# Patient Record
Sex: FEMALE | Race: BLACK OR AFRICAN AMERICAN | NOT HISPANIC OR LATINO | Employment: UNEMPLOYED | URBAN - METROPOLITAN AREA
[De-identification: names, ages, dates, MRNs, and addresses within clinical notes are randomized per-mention and may not be internally consistent; named-entity substitution may affect disease eponyms.]

---

## 2017-08-04 DIAGNOSIS — E87.6 HYPOKALEMIA: ICD-10-CM

## 2017-08-04 RX ORDER — POTASSIUM CHLORIDE 750 MG/1
CAPSULE, EXTENDED RELEASE ORAL
Qty: 90 CAPSULE | Refills: 0 | Status: SHIPPED | OUTPATIENT
Start: 2017-08-04 | End: 2017-09-01 | Stop reason: SDUPTHER

## 2017-08-07 ENCOUNTER — TELEPHONE (OUTPATIENT)
Dept: FAMILY MEDICINE | Facility: CLINIC | Age: 52
End: 2017-08-07

## 2017-08-07 NOTE — TELEPHONE ENCOUNTER
----- Message from Sheree Parson sent at 8/7/2017  2:18 PM CDT -----  Contact: Lynn  Patient is calling again as called earlier regarding an appointment to be seen for ear pain, sore throat, and nose irritation as about to go out of town. Please call [phone] or [phone]. Thanks!

## 2017-08-07 NOTE — TELEPHONE ENCOUNTER
----- Message from Giuliana Arita sent at 8/7/2017 12:37 PM CDT -----  Contact: patient  Patient calling in regards to scheduling a same day appt today. Her nose/ears and throat irritated- sore throat. No avail appt. Please advise.  Call back   Thanks!

## 2017-08-07 NOTE — TELEPHONE ENCOUNTER
Patient has been called and a appointment has been scheduled for her , patient has verbalized full understanding.

## 2017-08-08 ENCOUNTER — DOCUMENTATION ONLY (OUTPATIENT)
Dept: FAMILY MEDICINE | Facility: CLINIC | Age: 52
End: 2017-08-08

## 2017-08-08 DIAGNOSIS — Z51.81 THERAPEUTIC DRUG MONITORING: Primary | ICD-10-CM

## 2017-08-08 DIAGNOSIS — M54.2 CERVICALGIA: ICD-10-CM

## 2017-08-08 RX ORDER — IBUPROFEN 800 MG/1
800 TABLET ORAL 3 TIMES DAILY PRN
Qty: 270 TABLET | Refills: 0 | Status: SHIPPED | OUTPATIENT
Start: 2017-08-08 | End: 2017-09-01 | Stop reason: SDUPTHER

## 2017-08-08 NOTE — PROGRESS NOTES
Pre-Visit Chart Review  For Appointment Scheduled on 8/9/17    Health Maintenance Due   Topic Date Due    Colonoscopy  02/21/2014    Pap Smear with HPV Cotest  08/01/2016    Influenza Vaccine  08/01/2017

## 2017-08-08 NOTE — TELEPHONE ENCOUNTER
Last refill was for 90 days and was a year ago?????  Due for cbc and creatinine to check for problems with nsaid.  Orders in

## 2017-08-09 ENCOUNTER — HOSPITAL ENCOUNTER (OUTPATIENT)
Dept: RADIOLOGY | Facility: CLINIC | Age: 52
Discharge: HOME | End: 2017-08-09
Attending: INTERNAL MEDICINE
Payer: COMMERCIAL

## 2017-08-09 DIAGNOSIS — Z12.31 ENCOUNTER FOR SCREENING MAMMOGRAM FOR MALIGNANT NEOPLASM OF BREAST: ICD-10-CM

## 2017-08-09 PROCEDURE — 77067 SCR MAMMO BI INCL CAD: CPT | Mod: TC

## 2017-08-09 PROCEDURE — 77063 BREAST TOMOSYNTHESIS BI: CPT | Mod: 26,,, | Performed by: RADIOLOGY

## 2017-08-09 PROCEDURE — 77067 SCR MAMMO BI INCL CAD: CPT | Mod: 26,,, | Performed by: RADIOLOGY

## 2017-08-24 DIAGNOSIS — Z12.11 COLON CANCER SCREENING: ICD-10-CM

## 2017-08-31 ENCOUNTER — DOCUMENTATION ONLY (OUTPATIENT)
Dept: FAMILY MEDICINE | Facility: CLINIC | Age: 52
End: 2017-08-31

## 2017-08-31 NOTE — PROGRESS NOTES
Pre-Visit Chart Review  For Appointment Scheduled on 09/01/2017      Health Maintenance Due   Topic Date Due    Colonoscopy  02/21/2014    Pap Smear with HPV Cotest  08/01/2016    Influenza Vaccine  08/01/2017

## 2017-09-01 ENCOUNTER — OFFICE VISIT (OUTPATIENT)
Dept: FAMILY MEDICINE | Facility: CLINIC | Age: 52
End: 2017-09-01
Payer: COMMERCIAL

## 2017-09-01 ENCOUNTER — LAB VISIT (OUTPATIENT)
Dept: LAB | Facility: HOSPITAL | Age: 52
End: 2017-09-01
Attending: NURSE PRACTITIONER
Payer: COMMERCIAL

## 2017-09-01 VITALS
HEIGHT: 64 IN | SYSTOLIC BLOOD PRESSURE: 137 MMHG | DIASTOLIC BLOOD PRESSURE: 84 MMHG | BODY MASS INDEX: 34.21 KG/M2 | TEMPERATURE: 99 F | WEIGHT: 200.38 LBS | HEART RATE: 81 BPM

## 2017-09-01 DIAGNOSIS — Z12.11 SCREENING FOR COLON CANCER: ICD-10-CM

## 2017-09-01 DIAGNOSIS — E56.9 UNSPECIFIED VITAMIN DEFICIENCY: ICD-10-CM

## 2017-09-01 DIAGNOSIS — M50.30 DDD (DEGENERATIVE DISC DISEASE), CERVICAL: ICD-10-CM

## 2017-09-01 DIAGNOSIS — E87.6 HYPOKALEMIA: ICD-10-CM

## 2017-09-01 DIAGNOSIS — Z13.21 SCREENING FOR MALNUTRITION: Primary | ICD-10-CM

## 2017-09-01 DIAGNOSIS — Z13.6 SCREENING FOR ISCHEMIC HEART DISEASE: ICD-10-CM

## 2017-09-01 DIAGNOSIS — R53.83 FATIGUE: ICD-10-CM

## 2017-09-01 DIAGNOSIS — K59.00 CONSTIPATION, UNSPECIFIED CONSTIPATION TYPE: ICD-10-CM

## 2017-09-01 DIAGNOSIS — Z00.00 ANNUAL PHYSICAL EXAM: Primary | ICD-10-CM

## 2017-09-01 DIAGNOSIS — Z12.4 CERVICAL CANCER SCREENING: ICD-10-CM

## 2017-09-01 DIAGNOSIS — R60.9 EDEMA, UNSPECIFIED TYPE: ICD-10-CM

## 2017-09-01 DIAGNOSIS — L29.9 ITCHING: ICD-10-CM

## 2017-09-01 DIAGNOSIS — G47.00 INSOMNIA, UNSPECIFIED TYPE: ICD-10-CM

## 2017-09-01 DIAGNOSIS — Z51.81 THERAPEUTIC DRUG MONITORING: ICD-10-CM

## 2017-09-01 DIAGNOSIS — M54.2 CERVICALGIA: ICD-10-CM

## 2017-09-01 LAB
BASOPHILS # BLD AUTO: 0.02 K/UL
BASOPHILS NFR BLD: 0.3 %
CREAT SERPL-MCNC: 1 MG/DL
DIFFERENTIAL METHOD: NORMAL
EOSINOPHIL # BLD AUTO: 0.2 K/UL
EOSINOPHIL NFR BLD: 3.6 %
ERYTHROCYTE [DISTWIDTH] IN BLOOD BY AUTOMATED COUNT: 13.3 %
EST. GFR  (AFRICAN AMERICAN): >60 ML/MIN/1.73 M^2
EST. GFR  (NON AFRICAN AMERICAN): >60 ML/MIN/1.73 M^2
HCT VFR BLD AUTO: 43.6 %
HGB BLD-MCNC: 14.3 G/DL
LYMPHOCYTES # BLD AUTO: 3 K/UL
LYMPHOCYTES NFR BLD: 45.6 %
MCH RBC QN AUTO: 29.1 PG
MCHC RBC AUTO-ENTMCNC: 32.8 G/DL
MCV RBC AUTO: 89 FL
MONOCYTES # BLD AUTO: 0.4 K/UL
MONOCYTES NFR BLD: 5.7 %
NEUTROPHILS # BLD AUTO: 3 K/UL
NEUTROPHILS NFR BLD: 44.6 %
PLATELET # BLD AUTO: 326 K/UL
PMV BLD AUTO: 9.8 FL
RBC # BLD AUTO: 4.92 M/UL
WBC # BLD AUTO: 6.66 K/UL

## 2017-09-01 PROCEDURE — 85025 COMPLETE CBC W/AUTO DIFF WBC: CPT | Mod: 91

## 2017-09-01 PROCEDURE — 99999 PR PBB SHADOW E&M-EST. PATIENT-LVL IV: CPT | Mod: PBBFAC,,, | Performed by: PHYSICIAN ASSISTANT

## 2017-09-01 PROCEDURE — 99214 OFFICE O/P EST MOD 30 MIN: CPT | Mod: S$GLB,,, | Performed by: PHYSICIAN ASSISTANT

## 2017-09-01 PROCEDURE — 82565 ASSAY OF CREATININE: CPT

## 2017-09-01 PROCEDURE — 3008F BODY MASS INDEX DOCD: CPT | Mod: S$GLB,,, | Performed by: PHYSICIAN ASSISTANT

## 2017-09-01 RX ORDER — CYCLOBENZAPRINE HCL 10 MG
10 TABLET ORAL 3 TIMES DAILY PRN
Qty: 100 TABLET | Refills: 5 | Status: SHIPPED | OUTPATIENT
Start: 2017-09-01 | End: 2018-10-10 | Stop reason: SDUPTHER

## 2017-09-01 RX ORDER — HYDROCODONE BITARTRATE AND ACETAMINOPHEN 5; 325 MG/1; MG/1
1 TABLET ORAL EVERY 6 HOURS PRN
Qty: 100 TABLET | Refills: 0 | Status: CANCELLED | OUTPATIENT
Start: 2017-09-01

## 2017-09-01 RX ORDER — HYDROXYZINE HYDROCHLORIDE 25 MG/1
25 TABLET, FILM COATED ORAL 3 TIMES DAILY PRN
Qty: 90 TABLET | Refills: 1 | Status: SHIPPED | OUTPATIENT
Start: 2017-09-01 | End: 2017-11-06 | Stop reason: SDUPTHER

## 2017-09-01 RX ORDER — TRAZODONE HYDROCHLORIDE 100 MG/1
100 TABLET ORAL NIGHTLY
Qty: 90 TABLET | Refills: 3 | Status: SHIPPED | OUTPATIENT
Start: 2017-09-01 | End: 2018-10-10 | Stop reason: SDUPTHER

## 2017-09-01 RX ORDER — POTASSIUM CHLORIDE 750 MG/1
10 CAPSULE, EXTENDED RELEASE ORAL DAILY
Qty: 90 CAPSULE | Refills: 3 | Status: SHIPPED | OUTPATIENT
Start: 2017-09-01 | End: 2018-10-10 | Stop reason: SDUPTHER

## 2017-09-01 RX ORDER — FUROSEMIDE 40 MG/1
40 TABLET ORAL DAILY
Qty: 90 TABLET | Refills: 3 | Status: SHIPPED | OUTPATIENT
Start: 2017-09-01 | End: 2018-10-10 | Stop reason: SDUPTHER

## 2017-09-01 RX ORDER — IBUPROFEN 800 MG/1
800 TABLET ORAL 3 TIMES DAILY PRN
Qty: 270 TABLET | Refills: 1 | Status: SHIPPED | OUTPATIENT
Start: 2017-09-01 | End: 2018-10-10 | Stop reason: SDUPTHER

## 2017-09-01 NOTE — TELEPHONE ENCOUNTER
Needs ov with me.  Schedule 2 opiate.  Still has clonazepam on her med list.  Not allowed to have opiates if taking benzodiazepines or sleep meds.  (does not include trazodone-its okay)

## 2017-09-01 NOTE — PATIENT INSTRUCTIONS
[email or web address]  [email or web address]  Magnesium 500mg a nigh    Weight Management: Getting Started  Healthy bodies come in all shapes and sizes. Not all bodies are made to be thin. For some people, a healthy weight is higher than the average weight listed on weight charts. Your healthcare provider can help you decide on a healthy weight for you.    Reasons to lose weight  Losing weight can help with some health problems, such as high blood pressure, heart disease, diabetes, sleep apnea, and arthritis. You may also feel more energy.  Set your long-term goal  Your goal doesn't even have to be a specific weight. You may decide on a fitness goal (such as being able to walk 10 miles a week), or a health goal (such as lowering your blood pressure). Choose a goal that is measurable and reasonable, so you know when you've reached it. A goal of reaching a BMI of less than 25 is not always reasonable (or possible).   Make an action plan  Habits dont change overnight. Setting your goals too high can leave you feeling discouraged if you cant reach them. Be realistic. Choose one or two small changes you can make now. Set an action plan for how you are going to make these changes. When you can stick to this plan, keep making a few more small changes. Taking small steps will help you stay on the path to success.  Track your progress  Write down your goals. Then, keep a daily record of your progress. Write down what you eat and how active you are. This record lets you look back on how much youve done. It may also help when youre feeling frustrated. Reward yourself for success. Even if you dont reach every goal, give yourself credit for what you do get done.  Get support  Encouragement from others can help make losing weight easier. Ask your family members and friends for support. They may even want to join you. Also look to your healthcare provider, registered dietitian, and  for help. Your  local hospital can give you more information about nutrition, exercise, and weight loss.  Date Last Reviewed: 1/31/2016 © 2000-2016 [employer]. [address] [place] [address], [place] [address]. All rights reserved. This information is not intended as a substitute for professional medical care. Always follow your healthcare professional's instructions.        Walking for Fitness  Fitness walking has something for everyone, even people who are already fit. Walking is one of the safest ways to condition your body aerobically. It can boost energy, help you lose weight, and reduce stress.    Physical benefits  · Walking strengthens your heart and lungs, and tones your muscles.  · When walking, your feet land with less impact than in other sports. This reduces chances of muscle, bone, and joint injury.  · Regular walking improves your cholesterol levels and lowers your risk of heart disease. And it helps you control your blood sugar if you have diabetes.  · Walking is a weight-bearing activity, which helps maintain bone density. This can help prevent osteoporosis.  Personal rewards  · Taking walks can help you relax and manage stress. And fitness walking may make you feel better about yourself.  · Walking can help you sleep better at night and make you less likely to be depressed.  · Regular walking may help maintain your memory as you get older.  · Walking is a great way to spend extra time with friends and family members. Be sure to invite your dog along!  Q&A about fitness walking  Q: Will walking keep me fit?  A: Yes. Regular walking at the right pace gives you all the benefits of other aerobic activities, such as jogging and swimming.  Q: Will walking help me lose weight and keep it off?  A: Yes. Per mile, walking can burn as many calories as jogging. Your health care provider can help work walking into your weight-loss plan.  Q: Is walking safe for my health?  A: Yes. Walking is safe if you have high blood  pressure, diabetes, heart disease, or other conditions. Talk to your health care provider before you start.  Date Last Reviewed: 5/9/2015  © 5826-8179 [employer]. [address] [place] [address], [place] [address]. All rights reserved. This information is not intended as a substitute for professional medical care. Always follow your healthcare professional's instructions.

## 2017-09-01 NOTE — PROGRESS NOTES
Subjective:       Patient ID: Lynn Naidu is a 52 y.o. female.    Chief Complaint: Annual Exam    Patient is 51 yo F who is here for yearly physical. She is due for blood work and medication refills. She would like to discuss options for bariatric surgery. She is still complaining of constipation despite taking Linzess 290 daily.       Review of Systems   Constitutional: Negative for chills, fatigue and fever.   HENT: Negative for ear pain, rhinorrhea and sore throat.    Respiratory: Negative for cough and shortness of breath.    Cardiovascular: Negative for chest pain and palpitations.   Gastrointestinal: Positive for constipation. Negative for abdominal pain, nausea and vomiting.   Genitourinary: Negative for difficulty urinating and dysuria.   Skin: Negative for rash and wound.   Neurological: Negative for light-headedness and headaches.   Psychiatric/Behavioral: Negative.        Objective:      Physical Exam   Constitutional: She is oriented to person, place, and time. She appears well-developed and well-nourished.   HENT:   Head: Normocephalic and atraumatic.   Eyes: Conjunctivae are normal. Pupils are equal, round, and reactive to light.   Neck: Normal range of motion. Neck supple. No JVD present.   Cardiovascular: Normal rate and regular rhythm.  Exam reveals no gallop and no friction rub.    No murmur heard.  Pulmonary/Chest: Effort normal and breath sounds normal. No respiratory distress. She has no wheezes. She has no rales.   Neurological: She is alert and oriented to person, place, and time.   Skin: Skin is warm and dry.   Psychiatric: She has a normal mood and affect. Her behavior is normal. Judgment and thought content normal.       Assessment:       1. Annual physical exam    2. Cervical cancer screening    3. Screening for colon cancer    4. Hypokalemia    5. Edema, unspecified type    6. Cervicalgia    7. Insomnia, unspecified type    8. Constipation, unspecified constipation type    9. Itching         Plan:       Lynn was seen today for annual exam.    Diagnoses and all orders for this visit:    Annual physical exam  -     CBC auto differential; Future  -     Comprehensive metabolic panel; Future  -     Lipid panel; Future    Cervical cancer screening  -     Ambulatory Referral to Obstetrics / Gynecology    Screening for colon cancer  -     Ambulatory referral to Gastroenterology    Hypokalemia  -     potassium chloride (MICRO-K) 10 MEQ CpSR; Take 1 capsule (10 mEq total) by mouth once daily.    Edema, unspecified type  -     furosemide (LASIX) 40 MG tablet; Take 1 tablet (40 mg total) by mouth once daily.    Cervicalgia  -     cyclobenzaprine (FLEXERIL) 10 MG tablet; Take 1 tablet (10 mg total) by mouth 3 (three) times daily as needed.  -     ibuprofen (ADVIL,MOTRIN) 800 MG tablet; Take 1 tablet (800 mg total) by mouth 3 (three) times daily as needed.    Insomnia, unspecified type  -     trazodone (DESYREL) 100 MG tablet; Take 1 tablet (100 mg total) by mouth every evening.    Constipation, unspecified constipation type  -     linaclotide 290 mcg Cap; Take 1 capsule (290 mcg total) by mouth once daily.    Itching  -     hydrOXYzine HCl (ATARAX) 25 MG tablet; Take 1 tablet (25 mg total) by mouth 3 (three) times daily as needed for Itching.    Given resources for bariatric surgery  Encouraged to take OTC Mg 500 mg to aid in constipation relief

## 2017-09-19 ENCOUNTER — DOCUMENTATION ONLY (OUTPATIENT)
Dept: FAMILY MEDICINE | Facility: CLINIC | Age: 52
End: 2017-09-19

## 2017-09-19 NOTE — PROGRESS NOTES
Pre-Visit Chart Review  For Appointment Scheduled on 9-21-17    Health Maintenance Due   Topic Date Due    Colonoscopy  02/21/2014    Pap Smear with HPV Cotest  08/01/2016    Influenza Vaccine  08/01/2017

## 2017-09-21 ENCOUNTER — OFFICE VISIT (OUTPATIENT)
Dept: FAMILY MEDICINE | Facility: CLINIC | Age: 52
End: 2017-09-21
Payer: COMMERCIAL

## 2017-09-21 VITALS
TEMPERATURE: 98 F | HEIGHT: 64 IN | RESPIRATION RATE: 20 BRPM | BODY MASS INDEX: 34.33 KG/M2 | DIASTOLIC BLOOD PRESSURE: 92 MMHG | HEART RATE: 84 BPM | OXYGEN SATURATION: 97 % | WEIGHT: 201.06 LBS | SYSTOLIC BLOOD PRESSURE: 156 MMHG

## 2017-09-21 DIAGNOSIS — L50.9 URTICARIA: ICD-10-CM

## 2017-09-21 DIAGNOSIS — E55.9 VITAMIN D DEFICIENCY: ICD-10-CM

## 2017-09-21 DIAGNOSIS — M50.30 DDD (DEGENERATIVE DISC DISEASE), CERVICAL: Primary | ICD-10-CM

## 2017-09-21 DIAGNOSIS — M54.12 CERVICAL RADICULITIS: ICD-10-CM

## 2017-09-21 DIAGNOSIS — M50.20 CERVICAL DISC DISPLACEMENT: ICD-10-CM

## 2017-09-21 PROCEDURE — 99999 PR PBB SHADOW E&M-EST. PATIENT-LVL IV: CPT | Mod: PBBFAC,,, | Performed by: FAMILY MEDICINE

## 2017-09-21 PROCEDURE — 3008F BODY MASS INDEX DOCD: CPT | Mod: S$GLB,,, | Performed by: FAMILY MEDICINE

## 2017-09-21 PROCEDURE — 99214 OFFICE O/P EST MOD 30 MIN: CPT | Mod: S$GLB,,, | Performed by: FAMILY MEDICINE

## 2017-09-21 RX ORDER — MAGNESIUM 250 MG
2 TABLET ORAL DAILY PRN
COMMUNITY
End: 2019-10-15

## 2017-09-21 RX ORDER — HYDROCODONE BITARTRATE AND ACETAMINOPHEN 5; 325 MG/1; MG/1
1 TABLET ORAL EVERY 6 HOURS PRN
Qty: 100 TABLET | Refills: 0 | Status: SHIPPED | OUTPATIENT
Start: 2017-09-21 | End: 2019-01-02

## 2017-09-21 RX ORDER — ERGOCALCIFEROL 1.25 MG/1
50000 CAPSULE ORAL
Qty: 12 CAPSULE | Refills: 2 | Status: SHIPPED | OUTPATIENT
Start: 2017-09-21 | End: 2018-07-09 | Stop reason: SDUPTHER

## 2017-09-21 RX ORDER — ERGOCALCIFEROL 1.25 MG/1
50000 CAPSULE ORAL
COMMUNITY
End: 2017-09-21 | Stop reason: SDUPTHER

## 2017-09-21 NOTE — PATIENT INSTRUCTIONS
Walking for Fitness  Fitness walking has something for everyone, even people who are already fit. Walking is one of the safest ways to condition your body aerobically. It can boost energy, help you lose weight, and reduce stress.    Physical benefits  · Walking strengthens your heart and lungs, and tones your muscles.  · When walking, your feet land with less impact than in other sports. This reduces chances of muscle, bone, and joint injury.  · Regular walking improves your cholesterol levels and lowers your risk of heart disease. And it helps you control your blood sugar if you have diabetes.  · Walking is a weight-bearing activity, which helps maintain bone density. This can help prevent osteoporosis.  Personal rewards  · Taking walks can help you relax and manage stress. And fitness walking may make you feel better about yourself.  · Walking can help you sleep better at night and make you less likely to be depressed.  · Regular walking may help maintain your memory as you get older.  · Walking is a great way to spend extra time with friends and family members. Be sure to invite your dog along!  Q&A about fitness walking  Q: Will walking keep me fit?  A: Yes. Regular walking at the right pace gives you all the benefits of other aerobic activities, such as jogging and swimming.  Q: Will walking help me lose weight and keep it off?  A: Yes. Per mile, walking can burn as many calories as jogging. Your health care provider can help work walking into your weight-loss plan.  Q: Is walking safe for my health?  A: Yes. Walking is safe if you have high blood pressure, diabetes, heart disease, or other conditions. Talk to your healthcare provider before you start.  Date Last Reviewed: 4/1/2017 © 2000-2017 [employer]. [address] [place] [address], [place] [address]. All rights reserved. This information is not intended as a substitute for professional medical care. Always follow your healthcare professional's  instructions.        Weight Management: Getting Started  Healthy bodies come in all shapes and sizes. Not all bodies are made to be thin. For some people, a healthy weight is higher than the average weight listed on weight charts. Your healthcare provider can help you decide on a healthy weight for you.    Reasons to lose weight  Losing weight can help with some health problems, such as high blood pressure, heart disease, diabetes, sleep apnea, and arthritis. You may also feel more energy.  Set your long-term goal  Your goal doesn't even have to be a specific weight. You may decide on a fitness goal (such as being able to walk 10 miles a week), or a health goal (such as lowering your blood pressure). Choose a goal that is measurable and reasonable, so you know when you've reached it. A goal of reaching a BMI of less than 25 is not always reasonable (or possible).   Make an action plan  Habits dont change overnight. Setting your goals too high can leave you feeling discouraged if you cant reach them. Be realistic. Choose one or two small changes you can make now. Set an action plan for how you are going to make these changes. When you can stick to this plan, keep making a few more small changes. Taking small steps will help you stay on the path to success.  Track your progress  Write down your goals. Then, keep a daily record of your progress. Write down what you eat and how active you are. This record lets you look back on how much youve done. It may also help when youre feeling frustrated. Reward yourself for success. Even if you dont reach every goal, give yourself credit for what you do get done.  Get support  Encouragement from others can help make losing weight easier. Ask your family members and friends for support. They may even want to join you. Also look to your healthcare provider, registered dietitian, and  for help. Your local hospital can give you more information about  nutrition, exercise, and weight loss.  Date Last Reviewed: 1/31/2016  © 9449-1484 [employer]. [address] [place] [address], [place] [address]. All rights reserved. This information is not intended as a substitute for professional medical care. Always follow your healthcare professional's instructions.        Controlling High Blood Pressure  High blood pressure (hypertension) is often called the silent killer. This is because many people who have it dont know it. High blood pressure is defined as 140/90 mm Hg or higher. Know your blood pressure and remember to check it regularly. Doing so can save your life. Here are some things you can do to help control your blood pressure.    Choose heart-healthy foods  · Select low-salt, low-fat foods. Limit sodium intake to 2,400 mg per day or the amount suggested by your healthcare provider.  · Limit canned, dried, cured, packaged, and fast foods. These can contain a lot of salt.  · Eat 8 to 10 servings of fruits and vegetables every day.  · Choose lean meats, fish, or chicken.  · Eat whole-grain pasta, brown rice, and beans.  · Eat 2 to 3 servings of low-fat or fat-free dairy products.  · Ask your doctor about the DASH eating plan. This plan helps reduce blood pressure.  · When you go to a restaurant, ask that your meal be prepared with no added salt.  Maintain a healthy weight  · Ask your healthcare provider how many calories to eat a day. Then stick to that number.  · Ask your healthcare provider what weight range is healthiest for you. If you are overweight, a weight loss of only 3% to 5% of your body weight can help lower blood pressure. Generally, a good weight loss goal is to lose 10% of your body weight in a year.  · Limit snacks and sweets.  · Get regular exercise.  Get up and get active  · Choose activities you enjoy. Find ones you can do with friends or family. This includes bicycling, dancing, walking, and jogging.  · Park farther away from building  entrances.  · Use stairs instead of the elevator.  · When you can, walk or bike instead of driving.  · Triplett leaves, garden, or do household repairs.  · Be active at a moderate to vigorous level of physical activity for at least 40 minutes for a minimum of 3 to 4 days a week.   Manage stress  · Make time to relax and enjoy life. Find time to laugh.  · Communicate your concerns with your loved ones and your healthcare provider.  · Visit with family and friends, and keep up with hobbies.  Limit alcohol and quit smoking  · Men should have no more than 2 drinks per day.  · Women should have no more than 1 drink per day.  · Talk with your healthcare provider about quitting smoking. Smoking significantly increases your risk for heart disease and stroke. Ask your healthcare provider about community smoking cessation programs and other options.  Medicines  If lifestyle changes arent enough, your healthcare provider may prescribe high blood pressure medicine. Take all medicines as prescribed. If you have any questions about your medicines, ask your healthcare provider before stopping or changing them.   Date Last Reviewed: 4/27/2016 © 2000-2017 [employer]. [address] [place] [address], [place] [address]. All rights reserved. This information is not intended as a substitute for professional medical care. Always follow your healthcare professional's instructions.

## 2017-09-21 NOTE — PROGRESS NOTES
Subjective:       Patient ID: Lynn Naidu is a 52 y.o. female.    Chief Complaint: Medication Refill    52-year-old female presents for refill of [place].  She has degenerative disc disease with cervical radiculitis to the left shoulder and has a very sparing use of Norco.  She's been undergoing workup for possible gastric sleeve procedure with Dr. Frank.  They found on a screening ultrasound some mild abnormalities of the liver suggestive of intrinsic liver disease.  She has numerous liver panels going back 6 years or more all of which are completely normal and did a hepatitis panel that was negative as well.  The patient has no symptoms whatsoever.  They did not describe fatty liver disease but I suspect that what they are seeing is actually the early stages of it.  The patient complains of some urticaria generally involving the head and neck that occurs randomly several times a month.  She suspects it may be due to a food ALLERGY.  She went to a dermatologist he was not able to help her and she has no symptoms at this time.  She was found to have a very low vitamin D level during her workup of 8.  She was placed on high-dose vitamin D supplementation weekly but only given 3 month supply.    Past Medical History:  No date: Allergy  7-17-15: Bronchitis  No date: Cervical disc displacement  No date: Edema  No date: Insomnia  No date: Plantar fasciitis    History reviewed. No pertinent surgical history.      Current Outpatient Prescriptions:     BIONECT 0.2 % Crea, Apply topically daily as needed. , Disp: , Rfl:     clonazePAM (KLONOPIN) 0.5 MG tablet, Take 1 tablet (0.5 mg total) by mouth daily as needed., Disp: 30 tablet, Rfl: 0    cyclobenzaprine (FLEXERIL) 10 MG tablet, Take 1 tablet (10 mg total) by mouth 3 (three) times daily as needed., Disp: 100 tablet, Rfl: 5    desonide (DESOWEN) 0.05 % cream, Apply topically daily as needed. , Disp: , Rfl:     ergocalciferol (VITAMIN D2) 50,000 unit Cap, Take 1  capsule (50,000 Units total) by mouth every 7 days., Disp: 12 capsule, Rfl: 2    furosemide (LASIX) 40 MG tablet, Take 1 tablet (40 mg total) by mouth once daily., Disp: 90 tablet, Rfl: 3    hydrocodone-acetaminophen 5-325mg (NORCO) 5-325 mg per tablet, Take 1 tablet by mouth every 6 (six) hours as needed for Pain., Disp: 100 tablet, Rfl: 0    hydrOXYzine HCl (ATARAX) 25 MG tablet, Take 1 tablet (25 mg total) by mouth 3 (three) times daily as needed for Itching., Disp: 90 tablet, Rfl: 1    ibuprofen (ADVIL,MOTRIN) 800 MG tablet, Take 1 tablet (800 mg total) by mouth 3 (three) times daily as needed., Disp: 270 tablet, Rfl: 1    JUBLIA 10 % Tr, Apply topically nightly as needed. , Disp: , Rfl:     linaclotide 290 mcg Cap, Take 1 capsule (290 mcg total) by mouth once daily., Disp: 90 capsule, Rfl: 3    magnesium 250 mg Tab, Take 2 tablets by mouth once., Disp: , Rfl:     mometasone (NASONEX) 50 mcg/actuation nasal spray, 2 sprays by Nasal route daily as needed. , Disp: , Rfl:     montelukast (SINGULAIR) 10 mg tablet, Take 1 tablet (10 mg total) by mouth every evening. (Patient taking differently: Take 10 mg by mouth nightly as needed. ), Disp: 90 tablet, Rfl: 3    PANDEL 0.1 % Crea, Apply topically daily as needed. , Disp: , Rfl:     potassium chloride (MICRO-K) 10 MEQ CpSR, Take 1 capsule (10 mEq total) by mouth once daily., Disp: 90 capsule, Rfl: 3    scopolamine (TRANSDERM-SCOP) 1.5 mg (1 mg over 3 days), Place 1 patch (1.5 mg total) onto the skin every 72 hours. (Patient taking differently: Place 1 patch onto the skin every 72 hours as needed. ), Disp: 30 patch, Rfl: 2    SUDOGEST 30 mg tablet, Take 30 mg by mouth every 6 (six) hours as needed. , Disp: , Rfl:     SULFACLEANSE 8-4 8-4 % Susp, Apply topically nightly as needed. , Disp: , Rfl:     trazodone (DESYREL) 100 MG tablet, Take 1 tablet (100 mg total) by mouth every evening. (Patient taking differently: Take 100 mg by mouth nightly as needed.  ), Disp: 90 tablet, Rfl: 3    triamcinolone acetonide 0.1% (KENALOG) 0.1 % cream, Apply topically daily as needed. , Disp: , Rfl:         Review of Systems   Musculoskeletal: Positive for neck pain.   Skin: Positive for rash. Negative for color change and pallor.       Objective:      Physical Exam   Constitutional: She is oriented to person, place, and time. She appears well-developed. No distress.   Elevated blood pressure.  Patient reports she ate Chinese food which was a very salty last night.  Her physical 3 weeks ago had a normal blood pressure  Obese with BMI 35.1.  She is up 17.7 pounds since her last visit with me August 31, 2016   Abdominal: Soft. Normal appearance and bowel sounds are normal. She exhibits no shifting dullness, no distension, no pulsatile liver, no fluid wave, no abdominal bruit, no ascites, no pulsatile midline mass and no mass. There is no hepatosplenomegaly. There is no tenderness. There is no rigidity, no rebound, no guarding, no CVA tenderness, no tenderness at McBurney's point and negative Johnson's sign. No hernia.   Neurological: She is alert and oriented to person, place, and time.   Skin: She is not diaphoretic.   Psychiatric: She has a normal mood and affect. Her behavior is normal. Thought content normal.   Nursing note and vitals reviewed.      Assessment:       1. DDD (degenerative disc disease), cervical    2. Vitamin D deficiency    3. Urticaria    4. Cervical radiculitis    5. Cervical disc displacement        Plan:       1. DDD (degenerative disc disease), cervical   checked with no inappropriate activit- hydrocodone-acetaminophen 5-325mg (NORCO) 5-325 mg per tablet; Take 1 tablet by mouth every 6 (six) hours as needed for Pain.  Dispense: 100 tablet; Refill: 0    2. Vitamin D deficiency  - ergocalciferol (VITAMIN D2) 50,000 unit Cap; Take 1 capsule (50,000 Units total) by mouth every 7 days.  Dispense: 12 capsule; Refill: 2    3. Urticaria  Suggested she keep a food  diary for several weeks to months and then see ALLERGY  - Ambulatory referral to Allergy    4. Cervical radiculitis  Stable/chronic    5. Cervical disc displacement

## 2017-10-05 ENCOUNTER — TELEPHONE (OUTPATIENT)
Dept: GASTROENTEROLOGY | Facility: CLINIC | Age: 52
End: 2017-10-05

## 2017-10-05 NOTE — TELEPHONE ENCOUNTER
----- Message from Asha Gilliam sent at 10/5/2017  1:27 PM CDT -----  Contact: self [phone]  Please call her to reschedule the nurse appt.  Thank you!

## 2017-10-11 DIAGNOSIS — Z12.11 SCREENING FOR COLON CANCER: Primary | ICD-10-CM

## 2017-11-01 ENCOUNTER — HOSPITAL ENCOUNTER (OUTPATIENT)
Facility: HOSPITAL | Age: 52
Discharge: HOME | End: 2017-11-01
Attending: INTERNAL MEDICINE | Admitting: INTERNAL MEDICINE
Payer: COMMERCIAL

## 2017-11-01 ENCOUNTER — SURGERY (OUTPATIENT)
Age: 52
End: 2017-11-01

## 2017-11-01 ENCOUNTER — ANESTHESIA EVENT (OUTPATIENT)
Dept: ENDOSCOPY | Facility: HOSPITAL | Age: 52
End: 2017-11-01
Payer: COMMERCIAL

## 2017-11-01 ENCOUNTER — ANESTHESIA (OUTPATIENT)
Dept: ENDOSCOPY | Facility: HOSPITAL | Age: 52
End: 2017-11-01
Payer: COMMERCIAL

## 2017-11-01 VITALS — RESPIRATION RATE: 30 BRPM

## 2017-11-01 DIAGNOSIS — K57.30 DIVERTICULOSIS OF LARGE INTESTINE WITHOUT HEMORRHAGE: ICD-10-CM

## 2017-11-01 DIAGNOSIS — Z12.11 SCREEN FOR COLON CANCER: ICD-10-CM

## 2017-11-01 DIAGNOSIS — K64.8 INTERNAL HEMORRHOIDS: Primary | ICD-10-CM

## 2017-11-01 PROCEDURE — 37000009 HC ANESTHESIA EA ADD 15 MINS: Performed by: INTERNAL MEDICINE

## 2017-11-01 PROCEDURE — D9220A PRA ANESTHESIA: Mod: 33,ANES,, | Performed by: ANESTHESIOLOGY

## 2017-11-01 PROCEDURE — 37000008 HC ANESTHESIA 1ST 15 MINUTES: Performed by: INTERNAL MEDICINE

## 2017-11-01 PROCEDURE — G0121 COLON CA SCRN NOT HI RSK IND: HCPCS | Performed by: INTERNAL MEDICINE

## 2017-11-01 PROCEDURE — D9220A PRA ANESTHESIA: Mod: 33,CRNA,, | Performed by: NURSE ANESTHETIST, CERTIFIED REGISTERED

## 2017-11-01 PROCEDURE — 63600175 PHARM REV CODE 636 W HCPCS: Performed by: NURSE ANESTHETIST, CERTIFIED REGISTERED

## 2017-11-01 PROCEDURE — G0121 COLON CA SCRN NOT HI RSK IND: HCPCS | Mod: ,,, | Performed by: INTERNAL MEDICINE

## 2017-11-01 PROCEDURE — 25000003 PHARM REV CODE 250: Performed by: INTERNAL MEDICINE

## 2017-11-01 RX ORDER — SODIUM CHLORIDE 9 MG/ML
INJECTION, SOLUTION INTRAVENOUS CONTINUOUS
Status: DISCONTINUED | OUTPATIENT
Start: 2017-11-01 | End: 2017-11-01 | Stop reason: HOSPADM

## 2017-11-01 RX ORDER — LIDOCAINE HCL/PF 100 MG/5ML
SYRINGE (ML) INTRAVENOUS
Status: DISCONTINUED | OUTPATIENT
Start: 2017-11-01 | End: 2017-11-01

## 2017-11-01 RX ORDER — PROPOFOL 10 MG/ML
INJECTION, EMULSION INTRAVENOUS
Status: COMPLETED
Start: 2017-11-01 | End: 2017-11-01

## 2017-11-01 RX ORDER — LIDOCAINE HYDROCHLORIDE 20 MG/ML
INJECTION, SOLUTION EPIDURAL; INFILTRATION; INTRACAUDAL; PERINEURAL
Status: DISCONTINUED
Start: 2017-11-01 | End: 2017-11-01 | Stop reason: HOSPADM

## 2017-11-01 RX ORDER — PROPOFOL 10 MG/ML
VIAL (ML) INTRAVENOUS
Status: DISCONTINUED | OUTPATIENT
Start: 2017-11-01 | End: 2017-11-01

## 2017-11-01 RX ADMIN — PROPOFOL 50 MG: 10 INJECTION, EMULSION INTRAVENOUS at 08:11

## 2017-11-01 RX ADMIN — LIDOCAINE HYDROCHLORIDE 50 MG: 20 INJECTION, SOLUTION INTRAVENOUS at 08:11

## 2017-11-01 RX ADMIN — PROPOFOL 100 MG: 10 INJECTION, EMULSION INTRAVENOUS at 08:11

## 2017-11-01 RX ADMIN — SODIUM CHLORIDE: 0.9 INJECTION, SOLUTION INTRAVENOUS at 07:11

## 2017-11-01 NOTE — TRANSFER OF CARE
"Anesthesia Transfer of Care Note    Patient: Lynn Naidu    Procedure(s) Performed: Procedure(s) (LRB):  COLONOSCOPY (N/A)    Patient location: PACU    Anesthesia Type: general    Transport from OR: Transported from OR on room air with adequate spontaneous ventilation    Post pain: adequate analgesia    Post assessment: no apparent anesthetic complications and tolerated procedure well    Post vital signs: stable    Level of consciousness: awake, alert and oriented    Nausea/Vomiting: no nausea/vomiting    Complications: none    Transfer of care protocol was followed      Last vitals:   Visit Vitals  BP (!) 167/96 (BP Location: Left arm, Patient Position: Sitting)   Pulse 91   Temp 36.7 °C (98.1 °F) (Skin)   Resp 16   Ht 5' 3" (1.6 m)   Wt 91.2 kg (201 lb)   LMP 02/11/2009   SpO2 98%   Breastfeeding? No   BMI 35.61 kg/m²     "

## 2017-11-01 NOTE — DISCHARGE INSTRUCTIONS
Diverticulosis    Diverticulosis means that small pouches have formed in the wall of your large intestine (colon). Most often, this problem causes no symptoms and is common as people age. But the pouches in the colon are at risk of becoming infected. When this happens, the condition is called diverticulitis. Although most people with diverticulosis never develop diverticulitis, it is still not uncommon. Rectal bleeding can also occur and in less common situations, a type of colon inflammation called colitis.  While most people do not have symptoms, some people with diverticulosis may have:  · Abdominal cramps and pain  · Bloating  · Constipation  · Change in bowel habits  Causes  The exact cause of diverticulosis (and diverticulitis) has not been proved, but a few things are associated with the condition:  · Low-fiber diet  · Constipation  · Lack of exercise  Your healthcare provider will talk with you about how to manage your condition. Diet changes may be all that are needed to help control diverticulosis and prevent progression to diverticulitis. If you develop diverticulitis, you will likely need other treatments.  Home care  You may be told to take fiber supplements daily. Fiber adds bulk to the stool so that it passes through the colon more easily. Stool softeners may be recommended. You may also be given medications for pain relief. Be sure to take all medications as directed.  In the past, people were told to avoid corn, nuts, and seeds. This is no longer necessary.  Follow these guidelines when caring for yourself at home:  · Eat unprocessed foods that are high in fiber. Whole grains, fruits, and vegetables are good choices.  · Drink 6 to 8 glasses of water every day unless your healthcare provider has you limit how much fluid you should have.  · Watch for changes in your bowel movements. Tell your provider if you notice any changes.  · Begin an exercise program. Ask your provider how to get started.  Generally, walking is the best.  · Get plenty of rest and sleep.  Follow-up care  Follow up with your healthcare provider, or as advised. Regular visits may be needed to check on your health. Sometimes special procedures such as colonoscopy, are needed after an episode of diverticulitis or blooding. Be sure to keep all your appointments.  If a stool sample was taken, or cultures were done, you should be told if they are positive, or if your treatment needs to be changed. You can call as directed for the results.  If X-rays were done, a radiologist will look at them. You will be told if there is a change in your treatment.  If antibiotics were prescribed, be sure to finish them all.  When to seek medical advice  Call your healthcare provider right away if any of these occur:  · Fever of 100.4°F (38°C) or higher, or as directed by your healthcare provider  · Severe cramps in the lower left side of the abdomen or pain that is getting worse  · Tenderness in the lower left side of the abdomen or worsening pain throughout the abdomen  · Diarrhea or constipation that doesn't get better within 24 hours  · Nausea and vomiting  · Bleeding from the rectum  Call 911  Call emergency services if any of the following occur:  · Trouble breathing  · Confusion  · Very drowsy or trouble awakening  · Fainting or loss of consciousness  · Rapid heart rate  · Chest pain  Date Last Reviewed: 12/30/2015 © 2000-2017 [employer]. [address] [place] [address]. All rights reserved. This information is not intended as a substitute for professional medical care. Always follow your healthcare professional's instructions.          Discharge Instructions: After Your Surgery/Procedure  Youve just had surgery. During surgery you were given medicine called anesthesia to keep you relaxed and free of pain. After surgery you may have some pain or nausea. This is common. Here are some tips for feeling better and getting well  "after surgery.     Stay on schedule with your medication.   Going home  Your doctor or nurse will show you how to take care of yourself when you go home. He or she will also answer your questions. Have an adult family member or friend drive you home.      For your safety we recommend these precaution for the first 24 hours after your procedure:  · Do not drive or use heavy equipment.  · Do not make important decisions or sign legal papers.  · Do not drink alcohol.  · Have someone stay with you, if needed. He or she can watch for problems and help keep you safe.  · Your concentration, balance, coordination, and judgement may be impaired for many hours after anesthesia.  Use caution when ambulating or standing up.     · You may feel weak and "washed out" after anesthesia and surgery.      Subtle residual effects of general anesthesia or sedation with regional / local anesthesia can last more than 24 hours.  Rest for the remainder of the day or longer if your Doctor/Surgeon has advised you to do so.  Although you may feel normal within the first 24 hours, your reflexes and mental ability may be impaired without you realizing it.  You may feel dizzy, lightheaded or sleepy for 24 hours or longer.      Be sure to go to all follow-up visits with your doctor. And rest after your surgery for as long as your doctor tells you to.  Coping with pain  If you have pain after surgery, pain medicine will help you feel better. Take it as told, before pain becomes severe. Also, ask your doctor or pharmacist about other ways to control pain. This might be with heat, ice, or relaxation. And follow any other instructions your surgeon or nurse gives you.  Tips for taking pain medicine  To get the best relief possible, remember these points:  · Pain medicines can upset your stomach. Taking them with a little food may help.  · Most pain relievers taken by mouth need at least 20 to 30 minutes to start to work.  · Taking medicine on a " schedule can help you remember to take it. Try to time your medicine so that you can take it before starting an activity. This might be before you get dressed, go for a walk, or sit down for dinner.  · Constipation is a common side effect of pain medicines. Call your doctor before taking any medicines such as laxatives or stool softeners to help ease constipation. Also ask if you should skip any foods. Drinking lots of fluids and eating foods such as fruits and vegetables that are high in fiber can also help. Remember, do not take laxatives unless your surgeon has prescribed them.  · Drinking alcohol and taking pain medicine can cause dizziness and slow your breathing. It can even be deadly. Do not drink alcohol while taking pain medicine.  · Pain medicine can make you react more slowly to things. Do not drive or run machinery while taking pain medicine.  Your health care provider may tell you to take acetaminophen to help ease your pain. Ask him or her how much you are supposed to take each day. Acetaminophen or other pain relievers may interact with your prescription medicines or other over-the-counter (OTC) drugs. Some prescription medicines have acetaminophen and other ingredients. Using both prescription and OTC acetaminophen for pain can cause you to overdose. Read the labels on your OTC medicines with care. This will help you to clearly know the list of ingredients, how much to take, and any warnings. It may also help you not take too much acetaminophen. If you have questions or do not understand the information, ask your pharmacist or health care provider to explain it to you before you take the OTC medicine.  Managing nausea  Some people have an upset stomach after surgery. This is often because of anesthesia, pain, or pain medicine, or the stress of surgery. These tips will help you handle nausea and eat healthy foods as you get better. If you were on a special food plan before surgery, ask your doctor if  you should follow it while you get better. These tips may help:  · Do not push yourself to eat. Your body will tell you when to eat and how much.  · Start off with clear liquids and soup. They are easier to digest.  · Next try semi-solid foods, such as mashed potatoes, applesauce, and gelatin, as you feel ready.  · Slowly move to solid foods. Dont eat fatty, rich, or spicy foods at first.  · Do not force yourself to have 3 large meals a day. Instead eat smaller amounts more often.  · Take pain medicines with a small amount of solid food, such as crackers or toast, to avoid nausea.     Call your surgeon if  · You still have pain an hour after taking medicine. The medicine may not be strong enough.  · You feel too sleepy, dizzy, or groggy. The medicine may be too strong.  · You have side effects like nausea, vomiting, or skin changes, such as rash, itching, or hives.       If you have obstructive sleep apnea  You were given anesthesia medicine during surgery to keep you comfortable and free of pain. After surgery, you may have more apnea spells because of this medicine and other medicines you were given. The spells may last longer than usual.   At home:  · Keep using the continuous positive airway pressure (CPAP) device when you sleep. Unless your health care provider tells you not to, use it when you sleep, day or night. CPAP is a common device used to treat obstructive sleep apnea.  · Talk with your provider before taking any pain medicine, muscle relaxants, or sedatives. Your provider will tell you about the possible dangers of taking these medicines.  © 5944-6622 [employer]. [address] [place] [address], [place] [address]. All rights reserved. This information is not intended as a substitute for professional medical care. Always follow your healthcare professional's instructions.

## 2017-11-01 NOTE — ANESTHESIA PREPROCEDURE EVALUATION
11/01/2017  Lynn Naidu is a 52 y.o., female.    Anesthesia Evaluation      I have reviewed the Medications.     Review of Systems  Anesthesia Hx:  No problems with previous Anesthesia   Social:  Non-Smoker, No Alcohol Use    Cardiovascular:  Cardiovascular Normal     Pulmonary:  Pulmonary Normal    Renal/:  Renal/ Normal     Hepatic/GI:  Hepatic/GI Normal    Neurological:  Neurology Normal    Endocrine:  Endocrine Normal        Physical Exam  General:  Obesity    Airway/Jaw/Neck:  Airway Findings: Mouth Opening: Normal Tongue: Normal  General Airway Assessment: Adult, Average  Mallampati: II  Jaw/Neck Findings:  Neck ROM: Normal ROM       Chest/Lungs:  Chest/Lungs Findings: Clear to auscultation, Normal Respiratory Rate     Heart/Vascular:  Heart Findings: Rate: Normal  Rhythm: Regular Rhythm  Sounds: Normal  Heart murmur: negative       Mental Status:  Mental Status Findings:  Cooperative, Alert and Oriented         Anesthesia Plan  Type of Anesthesia, risks & benefits discussed:  Anesthesia Type:  general  Patient's Preference:   Intra-op Monitoring Plan:   Intra-op Monitoring Plan Comments:   Post Op Pain Control Plan:   Post Op Pain Control Plan Comments:   Induction:   IV  Beta Blocker:  Patient is not currently on a Beta-Blocker (No further documentation required).       Informed Consent: Patient understands risks and agrees with Anesthesia plan.  Questions answered. Anesthesia consent signed with patient.  ASA Score: 2     Day of Surgery Review of History & Physical:        Anesthesia Plan Notes: Propofol general.        Ready For Surgery From Anesthesia Perspective.

## 2017-11-01 NOTE — PLAN OF CARE
Have you had a colonoscopy LESS THAN 3 years ago? no  * If YES, answer these questions*:    1. Did patient have a prior colonic polyp in a previous surveillance/diagnostic colonoscopy and is 18 years or older on date of encounter?    2. Documentation of < 3 year interval since the patients last colonoscopy due to medical reasons (eg., last colonoscopy incomplete, last colonoscopy had inadequate prep, piecemeal removal of adenomas, or last colonoscopy found > 10 adenomas) ?

## 2017-11-01 NOTE — PLAN OF CARE
Vss, bruno po fluids, denies pain, ambulates easily. IV removed, catheter intact. Discharge instructions provided and states understanding. States ready to go home.  Discharged from facility with family.

## 2017-11-01 NOTE — H&P
[address] [address]    Phone:  [phone]                                       After Visit Summary   9/9/2017    Charlene Douglas    MRN: [id number]           After Visit Summary Signature Page     I have received my discharge instructions, and my questions have been answered. I have discussed any challenges I see with this plan with the nurse or doctor.    ..........................................................................................................................................  Patient/Patient Representative Signature      ..........................................................................................................................................  Patient Representative Print Name and Relationship to Patient    ..................................................               ................................................  Date                                            Time    ..........................................................................................................................................  Reviewed by Signature/Title    ...................................................              ..............................................  Date                                                            Time           CC: Screening for colorectal cancer - first occurrence    52 year old female with above. States that symptoms are absent, no alleviating/exacerbating factors. No family history of CA. No personal history of polyps. No bleeding or weight loss.     ROS:  No headache, no fever/chills, no chest pain/SOB, no nausea/vomiting/diarrhea/constipation/GI bleeding/abdominal pain, no dysuria/hematuria.    VSSAF   Exam:   Alert and oriented x 3; no apparent distress   PERRLA, sclera anicteric  CV: Regular rate/rhythm, normal PMI   Lungs: Clear bilaterally with no wheeze/rales   Abdomen: Soft, NT/ND, normal bowel sounds   Ext: No cyanosis, clubbing     Impression:   As above    Plan:   Proceed with endoscopy. Further recs to follow.

## 2017-11-01 NOTE — ANESTHESIA POSTPROCEDURE EVALUATION
"Anesthesia Post Evaluation    Patient: Lynn Naidu    Procedure(s) Performed: Procedure(s) (LRB):  COLONOSCOPY (N/A)    Final Anesthesia Type: general  Patient location during evaluation: PACU  Patient participation: Yes- Able to Participate  Level of consciousness: awake and alert  Post-procedure vital signs: reviewed and stable  Pain management: adequate  Airway patency: patent  PONV status at discharge: No PONV  Anesthetic complications: no      Cardiovascular status: hemodynamically stable and blood pressure returned to baseline  Respiratory status: unassisted, spontaneous ventilation and room air  Hydration status: euvolemic  Follow-up not needed.        Visit Vitals  BP (!) 149/90 (BP Location: Left arm, Patient Position: Sitting)   Pulse 92   Temp 36.7 °C (98.1 °F) (Skin)   Resp 16   Ht 5' 3" (1.6 m)   Wt 91.2 kg (201 lb)   LMP 02/11/2009   SpO2 99%   Breastfeeding? No   BMI 35.61 kg/m²       Pain/Dana Score: Pain Assessment Performed: Yes (11/1/2017  9:07 AM)  Presence of Pain: denies (11/1/2017  9:07 AM)  Dana Score: 10 (11/1/2017  9:07 AM)      "

## 2017-11-02 VITALS
OXYGEN SATURATION: 99 % | SYSTOLIC BLOOD PRESSURE: 149 MMHG | HEART RATE: 92 BPM | HEIGHT: 63 IN | DIASTOLIC BLOOD PRESSURE: 90 MMHG | TEMPERATURE: 98 F | BODY MASS INDEX: 35.61 KG/M2 | RESPIRATION RATE: 16 BRPM | WEIGHT: 201 LBS

## 2017-11-03 ENCOUNTER — TELEPHONE (OUTPATIENT)
Dept: ALLERGY | Facility: CLINIC | Age: 52
End: 2017-11-03

## 2017-11-03 NOTE — TELEPHONE ENCOUNTER
Pt has appt for 11/14/17 (next available).       ----- Message from Adilene Andino sent at 11/3/2017 10:15 AM CDT -----  Contact: SELF [phone]  Patient came to  to cancel appointment with Dr Mcintosh.  Then she came back to desk after going down the [place]illo and wanted to keep the appointment.  It will not let me book it.  She wants to know what is her next available if she cannot get that appointment back.  Please call patient at [phone] to advise.

## 2017-11-06 DIAGNOSIS — L29.9 ITCHING: ICD-10-CM

## 2017-11-06 RX ORDER — HYDROXYZINE HYDROCHLORIDE 25 MG/1
TABLET, FILM COATED ORAL
Qty: 90 TABLET | Refills: 1 | Status: SHIPPED | OUTPATIENT
Start: 2017-11-06 | End: 2017-11-14 | Stop reason: SDUPTHER

## 2017-11-14 ENCOUNTER — OFFICE VISIT (OUTPATIENT)
Dept: ALLERGY | Facility: CLINIC | Age: 52
End: 2017-11-14
Payer: COMMERCIAL

## 2017-11-14 VITALS — BODY MASS INDEX: 35.43 KG/M2 | HEIGHT: 63 IN | OXYGEN SATURATION: 97 % | HEART RATE: 112 BPM | WEIGHT: 199.94 LBS

## 2017-11-14 DIAGNOSIS — L29.9 PRURITUS: ICD-10-CM

## 2017-11-14 DIAGNOSIS — L29.9 ITCHING: ICD-10-CM

## 2017-11-14 DIAGNOSIS — J31.0 OTHER CHRONIC RHINITIS: ICD-10-CM

## 2017-11-14 DIAGNOSIS — L30.9 DERMATITIS: Primary | ICD-10-CM

## 2017-11-14 PROCEDURE — 99244 OFF/OP CNSLTJ NEW/EST MOD 40: CPT | Mod: S$GLB,,, | Performed by: ALLERGY & IMMUNOLOGY

## 2017-11-14 PROCEDURE — 99999 PR PBB SHADOW E&M-EST. PATIENT-LVL II: CPT | Mod: PBBFAC,,, | Performed by: ALLERGY & IMMUNOLOGY

## 2017-11-14 RX ORDER — HYDROXYZINE HYDROCHLORIDE 25 MG/1
25 TABLET, FILM COATED ORAL 3 TIMES DAILY PRN
Qty: 90 TABLET | Refills: 1 | Status: SHIPPED | OUTPATIENT
Start: 2017-11-14

## 2017-11-14 NOTE — PROGRESS NOTES
Subjective:       Patient ID: Lynn Naidu is a 52 y.o. female.    Chief Complaint:  Rash (rash /bumps on face, gets hot. pcp referred, has seen Derm several times)      51 yo woman presents for consult from Dr Remy Parson for rash. She states for past year she breaks out on her face mostly, once in awhile gets on neck and upper back. It is small bumps, slightly itchy and face gets red and hot. It will last a week or more then resolve but ome back eery 2 weeks or so. She has had medrol and helped. Saw derm and told seb derm then told rosacea but not sure. Had some blood work from derm and told negative for allergy but she was not sure what was done. Derm advised patch test but insurance wont cover and is expensive so not done yet. No rash anywhere else, no hives. she does get dry patches with the little bumps. Nos welling. She has occ congestion, rhinitis but not often. No asthma or eczema. No known food, insect or latex allergy. She thought maybe nuts or wheat are trigger and thinks that is what derm tested for.   after delay received records for derm, had negative immunocaps to several inhalants - cat,dog, dust mites, mold, trees, weeds and grass and common food panel        Environmental History: see history section for home environment  Review of Systems   Constitutional: Negative for appetite change, chills, fatigue and fever.   HENT: Positive for congestion, postnasal drip and sinus pressure. Negative for ear discharge, ear pain, facial swelling, nosebleeds, rhinorrhea, sneezing, sore throat, trouble swallowing and voice change.    Eyes: Negative for discharge, redness, itching and visual disturbance.   Respiratory: Negative for cough, choking, chest tightness, shortness of breath and wheezing.    Cardiovascular: Negative for chest pain, palpitations and leg swelling.   Gastrointestinal: Negative for abdominal distention, abdominal pain, constipation, diarrhea, nausea and vomiting.   Genitourinary:  Negative for difficulty urinating.   Musculoskeletal: Negative for arthralgias, gait problem, joint swelling and myalgias.   Skin: Positive for rash. Negative for color change.   Neurological: Negative for dizziness, syncope, weakness, light-headedness and headaches.   Hematological: Negative for adenopathy. Does not bruise/bleed easily.   Psychiatric/Behavioral: Negative for agitation, behavioral problems, confusion and sleep disturbance. The patient is not nervous/anxious.         Objective:    Physical Exam   Constitutional: She is oriented to person, place, and time. She appears well-developed and well-nourished. No distress.   HENT:   Head: Normocephalic and atraumatic.   Right Ear: Hearing, tympanic membrane, external ear and ear canal normal.   Left Ear: Hearing, tympanic membrane, external ear and ear canal normal.   Nose: No mucosal edema, rhinorrhea, sinus tenderness or septal deviation. No epistaxis. Right sinus exhibits no maxillary sinus tenderness and no frontal sinus tenderness. Left sinus exhibits no maxillary sinus tenderness and no frontal sinus tenderness.   Mouth/Throat: Uvula is midline, oropharynx is clear and moist and mucous membranes are normal. No uvula swelling.   Eyes: Conjunctivae are normal. Right eye exhibits no discharge. Left eye exhibits no discharge.   Neck: Normal range of motion. No thyromegaly present.   Cardiovascular: Normal rate, regular rhythm and normal heart sounds.    No murmur heard.  Pulmonary/Chest: Effort normal and breath sounds normal. No respiratory distress. She has no wheezes.   Abdominal: Soft. She exhibits no distension. There is no tenderness.   Musculoskeletal: Normal range of motion. She exhibits no edema or tenderness.   Lymphadenopathy:     She has no cervical adenopathy.   Neurological: She is alert and oriented to person, place, and time.   Skin: Skin is warm and dry. No rash noted. No erythema.   Psychiatric: She has a normal mood and affect. Her  behavior is normal. Judgment and thought content normal.   Nursing note and vitals reviewed.      Laboratory:   none performed   Assessment:       1. Dermatitis    2. Pruritus    3. Other chronic rhinitis         Plan:       1. Advised pt rash is not hives or eczema and had complete negative allergy test. Advised is more dermatology process and she needs to f/u with derm, she would like second opinion of derm so referred to Dr Garvey

## 2017-11-14 NOTE — LETTER
November 14, 2017      Remy Parson MD  [address]  [place] [address]           [place] [facility]  [address]  [place] [address]  Phone: [phone]          Patient: Lynn Naidu   MR Number: [id number]   YOB: 1965   Date of Visit: 11/14/2017       Dear Dr. Remy Parson:    Thank you for referring Lynn Naidu to me for evaluation. Attached you will find relevant portions of my assessment and plan of care.    If you have questions, please do not hesitate to call me. I look forward to following Lynn Naidu along with you.    Sincerely,    Albertina Mcintosh MD    Enclosure  CC:  No Recipients    If you would like to receive this communication electronically, please contact [email or web address] or [phone] to request more information on [employer] Link access.    For providers and/or their staff who would like to refer a patient to [facility], please contact us through our one-stop-shop provider referral line, Shawna Kirby, at [phone].    If you feel you have received this communication in error or would no longer like to receive these types of communications, please e-mail [email or web address]

## 2018-03-19 ENCOUNTER — OFFICE VISIT (OUTPATIENT)
Dept: FAMILY MEDICINE | Facility: CLINIC | Age: 53
End: 2018-03-19
Payer: COMMERCIAL

## 2018-03-19 VITALS
WEIGHT: 203 LBS | HEIGHT: 63 IN | TEMPERATURE: 98 F | HEART RATE: 81 BPM | SYSTOLIC BLOOD PRESSURE: 133 MMHG | DIASTOLIC BLOOD PRESSURE: 89 MMHG | BODY MASS INDEX: 35.97 KG/M2

## 2018-03-19 DIAGNOSIS — E66.01 SEVERE OBESITY (BMI 35.0-35.9 WITH COMORBIDITY): ICD-10-CM

## 2018-03-19 DIAGNOSIS — K21.00 GASTROESOPHAGEAL REFLUX DISEASE WITH ESOPHAGITIS: Primary | ICD-10-CM

## 2018-03-19 DIAGNOSIS — R14.0 BLOATED ABDOMEN: ICD-10-CM

## 2018-03-19 DIAGNOSIS — R14.2 BELCHING: ICD-10-CM

## 2018-03-19 DIAGNOSIS — K59.09 CHRONIC CONSTIPATION: ICD-10-CM

## 2018-03-19 DIAGNOSIS — K29.00 ACUTE GASTRITIS WITHOUT HEMORRHAGE, UNSPECIFIED GASTRITIS TYPE: ICD-10-CM

## 2018-03-19 PROCEDURE — 99999 PR PBB SHADOW E&M-EST. PATIENT-LVL V: CPT | Mod: PBBFAC,,, | Performed by: NURSE PRACTITIONER

## 2018-03-19 PROCEDURE — 99214 OFFICE O/P EST MOD 30 MIN: CPT | Mod: SA,S$GLB,, | Performed by: NURSE PRACTITIONER

## 2018-03-19 RX ORDER — PANTOPRAZOLE SODIUM 40 MG/1
40 TABLET, DELAYED RELEASE ORAL DAILY
Qty: 30 TABLET | Refills: 1 | Status: SHIPPED | OUTPATIENT
Start: 2018-03-19 | End: 2018-05-01 | Stop reason: SDUPTHER

## 2018-03-19 NOTE — PROGRESS NOTES
Subjective:       Patient ID: Lynn Naidu is a 53 y.o. female.    Chief Complaint: GI Problem    HPI   Chief Complaint  Chief Complaint   Patient presents with    GI Problem       HPI  Lynn Naidu is a 53 y.o. female with medical diagnoses as listed in the medical history and problem list that presents with complaints of a GI problem for 4 weeks.  Started with irritated stomach with stomach cramps, acid reflux and belching and bloating.  About 3 weeks ago had Sedrick's chicken and got ill with nausea and vomiting.  Since that time has had persistent abdominal cramping, acid reflux, belching.  Has tried some antacid and zantac over the counter with minimal relief but persistent symptoms. Patient stopped all of her medications on Friday, most of which are as needed with exception of linzess for chronic constipation and has not had a bowel movement since Friday.  BMI today is 35.96 and patient has gained 4 pounds since last appointment. Established patient with last clinic appointment 9/21/2017.        PAST MEDICAL HISTORY:  Past Medical History:   Diagnosis Date    Allergy     Bronchitis 7-17-15    Cervical disc displacement     Edema     Insomnia     Plantar fasciitis        PAST SURGICAL HISTORY:  Past Surgical History:   Procedure Laterality Date    COLONOSCOPY  11/01/2017    Dr. Oviedo, normal, ten year recheck    COLONOSCOPY N/A 11/1/2017    Procedure: COLONOSCOPY;  Surgeon: Cameron Oviedo MD;  Location: [facility];  Service: Endoscopy;  Laterality: N/A;    WISDOM TOOTH EXTRACTION         SOCIAL HISTORY:  Social History     Social History    Marital status:      Spouse name: N/A    Number of children: N/A    Years of education: N/A     Occupational History    Not on file.     Social History Main Topics    Smoking status: Never Smoker    Smokeless tobacco: Never Used    Alcohol use No    Drug use: No    Sexual activity: Yes     Partners: Male     Other Topics Concern    Not on  file     Social History Narrative    No narrative on file       FAMILY HISTORY:  Family History   Problem Relation Age of Onset    Heart disease Mother     Stroke Mother     Cancer Father      bone ca, prostate ca     Hypertension Sister     Diabetes Sister     Allergies Sister     Asthma Sister     Heart disease Brother     Kidney failure Brother     Hypertension Sister     Allergies Sister     Asthma Sister     Angioedema Neg Hx     Eczema Neg Hx     Immunodeficiency Neg Hx     Urticaria Neg Hx        ALLERGIES AND MEDICATIONS: updated and reviewed.  Review of patient's allergies indicates:   Allergen Reactions    No known drug allergies      Current Outpatient Prescriptions   Medication Sig Dispense Refill    BIONECT 0.2 % Crea Apply topically daily as needed.       clonazePAM (KLONOPIN) 0.5 MG tablet Take 1 tablet (0.5 mg total) by mouth daily as needed. 30 tablet 0    cyclobenzaprine (FLEXERIL) 10 MG tablet Take 1 tablet (10 mg total) by mouth 3 (three) times daily as needed. 100 tablet 5    desonide (DESOWEN) 0.05 % cream Apply topically daily as needed.       ergocalciferol (VITAMIN D2) 50,000 unit Cap Take 1 capsule (50,000 Units total) by mouth every 7 days. 12 capsule 2    furosemide (LASIX) 40 MG tablet Take 1 tablet (40 mg total) by mouth once daily. 90 tablet 3    hydrocodone-acetaminophen 5-325mg (NORCO) 5-325 mg per tablet Take 1 tablet by mouth every 6 (six) hours as needed for Pain. 100 tablet 0    hydrOXYzine HCl (ATARAX) 25 MG tablet Take 1 tablet (25 mg total) by mouth 3 (three) times daily as needed. 90 tablet 1    ibuprofen (ADVIL,MOTRIN) 800 MG tablet Take 1 tablet (800 mg total) by mouth 3 (three) times daily as needed. 270 tablet 1    JUBLIA 10 % Tr Apply topically nightly as needed.       linaclotide 290 mcg Cap Take 1 capsule (290 mcg total) by mouth once daily. 90 capsule 3    magnesium 250 mg Tab Take 2 tablets by mouth once.      mometasone (NASONEX) 50  mcg/actuation nasal spray 2 sprays by Nasal route daily as needed.       montelukast (SINGULAIR) 10 mg tablet Take 1 tablet (10 mg total) by mouth every evening. (Patient taking differently: Take 10 mg by mouth nightly as needed. ) 90 tablet 3    PANDEL 0.1 % Crea Apply topically daily as needed.       pantoprazole (PROTONIX) 40 MG tablet Take 1 tablet (40 mg total) by mouth once daily. In the morning 30 tablet 1    potassium chloride (MICRO-K) 10 MEQ CpSR Take 1 capsule (10 mEq total) by mouth once daily. 90 capsule 3    ranitidine (ZANTAC) 300 MG tablet Take 1 tablet (300 mg total) by mouth every evening. 30 tablet 1    SUDOGEST 30 mg tablet Take 30 mg by mouth every 6 (six) hours as needed.       SULFACLEANSE 8-4 8-4 % Susp Apply topically nightly as needed.       trazodone (DESYREL) 100 MG tablet Take 1 tablet (100 mg total) by mouth every evening. (Patient taking differently: Take 100 mg by mouth nightly as needed. ) 90 tablet 3    triamcinolone acetonide 0.1% (KENALOG) 0.1 % cream Apply topically daily as needed.        No current facility-administered medications for this visit.      Review of Systems   Constitutional: Negative for appetite change, chills and fever.        Hungry but has been afraid to eat due to indigestion.   Respiratory: Positive for cough. Negative for shortness of breath.         Had a cough but believes was related to sinus problem, seems to be resolving   Cardiovascular: Negative for chest pain.   Gastrointestinal: Positive for abdominal pain, constipation and nausea. Negative for blood in stool, diarrhea and vomiting.        Abdominal cramping, indigestion, bloating, belching, acid reflux, intermittent nausea   Genitourinary: Negative for difficulty urinating.   Skin: Negative for rash and wound.   Neurological: Negative for dizziness, numbness and headaches.   Psychiatric/Behavioral: Positive for sleep disturbance.        Does not sleep well normally, however lately has been  "sitting up at night due to acid reflux.       Objective:       Vitals:    03/19/18 1128   BP: 133/89   BP Location: Right arm   Patient Position: Sitting   BP Method: Large (Automatic)   Pulse: 81   Temp: 98.4 °F (36.9 °C)   TempSrc: Oral   Weight: 92.1 kg (203 lb)   Height: 5' 3" (1.6 m)     Physical Exam   Constitutional: She is oriented to person, place, and time. Vital signs are normal. She appears well-developed and well-nourished. No distress.   HENT:   Head: Normocephalic and atraumatic.   Right Ear: Tympanic membrane, external ear and ear canal normal.   Left Ear: Tympanic membrane, external ear and ear canal normal.   Nose: Nose normal. No mucosal edema. Right sinus exhibits no maxillary sinus tenderness and no frontal sinus tenderness. Left sinus exhibits no maxillary sinus tenderness and no frontal sinus tenderness.   Mouth/Throat: Oropharynx is clear and moist and mucous membranes are normal.   Neck: Normal carotid pulses present. Carotid bruit is not present.   Cardiovascular: Normal rate, regular rhythm, S1 normal, S2 normal, normal heart sounds, intact distal pulses and normal pulses.    No murmur heard.  Pulses:       Carotid pulses are 2+ on the right side, and 2+ on the left side.       Radial pulses are 2+ on the right side, and 2+ on the left side.        Posterior tibial pulses are 2+ on the right side, and 2+ on the left side.   No edema   Pulmonary/Chest: Effort normal and breath sounds normal. No respiratory distress. She has no decreased breath sounds. She has no wheezes. She has no rhonchi. She has no rales.   Abdominal: Soft. Bowel sounds are normal. There is no hepatosplenomegaly. There is tenderness in the epigastric area and left upper quadrant. No hernia.   Tenderness over lower esophageal sphincter with palpation   Musculoskeletal: Normal range of motion.   Lymphadenopathy:        Head (right side): No submental, no submandibular, no tonsillar, no preauricular, no posterior auricular " and no occipital adenopathy present.        Head (left side): No submental, no submandibular, no tonsillar, no preauricular, no posterior auricular and no occipital adenopathy present.     She has no cervical adenopathy.   Neurological: She is alert and oriented to person, place, and time. She has normal strength.   Skin: Skin is warm, dry and intact. Capillary refill takes less than 2 seconds. She is not diaphoretic. No pallor.   Psychiatric: She has a normal mood and affect. Her speech is normal and behavior is normal. Judgment and thought content normal. Cognition and memory are normal.   Nursing note and vitals reviewed.      Assessment:       1. Gastroesophageal reflux disease with esophagitis    2. Acute gastritis without hemorrhage, unspecified gastritis type    3. Bloated abdomen    4. Belching    5. Chronic constipation    6. Severe obesity (BMI 35.0-35.9 with comorbidity)        Plan:   Lynn was seen today for gi problem.  Patient advised to resume all scheduled medications: Linzess, Vitamin D2; All other medication is prescribed as needed and patient does not need to take unless necessary, prn meds left on patient med list.  Diagnoses and all orders for this visit:    Gastroesophageal reflux disease with esophagitis  -     ranitidine (ZANTAC) 300 MG tablet; Take 1 tablet (300 mg total) by mouth every evening.  -     pantoprazole (PROTONIX) 40 MG tablet; Take 1 tablet (40 mg total) by mouth once daily. In the morning  - Educational handouts provided. GERD, Lifestyle changes for controlling GERD, How acid reflux affects the throa    Acute gastritis without hemorrhage, unspecified gastritis type  -     ranitidine (ZANTAC) 300 MG tablet; Take 1 tablet (300 mg total) by mouth every evening.  -     pantoprazole (PROTONIX) 40 MG tablet; Take 1 tablet (40 mg total) by mouth once daily. In the morning  - Educational handouts provided. Gastritis, medications to treat    Bloated abdomen  -     ranitidine (ZANTAC)  300 MG tablet; Take 1 tablet (300 mg total) by mouth every evening.  -     pantoprazole (PROTONIX) 40 MG tablet; Take 1 tablet (40 mg total) by mouth once daily. In the morning    Belching  -     ranitidine (ZANTAC) 300 MG tablet; Take 1 tablet (300 mg total) by mouth every evening.  -     pantoprazole (PROTONIX) 40 MG tablet; Take 1 tablet (40 mg total) by mouth once daily. In the morning    Chronic constipation   Resume linzess 290 mcg daily as prescribed    Severe obesity (BMI 35.0-35.9 with comorbidity)   Obese with BMI 35.96 and weight gain 4 pounds since last visit   Weight loss recommended with well balanced diet and portion controlled meals and increased activity.     Medications for GERD/Gastritis to be taken for 4-6 weeks and then stopped if symptoms have resolved.    Follow-up if symptoms worsen or fail to improve, for in 6-8 weeks.     Patient readiness: acceptance and barriers:readiness    During the course of the visit the patient was educated and counseled about the following:     Obesity:   General weight loss/lifestyle modification strategies discussed (elicit support from others; identify saboteurs; non-food rewards, etc).  Informal exercise measures discussed, e.g. taking stairs instead of elevator.  Regular aerobic exercise program discussed.    Goals: Obesity: Reduce calorie intake and BMI    Did patient meet goals/outcomes: No    The following self management tools provided: declined    Patient Instructions (the written plan) was given to the patient/family.     Time spent with patient: 45 minutes    Barriers to medications present (no )    Adverse reactions to current medications (no)    Over the counter medications reviewed (Yes)

## 2018-03-19 NOTE — PATIENT INSTRUCTIONS
Tips to Control Acid Reflux    To control acid reflux, youll need to make some basic diet and lifestyle changes. The simple steps outlined below may be all youll need to ease discomfort.  Watch what you eat  · Avoid fatty foods and spicy foods.  · Eat fewer acidic foods, such as citrus and tomato-based foods. These can increase symptoms.  · Limit drinking alcohol, caffeine, and fizzy beverages. All increase acid reflux.  · Try limiting chocolate, peppermint, and spearmint. These can worsen acid reflux in some people.  Watch when you eat  · Avoid lying down for 3 hours after eating.  · Do not snack before going to bed.  Raise your head  Raising your head and upper body by 4 to 6 inches helps limit reflux when youre lying down. Put blocks under the head of your bed frame to raise it.  Other changes  · Lose weight, if you need to  · Dont exercise near bedtime  · Avoid tight-fitting clothes  · Limit aspirin and ibuprofen  · Stop smoking   Date Last Reviewed: 7/1/2016 © 2000-2017 [employer]. [address] [place] [address]. All rights reserved. This information is not intended as a substitute for professional medical care. Always follow your healthcare professional's instructions.        Gastritis (Adult)    Gastritis is inflammation and irritation of the stomach lining. It can be present for a short time (acute) or be long lasting (chronic). Gastritis is often caused by infection with bacteria called H pylori. More than a third of people in the [place] have this bacteria in their bodies. In many cases, H pylori causes no problems or symptoms. In some people, though, the infection irritates the stomach lining and causes gastritis. Other causes of stomach irritation include drinking alcohol or taking pain-relieving medicines called NSAIDs (such as aspirin or ibuprofen).   Symptoms of gastritis can include:  · Abdominal pain or bloating  · Loss of appetite  · Nausea or vomiting  · Vomiting blood  or having black stools  · Feeling more tired than usual  An inflamed and irritated stomach lining is more likely to develop a sore called an ulcer. To help prevent this, gastritis should be treated.  Home care  If needed, medicines may be prescribed. If you have H pylori infection, treating it will likely relieve your symptoms. Other changes can help reduce stomach irritation and help it heal.  · If you have been prescribed medicines for H pylori infection, take them as directed. Take all of the medicine until it is finished or your healthcare provider tells you to stop, even if you feel better.  · Your healthcare provider may recommend avoiding NSAIDs. If you take daily aspirin for your heart or other medical reasons, do not stop without talking to your healthcare provider first.  · Avoid drinking alcohol.  · Stop smoking. Smoking can irritate the stomach and delay healing. As much as possible, stay away from second hand smoke.  Follow-up care  Follow up with your healthcare provider, or as advised by our staff. Testing may be needed to check for inflammation or an ulcer.  When to seek medical advice  Call your healthcare provider for any of the following:  · Stomach pain that gets worse or moves to the lower right abdomen (appendix area)  · Chest pain that appears or gets worse, or spreads to the back, neck, shoulder, or arm  · Frequent vomiting (cant keep down liquids)  · Blood in the stool or vomit (red or black in color)  · Feeling weak or dizzy  · Fever of 100.4ºF (38ºC) or higher, or as directed by your healthcare provider  Date Last Reviewed: 6/22/2015  © 4712-0993 [employer]. [address] [place] [address], [place] [address]. All rights reserved. This information is not intended as a substitute for professional medical care. Always follow your healthcare professional's instructions.        Treating Gastritis     Take your medicines as directed, even if your stomach pain goes away.    A medical  evaluation will be done to find out the cause of your symptoms. The evaluation may include your health history, a physical exam, and some tests. Once your evaluation is done, treatment can begin. It may include taking certain medicines and making some lifestyle changes. Follow your healthcare providers advice.  Taking medicines  Your healthcare providers may prescribe some medicines to neutralize or reduce excess stomach acids. If tests show that H. pylori are in your stomach lining, antibiotics may be prescribed. H.pylori are a type of bacteria that can cause gastritis.  Avoiding certain things  Be sure to avoid:  · Aspirin. Avoid taking aspirin and other anti-inflammatory medicines, such as ibuprofen. They can irritate your stomach lining. Also, check with your healthcare provider before taking or stopping any medicines.  · Spicy foods and caffeine. Stay away from foods prepared with spices, especially black pepper. Caffeine can also make your symptoms worse. So, avoid coffee, tea, cola drinks, and chocolate. Be sure to tell your healthcare provider about any other foods or liquids that bother your stomach.  · Tobacco and alcohol. Dont use tobacco or drink alcohol. Tobacco and alcohol can increase stomach acids and worsen your gastritis symptoms.  Reducing your stress  Stress may make your gastritis symptoms worse. Whenever you can, reduce the stress in your life. One way to do this is to start an exercise program--talk to your healthcare provider first. Also, try to get enough sleep, at least 8 hours a night.  Date Last Reviewed: 7/1/2016  © 2667-3139 [employer]. [address] [place] [address], [place] [address]. All rights reserved. This information is not intended as a substitute for professional medical care. Always follow your healthcare professional's instructions.        Understanding Gastritis    Gastritis is a painful inflammation of the stomach lining. It has a number of causes. Gastritis and  its symptoms can be relieved with treatment. Work with your healthcare provider to find ways to treat your symptoms.  The Stomach  To digest the food you eat, your stomach makes strong acids and enzymes. A healthy stomach has built-in defenses that protect its lining from damage by these acids and enzymes.  When you have gastritis  Acids may damage the stomach lining when the built-in defenses of the stomach dont function as they should. The stomach lining can then become inflamed. When this happens, it is called gastritis.  Causes of gastritis  Gastritis has many causes. They may include:  · Aspirin and anti-inflammatory medicines  · Tobacco use  · Alcohol use  · Helicobacter pylori (H. pylori) bacteria  · Trauma from injuries, burns, or major surgery  · Critical illness or autoimmune disorders  Common symptoms  With gastritis, you may notice one or more of the following:  · A burning feeling in your upper belly  · Pain that happens after eating certain foods  · Gas or a bloated feeling in your stomach  · Frequent belching  · Nausea with or without vomiting  · Loss of appetite  · Feeling full quickly  · Fatigue  Date Last Reviewed: 7/1/2016 © 2000-2017 [employer]. [address] [place] [address]. All rights reserved. This information is not intended as a substitute for professional medical care. Always follow your healthcare professional's instructions.        GERD (Adult)    The esophagus is a tube that carries food from the mouth to the stomach. A valve at the lower end of the esophagus prevents stomach acid from flowing upward. When this valve doesn't work properly, stomach contents may repeatedly flow back up (reflux) into the esophagus. This is called gastroesophageal reflux disease (GERD). GERD can irritate the esophagus. It can cause problems with swallowing or breathing. In severe cases, GERD can cause recurrent pneumonia or other serious problems.  Symptoms of reflux include  "burning, pressure or sharp pain in the upper abdomen or mid to lower chest. The pain can spread to the neck, back, or shoulder. There may be belching, an acid taste in the back of the throat, chronic cough, or sore throat or hoarseness. GERD symptoms often occur during the day after a big meal. They can also occur at night when lying down.   Home care  Lifestyle changes can help reduce symptoms. If needed, medicines may be prescribed. Symptoms often improve with treatment, but if treatment is stopped, the symptoms often return after a few months. So most persons with GERD will need to continue treatment.  Lifestyle changes  · Limit or avoid fatty, fried, and spicy foods, as well as coffee, chocolate, mint, and foods with high acid content such as tomatoes and citrus fruit and juices (orange, grapefruit, lemon).  · Dont eat large meals, especially at night. Frequent, smaller meals are best. Do not lie down right after eating. And dont eat anything 3 hours before going to bed.  · Avoid drinking alcohol and smoking. As much as possible, stay away from second hand smoke.  · If you are overweight, losing weight will reduce symptoms.   · Avoid wearing tight clothing around your stomach area.  · If your symptoms occur during sleep, use a foam wedge to elevate your upper body (not just your head.) Or, place 4" blocks under the head of your bed.  Medicines  If needed, medicines can help relieve the symptoms of GERD and prevent damage to the esophagus. Discuss a medicine plan with your healthcare provider. This may include one or more of the following medicines:  · Antacids to help neutralize the normal acids in your stomach.  · Acid blockers (H2 blockers) to decrease acid production.  · Acid inhibitors (PPIs) to decrease acid production in a different way than the blockers. They may work better, but can take a little longer to take effect.  Take an antacid 30-60 minutes after eating and at bedtime, but not at the same time " as an acid blocker.  Try not to take medicines such as ibuprofen and aspirin. If you are taking aspirin for your heart or other medical reasons, talk to your healthcare provider about stopping it.  Follow-up care  Follow up with your healthcare provider or as advised by our staff.  When to seek medical advice  Call your healthcare provider if any of the following occur:  · Stomach pain gets worse or moves to the lower right abdomen (appendix area)  · Chest pain appears or gets worse, or spreads to the back, neck, shoulder, or arm  · Frequent vomiting (cant keep down liquids)  · Blood in the stool or vomit (red or black in color)  · Feeling weak or dizzy  · Fever of 100.4ºF (38ºC) or higher, or as directed by your healthcare provider  Date Last Reviewed: 6/23/2015 © 2000-2017 [employer]. [address] [place] [address]. All rights reserved. This information is not intended as a substitute for professional medical care. Always follow your healthcare professional's instructions.        Medicines for GERD  Gastroesophageal reflux disease (GERD) can be treated with medicine. This may be done with a medicine you can buy over the counter. Or it may be done with a medicine that your healthcare provider has to prescribe. In some cases, both types may be used. Your provider will tell you what is best for your symptoms.  Antacids  Antacids work to weaken the acid in your stomach and can give you quick relief. You can buy many of them with no prescription. Antacids can be high in sodium. This may be a problem if you have high blood pressure. Some antacids also have aluminum. This should be avoided if you have long-term (chronic) kidney disease. So check with your provider first. Take antacids only when you need to, as advised by your provider.  Side effects: Constipation, diarrhea. If you take too much medicine, it can cause calcium to build up.   H-2 blockers  H-2 blockers cause the stomach to make  less acid. They are often used both on demand as symptoms occur, and daily to keep symptoms away. Your provider may prescribe them if antacids dont work for you. You can buy some of them over the counter. These come in a lower dosage.  Side effects: Confusion in older adults  Proton-pump inhibitors  These also cause the stomach to make less acid. They reduce stomach acid more than H-2 blockers. They may be used for a short time, or longer to treat certain conditions. You can buy some of them over the counter. Or your provider may prescribe them. They help control GERD symptoms.  Side effects: Belly (abdominal) pain, diarrhea, upset stomach (nausea). Possible other side effects linked to long-term use and high doses.  Prokinetics  These medicines affect the movement of the digestive tract. They may be recommended if your stomach is emptying too slowly. But in most cases they are not recommended for treating GERD.   Side effects: Tiredness, depression, anxiety, problems with physical movement, belly cramps, constipation, diarrhea, a jittery feeling  Medicines to avoid  Dont take aspirin without your providers approval. And dont take a nonsteroidal anti-inflammatory drug (NSAID), such as ibuprofen. These reduce the protective lining of your stomach. This can lead to more GERD symptoms. Check with your provider or pharmacist before taking a new medicine.   Date Last Reviewed: 7/1/2016 © 2000-2017 [employer]. [address] [place] [address]. All rights reserved. This information is not intended as a substitute for professional medical care. Always follow your healthcare professional's instructions.        What Is GERD?     With GERD, the weak LES allows food and fluids to travel back, or reflux, into the esophagus.      If you often have a painful burning feeling in your chest after you eat, you may have gastroesophageal reflux disease (GERD). Heartburn that keeps coming back is a classic  symptom of GERD. But you may have other symptoms as well. A GERD diagnosis is made only after a complete evaluation by your healthcare provider.  Note: Chest pain may also be caused by heart problems. Be sure to have all chest pain evaluated by a healthcare provider.   When you have a reflux problem  After you eat, food travels from your mouth down the esophagus to your stomach. Along the way, food passes through a one-way valve called the lower esophageal sphincter (LES). The LES sits at the opening to your stomach. Normally the LES opens when you swallow. It lets food enter the stomach, then closes quickly. With GERD, the LES doesnt work normally. It lets food and stomach acid flow back (reflux) into the esophagus.  Some common symptoms  · Frequent heartburn or burping  · Sour-tasting fluid backing up into your mouth  · Symptoms that get worse after you eat, bend over, or lie down  · Trouble swallowing or pain when swallowing  · A dry, long-term (chronic) cough  · Upset stomach (nausea) or vomiting  Relieving your discomfort  You and your healthcare provider can work together to find the treatment options that best ease your symptoms. These may include lifestyle changes, medicine, and possibly surgery.  Many people find their GERD symptoms decrease when they eat small frequent meals instead of 3 large ones. Reducing the amount of fatty foods in your diet will also help.   The following foods tend to cause problems for people diagnosed with GERD:  · Tomatoes and tomato products  · Alcohol  · Coffee  · Peppermint  · Greasy or spicy foods  Talk with your provider if you dont understand how to make the dietary changes needed to control your GERD symptoms. Your provider can refer you to a nutritionist.  Date Last Reviewed: 7/1/2016 © 2000-2017 [employer] [address] [place] [address], [place] [address]. All rights reserved. This information is not intended as a substitute for professional medical care.  Always follow your healthcare professional's instructions.        How Acid Reflux Affects Your Throat    Do you have to clear your throat or cough often? Are you hoarse? Do you have trouble swallowing? If you have these or other throat symptoms, you may have acid reflux. This occurs when stomach acid flows back up and irritates your throat.  Why you have throat symptoms  There are muscles (esophageal sphincters) at both ends of the tube that carries food to your stomach (the esophagus). These muscles relax to let food pass. Then they tighten to keep stomach acid down. When the lower esophageal sphincter (LES) doesnt tighten enough, acid can flow back (reflux) from your stomach into your esophagus. This may cause heartburn. In some cases the upper esophageal sphincter (UES) also doesnt work well. Then acid can travel higher and enter your throat (pharynx). In many cases, this causes throat symptoms.  Common throat symptoms  · Need to clear your throat often  · Feeling like youre choking  · Long-term (chronic) cough  · Hoarseness  · Trouble swallowing  · Feel like you have a lump in your throat  · Sour or acid taste  · Sore throat that keeps coming back   Date Last Reviewed: 7/1/2016  © 4598-4565 [employer]. [address] [place] [address], [place] [address]. All rights reserved. This information is not intended as a substitute for professional medical care. Always follow your healthcare professional's instructions.

## 2018-05-01 DIAGNOSIS — R14.0 BLOATED ABDOMEN: ICD-10-CM

## 2018-05-01 DIAGNOSIS — K29.00 ACUTE GASTRITIS WITHOUT HEMORRHAGE, UNSPECIFIED GASTRITIS TYPE: ICD-10-CM

## 2018-05-01 DIAGNOSIS — R14.2 BELCHING: ICD-10-CM

## 2018-05-01 DIAGNOSIS — K21.00 GASTROESOPHAGEAL REFLUX DISEASE WITH ESOPHAGITIS: ICD-10-CM

## 2018-05-01 RX ORDER — PANTOPRAZOLE SODIUM 40 MG/1
TABLET, DELAYED RELEASE ORAL
Qty: 30 TABLET | Refills: 1 | Status: SHIPPED | OUTPATIENT
Start: 2018-05-01 | End: 2018-05-09 | Stop reason: SDUPTHER

## 2018-05-09 DIAGNOSIS — R14.0 BLOATED ABDOMEN: ICD-10-CM

## 2018-05-09 DIAGNOSIS — K29.00 ACUTE GASTRITIS WITHOUT HEMORRHAGE, UNSPECIFIED GASTRITIS TYPE: ICD-10-CM

## 2018-05-09 DIAGNOSIS — R14.2 BELCHING: ICD-10-CM

## 2018-05-09 DIAGNOSIS — K21.00 GASTROESOPHAGEAL REFLUX DISEASE WITH ESOPHAGITIS: ICD-10-CM

## 2018-05-09 RX ORDER — PANTOPRAZOLE SODIUM 40 MG/1
40 TABLET, DELAYED RELEASE ORAL EVERY MORNING
Qty: 90 TABLET | Refills: 0 | Status: SHIPPED | OUTPATIENT
Start: 2018-05-09 | End: 2018-10-10 | Stop reason: SDUPTHER

## 2018-05-09 NOTE — TELEPHONE ENCOUNTER
----- Message from Marely Yen sent at 5/9/2018  4:28 PM CDT -----  Contact: pt  Pt states needs a refill on her pantoprazole (PROTONIX) 40 MG tablet andranitidine (ZANTAC) 300 MG tablet...[phone](please notify when sent)        .  [facility] [address] [place] [address] [facility]  [address] [facility] [address]  Phone: [phone] Fax: [phone]

## 2018-07-09 DIAGNOSIS — E55.9 VITAMIN D DEFICIENCY: ICD-10-CM

## 2018-07-09 RX ORDER — ERGOCALCIFEROL 1.25 MG/1
CAPSULE ORAL
Qty: 12 CAPSULE | Refills: 2 | Status: SHIPPED | OUTPATIENT
Start: 2018-07-09 | End: 2018-10-12 | Stop reason: SDUPTHER

## 2018-08-10 ENCOUNTER — HOSPITAL ENCOUNTER (OUTPATIENT)
Dept: RADIOLOGY | Facility: CLINIC | Age: 53
Discharge: HOME | End: 2018-08-10
Attending: FAMILY MEDICINE
Payer: COMMERCIAL

## 2018-08-10 DIAGNOSIS — Z12.39 ENCOUNTER FOR SPECIAL SCREENING EXAMINATION FOR NEOPLASM OF BREAST: ICD-10-CM

## 2018-08-10 PROCEDURE — 77067 SCR MAMMO BI INCL CAD: CPT | Mod: 26,,, | Performed by: RADIOLOGY

## 2018-08-10 PROCEDURE — 77063 BREAST TOMOSYNTHESIS BI: CPT | Mod: TC,PO

## 2018-08-10 PROCEDURE — 77063 BREAST TOMOSYNTHESIS BI: CPT | Mod: 26,,, | Performed by: RADIOLOGY

## 2018-09-26 ENCOUNTER — TELEPHONE (OUTPATIENT)
Dept: ADMINISTRATIVE | Facility: HOSPITAL | Age: 53
End: 2018-09-26

## 2018-09-26 NOTE — LETTER
"  Dear Lynn:    [facility] wants to help patients achieve their health goals. Our records show that you may have been told you have diabetes in the past. Diabetes is a medical condition that needs to be managed all the time to reduce your risk of things like heart, kidney and eye disease. Your [facility] primary care team wants to be sure you get important tests and screenings done regularly to assure that your health needs are met.    We have put a new system in place, called CareTouch that will help us improve how we monitor and reach out to you about tests and screenings that you may need to help manage your diabetes. Our records indicate that you may be due for the following:    There are no preventive care reminders to display for this patient.    We have ordered these tests for you and would like you to come in to one of the following labs in your area any weekday between 10:30 am and 4:00 pm to have your tests done. Tell the  you received a  CareTouch letter, or just look for the CareTouch sign.    For [facility] Lab Locations, click <a href="[email or web address]" target="_blank">here.</a>     If you've recently had these tests done outside the [facility], or if the diagnosis of diabetes is in error, please let your physician know so that he/she can update your health record.    If you have questions or want to schedule your lab at another more convenient site, simply call [phone],  Monday through Friday 9am - 4pm, to speak to a CareTouch Coordinator.    Sincerely,      Remy Parson MD and your [facility] Team     "

## 2018-09-26 NOTE — LETTER
September 26, 2018    Liz Starr MD             [facility]  [address]  [facility] [address]  Phone: [phone] September 26, 2018     Patient: Lynn Naidu    YOB: 1965   Date of Visit: 9/26/2018       To Whom It May Concern:  [facility]    We are seeing Lynn Naidu in the clinic today at [facility].  Remy Parson MD is their PCP.  She/He has an outstanding lab/procedure at this time when reviewing their chart.  To help with our Health Maintenance records will you please supply the following:      []  Mammogram                                                []  Colonoscopy   [x]  Pap Smear                                                    []  Outside Lab Results   []  Dexa scans                                                   []  Eye Exam   []  Foot Exam                                                     [] Other___________   []  Outside Immunizations                                               Please Fax to [facility] at [phone]    If you have any questions or concerns, please don't hesitate to call.    Sincerely,    Remy Parson MD

## 2018-10-04 ENCOUNTER — TELEPHONE (OUTPATIENT)
Dept: FAMILY MEDICINE | Facility: CLINIC | Age: 53
End: 2018-10-04

## 2018-10-04 NOTE — TELEPHONE ENCOUNTER
Patient notified/left message that Dr. Parson prefers to see patients for annual exam then will order lab.

## 2018-10-04 NOTE — TELEPHONE ENCOUNTER
----- Message from Adilene Andino sent at 10/4/2018 10:08 AM CDT -----  Contact: SELF [phone]  Patient came to .  Has appt for annual on 10-10-18.  Would like to have labs done earlier if you want fasting labs.  Please call patient to advise.

## 2018-10-05 ENCOUNTER — DOCUMENTATION ONLY (OUTPATIENT)
Dept: FAMILY MEDICINE | Facility: CLINIC | Age: 53
End: 2018-10-05

## 2018-10-05 NOTE — PROGRESS NOTES
Pre-Visit Chart Review  For Appointment Scheduled on 10-10-18    Health Maintenance Due   Topic Date Due    Influenza Vaccine  08/01/2018

## 2018-10-10 ENCOUNTER — OFFICE VISIT (OUTPATIENT)
Dept: FAMILY MEDICINE | Facility: CLINIC | Age: 53
End: 2018-10-10
Attending: FAMILY MEDICINE
Payer: COMMERCIAL

## 2018-10-10 VITALS
TEMPERATURE: 98 F | DIASTOLIC BLOOD PRESSURE: 82 MMHG | HEIGHT: 63 IN | WEIGHT: 197.56 LBS | SYSTOLIC BLOOD PRESSURE: 134 MMHG | OXYGEN SATURATION: 97 % | HEART RATE: 112 BPM | RESPIRATION RATE: 20 BRPM | BODY MASS INDEX: 35 KG/M2

## 2018-10-10 DIAGNOSIS — E55.9 VITAMIN D DEFICIENCY: ICD-10-CM

## 2018-10-10 DIAGNOSIS — R14.2 BELCHING: ICD-10-CM

## 2018-10-10 DIAGNOSIS — K29.00 ACUTE GASTRITIS WITHOUT HEMORRHAGE, UNSPECIFIED GASTRITIS TYPE: ICD-10-CM

## 2018-10-10 DIAGNOSIS — M54.2 CERVICALGIA: ICD-10-CM

## 2018-10-10 DIAGNOSIS — R14.0 BLOATED ABDOMEN: ICD-10-CM

## 2018-10-10 DIAGNOSIS — K59.00 CONSTIPATION, UNSPECIFIED CONSTIPATION TYPE: ICD-10-CM

## 2018-10-10 DIAGNOSIS — Z00.00 ENCOUNTER FOR PREVENTIVE HEALTH EXAMINATION: Primary | ICD-10-CM

## 2018-10-10 DIAGNOSIS — G47.00 INSOMNIA, UNSPECIFIED TYPE: ICD-10-CM

## 2018-10-10 DIAGNOSIS — L71.9 ROSACEA: ICD-10-CM

## 2018-10-10 DIAGNOSIS — M50.30 DDD (DEGENERATIVE DISC DISEASE), CERVICAL: ICD-10-CM

## 2018-10-10 DIAGNOSIS — E78.5 HYPERLIPIDEMIA, UNSPECIFIED HYPERLIPIDEMIA TYPE: ICD-10-CM

## 2018-10-10 DIAGNOSIS — E87.6 HYPOKALEMIA: ICD-10-CM

## 2018-10-10 DIAGNOSIS — K21.00 GASTROESOPHAGEAL REFLUX DISEASE WITH ESOPHAGITIS: ICD-10-CM

## 2018-10-10 DIAGNOSIS — R60.9 EDEMA, UNSPECIFIED TYPE: ICD-10-CM

## 2018-10-10 PROCEDURE — 99396 PREV VISIT EST AGE 40-64: CPT | Mod: S$GLB,,, | Performed by: FAMILY MEDICINE

## 2018-10-10 PROCEDURE — 99999 PR PBB SHADOW E&M-EST. PATIENT-LVL IV: CPT | Mod: PBBFAC,,, | Performed by: FAMILY MEDICINE

## 2018-10-10 RX ORDER — MINOCYCLINE HYDROCHLORIDE 100 MG/1
100 CAPSULE ORAL EVERY 12 HOURS PRN
COMMUNITY
Start: 2018-10-04 | End: 2019-10-15 | Stop reason: ALTCHOICE

## 2018-10-10 RX ORDER — DOXYCYCLINE HYCLATE 20 MG
20 TABLET ORAL 2 TIMES DAILY
COMMUNITY
Start: 2018-09-26 | End: 2019-01-02 | Stop reason: ALTCHOICE

## 2018-10-10 RX ORDER — FUROSEMIDE 40 MG/1
40 TABLET ORAL DAILY
Qty: 90 TABLET | Refills: 3 | Status: SHIPPED | OUTPATIENT
Start: 2018-10-10 | End: 2019-10-15 | Stop reason: SDUPTHER

## 2018-10-10 RX ORDER — TRAZODONE HYDROCHLORIDE 100 MG/1
100 TABLET ORAL NIGHTLY PRN
Qty: 90 TABLET | Refills: 3 | Status: SHIPPED | OUTPATIENT
Start: 2018-10-10 | End: 2019-10-15

## 2018-10-10 RX ORDER — PANTOPRAZOLE SODIUM 40 MG/1
40 TABLET, DELAYED RELEASE ORAL EVERY MORNING
Qty: 90 TABLET | Refills: 3 | Status: SHIPPED | OUTPATIENT
Start: 2018-10-10 | End: 2020-07-06

## 2018-10-10 RX ORDER — IBUPROFEN 800 MG/1
800 TABLET ORAL 3 TIMES DAILY PRN
Qty: 270 TABLET | Refills: 1 | Status: SHIPPED | OUTPATIENT
Start: 2018-10-10 | End: 2019-10-15 | Stop reason: SDUPTHER

## 2018-10-10 RX ORDER — POTASSIUM CHLORIDE 750 MG/1
10 CAPSULE, EXTENDED RELEASE ORAL DAILY
Qty: 90 CAPSULE | Refills: 3 | Status: SHIPPED | OUTPATIENT
Start: 2018-10-10 | End: 2019-10-15 | Stop reason: SDUPTHER

## 2018-10-10 RX ORDER — CYCLOBENZAPRINE HCL 10 MG
10 TABLET ORAL 3 TIMES DAILY PRN
Qty: 100 TABLET | Refills: 5 | Status: SHIPPED | OUTPATIENT
Start: 2018-10-10 | End: 2019-10-15

## 2018-10-10 NOTE — PROGRESS NOTES
Subjective:       Patient ID: Lynn Naidu is a 53 y.o. female.    Chief Complaint: Annual Exam    53-year-old female coming in for physical exam fasting.  She has a history of degenerative disc disease of the cervical spine with occasional radiculopathy and insomnia.  She is status post colonoscopy November 1, 2017 and was recently placed on vitamin-D supplementation because of a very low vitamin-D level at 8.  She needs a follow-up on that and will need a refill on her vitamin-D supplement if it continues to be low.  She is going on a cruise in early November that will be 11 days.  She does tend to be C6 but she found she is worse with the patches and she is does not want a patch.    She has a history of pain in the neck and uses ibuprofen and Flexeril for the most part.  She does occasionally use Norco and is asking for refill now but she is not having symptoms now so we deferred until she actually needs them.    Past Medical History:  No date: Allergy  7-17-15: Bronchitis  No date: Cervical disc displacement  No date: Edema  No date: Insomnia  No date: Plantar fasciitis    Past Surgical History:  11/01/2017: COLONOSCOPY      Comment:  Dr. Oviedo, normal, ten year recheck  11/1/2017: COLONOSCOPY; N/A      Comment:  Procedure: COLONOSCOPY;  Surgeon: Cameron Oviedo MD;                Location: [facility];  Service: Endoscopy;  Laterality:                N/A;  11/1/2017: COLONOSCOPY; N/A      Comment:  Performed by Cameron Oviedo MD at [facility]  3/14/2014: INJECTION, STEROID, SPINE, CERVICAL, EPIDURAL; N/A      Comment:  [facility]  No date: WISDOM TOOTH EXTRACTION    Review of patient's family history indicates:  Problem: Heart disease      Relation: Mother          Age of Onset: (Not Specified)  Problem: Stroke      Relation: Mother          Age of Onset: (Not Specified)  Problem: Cancer      Relation: Father          Age of Onset: (Not Specified)          Comment: bone ca,  prostate ca   Problem: Hypertension      Relation: Sister          Age of Onset: (Not Specified)  Problem: Diabetes      Relation: Sister          Age of Onset: (Not Specified)  Problem: Allergies      Relation: Sister          Age of Onset: (Not Specified)  Problem: Asthma      Relation: Sister          Age of Onset: (Not Specified)  Problem: Heart disease      Relation: Brother          Age of Onset: (Not Specified)  Problem: Kidney failure      Relation: Brother          Age of Onset: (Not Specified)  Problem: Hypertension      Relation: Sister          Age of Onset: (Not Specified)  Problem: Allergies      Relation: Sister          Age of Onset: (Not Specified)  Problem: Asthma      Relation: Sister          Age of Onset: (Not Specified)  Problem: Angioedema      Relation: Neg Hx          Age of Onset: (Not Specified)  Problem: Eczema      Relation: Neg Hx          Age of Onset: (Not Specified)  Problem: Immunodeficiency      Relation: Neg Hx          Age of Onset: (Not Specified)  Problem: Urticaria      Relation: Neg Hx          Age of Onset: (Not Specified)    Social History    Tobacco Use      Smoking status: Never Smoker      Smokeless tobacco: Never Used    Alcohol use: No    Drug use: No    Current Outpatient Medications on File Prior to Visit:  BIONECT 0.2 % Crea, Apply topically daily as needed. , Disp: , Rfl:   clonazePAM (KLONOPIN) 0.5 MG tablet, Take 1 tablet (0.5 mg total) by mouth daily as needed., Disp: 30 tablet, Rfl: 0  desonide (DESOWEN) 0.05 % cream, Apply topically daily as needed. , Disp: , Rfl:   doxycycline (PERIOSTAT) 20 MG tablet, Take 20 mg by mouth 2 (two) times daily. , Disp: , Rfl:   hydrocodone-acetaminophen 5-325mg (NORCO) 5-325 mg per tablet, Take 1 tablet by mouth every 6 (six) hours as needed for Pain., Disp: 100 tablet, Rfl: 0  hydrOXYzine HCl (ATARAX) 25 MG tablet, Take 1 tablet (25 mg total) by mouth 3 (three) times daily as needed., Disp: 90 tablet, Rfl: 1  JUBLIA 10 % Tr,  Apply topically nightly as needed. , Disp: , Rfl:   magnesium 250 mg Tab, Take 2 tablets by mouth once., Disp: , Rfl:   minocycline (MINOCIN,DYNACIN) 100 MG capsule, Take 100 mg by mouth every 12 (twelve) hours. , Disp: , Rfl:   mometasone (NASONEX) 50 mcg/actuation nasal spray, 2 sprays by Nasal route daily as needed. , Disp: , Rfl:   montelukast (SINGULAIR) 10 mg tablet, Take 1 tablet (10 mg total) by mouth every evening. (Patient taking differently: Take 10 mg by mouth nightly as needed. ), Disp: 90 tablet, Rfl: 3  PANDEL 0.1 % Crea, Apply topically daily as needed. , Disp: , Rfl:   SULFACLEANSE 8-4 8-4 % Susp, Apply topically nightly as needed. , Disp: , Rfl:   triamcinolone acetonide 0.1% (KENALOG) 0.1 % cream, Apply topically daily as needed. , Disp: , Rfl:   VITAMIN D2 50,000 unit capsule, TAKE ONE CAPSULE BY MOUTH ONCE A WEEK, Disp: 12 capsule, Rfl: 2  (DISCONTINUED) cyclobenzaprine (FLEXERIL) 10 MG tablet, Take 1 tablet (10 mg total) by mouth 3 (three) times daily as needed., Disp: 100 tablet, Rfl: 5  (DISCONTINUED) furosemide (LASIX) 40 MG tablet, Take 1 tablet (40 mg total) by mouth once daily., Disp: 90 tablet, Rfl: 3  (DISCONTINUED) ibuprofen (ADVIL,MOTRIN) 800 MG tablet, Take 1 tablet (800 mg total) by mouth 3 (three) times daily as needed., Disp: 270 tablet, Rfl: 1  (DISCONTINUED) linaclotide 290 mcg Cap, Take 1 capsule (290 mcg total) by mouth once daily., Disp: 90 capsule, Rfl: 3  (DISCONTINUED) pantoprazole (PROTONIX) 40 MG tablet, Take 1 tablet (40 mg total) by mouth every morning., Disp: 90 tablet, Rfl: 0  (DISCONTINUED) potassium chloride (MICRO-K) 10 MEQ CpSR, Take 1 capsule (10 mEq total) by mouth once daily., Disp: 90 capsule, Rfl: 3  (DISCONTINUED) ranitidine (ZANTAC) 300 MG tablet, Take 1 tablet (300 mg total) by mouth every evening., Disp: 90 tablet, Rfl: 0  (DISCONTINUED) trazodone (DESYREL) 100 MG tablet, Take 1 tablet (100 mg total) by mouth every evening. (Patient taking differently:  Take 100 mg by mouth nightly as needed. ), Disp: 90 tablet, Rfl: 3  (DISCONTINUED) SUDOGEST 30 mg tablet, Take 30 mg by mouth every 6 (six) hours as needed. , Disp: , Rfl:     No current facility-administered medications on file prior to visit.       Influenza Vaccine due on 08/01/2018        Review of Systems   Constitutional: Negative for chills, diaphoresis, fatigue, fever and unexpected weight change.   HENT: Positive for tinnitus (Constant noise with varying intensity). Negative for congestion, ear pain, hearing loss, postnasal drip and sinus pressure.    Eyes: Negative for itching and visual disturbance.   Respiratory: Negative for cough, chest tightness, shortness of breath and wheezing.    Cardiovascular: Negative for chest pain, palpitations and leg swelling.   Gastrointestinal: Positive for constipation (Takes Linzess). Negative for abdominal pain, blood in stool, diarrhea, nausea and vomiting.   Genitourinary: Negative for dysuria, frequency, hematuria, menstrual problem, pelvic pain, vaginal bleeding and vaginal discharge.   Musculoskeletal: Positive for neck pain and neck stiffness. Negative for arthralgias, back pain, joint swelling and myalgias.   Neurological: Negative for dizziness and headaches.   Hematological: Negative for adenopathy.   Psychiatric/Behavioral: Positive for sleep disturbance. The patient is not nervous/anxious.        Objective:      Physical Exam   Constitutional: She is oriented to person, place, and time. She appears well-developed. No distress.   Fair blood pressure control  Obese with a BMI of 34.9 she is down 3.6 lb from her last checkup September 21, 2017   HENT:   Head: Normocephalic and atraumatic.   Right Ear: Hearing, tympanic membrane, external ear and ear canal normal.   Left Ear: Hearing, tympanic membrane, external ear and ear canal normal.   Nose: Nose normal.   Mouth/Throat: Oropharynx is clear and moist. No oropharyngeal exudate.   Tympanic membranes are clear  with good motility, no effusion erythema or injection.  No cerumen obstructing the tympanic membrane or contacting.   Eyes: Conjunctivae and EOM are normal. Pupils are equal, round, and reactive to light. Right eye exhibits no discharge. Left eye exhibits no discharge. No scleral icterus.   Neck: Neck supple. No JVD present. No tracheal deviation present. No thyromegaly present.   Cardiovascular: Normal rate, regular rhythm and normal heart sounds. Exam reveals no gallop and no friction rub.   No murmur heard.  Carotid pulses 2+ without bruit audible.  No intracranial bruit audible   Pulmonary/Chest: Effort normal and breath sounds normal. She has no wheezes. She has no rales. She exhibits no tenderness.   Abdominal: Soft. Bowel sounds are normal. She exhibits no mass. There is no tenderness. There is no rebound and no guarding.   Musculoskeletal: Normal range of motion. She exhibits no edema or tenderness.   Lymphadenopathy:     She has no cervical adenopathy.   Neurological: She is alert and oriented to person, place, and time. She has normal reflexes. She displays normal reflexes. No cranial nerve deficit or sensory deficit. She exhibits normal muscle tone.   Skin: Skin is warm and dry. No rash noted. She is not diaphoretic. No erythema.   Psychiatric: She has a normal mood and affect.   Nursing note and vitals reviewed.      Assessment:       1. Encounter for preventive health examination    2. Vitamin D deficiency    3. DDD (degenerative disc disease), cervical    4. Insomnia, unspecified type    5. Rosacea    6. Hyperlipidemia, unspecified hyperlipidemia type    7. BMI 34.0-34.9,adult    8. Cervicalgia    9. Edema, unspecified type    10. Constipation, unspecified constipation type    11. Gastroesophageal reflux disease with esophagitis    12. Acute gastritis without hemorrhage, unspecified gastritis type    13. Bloated abdomen    14. Belching    15. Hypokalemia        Plan:       1. Encounter for preventive  health examination  - Comprehensive metabolic panel; Future  - Lipid panel; Future  - CBC auto differential; Future  - TSH; Future    2. Vitamin D deficiency  Await level, renew vitamin-D if needed  - Vitamin D; Future    3. DDD (degenerative disc disease), cervical  Asymptomatic at present with intermittent flare ups, occasional use of Norco    4. Insomnia, unspecified type  - traZODone (DESYREL) 100 MG tablet; Take 1 tablet (100 mg total) by mouth nightly as needed.  Dispense: 90 tablet; Refill: 3    5. Rosacea  Under care Dermatology, she has been placed on doxycycline but is reluctant to use it because of her sensitive GI system.  She is going to consider trying to tetri oil    6. Hyperlipidemia, unspecified hyperlipidemia type  Await labs  - Comprehensive metabolic panel; Future  - Lipid panel; Future    7. BMI 34.0-34.9,adult  - Comprehensive metabolic panel; Future  - Lipid panel; Future  - TSH; Future    8. Cervicalgia  - cyclobenzaprine (FLEXERIL) 10 MG tablet; Take 1 tablet (10 mg total) by mouth 3 (three) times daily as needed.  Dispense: 100 tablet; Refill: 5  - ibuprofen (ADVIL,MOTRIN) 800 MG tablet; Take 1 tablet (800 mg total) by mouth 3 (three) times daily as needed.  Dispense: 270 tablet; Refill: 1    9. Edema, unspecified type  - furosemide (LASIX) 40 MG tablet; Take 1 tablet (40 mg total) by mouth once daily.  Dispense: 90 tablet; Refill: 3    10. Constipation, unspecified constipation type  - linaclotide (LINZESS) 290 mcg Cap; Take 1 capsule (290 mcg total) by mouth once daily.  Dispense: 90 capsule; Refill: 3    11. Gastroesophageal reflux disease with esophagitis  - pantoprazole (PROTONIX) 40 MG tablet; Take 1 tablet (40 mg total) by mouth every morning.  Dispense: 90 tablet; Refill: 3  - ranitidine (ZANTAC) 300 MG tablet; Take 1 tablet (300 mg total) by mouth every evening.  Dispense: 90 tablet; Refill: 3    12. Acute gastritis without hemorrhage, unspecified gastritis type  - pantoprazole  (PROTONIX) 40 MG tablet; Take 1 tablet (40 mg total) by mouth every morning.  Dispense: 90 tablet; Refill: 3  - ranitidine (ZANTAC) 300 MG tablet; Take 1 tablet (300 mg total) by mouth every evening.  Dispense: 90 tablet; Refill: 3    13. Bloated abdomen  - pantoprazole (PROTONIX) 40 MG tablet; Take 1 tablet (40 mg total) by mouth every morning.  Dispense: 90 tablet; Refill: 3  - ranitidine (ZANTAC) 300 MG tablet; Take 1 tablet (300 mg total) by mouth every evening.  Dispense: 90 tablet; Refill: 3    14. Belching  - pantoprazole (PROTONIX) 40 MG tablet; Take 1 tablet (40 mg total) by mouth every morning.  Dispense: 90 tablet; Refill: 3  - ranitidine (ZANTAC) 300 MG tablet; Take 1 tablet (300 mg total) by mouth every evening.  Dispense: 90 tablet; Refill: 3    15. Hypokalemia  - potassium chloride (MICRO-K) 10 MEQ CpSR; Take 1 capsule (10 mEq total) by mouth once daily.  Dispense: 90 capsule; Refill: 3

## 2018-10-10 NOTE — PATIENT INSTRUCTIONS
Walking for Fitness  Fitness walking has something for everyone, even people who are already fit. Walking is one of the safest ways to condition your body aerobically. It can boost energy, help you lose weight, and reduce stress.    Physical benefits  · Walking strengthens your heart and lungs, and tones your muscles.  · When walking, your feet land with less impact than in other sports. This reduces chances of muscle, bone, and joint injury.  · Regular walking improves your cholesterol levels and lowers your risk of heart disease. And it helps you control your blood sugar if you have diabetes.  · Walking is a weight-bearing activity, which helps maintain bone density. This can help prevent osteoporosis.  Personal rewards  · Taking walks can help you relax and manage stress. And fitness walking may make you feel better about yourself.  · Walking can help you sleep better at night and make you less likely to be depressed.  · Regular walking may help maintain your memory as you get older.  · Walking is a great way to spend extra time with friends and family members. Be sure to invite your dog along!  Q&A about fitness walking  Q: Will walking keep me fit?  A: Yes. Regular walking at the right pace gives you all the benefits of other aerobic activities, such as jogging and swimming.  Q: Will walking help me lose weight and keep it off?  A: Yes. Per mile, walking can burn as many calories as jogging. Your health care provider can help work walking into your weight-loss plan.  Q: Is walking safe for my health?  A: Yes. Walking is safe if you have high blood pressure, diabetes, heart disease, or other conditions. Talk to your healthcare provider before you start.  Date Last Reviewed: 4/1/2017 © 2000-2017 [employer]. [address] [place] [address], [place] [address]. All rights reserved. This information is not intended as a substitute for professional medical care. Always follow your healthcare professional's  instructions.        Weight Management: Getting Started  Healthy bodies come in all shapes and sizes. Not all bodies are made to be thin. For some people, a healthy weight is higher than the average weight listed on weight charts. Your healthcare provider can help you decide on a healthy weight for you.    Reasons to lose weight  Losing weight can help with some health problems, such as high blood pressure, heart disease, diabetes, sleep apnea, and arthritis. You may also feel more energy.  Set your long-term goal  Your goal doesn't even have to be a specific weight. You may decide on a fitness goal (such as being able to walk 10 miles a week), or a health goal (such as lowering your blood pressure). Choose a goal that is measurable and reasonable, so you know when you've reached it. A goal of reaching a BMI of less than 25 is not always reasonable (or possible).   Make an action plan  Habits dont change overnight. Setting your goals too high can leave you feeling discouraged if you cant reach them. Be realistic. Choose one or two small changes you can make now. Set an action plan for how you are going to make these changes. When you can stick to this plan, keep making a few more small changes. Taking small steps will help you stay on the path to success.  Track your progress  Write down your goals. Then, keep a daily record of your progress. Write down what you eat and how active you are. This record lets you look back on how much youve done. It may also help when youre feeling frustrated. Reward yourself for success. Even if you dont reach every goal, give yourself credit for what you do get done.  Get support  Encouragement from others can help make losing weight easier. Ask your family members and friends for support. They may even want to join you. Also look to your healthcare provider, registered dietitian, and  for help. Your local hospital can give you more information about  nutrition, exercise, and weight loss.  Date Last Reviewed: 1/31/2016  © 0000-8076 [employer] [address] [place] [address], [place] [address]. All rights reserved. This information is not intended as a substitute for professional medical care. Always follow your healthcare professional's instructions.        Controlling High Blood Pressure  High blood pressure (hypertension) is often called the silent killer. This is because many people who have it dont know it. High blood pressure is defined as 140/90 mm Hg or higher. Know your blood pressure and remember to check it regularly. Doing so can save your life. Here are some things you can do to help control your blood pressure.    Choose heart-healthy foods  · Select low-salt, low-fat foods. Limit sodium intake to 2,400 mg per day or the amount suggested by your healthcare provider.  · Limit canned, dried, cured, packaged, and fast foods. These can contain a lot of salt.  · Eat 8 to 10 servings of fruits and vegetables every day.  · Choose lean meats, fish, or chicken.  · Eat whole-grain pasta, brown rice, and beans.  · Eat 2 to 3 servings of low-fat or fat-free dairy products.  · Ask your doctor about the DASH eating plan. This plan helps reduce blood pressure.  · When you go to a restaurant, ask that your meal be prepared with no added salt.  Maintain a healthy weight  · Ask your healthcare provider how many calories to eat a day. Then stick to that number.  · Ask your healthcare provider what weight range is healthiest for you. If you are overweight, a weight loss of only 3% to 5% of your body weight can help lower blood pressure. Generally, a good weight loss goal is to lose 10% of your body weight in a year.  · Limit snacks and sweets.  · Get regular exercise.  Get up and get active  · Choose activities you enjoy. Find ones you can do with friends or family. This includes bicycling, dancing, walking, and jogging.  · Park farther away from building  entrances.  · Use stairs instead of the elevator.  · When you can, walk or bike instead of driving.  · Lucas leaves, garden, or do household repairs.  · Be active at a moderate to vigorous level of physical activity for at least 40 minutes for a minimum of 3 to 4 days a week.   Manage stress  · Make time to relax and enjoy life. Find time to laugh.  · Communicate your concerns with your loved ones and your healthcare provider.  · Visit with family and friends, and keep up with hobbies.  Limit alcohol and quit smoking  · Men should have no more than 2 drinks per day.  · Women should have no more than 1 drink per day.  · Talk with your healthcare provider about quitting smoking. Smoking significantly increases your risk for heart disease and stroke. Ask your healthcare provider about community smoking cessation programs and other options.  Medicines  If lifestyle changes arent enough, your healthcare provider may prescribe high blood pressure medicine. Take all medicines as prescribed. If you have any questions about your medicines, ask your healthcare provider before stopping or changing them.   Date Last Reviewed: 4/27/2016 © 2000-2017 [employer]. [address] [place] [address], [place] [address]. All rights reserved. This information is not intended as a substitute for professional medical care. Always follow your healthcare professional's instructions.

## 2018-10-11 ENCOUNTER — LAB VISIT (OUTPATIENT)
Dept: LAB | Facility: HOSPITAL | Age: 53
End: 2018-10-11
Attending: FAMILY MEDICINE
Payer: COMMERCIAL

## 2018-10-11 DIAGNOSIS — Z00.00 ENCOUNTER FOR PREVENTIVE HEALTH EXAMINATION: ICD-10-CM

## 2018-10-11 DIAGNOSIS — E55.9 VITAMIN D DEFICIENCY: ICD-10-CM

## 2018-10-11 DIAGNOSIS — E78.5 HYPERLIPIDEMIA, UNSPECIFIED HYPERLIPIDEMIA TYPE: ICD-10-CM

## 2018-10-11 LAB
25(OH)D3+25(OH)D2 SERPL-MCNC: 19 NG/ML
ALBUMIN SERPL BCP-MCNC: 3.6 G/DL
ALP SERPL-CCNC: 125 U/L
ALT SERPL W/O P-5'-P-CCNC: 17 U/L
ANION GAP SERPL CALC-SCNC: 10 MMOL/L
AST SERPL-CCNC: 15 U/L
BASOPHILS # BLD AUTO: 0.05 K/UL
BASOPHILS NFR BLD: 0.7 %
BILIRUB SERPL-MCNC: 0.6 MG/DL
BUN SERPL-MCNC: 16 MG/DL
CALCIUM SERPL-MCNC: 9.2 MG/DL
CHLORIDE SERPL-SCNC: 108 MMOL/L
CHOLEST SERPL-MCNC: 192 MG/DL
CHOLEST/HDLC SERPL: 3.9 {RATIO}
CO2 SERPL-SCNC: 23 MMOL/L
CREAT SERPL-MCNC: 1 MG/DL
DIFFERENTIAL METHOD: ABNORMAL
EOSINOPHIL # BLD AUTO: 0.1 K/UL
EOSINOPHIL NFR BLD: 1.7 %
ERYTHROCYTE [DISTWIDTH] IN BLOOD BY AUTOMATED COUNT: 13.3 %
EST. GFR  (AFRICAN AMERICAN): >60 ML/MIN/1.73 M^2
EST. GFR  (NON AFRICAN AMERICAN): >60 ML/MIN/1.73 M^2
GLUCOSE SERPL-MCNC: 111 MG/DL
HCT VFR BLD AUTO: 41.9 %
HDLC SERPL-MCNC: 49 MG/DL
HDLC SERPL: 25.5 %
HGB BLD-MCNC: 13.8 G/DL
IMM GRANULOCYTES # BLD AUTO: 0 K/UL
IMM GRANULOCYTES NFR BLD AUTO: 0 %
LDLC SERPL CALC-MCNC: 122.8 MG/DL
LYMPHOCYTES # BLD AUTO: 4.2 K/UL
LYMPHOCYTES NFR BLD: 60.1 %
MCH RBC QN AUTO: 28.9 PG
MCHC RBC AUTO-ENTMCNC: 32.9 G/DL
MCV RBC AUTO: 88 FL
MONOCYTES # BLD AUTO: 0.4 K/UL
MONOCYTES NFR BLD: 5.2 %
NEUTROPHILS # BLD AUTO: 2.2 K/UL
NEUTROPHILS NFR BLD: 32.3 %
NONHDLC SERPL-MCNC: 143 MG/DL
NRBC BLD-RTO: 0 /100 WBC
PLATELET # BLD AUTO: 311 K/UL
PMV BLD AUTO: 9.9 FL
POTASSIUM SERPL-SCNC: 3.4 MMOL/L
PROT SERPL-MCNC: 7.1 G/DL
RBC # BLD AUTO: 4.77 M/UL
SODIUM SERPL-SCNC: 141 MMOL/L
TRIGL SERPL-MCNC: 101 MG/DL
TSH SERPL DL<=0.005 MIU/L-ACNC: 2.88 UIU/ML
WBC # BLD AUTO: 6.91 K/UL

## 2018-10-11 PROCEDURE — 85025 COMPLETE CBC W/AUTO DIFF WBC: CPT

## 2018-10-11 PROCEDURE — 80061 LIPID PANEL: CPT

## 2018-10-11 PROCEDURE — 80053 COMPREHEN METABOLIC PANEL: CPT

## 2018-10-11 PROCEDURE — 84443 ASSAY THYROID STIM HORMONE: CPT

## 2018-10-11 PROCEDURE — 82306 VITAMIN D 25 HYDROXY: CPT

## 2018-10-11 PROCEDURE — 36415 COLL VENOUS BLD VENIPUNCTURE: CPT | Mod: PO

## 2018-10-12 ENCOUNTER — TELEPHONE (OUTPATIENT)
Dept: FAMILY MEDICINE | Facility: CLINIC | Age: 53
End: 2018-10-12

## 2018-10-12 DIAGNOSIS — R73.9 HYPERGLYCEMIA: Primary | ICD-10-CM

## 2018-10-12 DIAGNOSIS — E55.9 VITAMIN D DEFICIENCY: ICD-10-CM

## 2018-10-12 RX ORDER — ERGOCALCIFEROL 1.25 MG/1
50000 CAPSULE ORAL
Qty: 12 CAPSULE | Refills: 3 | Status: SHIPPED | OUTPATIENT
Start: 2018-10-12 | End: 2019-10-15 | Stop reason: SDUPTHER

## 2018-10-12 NOTE — TELEPHONE ENCOUNTER
----- Message from Remy Parson MD sent at 10/11/2018  9:19 PM CDT -----  The vitamin-D level is better but still very low and she should remain on the high dose supplement.    The thyroid level is normal.    The chemistry panel as an elevated blood sugar.  She said she was fasting for this test, was she?  The potassium level is low, probably from the furosemide.  I would suggest we add a potassium supplement.    The cholesterol levels are good and much improved from last year.    The blood count is normal with no anemia, normal white blood cells, and normal platelets.

## 2018-10-12 NOTE — TELEPHONE ENCOUNTER
Patient was given report. She will stay on vit D = please send in new rx to her [facility]    She stated she was fasting for these labs.    She had stopped her potassium supplement. She will restart it.     Patient requested results be mailed to her. Done.

## 2018-10-12 NOTE — TELEPHONE ENCOUNTER
Vitamin D sent to [facility].  Recommend weight loss program with high protein low simple carb diet, regular exercise 30-60 minutes 5-6 days a week and recheck blood sugar with an a1c as well in 6-12 months

## 2018-12-28 ENCOUNTER — TELEPHONE (OUTPATIENT)
Dept: FAMILY MEDICINE | Facility: CLINIC | Age: 53
End: 2018-12-28

## 2018-12-28 NOTE — TELEPHONE ENCOUNTER
----- Message from Adilene Sina sent at 12/28/2018 12:30 PM CST -----  Contact: self [phone]  Patient came to  to make appt.  Earliest she could get is 1-7-19.  Patient stated she went to a doctor right before Thanksgiving and her bp was 181/117.  She is having bp issues as well as anxiety and depression.  Would like to be seen sooner than 1-7-19 if possible.  Please call patient at [phone] to advise if she can.

## 2018-12-28 NOTE — TELEPHONE ENCOUNTER
Called patient- she saw an ent for ear infection- no htn meds prescribed- she is under a lot of stress/anxiety-denies depression- appt rescheduled to 1/2/19.

## 2019-01-02 ENCOUNTER — DOCUMENTATION ONLY (OUTPATIENT)
Dept: FAMILY MEDICINE | Facility: CLINIC | Age: 54
End: 2019-01-02

## 2019-01-02 ENCOUNTER — OFFICE VISIT (OUTPATIENT)
Dept: FAMILY MEDICINE | Facility: CLINIC | Age: 54
End: 2019-01-02
Attending: FAMILY MEDICINE
Payer: COMMERCIAL

## 2019-01-02 VITALS
RESPIRATION RATE: 12 BRPM | DIASTOLIC BLOOD PRESSURE: 86 MMHG | OXYGEN SATURATION: 98 % | SYSTOLIC BLOOD PRESSURE: 154 MMHG | HEIGHT: 63 IN | BODY MASS INDEX: 34.73 KG/M2 | WEIGHT: 196 LBS | HEART RATE: 91 BPM | TEMPERATURE: 99 F

## 2019-01-02 DIAGNOSIS — F43.20 ADJUSTMENT DISORDER, UNSPECIFIED TYPE: ICD-10-CM

## 2019-01-02 DIAGNOSIS — I10 HYPERTENSION, POOR CONTROL: Primary | ICD-10-CM

## 2019-01-02 PROCEDURE — 99214 OFFICE O/P EST MOD 30 MIN: CPT | Mod: S$GLB,,, | Performed by: FAMILY MEDICINE

## 2019-01-02 PROCEDURE — 3008F PR BODY MASS INDEX (BMI) DOCUMENTED: ICD-10-PCS | Mod: CPTII,S$GLB,, | Performed by: FAMILY MEDICINE

## 2019-01-02 PROCEDURE — 3008F BODY MASS INDEX DOCD: CPT | Mod: CPTII,S$GLB,, | Performed by: FAMILY MEDICINE

## 2019-01-02 PROCEDURE — 99999 PR PBB SHADOW E&M-EST. PATIENT-LVL IV: ICD-10-PCS | Mod: PBBFAC,,, | Performed by: FAMILY MEDICINE

## 2019-01-02 PROCEDURE — 99999 PR PBB SHADOW E&M-EST. PATIENT-LVL IV: CPT | Mod: PBBFAC,,, | Performed by: FAMILY MEDICINE

## 2019-01-02 PROCEDURE — 99214 PR OFFICE/OUTPT VISIT, EST, LEVL IV, 30-39 MIN: ICD-10-PCS | Mod: S$GLB,,, | Performed by: FAMILY MEDICINE

## 2019-01-02 RX ORDER — METOPROLOL SUCCINATE 50 MG/1
50 TABLET, EXTENDED RELEASE ORAL DAILY
Qty: 30 TABLET | Refills: 5 | Status: SHIPPED | OUTPATIENT
Start: 2019-01-02 | End: 2019-05-29 | Stop reason: SDUPTHER

## 2019-01-02 NOTE — PATIENT INSTRUCTIONS
Controlling High Blood Pressure  High blood pressure (hypertension) is often called the silent killer. This is because many people who have it dont know it. High blood pressure is defined as 140/90 mm Hg or higher. Know your blood pressure and remember to check it regularly. Doing so can save your life. Here are some things you can do to help control your blood pressure.    Choose heart-healthy foods  · Select low-salt, low-fat foods. Limit sodium intake to 2,400 mg per day or the amount suggested by your healthcare provider.  · Limit canned, dried, cured, packaged, and fast foods. These can contain a lot of salt.  · Eat 8 to 10 servings of fruits and vegetables every day.  · Choose lean meats, fish, or chicken.  · Eat whole-grain pasta, brown rice, and beans.  · Eat 2 to 3 servings of low-fat or fat-free dairy products.  · Ask your doctor about the DASH eating plan. This plan helps reduce blood pressure.  · When you go to a restaurant, ask that your meal be prepared with no added salt.  Maintain a healthy weight  · Ask your healthcare provider how many calories to eat a day. Then stick to that number.  · Ask your healthcare provider what weight range is healthiest for you. If you are overweight, a weight loss of only 3% to 5% of your body weight can help lower blood pressure. Generally, a good weight loss goal is to lose 10% of your body weight in a year.  · Limit snacks and sweets.  · Get regular exercise.  Get up and get active  · Choose activities you enjoy. Find ones you can do with friends or family. This includes bicycling, dancing, walking, and jogging.  · Park farther away from building entrances.  · Use stairs instead of the elevator.  · When you can, walk or bike instead of driving.  · [place] leaves, garden, or do household repairs.  · Be active at a moderate to vigorous level of physical activity for at least 40 minutes for a minimum of 3 to 4 days a week.   Manage stress  · Make time to relax and enjoy  life. Find time to laugh.  · Communicate your concerns with your loved ones and your healthcare provider.  · Visit with family and friends, and keep up with hobbies.  Limit alcohol and quit smoking  · Men should have no more than 2 drinks per day.  · Women should have no more than 1 drink per day.  · Talk with your healthcare provider about quitting smoking. Smoking significantly increases your risk for heart disease and stroke. Ask your healthcare provider about community smoking cessation programs and other options.  Medicines  If lifestyle changes arent enough, your healthcare provider may prescribe high blood pressure medicine. Take all medicines as prescribed. If you have any questions about your medicines, ask your healthcare provider before stopping or changing them.   Date Last Reviewed: 4/27/2016  © 7221-4613 [employer]. [address] [place] [address], [place] [address]. All rights reserved. This information is not intended as a substitute for professional medical care. Always follow your healthcare professional's instructions.        Controlling High Blood Pressure  High blood pressure (hypertension) is often called the silent killer. This is because many people who have it dont know it. High blood pressure is defined as 140/90 mm Hg or higher. Know your blood pressure and remember to check it regularly. Doing so can save your life. Here are some things you can do to help control your blood pressure.    Choose heart-healthy foods  · Select low-salt, low-fat foods. Limit sodium intake to 2,400 mg per day or the amount suggested by your healthcare provider.  · Limit canned, dried, cured, packaged, and fast foods. These can contain a lot of salt.  · Eat 8 to 10 servings of fruits and vegetables every day.  · Choose lean meats, fish, or chicken.  · Eat whole-grain pasta, brown rice, and beans.  · Eat 2 to 3 servings of low-fat or fat-free dairy products.  · Ask your doctor about the DASH eating plan.  This plan helps reduce blood pressure.  · When you go to a restaurant, ask that your meal be prepared with no added salt.  Maintain a healthy weight  · Ask your healthcare provider how many calories to eat a day. Then stick to that number.  · Ask your healthcare provider what weight range is healthiest for you. If you are overweight, a weight loss of only 3% to 5% of your body weight can help lower blood pressure. Generally, a good weight loss goal is to lose 10% of your body weight in a year.  · Limit snacks and sweets.  · Get regular exercise.  Get up and get active  · Choose activities you enjoy. Find ones you can do with friends or family. This includes bicycling, dancing, walking, and jogging.  · Park farther away from building entrances.  · Use stairs instead of the elevator.  · When you can, walk or bike instead of driving.  · [place] leaves, garden, or do household repairs.  · Be active at a moderate to vigorous level of physical activity for at least 40 minutes for a minimum of 3 to 4 days a week.   Manage stress  · Make time to relax and enjoy life. Find time to laugh.  · Communicate your concerns with your loved ones and your healthcare provider.  · Visit with family and friends, and keep up with hobbies.  Limit alcohol and quit smoking  · Men should have no more than 2 drinks per day.  · Women should have no more than 1 drink per day.  · Talk with your healthcare provider about quitting smoking. Smoking significantly increases your risk for heart disease and stroke. Ask your healthcare provider about community smoking cessation programs and other options.  Medicines  If lifestyle changes arent enough, your healthcare provider may prescribe high blood pressure medicine. Take all medicines as prescribed. If you have any questions about your medicines, ask your healthcare provider before stopping or changing them.   Date Last Reviewed: 4/27/2016  © 1023-4435 [employer]. [address] [facility],  [place] [address]. All rights reserved. This information is not intended as a substitute for professional medical care. Always follow your healthcare professional's instructions.        Walking for Fitness  Fitness walking has something for everyone, even people who are already fit. Walking is one of the safest ways to condition your body aerobically. It can boost energy, help you lose weight, and reduce stress.    Physical benefits  · Walking strengthens your heart and lungs, and tones your muscles.  · When walking, your feet land with less impact than in other sports. This reduces chances of muscle, bone, and joint injury.  · Regular walking improves your cholesterol levels and lowers your risk of heart disease. And it helps you control your blood sugar if you have diabetes.  · Walking is a weight-bearing activity, which helps maintain bone density. This can help prevent osteoporosis.  Personal rewards  · Taking walks can help you relax and manage stress. And fitness walking may make you feel better about yourself.  · Walking can help you sleep better at night and make you less likely to be depressed.  · Regular walking may help maintain your memory as you get older.  · Walking is a great way to spend extra time with friends and family members. Be sure to invite your dog along!  Q&A about fitness walking  Q: Will walking keep me fit?  A: Yes. Regular walking at the right pace gives you all the benefits of other aerobic activities, such as jogging and swimming.  Q: Will walking help me lose weight and keep it off?  A: Yes. Per mile, walking can burn as many calories as jogging. Your health care provider can help work walking into your weight-loss plan.  Q: Is walking safe for my health?  A: Yes. Walking is safe if you have high blood pressure, diabetes, heart disease, or other conditions. Talk to your healthcare provider before you start.  Date Last Reviewed: 4/1/2017  © 9596-4712 [employer] [address]  [place] [address]. All rights reserved. This information is not intended as a substitute for professional medical care. Always follow your healthcare professional's instructions.        Weight Management: Getting Started  Healthy bodies come in all shapes and sizes. Not all bodies are made to be thin. For some people, a healthy weight is higher than the average weight listed on weight charts. Your healthcare provider can help you decide on a healthy weight for you.    Reasons to lose weight  Losing weight can help with some health problems, such as high blood pressure, heart disease, diabetes, sleep apnea, and arthritis. You may also feel more energy.  Set your long-term goal  Your goal doesn't even have to be a specific weight. You may decide on a fitness goal (such as being able to walk 10 miles a week), or a health goal (such as lowering your blood pressure). Choose a goal that is measurable and reasonable, so you know when you've reached it. A goal of reaching a BMI of less than 25 is not always reasonable (or possible).   Make an action plan  Habits dont change overnight. Setting your goals too high can leave you feeling discouraged if you cant reach them. Be realistic. Choose one or two small changes you can make now. Set an action plan for how you are going to make these changes. When you can stick to this plan, keep making a few more small changes. Taking small steps will help you stay on the path to success.  Track your progress  Write down your goals. Then, keep a daily record of your progress. Write down what you eat and how active you are. This record lets you look back on how much youve done. It may also help when youre feeling frustrated. Reward yourself for success. Even if you dont reach every goal, give yourself credit for what you do get done.  Get support  Encouragement from others can help make losing weight easier. Ask your family members and friends for support. They  may even want to join you. Also look to your healthcare provider, registered dietitian, and  for help. Your local hospital can give you more information about nutrition, exercise, and weight loss.  Date Last Reviewed: 1/31/2016  © 0800-0169 [employer]. [address] [place] [address], [place] [address]. All rights reserved. This information is not intended as a substitute for professional medical care. Always follow your healthcare professional's instructions.
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
## 2019-01-02 NOTE — PROGRESS NOTES
Pre-Visit Chart Review  For Appointment Scheduled on 1-2-19    Health Maintenance Due   Topic Date Due    Influenza Vaccine  08/01/2018

## 2019-01-04 ENCOUNTER — DOCUMENTATION ONLY (OUTPATIENT)
Dept: FAMILY MEDICINE | Facility: CLINIC | Age: 54
End: 2019-01-04

## 2019-01-04 NOTE — PROGRESS NOTES
Pre-Visit Chart Review  For Appointment Scheduled on 1-30-19    Health Maintenance Due   Topic Date Due    Influenza Vaccine  08/01/2018

## 2019-01-16 ENCOUNTER — PATIENT OUTREACH (OUTPATIENT)
Dept: ADMINISTRATIVE | Facility: HOSPITAL | Age: 54
End: 2019-01-16

## 2019-01-16 NOTE — LETTER
January 16, 2019    Lynn Naidu  [address] [place] [address] [address] [place] [id number]             [facility]  [address]  [facility] [address]  Phone: [phone] DEARJanet R Johnson, [facility] is committed to your overall health.  To help you get the most out of each of your visits, we will review your information to make sure you are up to date on all of your recommended tests and/or procedures.      Dr. Remy Parson MD has found that your chart shows you may be due for PAP SMEAR.     If you have had any of the above done at another facility, please bring the records or information with you so that your record at [facility] will be complete.  If you would like to schedule any of these, please contact me.    If you are currently taking medication, please bring it with you to your appointment for review.    If you see a provider from Dr Vigil office the results will need to be picked up and brought to us or, you as the patient will have to request the record for your PCP. We have a difficult time getting records from that office.      Thank you for letting [facility] take care of you r healthcare needs.    Your [facility],  MD Odalys Shah LPN Clinical Care Coordinator  [facility] [address] [place] [address]  Phone [phone]  Fax [phone]

## 2019-01-30 ENCOUNTER — OFFICE VISIT (OUTPATIENT)
Dept: FAMILY MEDICINE | Facility: CLINIC | Age: 54
End: 2019-01-30
Attending: FAMILY MEDICINE
Payer: COMMERCIAL

## 2019-01-30 VITALS
WEIGHT: 198.19 LBS | OXYGEN SATURATION: 98 % | TEMPERATURE: 99 F | RESPIRATION RATE: 20 BRPM | DIASTOLIC BLOOD PRESSURE: 70 MMHG | HEART RATE: 75 BPM | HEIGHT: 63 IN | SYSTOLIC BLOOD PRESSURE: 122 MMHG | BODY MASS INDEX: 35.12 KG/M2

## 2019-01-30 DIAGNOSIS — F43.23 ADJUSTMENT DISORDER WITH MIXED ANXIETY AND DEPRESSED MOOD: Primary | ICD-10-CM

## 2019-01-30 DIAGNOSIS — I10 GOOD HYPERTENSION CONTROL: ICD-10-CM

## 2019-01-30 PROCEDURE — 99213 OFFICE O/P EST LOW 20 MIN: CPT | Mod: S$GLB,,, | Performed by: FAMILY MEDICINE

## 2019-01-30 PROCEDURE — 3008F PR BODY MASS INDEX (BMI) DOCUMENTED: ICD-10-PCS | Mod: CPTII,S$GLB,, | Performed by: FAMILY MEDICINE

## 2019-01-30 PROCEDURE — 99213 PR OFFICE/OUTPT VISIT, EST, LEVL III, 20-29 MIN: ICD-10-PCS | Mod: S$GLB,,, | Performed by: FAMILY MEDICINE

## 2019-01-30 PROCEDURE — 99999 PR PBB SHADOW E&M-EST. PATIENT-LVL IV: CPT | Mod: PBBFAC,,, | Performed by: FAMILY MEDICINE

## 2019-01-30 PROCEDURE — 3008F BODY MASS INDEX DOCD: CPT | Mod: CPTII,S$GLB,, | Performed by: FAMILY MEDICINE

## 2019-01-30 PROCEDURE — 99999 PR PBB SHADOW E&M-EST. PATIENT-LVL IV: ICD-10-PCS | Mod: PBBFAC,,, | Performed by: FAMILY MEDICINE

## 2019-01-30 NOTE — PATIENT INSTRUCTIONS
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
## 2019-02-14 ENCOUNTER — TELEPHONE (OUTPATIENT)
Dept: FAMILY MEDICINE | Facility: CLINIC | Age: 54
End: 2019-02-14

## 2019-02-14 NOTE — TELEPHONE ENCOUNTER
----- Message from Anni Zhang sent at 2/14/2019  1:33 PM CST -----  Type: Needs Medical Advice    Who Called:  Patient  Best Call Back Number: [phone] or [phone]  Additional Information: Her social work therapist Jaja Ori (Ph#[phone])recommended patient go to her PCP for Xanax for her sleep and panic attacks. They are asking you to fill the prescription. Please remit to:    [address] - [address] [address]  [address] [facility] [address]  Phone: [phone] Fax: [phone]    Please call to advise.

## 2019-02-15 NOTE — TELEPHONE ENCOUNTER
Patient notified  will not prescribe Xanax- she will contact her therapist to see if she can help her find a psychiatrist.

## 2019-02-15 NOTE — TELEPHONE ENCOUNTER
I usually do not use xanax or benzodiazepines for other than short term needs (flying, mri scan, etc) due to highly addictive nature and sedation problems.  Xanax is too addictive to use as a sleep aide.  In addition it looks like she has been getting Valium in [place] already.

## 2019-05-16 ENCOUNTER — LAB VISIT (OUTPATIENT)
Dept: LAB | Facility: HOSPITAL | Age: 54
End: 2019-05-16
Attending: FAMILY MEDICINE
Payer: COMMERCIAL

## 2019-05-16 DIAGNOSIS — R73.9 HYPERGLYCEMIA: ICD-10-CM

## 2019-05-16 LAB
ESTIMATED AVG GLUCOSE: 114 MG/DL (ref 68–131)
GLUCOSE SERPL-MCNC: 95 MG/DL (ref 70–110)
HBA1C MFR BLD HPLC: 5.6 % (ref 4–5.6)

## 2019-05-16 PROCEDURE — 83036 HEMOGLOBIN GLYCOSYLATED A1C: CPT

## 2019-05-16 PROCEDURE — 82947 ASSAY GLUCOSE BLOOD QUANT: CPT

## 2019-05-16 PROCEDURE — 36415 COLL VENOUS BLD VENIPUNCTURE: CPT | Mod: PO

## 2019-05-29 ENCOUNTER — TELEPHONE (OUTPATIENT)
Dept: FAMILY MEDICINE | Facility: CLINIC | Age: 54
End: 2019-05-29

## 2019-05-29 DIAGNOSIS — F43.20 ADJUSTMENT DISORDER, UNSPECIFIED TYPE: ICD-10-CM

## 2019-05-29 DIAGNOSIS — I10 HYPERTENSION, POOR CONTROL: ICD-10-CM

## 2019-05-29 RX ORDER — METOPROLOL SUCCINATE 50 MG/1
50 TABLET, EXTENDED RELEASE ORAL DAILY
Qty: 30 TABLET | Refills: 8 | Status: SHIPPED | OUTPATIENT
Start: 2019-05-29 | End: 2019-06-04 | Stop reason: SDUPTHER

## 2019-05-29 NOTE — TELEPHONE ENCOUNTER
----- Message from Brianda Dudley sent at 5/29/2019  4:11 PM CDT -----  .Type:  RX Refill Request    Who Called: patient  Refill or New Rx:refill  RX Name and Strength:metoprolol 50mg  How is the patient currently taking it? (ex. 1XDay):1/day  Is this a 30 day or 90 day RX:90  Preferred Pharmacy with phone number:.  [facility]  [place], [place] - [address] [address]  [address] [address] [facility] [address]  Phone: [phone] Fax: [phone]    Local or Mail Order:local  Ordering Provider:lester  Would the patient rather a call back or a response via [facility]? call  Best Call Back Number:.[phone] or [phone]  Additional Information: needs by Friday as she's going out of town for 3 weeks

## 2019-06-04 DIAGNOSIS — I10 HYPERTENSION, POOR CONTROL: ICD-10-CM

## 2019-06-04 DIAGNOSIS — F43.20 ADJUSTMENT DISORDER, UNSPECIFIED TYPE: ICD-10-CM

## 2019-06-04 RX ORDER — METOPROLOL SUCCINATE 50 MG/1
50 TABLET, EXTENDED RELEASE ORAL DAILY
Qty: 90 TABLET | Refills: 1 | Status: SHIPPED | OUTPATIENT
Start: 2019-06-04 | End: 2019-10-15 | Stop reason: SDUPTHER

## 2019-06-04 NOTE — TELEPHONE ENCOUNTER
----- Message from Nataliia Matta sent at 6/4/2019 10:24 AM CDT -----    Type:  RX Refill Request    Who Called:  pt  Refill   RX Name and Strength: metoprolol succinate 50  mg  How is the patient currently taking it? (ex. 1XDay):  1   A day  Is this a 30 day or 90 day RX:  90  days  Preferred Pharmacy with phone number:    [facility] [address] [place] [address] [facility]  [address] [facility] [address]  Phone: [phone]  Best Call Back Number:  [phone]  Additional Information:   Pt is  Out  Of  med

## 2019-07-03 ENCOUNTER — OFFICE VISIT (OUTPATIENT)
Dept: PAIN MEDICINE | Facility: CLINIC | Age: 54
End: 2019-07-03
Payer: COMMERCIAL

## 2019-07-03 ENCOUNTER — TELEPHONE (OUTPATIENT)
Dept: PAIN MEDICINE | Facility: CLINIC | Age: 54
End: 2019-07-03

## 2019-07-03 VITALS
SYSTOLIC BLOOD PRESSURE: 129 MMHG | HEIGHT: 63 IN | HEART RATE: 67 BPM | DIASTOLIC BLOOD PRESSURE: 81 MMHG | BODY MASS INDEX: 35.08 KG/M2 | WEIGHT: 198 LBS

## 2019-07-03 DIAGNOSIS — M54.12 CERVICAL RADICULITIS: Primary | ICD-10-CM

## 2019-07-03 DIAGNOSIS — M50.30 DDD (DEGENERATIVE DISC DISEASE), CERVICAL: ICD-10-CM

## 2019-07-03 PROCEDURE — 99999 PR PBB SHADOW E&M-EST. PATIENT-LVL IV: CPT | Mod: PBBFAC,,, | Performed by: ANESTHESIOLOGY

## 2019-07-03 PROCEDURE — 99204 PR OFFICE/OUTPT VISIT, NEW, LEVL IV, 45-59 MIN: ICD-10-PCS | Mod: S$GLB,,, | Performed by: ANESTHESIOLOGY

## 2019-07-03 PROCEDURE — 99999 PR PBB SHADOW E&M-EST. PATIENT-LVL IV: ICD-10-PCS | Mod: PBBFAC,,, | Performed by: ANESTHESIOLOGY

## 2019-07-03 PROCEDURE — 99204 OFFICE O/P NEW MOD 45 MIN: CPT | Mod: S$GLB,,, | Performed by: ANESTHESIOLOGY

## 2019-07-03 PROCEDURE — 3008F BODY MASS INDEX DOCD: CPT | Mod: CPTII,S$GLB,, | Performed by: ANESTHESIOLOGY

## 2019-07-03 PROCEDURE — 3008F PR BODY MASS INDEX (BMI) DOCUMENTED: ICD-10-PCS | Mod: CPTII,S$GLB,, | Performed by: ANESTHESIOLOGY

## 2019-07-03 RX ORDER — FLUOXETINE HYDROCHLORIDE 20 MG/1
20 CAPSULE ORAL DAILY
Refills: 2 | COMMUNITY
Start: 2019-06-29 | End: 2024-01-02

## 2019-07-03 RX ORDER — DOXEPIN HYDROCHLORIDE 10 MG/1
10 CAPSULE ORAL NIGHTLY
Refills: 1 | COMMUNITY
Start: 2019-06-29 | End: 2019-10-15

## 2019-07-03 RX ORDER — BACLOFEN 10 MG/1
10 TABLET ORAL 3 TIMES DAILY
Qty: 90 TABLET | Refills: 1 | Status: SHIPPED | OUTPATIENT
Start: 2019-07-03 | End: 2019-10-15 | Stop reason: ALTCHOICE

## 2019-07-03 RX ORDER — GABAPENTIN 300 MG/1
300 CAPSULE ORAL 3 TIMES DAILY
Qty: 90 CAPSULE | Refills: 1 | Status: SHIPPED | OUTPATIENT
Start: 2019-07-03 | End: 2019-08-19 | Stop reason: SDUPTHER
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
## 2019-07-03 NOTE — TELEPHONE ENCOUNTER
----- Message from Anni Zhang sent at 7/3/2019 11:31 AM CDT -----  Type: Needs Medical Advice    Who Called:  Patient    Pharmacy name and phone #:    [facility] [place] - [address]  [address]  [place] [address]  Phone: [phone] Fax: [phone]    Best Call Back Number: [phone] (home)     Additional Information: The doctor told patient that he was sending in a muscle relaxer for her. She would like to know if he did or not. Please call to advise.
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
## 2019-07-08 DIAGNOSIS — M54.12 CERVICAL RADICULOPATHY: Primary | ICD-10-CM

## 2019-07-12 ENCOUNTER — TELEPHONE (OUTPATIENT)
Dept: PAIN MEDICINE | Facility: CLINIC | Age: 54
End: 2019-07-12

## 2019-07-12 RX ORDER — HYDROCODONE BITARTRATE AND ACETAMINOPHEN 5; 325 MG/1; MG/1
1 TABLET ORAL EVERY 6 HOURS PRN
Qty: 30 TABLET | Refills: 0 | Status: SHIPPED | OUTPATIENT
Start: 2019-07-12 | End: 2019-08-11

## 2019-07-12 NOTE — TELEPHONE ENCOUNTER
----- Message from Ezekiel Yadav sent at 7/12/2019 12:08 PM CDT -----  Contact: Dianne Marshall  Type:  Pharmacy Calling to Clarify an RX    Name of Caller:  Dianne  Pharmacy Name:    [facility] [address] [place] [address] [address]  [address] [facility] [address]  Phone: [phone] Fax: [phone]  Prescription Name:  HYDROcodone-acetaminophen (NORCO) 5-325 mg per tablet  What do they need to clarify?:  Diagnosis code  Best Call Back Number:  [phone]  Additional Information:  NA

## 2019-07-12 NOTE — TELEPHONE ENCOUNTER
----- Message from Mau Benites sent at 7/12/2019  9:28 AM CDT -----  Type: Needs Medical Advice    Who Called: self Symptoms (please be specific):  NA  How long has patient had these symptoms:  NA  [facility] [address]  Best Call Back Number: [phone]  Additional Information: Patient complain of neck pain, requesting a rx for the pain.

## 2019-07-22 ENCOUNTER — NURSE TRIAGE (OUTPATIENT)
Dept: ADMINISTRATIVE | Facility: CLINIC | Age: 54
End: 2019-07-22

## 2019-07-22 ENCOUNTER — HOSPITAL ENCOUNTER (OUTPATIENT)
Facility: AMBULARY SURGERY CENTER | Age: 54
Discharge: HOME | End: 2019-07-22
Attending: ANESTHESIOLOGY | Admitting: ANESTHESIOLOGY
Payer: COMMERCIAL

## 2019-07-22 DIAGNOSIS — M54.12 CERVICAL RADICULITIS: Primary | ICD-10-CM

## 2019-07-22 PROCEDURE — 99152 PR MOD CONSCIOUS SEDATION, SAME PHYS, 5+ YRS, FIRST 15 MIN: ICD-10-PCS | Mod: ,,, | Performed by: ANESTHESIOLOGY

## 2019-07-22 PROCEDURE — 62321 NJX INTERLAMINAR CRV/THRC: CPT | Mod: ,,, | Performed by: ANESTHESIOLOGY

## 2019-07-22 PROCEDURE — 62321 PR INJ CERV/THORAC, W/GUIDANCE: ICD-10-PCS | Mod: ,,, | Performed by: ANESTHESIOLOGY

## 2019-07-22 PROCEDURE — 62321 NJX INTERLAMINAR CRV/THRC: CPT | Performed by: ANESTHESIOLOGY

## 2019-07-22 PROCEDURE — 99152 MOD SED SAME PHYS/QHP 5/>YRS: CPT | Mod: ,,, | Performed by: ANESTHESIOLOGY

## 2019-07-22 RX ORDER — SODIUM CHLORIDE, SODIUM LACTATE, POTASSIUM CHLORIDE, CALCIUM CHLORIDE 600; 310; 30; 20 MG/100ML; MG/100ML; MG/100ML; MG/100ML
INJECTION, SOLUTION INTRAVENOUS CONTINUOUS
Status: DISCONTINUED | OUTPATIENT
Start: 2019-07-22 | End: 2019-07-22 | Stop reason: HOSPADM

## 2019-07-22 RX ORDER — SODIUM CHLORIDE 9 MG/ML
INJECTION, SOLUTION INTRAMUSCULAR; INTRAVENOUS; SUBCUTANEOUS
Status: DISCONTINUED | OUTPATIENT
Start: 2019-07-22 | End: 2019-07-22 | Stop reason: HOSPADM

## 2019-07-22 RX ORDER — DEXAMETHASONE SODIUM PHOSPHATE 10 MG/ML
INJECTION INTRAMUSCULAR; INTRAVENOUS
Status: DISCONTINUED | OUTPATIENT
Start: 2019-07-22 | End: 2019-07-22 | Stop reason: HOSPADM

## 2019-07-22 NOTE — DISCHARGE SUMMARY
[facility]  Discharge Note  Short Stay    Admit Date: 7/22/2019    Discharge Date and Time: 7/22/2019    Attending Physician: Thomas Ivory MD     Discharge Provider: Thomas Ivory    Diagnoses:  Active Hospital Problems    Diagnosis  POA    *Cervical radiculitis [M54.12]  Yes      Resolved Hospital Problems   No resolved problems to display.       Hospital Course: cervical EIS  Discharged Condition: Good    Final Diagnoses:   Active Hospital Problems    Diagnosis  POA    *Cervical radiculitis [M54.12]  Yes      Resolved Hospital Problems   No resolved problems to display.       Disposition: Home or Self Care    Follow up/Patient Instructions:    Medications:  Reconciled Home Medications:      Medication List      CHANGE how you take these medications    pantoprazole 40 MG tablet  Commonly known as:  PROTONIX  Take 1 tablet (40 mg total) by mouth every morning.  What changed:    · when to take this  · reasons to take this     ranitidine 300 MG tablet  Commonly known as:  ZANTAC  Take 1 tablet (300 mg total) by mouth every evening.  What changed:    · when to take this  · reasons to take this        CONTINUE taking these medications    baclofen 10 MG tablet  Commonly known as:  LIORESAL  Take 1 tablet (10 mg total) by mouth 3 (three) times daily.     BIONECT 0.2 % Crea  Generic drug:  sodium hyaluronate  Apply topically daily as needed.     clonazePAM 0.5 MG tablet  Commonly known as:  KLONOPIN  Take 1 tablet (0.5 mg total) by mouth daily as needed.     cyclobenzaprine 10 MG tablet  Commonly known as:  FLEXERIL  Take 1 tablet (10 mg total) by mouth 3 (three) times daily as needed.     desonide 0.05 % cream  Commonly known as:  DESOWEN  Apply topically daily as needed.     doxepin 10 MG capsule  Commonly known as:  SINEQUAN  Take 10 mg by mouth every evening.     ergocalciferol 50,000 unit Cap  Commonly known as:  VITAMIN D2  Take 1 capsule (50,000 Units total) by mouth every 7 days.     FLUoxetine 20 MG  capsule  Take 20 mg by mouth once daily.     furosemide 40 MG tablet  Commonly known as:  LASIX  Take 1 tablet (40 mg total) by mouth once daily.     gabapentin 300 MG capsule  Commonly known as:  NEURONTIN  Take 1 capsule (300 mg total) by mouth 3 (three) times daily.     HYDROcodone-acetaminophen 5-325 mg per tablet  Commonly known as:  NORCO  Take 1 tablet by mouth every 6 (six) hours as needed for Pain.     hydrOXYzine HCl 25 MG tablet  Commonly known as:  ATARAX  Take 1 tablet (25 mg total) by mouth 3 (three) times daily as needed.     ibuprofen 800 MG tablet  Commonly known as:  ADVIL,MOTRIN  Take 1 tablet (800 mg total) by mouth 3 (three) times daily as needed.     JUBLIA 10 % Ava  Generic drug:  efinaconazole  Apply topically nightly as needed.     linaCLOtide 290 mcg Cap capsule  Commonly known as:  LINZESS  Take 1 capsule (290 mcg total) by mouth once daily.     magnesium 250 mg Tab  Take 2 tablets by mouth daily as needed.     metoprolol succinate 50 MG 24 hr tablet  Commonly known as:  TOPROL-XL  Take 1 tablet (50 mg total) by mouth once daily.     minocycline 100 MG capsule  Commonly known as:  MINOCIN,DYNACIN  Take 100 mg by mouth every 12 (twelve) hours as needed.     mometasone 50 mcg/actuation nasal spray  Commonly known as:  NASONEX  2 sprays by Nasal route daily as needed.     potassium chloride 10 MEQ Cpsr  Commonly known as:  MICRO-K  Take 1 capsule (10 mEq total) by mouth once daily.     PROBIOTIC BLEND ORAL  Take by mouth.     SULFACLEANSE 8-4 8-4 % Susp  Generic drug:  sulfacetamide sodium-sulfur  Apply topically nightly as needed.     traZODone 100 MG tablet  Commonly known as:  DESYREL  Take 1 tablet (100 mg total) by mouth nightly as needed.     triamcinolone acetonide 0.1% 0.1 % cream  Commonly known as:  KENALOG  Apply topically daily as needed.          Discharge Procedure Orders   Call MD for:  temperature >100.4     Call MD for:  persistent nausea and vomiting or diarrhea     Call MD  for:  severe uncontrolled pain     Call MD for:  redness, tenderness, or signs of infection (pain, swelling, redness, odor or green/yellow discharge around incision site)     Call MD for:  difficulty breathing or increased cough     Call MD for:  severe persistent headache        Follow up with MD in 2-3 weeks    Discharge Procedure Orders (must include Diet, Follow-up, Activity):   Discharge Procedure Orders (must include Diet, Follow-up, Activity)   Call MD for:  temperature >100.4     Call MD for:  persistent nausea and vomiting or diarrhea     Call MD for:  severe uncontrolled pain     Call MD for:  redness, tenderness, or signs of infection (pain, swelling, redness, odor or green/yellow discharge around incision site)     Call MD for:  difficulty breathing or increased cough     Call MD for:  severe persistent headache

## 2019-07-22 NOTE — OP NOTE
PROCEDURE DATE: 7/22/2019    Procedure: C7-T1 cervical interlaminar epidural steroid injection under utilizing fluoroscopy.    Diagnosis: Cervical Degenerative Disc Diease; Cervical Radiculitis  POSTOP DIAGNOSIS: SAME    Physician: Thomas Ivory MD    Medications injected:  Dexamethasone 10mg followed by a slow injection of 4 mL sterile, preservative-free normal saline.    Local anesthetic used: Lidocaine 1%, 2 ml.    Sedation Medications: RN IV sedation    Complications:  None    Estimated blood loss: None    Technique:  A time-out was taken to identify patient and procedure prior to starting the procedure.  With the patient laying in a prone position with the neck in a mid-flexed forward position, the area was prepped and draped in the usual sterile fashion using ChloraPrep and a fenestrated drape.  The area was determined under AP fluoroscopic guidance.  Local anesthetic was given using a 25-gauge 1.5 inch needle by raising a wheal and then infiltrating ventrally.  A 3.5 inch 20-gauge Touhy needle was introduced under fluoroscopic guidance to meet the lamina of C7.  The needle was then hinged under the lamina then advanced using loss of resistance technique.  Once the tip of the needle was in the desired position, the 1ml contrast dye Omnipaque was injected to determine placement and no uptake.  The steroid was then injected slowly followed by a slow injection of 4 mL of the sterile preservative-free normal saline.  The patient tolerated the procedure well.    The patient was monitored after the procedure and was given post-procedure and discharge instructions to follow at home. The patient was discharged in a stable condition.

## 2019-07-22 NOTE — PLAN OF CARE
Patient sitting in wheelchair and states she is ready to go home;she is awake and alert and denies pain,nausea ,dizziness or weakness. She states that she is ready to go home. Her spouse is present and states he is ready to take patient home; he states that he is driving the patient home. All belongings listed on pre op check list have been returned to patient.

## 2019-07-23 ENCOUNTER — TELEPHONE (OUTPATIENT)
Dept: PAIN MEDICINE | Facility: CLINIC | Age: 54
End: 2019-07-23

## 2019-07-23 VITALS
OXYGEN SATURATION: 97 % | RESPIRATION RATE: 18 BRPM | WEIGHT: 198 LBS | DIASTOLIC BLOOD PRESSURE: 101 MMHG | TEMPERATURE: 98 F | HEIGHT: 63 IN | BODY MASS INDEX: 35.08 KG/M2 | SYSTOLIC BLOOD PRESSURE: 139 MMHG | HEART RATE: 67 BPM

## 2019-07-23 NOTE — TELEPHONE ENCOUNTER
----- Message from Ezekiel Yadav sent at 7/23/2019  4:12 PM CDT -----  Contact: Patient  Type: Needs Medical Advice    Who Called:  Patient  Best Call Back Number: [phone]; [phone]  Additional Information: Patient states they had an injection and had to call the on-call nurse due to swelling and pain with a headache. Please call to advise. Thanks!

## 2019-07-23 NOTE — TELEPHONE ENCOUNTER
Patient states she barely got any sleep last night due to neck swelling and a headache.  She also states she has been putting ice on her neck and it has gotten better.  She states she just has a slight headache.  I advised her to go to the ED if it gets any worse.  She verbalized understanding.  She also would like to know if she can get something called in for pain?  Please advise

## 2019-07-23 NOTE — TELEPHONE ENCOUNTER
Reason for Disposition   Health Information question, no triage required and triager able to answer question    Protocols used: ST INFORMATION ONLY CALL-A-AH    Pt had some questions regarding post op care. Read through discharge instructions with patient. Verbalized understanding. Advised to call back with any questions

## 2019-07-24 RX ORDER — NABUMETONE 500 MG/1
500 TABLET, FILM COATED ORAL 2 TIMES DAILY
Qty: 30 TABLET | Refills: 0 | Status: SHIPPED | OUTPATIENT
Start: 2019-07-24 | End: 2019-10-15 | Stop reason: ALTCHOICE

## 2019-07-24 NOTE — TELEPHONE ENCOUNTER
Spoke to pt states she received a muscle relaxer Baclofen with no relief. Her headaches have subsided. Pt also states she has been holding ice packs on neck witch has leaked and wet the area. Pt requesting a different medication be called in if possible. Pharmacy verified [facility] correct in chart.

## 2019-08-02 ENCOUNTER — TELEPHONE (OUTPATIENT)
Dept: FAMILY MEDICINE | Facility: CLINIC | Age: 54
End: 2019-08-02

## 2019-08-02 DIAGNOSIS — Z12.31 SCREENING MAMMOGRAM, ENCOUNTER FOR: Primary | ICD-10-CM

## 2019-08-02 NOTE — TELEPHONE ENCOUNTER
----- Message from Gogo Foley sent at 8/2/2019  2:55 PM CDT -----  Contact: patient  Type: Needs Medical Advice    Who Called:  patient  Symptoms (please be specific):  Need to get orders put in for mammogram  Best Call Back Number: [phone]  Additional Information: please call when ready for scheduling

## 2019-08-12 ENCOUNTER — HOSPITAL ENCOUNTER (OUTPATIENT)
Dept: RADIOLOGY | Facility: CLINIC | Age: 54
Discharge: HOME | End: 2019-08-12
Attending: FAMILY MEDICINE
Payer: COMMERCIAL

## 2019-08-12 DIAGNOSIS — Z12.31 SCREENING MAMMOGRAM, ENCOUNTER FOR: ICD-10-CM

## 2019-08-12 PROCEDURE — 77063 MAMMO DIGITAL SCREENING BILAT WITH TOMOSYNTHESIS_CAD: ICD-10-PCS | Mod: 26,,, | Performed by: RADIOLOGY

## 2019-08-12 PROCEDURE — 77063 BREAST TOMOSYNTHESIS BI: CPT | Mod: 26,,, | Performed by: RADIOLOGY

## 2019-08-12 PROCEDURE — 77067 SCR MAMMO BI INCL CAD: CPT | Mod: 26,,, | Performed by: RADIOLOGY

## 2019-08-12 PROCEDURE — 77067 SCR MAMMO BI INCL CAD: CPT | Mod: TC,PO

## 2019-08-12 PROCEDURE — 77067 MAMMO DIGITAL SCREENING BILAT WITH TOMOSYNTHESIS_CAD: ICD-10-PCS | Mod: 26,,, | Performed by: RADIOLOGY

## 2019-08-19 ENCOUNTER — OFFICE VISIT (OUTPATIENT)
Dept: PAIN MEDICINE | Facility: CLINIC | Age: 54
End: 2019-08-19
Payer: COMMERCIAL

## 2019-08-19 VITALS
DIASTOLIC BLOOD PRESSURE: 86 MMHG | BODY MASS INDEX: 34.91 KG/M2 | HEART RATE: 59 BPM | WEIGHT: 197 LBS | HEIGHT: 63 IN | SYSTOLIC BLOOD PRESSURE: 130 MMHG

## 2019-08-19 DIAGNOSIS — M54.12 CERVICAL RADICULITIS: ICD-10-CM

## 2019-08-19 DIAGNOSIS — M50.30 DDD (DEGENERATIVE DISC DISEASE), CERVICAL: Primary | ICD-10-CM

## 2019-08-19 DIAGNOSIS — M79.18 MYOFASCIAL PAIN: ICD-10-CM

## 2019-08-19 PROCEDURE — 99214 PR OFFICE/OUTPT VISIT, EST, LEVL IV, 30-39 MIN: ICD-10-PCS | Mod: S$GLB,,, | Performed by: PHYSICIAN ASSISTANT

## 2019-08-19 PROCEDURE — 3008F PR BODY MASS INDEX (BMI) DOCUMENTED: ICD-10-PCS | Mod: CPTII,S$GLB,, | Performed by: PHYSICIAN ASSISTANT

## 2019-08-19 PROCEDURE — 99214 OFFICE O/P EST MOD 30 MIN: CPT | Mod: S$GLB,,, | Performed by: PHYSICIAN ASSISTANT

## 2019-08-19 PROCEDURE — 99999 PR PBB SHADOW E&M-EST. PATIENT-LVL V: CPT | Mod: PBBFAC,,, | Performed by: PHYSICIAN ASSISTANT

## 2019-08-19 PROCEDURE — 3008F BODY MASS INDEX DOCD: CPT | Mod: CPTII,S$GLB,, | Performed by: PHYSICIAN ASSISTANT

## 2019-08-19 PROCEDURE — 99999 PR PBB SHADOW E&M-EST. PATIENT-LVL V: ICD-10-PCS | Mod: PBBFAC,,, | Performed by: PHYSICIAN ASSISTANT

## 2019-08-19 RX ORDER — GABAPENTIN 300 MG/1
300 CAPSULE ORAL 3 TIMES DAILY
Qty: 90 CAPSULE | Refills: 1 | Status: SHIPPED | OUTPATIENT
Start: 2019-08-19 | End: 2019-10-22 | Stop reason: SDUPTHER
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
## 2019-09-30 ENCOUNTER — DOCUMENTATION ONLY (OUTPATIENT)
Dept: FAMILY MEDICINE | Facility: CLINIC | Age: 54
End: 2019-09-30

## 2019-09-30 NOTE — PROGRESS NOTES
Pre-Visit Chart Review  For Appointment Scheduled on 10-15-19    There are no preventive care reminders to display for this patient.

## 2019-10-15 ENCOUNTER — OFFICE VISIT (OUTPATIENT)
Dept: FAMILY MEDICINE | Facility: CLINIC | Age: 54
End: 2019-10-15
Attending: FAMILY MEDICINE
Payer: COMMERCIAL

## 2019-10-15 ENCOUNTER — LAB VISIT (OUTPATIENT)
Dept: LAB | Facility: HOSPITAL | Age: 54
End: 2019-10-15
Attending: FAMILY MEDICINE
Payer: COMMERCIAL

## 2019-10-15 VITALS
SYSTOLIC BLOOD PRESSURE: 124 MMHG | RESPIRATION RATE: 16 BRPM | WEIGHT: 189.13 LBS | BODY MASS INDEX: 33.51 KG/M2 | HEIGHT: 63 IN | TEMPERATURE: 98 F | HEART RATE: 67 BPM | DIASTOLIC BLOOD PRESSURE: 74 MMHG | OXYGEN SATURATION: 98 %

## 2019-10-15 DIAGNOSIS — Z00.00 ENCOUNTER FOR PREVENTIVE HEALTH EXAMINATION: ICD-10-CM

## 2019-10-15 DIAGNOSIS — K21.00 GASTROESOPHAGEAL REFLUX DISEASE WITH ESOPHAGITIS: ICD-10-CM

## 2019-10-15 DIAGNOSIS — E55.9 VITAMIN D DEFICIENCY: ICD-10-CM

## 2019-10-15 DIAGNOSIS — E87.6 HYPOKALEMIA: ICD-10-CM

## 2019-10-15 DIAGNOSIS — I10 GOOD HYPERTENSION CONTROL: ICD-10-CM

## 2019-10-15 DIAGNOSIS — M54.2 CERVICALGIA: ICD-10-CM

## 2019-10-15 DIAGNOSIS — R60.9 EDEMA, UNSPECIFIED TYPE: ICD-10-CM

## 2019-10-15 DIAGNOSIS — J30.2 SEASONAL ALLERGIC RHINITIS, UNSPECIFIED TRIGGER: ICD-10-CM

## 2019-10-15 DIAGNOSIS — R73.9 HYPERGLYCEMIA: ICD-10-CM

## 2019-10-15 DIAGNOSIS — K59.00 CONSTIPATION, UNSPECIFIED CONSTIPATION TYPE: ICD-10-CM

## 2019-10-15 DIAGNOSIS — Z00.00 ENCOUNTER FOR PREVENTIVE HEALTH EXAMINATION: Primary | ICD-10-CM

## 2019-10-15 LAB
25(OH)D3+25(OH)D2 SERPL-MCNC: 21 NG/ML (ref 30–96)
ALBUMIN SERPL BCP-MCNC: 3.8 G/DL (ref 3.5–5.2)
ALP SERPL-CCNC: 103 U/L (ref 55–135)
ALT SERPL W/O P-5'-P-CCNC: 11 U/L (ref 10–44)
ANION GAP SERPL CALC-SCNC: 8 MMOL/L (ref 8–16)
AST SERPL-CCNC: 15 U/L (ref 10–40)
BASOPHILS # BLD AUTO: 0.06 K/UL (ref 0–0.2)
BASOPHILS NFR BLD: 0.9 % (ref 0–1.9)
BILIRUB SERPL-MCNC: 0.5 MG/DL (ref 0.1–1)
BUN SERPL-MCNC: 14 MG/DL (ref 6–20)
CALCIUM SERPL-MCNC: 9.2 MG/DL (ref 8.7–10.5)
CHLORIDE SERPL-SCNC: 105 MMOL/L (ref 95–110)
CHOLEST SERPL-MCNC: 193 MG/DL (ref 120–199)
CHOLEST/HDLC SERPL: 4.4 {RATIO} (ref 2–5)
CO2 SERPL-SCNC: 26 MMOL/L (ref 23–29)
CREAT SERPL-MCNC: 1 MG/DL (ref 0.5–1.4)
DIFFERENTIAL METHOD: ABNORMAL
EOSINOPHIL # BLD AUTO: 0.1 K/UL (ref 0–0.5)
EOSINOPHIL NFR BLD: 1.9 % (ref 0–8)
ERYTHROCYTE [DISTWIDTH] IN BLOOD BY AUTOMATED COUNT: 12.8 % (ref 11.5–14.5)
EST. GFR  (AFRICAN AMERICAN): >60 ML/MIN/1.73 M^2
EST. GFR  (NON AFRICAN AMERICAN): >60 ML/MIN/1.73 M^2
ESTIMATED AVG GLUCOSE: 108 MG/DL (ref 68–131)
GLUCOSE SERPL-MCNC: 88 MG/DL (ref 70–110)
HBA1C MFR BLD HPLC: 5.4 % (ref 4–5.6)
HCT VFR BLD AUTO: 42.5 % (ref 37–48.5)
HDLC SERPL-MCNC: 44 MG/DL (ref 40–75)
HDLC SERPL: 22.8 % (ref 20–50)
HGB BLD-MCNC: 14.1 G/DL (ref 12–16)
IMM GRANULOCYTES # BLD AUTO: 0.01 K/UL (ref 0–0.04)
IMM GRANULOCYTES NFR BLD AUTO: 0.2 % (ref 0–0.5)
LDLC SERPL CALC-MCNC: 129.8 MG/DL (ref 63–159)
LYMPHOCYTES # BLD AUTO: 3.5 K/UL (ref 1–4.8)
LYMPHOCYTES NFR BLD: 54.3 % (ref 18–48)
MCH RBC QN AUTO: 29.7 PG (ref 27–31)
MCHC RBC AUTO-ENTMCNC: 33.2 G/DL (ref 32–36)
MCV RBC AUTO: 90 FL (ref 82–98)
MONOCYTES # BLD AUTO: 0.3 K/UL (ref 0.3–1)
MONOCYTES NFR BLD: 4.9 % (ref 4–15)
NEUTROPHILS # BLD AUTO: 2.5 K/UL (ref 1.8–7.7)
NEUTROPHILS NFR BLD: 37.8 % (ref 38–73)
NONHDLC SERPL-MCNC: 149 MG/DL
NRBC BLD-RTO: 0 /100 WBC
PLATELET # BLD AUTO: 337 K/UL (ref 150–350)
PMV BLD AUTO: 10.2 FL (ref 9.2–12.9)
POTASSIUM SERPL-SCNC: 3.4 MMOL/L (ref 3.5–5.1)
PROT SERPL-MCNC: 7.6 G/DL (ref 6–8.4)
RBC # BLD AUTO: 4.75 M/UL (ref 4–5.4)
SODIUM SERPL-SCNC: 139 MMOL/L (ref 136–145)
TRIGL SERPL-MCNC: 96 MG/DL (ref 30–150)
WBC # BLD AUTO: 6.48 K/UL (ref 3.9–12.7)

## 2019-10-15 PROCEDURE — 80053 COMPREHEN METABOLIC PANEL: CPT

## 2019-10-15 PROCEDURE — 99396 PR PREVENTIVE VISIT,EST,40-64: ICD-10-PCS | Mod: S$GLB,,, | Performed by: FAMILY MEDICINE

## 2019-10-15 PROCEDURE — 99396 PREV VISIT EST AGE 40-64: CPT | Mod: S$GLB,,, | Performed by: FAMILY MEDICINE

## 2019-10-15 PROCEDURE — 82306 VITAMIN D 25 HYDROXY: CPT

## 2019-10-15 PROCEDURE — 36415 COLL VENOUS BLD VENIPUNCTURE: CPT | Mod: PO

## 2019-10-15 PROCEDURE — 99999 PR PBB SHADOW E&M-EST. PATIENT-LVL IV: CPT | Mod: PBBFAC,,, | Performed by: FAMILY MEDICINE

## 2019-10-15 PROCEDURE — 80061 LIPID PANEL: CPT

## 2019-10-15 PROCEDURE — 99999 PR PBB SHADOW E&M-EST. PATIENT-LVL IV: ICD-10-PCS | Mod: PBBFAC,,, | Performed by: FAMILY MEDICINE

## 2019-10-15 PROCEDURE — 83036 HEMOGLOBIN GLYCOSYLATED A1C: CPT

## 2019-10-15 PROCEDURE — 85025 COMPLETE CBC W/AUTO DIFF WBC: CPT

## 2019-10-15 RX ORDER — POTASSIUM CHLORIDE 750 MG/1
10 CAPSULE, EXTENDED RELEASE ORAL DAILY
Qty: 90 CAPSULE | Refills: 1 | Status: SHIPPED | OUTPATIENT
Start: 2019-10-15 | End: 2019-10-16 | Stop reason: DRUGHIGH

## 2019-10-15 RX ORDER — DIAZEPAM 10 MG/1
10 TABLET ORAL NIGHTLY
Refills: 1 | COMMUNITY
Start: 2019-10-01

## 2019-10-15 RX ORDER — ERGOCALCIFEROL 1.25 MG/1
50000 CAPSULE ORAL
Qty: 12 CAPSULE | Refills: 3 | Status: SHIPPED | OUTPATIENT
Start: 2019-10-15 | End: 2020-10-01

## 2019-10-15 RX ORDER — IBUPROFEN 800 MG/1
800 TABLET ORAL 3 TIMES DAILY PRN
Qty: 270 TABLET | Refills: 1 | Status: SHIPPED | OUTPATIENT
Start: 2019-10-15 | End: 2021-10-19

## 2019-10-15 RX ORDER — METOPROLOL SUCCINATE 50 MG/1
50 TABLET, EXTENDED RELEASE ORAL DAILY
Qty: 90 TABLET | Refills: 3 | Status: SHIPPED | OUTPATIENT
Start: 2019-10-15 | End: 2020-10-16 | Stop reason: SDUPTHER

## 2019-10-15 RX ORDER — FUROSEMIDE 40 MG/1
40 TABLET ORAL DAILY
Qty: 90 TABLET | Refills: 3 | Status: SHIPPED | OUTPATIENT
Start: 2019-10-15 | End: 2020-10-16 | Stop reason: SDUPTHER

## 2019-10-15 RX ORDER — HYDROXYZINE HYDROCHLORIDE 25 MG/1
25 TABLET, FILM COATED ORAL DAILY
COMMUNITY
End: 2020-10-16

## 2019-10-15 NOTE — PROGRESS NOTES
Subjective:       Patient ID: Lynn Naidu is a 54 y.o. female.    Chief Complaint: Annual Exam    54-year-old female coming in for a physical exam.  She is fasting for lab.  She has a history of degenerative disc disease in cervical spine with radiculopathy and had an epidural injection with Dr. Ivory back in July which is working fairly well.  She still has some arthritic pain in the neck but the radicular symptoms have improved.  She has a history of allergic rhinitis with seasonal allergies, plantar fasciitis, vitamin-D deficiency, chronic edema, and hyperglycemia.  Her colonoscopy was in November of 2017 by Dr. Oviedo and she is due for a recheck in 10 years or 2027.  She declines to have a flu vaccine and will consider a shingles vaccine but is leaning against that.    Past Medical History:  No date: Allergy  7-17-15: Bronchitis  No date: Cervical disc displacement  No date: Edema  No date: Insomnia  No date: Plantar fasciitis    Past Surgical History:  11/01/2017: COLONOSCOPY      Comment:  Dr. Oviedo, normal, ten year recheck  11/1/2017: COLONOSCOPY; N/A      Comment:  Procedure: COLONOSCOPY;  Surgeon: Cameron Oviedo MD;                Location: [facility];  Service: Endoscopy;  Laterality:                N/A;  7/22/2019: EPIDURAL STEROID INJECTION INTO CERVICAL SPINE; N/A      Comment:  Procedure: Injection-steroid-epidural-cervical;                 Surgeon: Thomas Ivory MD;  Location: [facility];  Service:                Pain Management;  Laterality: N/A;  C7-T1  No date: WISDOM TOOTH EXTRACTION    Review of patient's family history indicates:  Problem: Heart disease      Relation: Mother          Age of Onset: (Not Specified)  Problem: Stroke      Relation: Mother          Age of Onset: (Not Specified)  Problem: Cancer      Relation: Father          Age of Onset: (Not Specified)          Comment: bone ca, prostate ca   Problem: Hypertension      Relation: Sister          Age of Onset: (Not  Specified)  Problem: Diabetes      Relation: Sister          Age of Onset: (Not Specified)  Problem: Allergies      Relation: Sister          Age of Onset: (Not Specified)  Problem: Asthma      Relation: Sister          Age of Onset: (Not Specified)  Problem: Heart disease      Relation: Brother          Age of Onset: (Not Specified)  Problem: Kidney failure      Relation: Brother          Age of Onset: (Not Specified)  Problem: Hypertension      Relation: Sister          Age of Onset: (Not Specified)  Problem: Allergies      Relation: Sister          Age of Onset: (Not Specified)  Problem: Asthma      Relation: Sister          Age of Onset: (Not Specified)  Problem: Angioedema      Relation: Neg Hx          Age of Onset: (Not Specified)  Problem: Eczema      Relation: Neg Hx          Age of Onset: (Not Specified)  Problem: Immunodeficiency      Relation: Neg Hx          Age of Onset: (Not Specified)  Problem: Urticaria      Relation: Neg Hx          Age of Onset: (Not Specified)    Social History    Tobacco Use      Smoking status: Never Smoker      Smokeless tobacco: Never Used    Alcohol use: No    Drug use: No    Current Outpatient Medications on File Prior to Visit:  BIONECT 0.2 % Crea, Apply topically daily as needed. , Disp: , Rfl:   desonide (DESOWEN) 0.05 % cream, Apply topically daily as needed. , Disp: , Rfl:   diazePAM (VALIUM) 10 MG Tab, Take 10 mg by mouth every evening., Disp: , Rfl: 1  FLUoxetine 20 MG capsule, Take 20 mg by mouth once daily., Disp: , Rfl: 2  gabapentin (NEURONTIN) 300 MG capsule, Take 1 capsule (300 mg total) by mouth 3 (three) times daily., Disp: 90 capsule, Rfl: 1  hydrOXYzine HCl (ATARAX) 25 MG tablet, Take 1 tablet (25 mg total) by mouth 3 (three) times daily as needed. (Patient taking differently: Take 25 mg by mouth once daily. ), Disp: 90 tablet, Rfl: 1  hydrOXYzine HCl (ATARAX) 25 MG tablet, Take 25 mg by mouth once daily., Disp: , Rfl:   L.acid/L.casei/B.bif/B.lisa/FOS  (PROBIOTIC BLEND ORAL), Take 1 capsule by mouth once daily. florify = 10 Billion, Disp: , Rfl:   mometasone (NASONEX) 50 mcg/actuation nasal spray, 2 sprays by Nasal route daily as needed. , Disp: , Rfl:   pantoprazole (PROTONIX) 40 MG tablet, Take 1 tablet (40 mg total) by mouth every morning. (Patient taking differently: Take 40 mg by mouth daily as needed. ), Disp: 90 tablet, Rfl: 3  SULFACLEANSE 8-4 8-4 % Susp, Apply topically nightly as needed. , Disp: , Rfl:   triamcinolone acetonide 0.1% (KENALOG) 0.1 % cream, Apply topically daily as needed. , Disp: , Rfl:   (DISCONTINUED) ergocalciferol (VITAMIN D2) 50,000 unit Cap, Take 1 capsule (50,000 Units total) by mouth every 7 days., Disp: 12 capsule, Rfl: 3  (DISCONTINUED) furosemide (LASIX) 40 MG tablet, Take 1 tablet (40 mg total) by mouth once daily., Disp: 90 tablet, Rfl: 3  (DISCONTINUED) ibuprofen (ADVIL,MOTRIN) 800 MG tablet, Take 1 tablet (800 mg total) by mouth 3 (three) times daily as needed., Disp: 270 tablet, Rfl: 1  (DISCONTINUED) linaclotide (LINZESS) 290 mcg Cap, Take 1 capsule (290 mcg total) by mouth once daily., Disp: 90 capsule, Rfl: 3  (DISCONTINUED) metoprolol succinate (TOPROL-XL) 50 MG 24 hr tablet, Take 1 tablet (50 mg total) by mouth once daily., Disp: 90 tablet, Rfl: 1  (DISCONTINUED) potassium chloride (MICRO-K) 10 MEQ CpSR, Take 1 capsule (10 mEq total) by mouth once daily., Disp: 90 capsule, Rfl: 3  (DISCONTINUED) ranitidine (ZANTAC) 300 MG tablet, Take 1 tablet (300 mg total) by mouth every evening. (Patient taking differently: Take 300 mg by mouth nightly as needed. ), Disp: 90 tablet, Rfl: 3  (DISCONTINUED) baclofen (LIORESAL) 10 MG tablet, Take 1 tablet (10 mg total) by mouth 3 (three) times daily. (Patient not taking: Reported on 10/15/2019), Disp: 90 tablet, Rfl: 1  (DISCONTINUED) clonazePAM (KLONOPIN) 0.5 MG tablet, Take 1 tablet (0.5 mg total) by mouth daily as needed. (Patient not taking: Reported on 10/15/2019), Disp: 30  tablet, Rfl: 0  (DISCONTINUED) cyclobenzaprine (FLEXERIL) 10 MG tablet, Take 1 tablet (10 mg total) by mouth 3 (three) times daily as needed. (Patient not taking: Reported on 10/15/2019), Disp: 100 tablet, Rfl: 5  (DISCONTINUED) doxepin (SINEQUAN) 10 MG capsule, Take 10 mg by mouth every evening., Disp: , Rfl: 1  (DISCONTINUED) JUBLIA 10 % Tr, Apply topically nightly as needed. , Disp: , Rfl:   (DISCONTINUED) magnesium 250 mg Tab, Take 2 tablets by mouth daily as needed. , Disp: , Rfl:   (DISCONTINUED) minocycline (MINOCIN,DYNACIN) 100 MG capsule, Take 100 mg by mouth every 12 (twelve) hours as needed. , Disp: , Rfl:   (DISCONTINUED) nabumetone (RELAFEN) 500 MG tablet, Take 1 tablet (500 mg total) by mouth 2 (two) times daily. (Patient not taking: Reported on 10/15/2019), Disp: 30 tablet, Rfl: 0  (DISCONTINUED) traZODone (DESYREL) 100 MG tablet, Take 1 tablet (100 mg total) by mouth nightly as needed. (Patient not taking: Reported on 10/15/2019), Disp: 90 tablet, Rfl: 3    No current facility-administered medications on file prior to visit.     Shingles Vaccine(1 of 2) due on 02/21/2014  Influenza Vaccine(1) due on 09/01/2019      Review of Systems   Constitutional: Negative for chills, diaphoresis, fatigue, fever and unexpected weight change.   HENT: Positive for congestion, postnasal drip and sneezing. Negative for ear pain, hearing loss, sinus pressure and sore throat.    Eyes: Positive for photophobia, pain, redness and itching. Negative for discharge and visual disturbance.   Respiratory: Negative for cough, chest tightness, shortness of breath and wheezing.    Cardiovascular: Negative for chest pain, palpitations and leg swelling.   Gastrointestinal: Negative for abdominal pain, blood in stool, constipation, diarrhea, nausea and vomiting.   Genitourinary: Negative for dysuria, frequency, hematuria, menstrual problem, pelvic pain, vaginal bleeding and vaginal discharge.   Musculoskeletal: Negative for  arthralgias, back pain, joint swelling and myalgias.   Skin: Negative for color change and rash.   Neurological: Negative for dizziness and headaches.   Hematological: Negative for adenopathy.   Psychiatric/Behavioral: Negative for sleep disturbance. The patient is not nervous/anxious.        Objective:      Physical Exam   Constitutional: She is oriented to person, place, and time. She appears well-developed. No distress.   Good blood pressure control  Obese with a BMI of 33.8 she is down 9.1 lb from her January 30, 2019 visit   HENT:   Head: Normocephalic and atraumatic.   Right Ear: External ear normal.   Left Ear: External ear normal.   Mouth/Throat: Oropharynx is clear and moist. No oropharyngeal exudate.   Nasal turbinate swollen but pale with no erythema or exudate have present.  Mild postnasal drainage.  No facial tenderness to percussion   Eyes: Pupils are equal, round, and reactive to light. Conjunctivae and EOM are normal. Right eye exhibits no discharge. Left eye exhibits no discharge. No scleral icterus.   Neck: Normal range of motion. Neck supple. No JVD present. No tracheal deviation present. No thyromegaly present.   Cardiovascular: Normal rate, regular rhythm and normal heart sounds. Exam reveals no gallop and no friction rub.   No murmur heard.  Pulmonary/Chest: Effort normal and breath sounds normal. No stridor. No respiratory distress. She has no wheezes. She has no rales. She exhibits no tenderness.   Abdominal: Soft. Bowel sounds are normal. She exhibits no distension and no mass. There is no tenderness. There is no rebound and no guarding. No hernia.   Musculoskeletal: Normal range of motion. She exhibits no edema or tenderness.   Lymphadenopathy:     She has no cervical adenopathy.   Neurological: She is alert and oriented to person, place, and time. She has normal reflexes. She displays normal reflexes. No cranial nerve deficit or sensory deficit. She exhibits normal muscle tone.   Skin:  Skin is warm and dry. No rash noted. She is not diaphoretic. No erythema.   Psychiatric: She has a normal mood and affect. Her behavior is normal. Judgment and thought content normal.   Nursing note and vitals reviewed.      Assessment:       1. Encounter for preventive health examination    2. Hyperglycemia    3. Vitamin D deficiency    4. Seasonal allergic rhinitis, unspecified trigger    5. Edema, unspecified type    6. Cervicalgia    7. Constipation, unspecified constipation type    8. Good hypertension control    9. Hypokalemia    10. Gastroesophageal reflux disease with esophagitis    11. BMI 33.0-33.9,adult        Plan:       1. Encounter for preventive health examination  - Comprehensive metabolic panel; Future  - Lipid panel; Future  - CBC auto differential; Future    2. Hyperglycemia  Await lab results  - Comprehensive metabolic panel; Future  - Hemoglobin A1c; Future    3. Vitamin D deficiency  Patient to hold printed prescription into lab results available to see if we are going to continue or change therapy  - Vitamin D; Future  - ergocalciferol (VITAMIN D2) 50,000 unit Cap; Take 1 capsule (50,000 Units total) by mouth every 7 days.  Dispense: 12 capsule; Refill: 3    4. Seasonal allergic rhinitis, unspecified trigger  Not using antihistamines due to dry eyes, may use saline, Flonase    5. Edema, unspecified type  - furosemide (LASIX) 40 MG tablet; Take 1 tablet (40 mg total) by mouth once daily.  Dispense: 90 tablet; Refill: 3    6. Cervicalgia  Radicular symptoms much improved still having local neck pain for which ibuprofen helps  - ibuprofen (ADVIL,MOTRIN) 800 MG tablet; Take 1 tablet (800 mg total) by mouth 3 (three) times daily as needed.  Dispense: 270 tablet; Refill: 1    7. Constipation, unspecified constipation type  Linzess generally control symptoms with occasional worsening possibly due to foods such as apples and she is  - linaCLOtide (LINZESS) 290 mcg Cap capsule; Take 1 capsule (290 mcg  total) by mouth once daily.  Dispense: 90 capsule; Refill: 3    8. Good hypertension control  Good control with no changes needed  - metoprolol succinate (TOPROL-XL) 50 MG 24 hr tablet; Take 1 tablet (50 mg total) by mouth once daily.  Dispense: 90 tablet; Refill: 3    9. Hypokalemia  Await lab results  - potassium chloride (MICRO-K) 10 MEQ CpSR; Take 1 capsule (10 mEq total) by mouth once daily.  Dispense: 90 capsule; Refill: 1    10. Gastroesophageal reflux disease with esophagitis  - ranitidine (ZANTAC) 300 MG tablet; Take 1 tablet (300 mg total) by mouth every evening.  Dispense: 90 tablet; Refill: 3    11. BMI 33.0-33.9,adult  Continue weight loss

## 2019-10-15 NOTE — PATIENT INSTRUCTIONS

## 2019-10-16 ENCOUNTER — TELEPHONE (OUTPATIENT)
Dept: FAMILY MEDICINE | Facility: CLINIC | Age: 54
End: 2019-10-16

## 2019-10-16 RX ORDER — POTASSIUM CHLORIDE 20 MEQ/1
20 TABLET, EXTENDED RELEASE ORAL DAILY
Qty: 90 TABLET | Refills: 1 | Status: SHIPPED | OUTPATIENT
Start: 2019-10-16 | End: 2020-10-16 | Stop reason: SDUPTHER

## 2019-10-16 RX ORDER — POTASSIUM CHLORIDE 20 MEQ/1
20 TABLET, EXTENDED RELEASE ORAL 2 TIMES DAILY
Qty: 90 TABLET | Refills: 3 | Status: SHIPPED | OUTPATIENT
Start: 2019-10-16 | End: 2019-10-16 | Stop reason: SDUPTHER

## 2019-10-16 NOTE — TELEPHONE ENCOUNTER
Prescription that was sent to [facility] has an error in the sig.  It is supposed to be once daily, not twice daily.  I do not know if she has already picked it up.  Quantity is correct for once daily.

## 2019-10-16 NOTE — TELEPHONE ENCOUNTER
Patient was read report. She is taking her potassium 10 meq. She will take 2 to = 20 meq qd. Please send in rx to her [facility].

## 2019-10-16 NOTE — TELEPHONE ENCOUNTER
----- Message from Remy Parson MD sent at 10/15/2019  8:35 PM CDT -----  Her chemistry panel is normal with the exception of a low potassium.  Is she taking her potassium supplement?  If so we need to increase the strength to a 20 mEq.  This is a side effect of the Lasix diuretic.  The blood sugar, kidney functions, and liver functions are otherwise normal.  The A1c indicates no evidence of diabetes.    The cholesterol levels are good.    The vitamin-D level is still low, slightly improved from last year.  She should continue on the high dose vitamin-D.    The blood count is normal with no anemia, normal white blood cells, and normal platelets.

## 2019-10-16 NOTE — TELEPHONE ENCOUNTER
----- Message from Pradip Ch sent at 10/16/2019 10:58 AM CDT -----  Contact: same  Type:  Test Results    Who Called:  patient  Name of Test (Lab/Mammo/Etc):  labs  Date of Test:  10/14/19  Ordering Provider:  Blank  Where the test was performed[facility]   Best Call Back Number:  [phone] or [phone]  Additional Information:  n/a

## 2019-10-21 NOTE — TELEPHONE ENCOUNTER
Pharmacy was called, they will correct dosage. Patient was called. She is finishing up her 10 meq 2 qd then will start on new bottle of 20 meq qd.

## 2019-10-22 RX ORDER — GABAPENTIN 300 MG/1
300 CAPSULE ORAL 3 TIMES DAILY
Qty: 90 CAPSULE | Refills: 1 | Status: SHIPPED | OUTPATIENT
Start: 2019-10-22 | End: 2019-12-23 | Stop reason: SDUPTHER

## 2019-10-22 NOTE — TELEPHONE ENCOUNTER
----- Message from Ashley Watson sent at 10/22/2019  4:18 PM CDT -----  Contact: patient  Type:  RX Refill Request    Who Called:  patient  Refill or New Rx:  Refill  RX Name and Strength:  gabapentin (NEURONTIN) 300 MG capsule  How is the patient currently taking it? (ex. 1XDay):  ?  Is this a 30 day or 90 day RX:  ?  Preferred Pharmacy with phone number:    [facility] [address] [place] [address] [address]  [address] [facility] [address]  Phone: [phone] Fax: [phone]  Local or Mail Order:  local  Ordering Provider:  Cachorro Fenton Call Back Number:  [phone]  Additional Information: n/a

## 2019-11-01 ENCOUNTER — TELEPHONE (OUTPATIENT)
Dept: FAMILY MEDICINE | Facility: CLINIC | Age: 54
End: 2019-11-01

## 2019-11-01 NOTE — TELEPHONE ENCOUNTER
----- Message from Viji Nixon sent at 11/1/2019 12:09 PM CDT -----  Type: Needs Medical Advice    Who Called:  Patient  Best Call Back Number: [phone]  Additional Information: Patient requesting to speak with nurse concerning her vitamin D and potassium levels being low/would like advice on any vitamins she could take/has question/please call back to advise.

## 2019-11-01 NOTE — TELEPHONE ENCOUNTER
Patient given recommendation to take a multiple vitamin daily and continue prescribed Vit D and potassium.

## 2019-12-23 RX ORDER — GABAPENTIN 300 MG/1
300 CAPSULE ORAL 3 TIMES DAILY
Qty: 90 CAPSULE | Refills: 2 | Status: SHIPPED | OUTPATIENT
Start: 2019-12-23 | End: 2020-10-16 | Stop reason: SDUPTHER

## 2019-12-23 RX ORDER — GABAPENTIN 300 MG/1
CAPSULE ORAL
Qty: 30 CAPSULE | Refills: 0 | Status: SHIPPED | OUTPATIENT
Start: 2019-12-23 | End: 2019-12-23 | Stop reason: SDUPTHER

## 2019-12-23 NOTE — TELEPHONE ENCOUNTER
----- Message from Jennifer Brown sent at 12/23/2019  9:11 AM CST -----  Contact: Pt  Type:  RX Refill Request    Who Called:  Pt  Refill or New Rx:  Refill  RX Name and Strength:  gabapentin (NEURONTIN) 300 MG capsule  How is the patient currently taking it? (ex. 1XDay):  As directed  Is this a 30 day or 90 day RX: 30  Preferred Pharmacy with phone number:    [facility] - [place], [place] - [address]  [address] [facility] [address]  Phone: [phone] Fax: [phone]  Local or Mail Order:  local  Ordering Provider:  AG Fenton Call Back Number:  [phone]  Additional Information:  Please Advise ---Thank you

## 2020-04-30 ENCOUNTER — TELEPHONE (OUTPATIENT)
Dept: FAMILY MEDICINE | Facility: CLINIC | Age: 55
End: 2020-04-30

## 2020-04-30 NOTE — TELEPHONE ENCOUNTER
----- Message from Nickolas Chen sent at 4/30/2020 11:26 AM CDT -----  Contact: patient  Type: Needs Medical Advice  Who Called:  Patient  Symptoms (please be specific):    How long has patient had these symptoms:    Pharmacy name and phone #:    Best Call Back Number:   Additional Information: requesting a call back to schedule annual appt,epic would allow for scheduling

## 2020-08-06 ENCOUNTER — TELEPHONE (OUTPATIENT)
Dept: FAMILY MEDICINE | Facility: CLINIC | Age: 55
End: 2020-08-06

## 2020-08-06 DIAGNOSIS — Z12.31 VISIT FOR SCREENING MAMMOGRAM: Primary | ICD-10-CM

## 2020-08-06 NOTE — TELEPHONE ENCOUNTER
----- Message from Massiel Perera sent at 8/6/2020  2:28 PM CDT -----  Type: Needs Medical Advice  Who Called:  Patient  Best Call Back Number:   Additional Information: Calling to request orders for a mammogram be put in for her.

## 2020-08-12 ENCOUNTER — HOSPITAL ENCOUNTER (OUTPATIENT)
Dept: RADIOLOGY | Facility: CLINIC | Age: 55
Discharge: HOME | End: 2020-08-12
Attending: FAMILY MEDICINE
Payer: COMMERCIAL

## 2020-08-12 DIAGNOSIS — Z12.31 VISIT FOR SCREENING MAMMOGRAM: ICD-10-CM

## 2020-08-12 PROCEDURE — 77067 SCR MAMMO BI INCL CAD: CPT | Mod: TC,PO

## 2020-08-12 PROCEDURE — 77063 MAMMO DIGITAL SCREENING BILAT WITH TOMOSYNTHESIS_CAD: ICD-10-PCS | Mod: 26,,, | Performed by: RADIOLOGY

## 2020-08-12 PROCEDURE — 77067 SCR MAMMO BI INCL CAD: CPT | Mod: 26,,, | Performed by: RADIOLOGY

## 2020-08-12 PROCEDURE — 77063 BREAST TOMOSYNTHESIS BI: CPT | Mod: 26,,, | Performed by: RADIOLOGY

## 2020-08-12 PROCEDURE — 77067 MAMMO DIGITAL SCREENING BILAT WITH TOMOSYNTHESIS_CAD: ICD-10-PCS | Mod: 26,,, | Performed by: RADIOLOGY

## 2020-10-16 ENCOUNTER — OFFICE VISIT (OUTPATIENT)
Dept: FAMILY MEDICINE | Facility: CLINIC | Age: 55
End: 2020-10-16
Attending: FAMILY MEDICINE
Payer: COMMERCIAL

## 2020-10-16 ENCOUNTER — LAB VISIT (OUTPATIENT)
Dept: LAB | Facility: HOSPITAL | Age: 55
End: 2020-10-16
Attending: FAMILY MEDICINE
Payer: COMMERCIAL

## 2020-10-16 VITALS
OXYGEN SATURATION: 98 % | HEART RATE: 77 BPM | HEIGHT: 63 IN | SYSTOLIC BLOOD PRESSURE: 100 MMHG | WEIGHT: 182.13 LBS | BODY MASS INDEX: 32.27 KG/M2 | DIASTOLIC BLOOD PRESSURE: 72 MMHG | TEMPERATURE: 97 F

## 2020-10-16 DIAGNOSIS — I10 ESSENTIAL HYPERTENSION: ICD-10-CM

## 2020-10-16 DIAGNOSIS — Z00.00 ENCOUNTER FOR PREVENTIVE HEALTH EXAMINATION: ICD-10-CM

## 2020-10-16 DIAGNOSIS — E55.9 VITAMIN D DEFICIENCY: ICD-10-CM

## 2020-10-16 DIAGNOSIS — H93.13 TINNITUS OF BOTH EARS: ICD-10-CM

## 2020-10-16 DIAGNOSIS — I10 GOOD HYPERTENSION CONTROL: ICD-10-CM

## 2020-10-16 DIAGNOSIS — K59.00 CONSTIPATION, UNSPECIFIED CONSTIPATION TYPE: ICD-10-CM

## 2020-10-16 DIAGNOSIS — Z00.00 ENCOUNTER FOR PREVENTIVE HEALTH EXAMINATION: Primary | ICD-10-CM

## 2020-10-16 DIAGNOSIS — I10 HYPERTENSION, POOR CONTROL: ICD-10-CM

## 2020-10-16 DIAGNOSIS — J35.8 CRYPTIC TONSIL: ICD-10-CM

## 2020-10-16 DIAGNOSIS — M50.30 DDD (DEGENERATIVE DISC DISEASE), CERVICAL: ICD-10-CM

## 2020-10-16 DIAGNOSIS — M50.20 CERVICAL DISC DISPLACEMENT: ICD-10-CM

## 2020-10-16 DIAGNOSIS — R60.9 EDEMA, UNSPECIFIED TYPE: ICD-10-CM

## 2020-10-16 LAB
25(OH)D3+25(OH)D2 SERPL-MCNC: 23 NG/ML (ref 30–96)
ALBUMIN SERPL BCP-MCNC: 3.8 G/DL (ref 3.5–5.2)
ALP SERPL-CCNC: 95 U/L (ref 55–135)
ALT SERPL W/O P-5'-P-CCNC: 17 U/L (ref 10–44)
ANION GAP SERPL CALC-SCNC: 9 MMOL/L (ref 8–16)
ANION GAP SERPL CALC-SCNC: 9 MMOL/L (ref 8–16)
AST SERPL-CCNC: 15 U/L (ref 10–40)
BASOPHILS # BLD AUTO: 0.04 K/UL (ref 0–0.2)
BASOPHILS NFR BLD: 0.6 % (ref 0–1.9)
BILIRUB SERPL-MCNC: 0.4 MG/DL (ref 0.1–1)
BUN SERPL-MCNC: 19 MG/DL (ref 6–20)
BUN SERPL-MCNC: 19 MG/DL (ref 6–20)
CALCIUM SERPL-MCNC: 9.1 MG/DL (ref 8.7–10.5)
CALCIUM SERPL-MCNC: 9.1 MG/DL (ref 8.7–10.5)
CHLORIDE SERPL-SCNC: 102 MMOL/L (ref 95–110)
CHLORIDE SERPL-SCNC: 102 MMOL/L (ref 95–110)
CHOLEST SERPL-MCNC: 235 MG/DL (ref 120–199)
CHOLEST/HDLC SERPL: 4.2 {RATIO} (ref 2–5)
CO2 SERPL-SCNC: 26 MMOL/L (ref 23–29)
CO2 SERPL-SCNC: 26 MMOL/L (ref 23–29)
CREAT SERPL-MCNC: 1.1 MG/DL (ref 0.5–1.4)
CREAT SERPL-MCNC: 1.1 MG/DL (ref 0.5–1.4)
DIFFERENTIAL METHOD: ABNORMAL
EOSINOPHIL # BLD AUTO: 0.2 K/UL (ref 0–0.5)
EOSINOPHIL NFR BLD: 2.9 % (ref 0–8)
ERYTHROCYTE [DISTWIDTH] IN BLOOD BY AUTOMATED COUNT: 12 % (ref 11.5–14.5)
EST. GFR  (AFRICAN AMERICAN): >60 ML/MIN/1.73 M^2
EST. GFR  (AFRICAN AMERICAN): >60 ML/MIN/1.73 M^2
EST. GFR  (NON AFRICAN AMERICAN): 56.3 ML/MIN/1.73 M^2
EST. GFR  (NON AFRICAN AMERICAN): 56.3 ML/MIN/1.73 M^2
GLUCOSE SERPL-MCNC: 99 MG/DL (ref 70–110)
GLUCOSE SERPL-MCNC: 99 MG/DL (ref 70–110)
HCT VFR BLD AUTO: 43.2 % (ref 37–48.5)
HDLC SERPL-MCNC: 56 MG/DL (ref 40–75)
HDLC SERPL: 23.8 % (ref 20–50)
HGB BLD-MCNC: 14.3 G/DL (ref 12–16)
IMM GRANULOCYTES # BLD AUTO: 0.02 K/UL (ref 0–0.04)
IMM GRANULOCYTES NFR BLD AUTO: 0.3 % (ref 0–0.5)
LDLC SERPL CALC-MCNC: 155 MG/DL (ref 63–159)
LYMPHOCYTES # BLD AUTO: 3.9 K/UL (ref 1–4.8)
LYMPHOCYTES NFR BLD: 56.1 % (ref 18–48)
MCH RBC QN AUTO: 29.9 PG (ref 27–31)
MCHC RBC AUTO-ENTMCNC: 33.1 G/DL (ref 32–36)
MCV RBC AUTO: 90 FL (ref 82–98)
MONOCYTES # BLD AUTO: 0.3 K/UL (ref 0.3–1)
MONOCYTES NFR BLD: 4.3 % (ref 4–15)
NEUTROPHILS # BLD AUTO: 2.5 K/UL (ref 1.8–7.7)
NEUTROPHILS NFR BLD: 35.8 % (ref 38–73)
NONHDLC SERPL-MCNC: 179 MG/DL
NRBC BLD-RTO: 0 /100 WBC
PLATELET # BLD AUTO: 306 K/UL (ref 150–350)
PMV BLD AUTO: 10.1 FL (ref 9.2–12.9)
POTASSIUM SERPL-SCNC: 3.5 MMOL/L (ref 3.5–5.1)
POTASSIUM SERPL-SCNC: 3.5 MMOL/L (ref 3.5–5.1)
PROT SERPL-MCNC: 7.6 G/DL (ref 6–8.4)
RBC # BLD AUTO: 4.78 M/UL (ref 4–5.4)
SODIUM SERPL-SCNC: 137 MMOL/L (ref 136–145)
SODIUM SERPL-SCNC: 137 MMOL/L (ref 136–145)
TRIGL SERPL-MCNC: 120 MG/DL (ref 30–150)
WBC # BLD AUTO: 6.92 K/UL (ref 3.9–12.7)

## 2020-10-16 PROCEDURE — 99999 PR PBB SHADOW E&M-EST. PATIENT-LVL IV: CPT | Mod: PBBFAC,,, | Performed by: FAMILY MEDICINE

## 2020-10-16 PROCEDURE — 80053 COMPREHEN METABOLIC PANEL: CPT

## 2020-10-16 PROCEDURE — 85025 COMPLETE CBC W/AUTO DIFF WBC: CPT

## 2020-10-16 PROCEDURE — 99214 PR OFFICE/OUTPT VISIT, EST, LEVL IV, 30-39 MIN: ICD-10-PCS | Mod: S$GLB,,, | Performed by: FAMILY MEDICINE

## 2020-10-16 PROCEDURE — 36415 COLL VENOUS BLD VENIPUNCTURE: CPT | Mod: PO

## 2020-10-16 PROCEDURE — 99214 OFFICE O/P EST MOD 30 MIN: CPT | Mod: S$GLB,,, | Performed by: FAMILY MEDICINE

## 2020-10-16 PROCEDURE — 3008F PR BODY MASS INDEX (BMI) DOCUMENTED: ICD-10-PCS | Mod: CPTII,S$GLB,, | Performed by: FAMILY MEDICINE

## 2020-10-16 PROCEDURE — 3008F BODY MASS INDEX DOCD: CPT | Mod: CPTII,S$GLB,, | Performed by: FAMILY MEDICINE

## 2020-10-16 PROCEDURE — 80061 LIPID PANEL: CPT

## 2020-10-16 PROCEDURE — 99999 PR PBB SHADOW E&M-EST. PATIENT-LVL IV: ICD-10-PCS | Mod: PBBFAC,,, | Performed by: FAMILY MEDICINE

## 2020-10-16 PROCEDURE — 82306 VITAMIN D 25 HYDROXY: CPT

## 2020-10-16 RX ORDER — METOPROLOL SUCCINATE 50 MG/1
50 TABLET, EXTENDED RELEASE ORAL DAILY
Qty: 90 TABLET | Refills: 3 | Status: SHIPPED | OUTPATIENT
Start: 2020-10-16 | End: 2021-10-18 | Stop reason: SDUPTHER

## 2020-10-16 RX ORDER — MOMETASONE FUROATE 50 UG/1
2 SPRAY, METERED NASAL DAILY PRN
Qty: 17 G | Refills: 5 | Status: SHIPPED | OUTPATIENT
Start: 2020-10-16 | End: 2020-10-16 | Stop reason: CLARIF

## 2020-10-16 RX ORDER — GABAPENTIN 300 MG/1
300 CAPSULE ORAL 3 TIMES DAILY
Qty: 90 CAPSULE | Refills: 3 | Status: SHIPPED | OUTPATIENT
Start: 2020-10-16 | End: 2021-10-18

## 2020-10-16 RX ORDER — POTASSIUM CHLORIDE 20 MEQ/1
20 TABLET, EXTENDED RELEASE ORAL DAILY
Qty: 90 TABLET | Refills: 1 | Status: SHIPPED | OUTPATIENT
Start: 2020-10-16 | End: 2021-04-08

## 2020-10-16 RX ORDER — FLUTICASONE PROPIONATE 50 MCG
2 SPRAY, SUSPENSION (ML) NASAL DAILY
Qty: 16 G | Refills: 11 | Status: SHIPPED | OUTPATIENT
Start: 2020-10-16 | End: 2021-10-18 | Stop reason: SDUPTHER

## 2020-10-16 RX ORDER — FUROSEMIDE 40 MG/1
40 TABLET ORAL DAILY
Qty: 90 TABLET | Refills: 3 | Status: SHIPPED | OUTPATIENT
Start: 2020-10-16 | End: 2021-10-18 | Stop reason: SDUPTHER

## 2020-10-16 NOTE — TELEPHONE ENCOUNTER
Care Due:                  Date            Visit Type   Department     Provider  --------------------------------------------------------------------------------                                ESTABLISHED   [facility]  Last Visit: 10-      PATIENT      MEDICINE       Remy Parson  Next Visit: None Scheduled  None         None Found                                                            Last  Test          Frequency    Reason                     Performed    Due Date  --------------------------------------------------------------------------------    Cr..........  6 months...  furosemide, potassium....  10-   04-    K...........  6 months...  furosemide, potassium....  10-   04-    Na..........  6 months...  furosemide...............  10-   04-    Powered by [employer]. Reference number: [id number]. 10/16/2020 12:29:42 PM   CDT

## 2020-10-16 NOTE — PROGRESS NOTES
Subjective:       Patient ID: Lynn Naidu is a 55 y.o. female.    Chief Complaint: Annual Exam    55-year-old female coming in for annual exam.  She has several new problems.  She has a history of cryptic tonsillitis and periodically will have to extract tonsil stones but the seem to be getting a little worse.  She is also having some chronic tinnitus in both ears describing it as a hum with no history of excessive noise exposure in her work.  She went to ENT for both problems and their recommendation was to remove her tonsils.  Nothing adverse was found with the tinnitus after testing but they did want to do an MRI which she is reluctant to do because of her co-pay and cost of 700 dollars.  She is also reluctant to go to surgery for the tonsils.  She has additional history of cervical disc disease with occasional radicular pain, hypertension, vitamin-D deficiency, and her last colonoscopy was in November of 2017 by Dr. Oviedo.    Past Medical History:  No date: Allergy  7-17-15: Bronchitis  No date: Cervical disc displacement  No date: Edema  No date: Insomnia  No date: Plantar fasciitis    Past Surgical History:  11/01/2017: COLONOSCOPY      Comment:  Dr. Oviedo, normal, ten year recheck  11/1/2017: COLONOSCOPY; N/A      Comment:  Procedure: COLONOSCOPY;  Surgeon: Cameron Oviedo MD;                Location: [facility];  Service: Endoscopy;  Laterality:                N/A;  7/22/2019: EPIDURAL STEROID INJECTION INTO CERVICAL SPINE; N/A      Comment:  Procedure: Injection-steroid-epidural-cervical;                 Surgeon: Thomas Ivory MD;  Location: [facility];  Service:                Pain Management;  Laterality: N/A;  C7-T1  No date: WISDOM TOOTH EXTRACTION    Review of patient's family history indicates:  Problem: Heart disease      Relation: Mother          Age of Onset: (Not Specified)  Problem: Stroke      Relation: Mother          Age of Onset: (Not Specified)  Problem: Cancer      Relation: Father           Age of Onset: (Not Specified)          Comment: bone ca, prostate ca   Problem: Hypertension      Relation: Sister          Age of Onset: (Not Specified)  Problem: Diabetes      Relation: Sister          Age of Onset: (Not Specified)  Problem: Allergies      Relation: Sister          Age of Onset: (Not Specified)  Problem: Asthma      Relation: Sister          Age of Onset: (Not Specified)  Problem: Heart disease      Relation: Brother          Age of Onset: (Not Specified)  Problem: Kidney failure      Relation: Brother          Age of Onset: (Not Specified)  Problem: Hypertension      Relation: Sister          Age of Onset: (Not Specified)  Problem: Allergies      Relation: Sister          Age of Onset: (Not Specified)  Problem: Asthma      Relation: Sister          Age of Onset: (Not Specified)  Problem: Angioedema      Relation: Neg Hx          Age of Onset: (Not Specified)  Problem: Eczema      Relation: Neg Hx          Age of Onset: (Not Specified)  Problem: Immunodeficiency      Relation: Neg Hx          Age of Onset: (Not Specified)  Problem: Urticaria      Relation: Neg Hx          Age of Onset: (Not Specified)    Social History    Tobacco Use      Smoking status: Never Smoker      Smokeless tobacco: Never Used    Alcohol use: No    Drug use: No    Current Outpatient Medications on File Prior to Visit:  BIONECT 0.2 % Crea, Apply topically daily as needed. , Disp: , Rfl:   desonide (DESOWEN) 0.05 % cream, Apply topically daily as needed. , Disp: , Rfl:   diazePAM (VALIUM) 10 MG Tab, Take 10 mg by mouth every evening., Disp: , Rfl: 1  FLUoxetine 20 MG capsule, Take 20 mg by mouth once daily., Disp: , Rfl: 2  hydrOXYzine HCl (ATARAX) 25 MG tablet, Take 1 tablet (25 mg total) by mouth 3 (three) times daily as needed. (Patient taking differently: Take 25 mg by mouth once daily. ), Disp: 90 tablet, Rfl: 1  ibuprofen (ADVIL,MOTRIN) 800 MG tablet, Take 1 tablet (800 mg total) by mouth 3 (three) times daily as  needed., Disp: 270 tablet, Rfl: 1  L.acid/L.casei/B.bif/B.lisa/FOS (PROBIOTIC BLEND ORAL), Take 1 capsule by mouth once daily. florify = 10 Billion, Disp: , Rfl:   pantoprazole (PROTONIX) 40 MG tablet, TAKE 1 TABLET BY MOUTH IN THE MORNING, Disp: 90 tablet, Rfl: 3  SULFACLEANSE 8-4 8-4 % Susp, Apply topically nightly as needed. , Disp: , Rfl:   triamcinolone acetonide 0.1% (KENALOG) 0.1 % cream, Apply topically daily as needed. , Disp: , Rfl:   VITAMIN D2 1,250 mcg (50,000 unit) capsule, Take 1 capsule by mouth once a week, Disp: 12 capsule, Rfl: 0  (DISCONTINUED) furosemide (LASIX) 40 MG tablet, Take 1 tablet (40 mg total) by mouth once daily., Disp: 90 tablet, Rfl: 3  (DISCONTINUED) gabapentin (NEURONTIN) 300 MG capsule, Take 1 capsule (300 mg total) by mouth 3 (three) times daily., Disp: 90 capsule, Rfl: 2  (DISCONTINUED) hydrOXYzine HCl (ATARAX) 25 MG tablet, Take 25 mg by mouth once daily., Disp: , Rfl:   (DISCONTINUED) linaCLOtide (LINZESS) 290 mcg Cap capsule, Take 1 capsule (290 mcg total) by mouth once daily., Disp: 90 capsule, Rfl: 3  (DISCONTINUED) metoprolol succinate (TOPROL-XL) 50 MG 24 hr tablet, Take 1 tablet (50 mg total) by mouth once daily., Disp: 90 tablet, Rfl: 3  (DISCONTINUED) mometasone (NASONEX) 50 mcg/actuation nasal spray, 2 sprays by Nasal route daily as needed. , Disp: , Rfl:   (DISCONTINUED) potassium chloride SA (K-DUR,KLOR-CON) 20 MEQ tablet, Take 1 tablet (20 mEq total) by mouth once daily., Disp: 90 tablet, Rfl: 1  (DISCONTINUED) ranitidine (ZANTAC) 300 MG tablet, Take 1 tablet (300 mg total) by mouth every evening. (Patient not taking: Reported on 10/16/2020), Disp: 90 tablet, Rfl: 3    No current facility-administered medications on file prior to visit.     HIV Screening due on 02/21/1979  Shingles Vaccine(1 of 2) due on 02/21/2014  Influenza Vaccine(1) due on 08/01/2020      Review of Systems   Constitutional: Negative for chills, diaphoresis, fatigue, fever and unexpected weight  change.   HENT: Positive for sore throat and tinnitus. Negative for congestion, ear pain, hearing loss, postnasal drip, sinus pressure, sneezing and trouble swallowing.    Eyes: Negative for itching and visual disturbance.   Respiratory: Negative for cough, chest tightness, shortness of breath and wheezing.    Cardiovascular: Negative for chest pain, palpitations and leg swelling.   Gastrointestinal: Negative for abdominal pain, blood in stool, constipation, diarrhea, nausea and vomiting.   Genitourinary: Negative for dysuria, frequency and hematuria.   Musculoskeletal: Negative for arthralgias, back pain, joint swelling and myalgias.   Neurological: Negative for dizziness and headaches.   Hematological: Negative for adenopathy.   Psychiatric/Behavioral: Negative for sleep disturbance. The patient is not nervous/anxious.        Objective:      Physical Exam  Vitals signs and nursing note reviewed.   Constitutional:       General: She is not in acute distress.     Appearance: Normal appearance. She is well-developed. She is obese. She is not ill-appearing, toxic-appearing or diaphoretic.      Comments: Good blood pressure control  Obese with a BMI of 32.3 she is down 7 lb from her last physical October 15, 2019   HENT:      Head: Normocephalic and atraumatic.      Right Ear: Tympanic membrane, ear canal and external ear normal. There is no impacted cerumen.      Left Ear: Tympanic membrane, ear canal and external ear normal. There is no impacted cerumen.      Nose: Nose normal. No congestion or rhinorrhea.      Mouth/Throat:      Mouth: Mucous membranes are moist.      Pharynx: Oropharynx is clear. No oropharyngeal exudate or posterior oropharyngeal erythema.      Comments: Tonsils present bilaterally, no erythema, no swelling, no debris in the tonsillar crypts at this time  Eyes:      General: No scleral icterus.        Right eye: No discharge.         Left eye: No discharge.      Extraocular Movements: Extraocular  movements intact.      Conjunctiva/sclera: Conjunctivae normal.      Pupils: Pupils are equal, round, and reactive to light.   Neck:      Musculoskeletal: Normal range of motion and neck supple. No neck rigidity or muscular tenderness.      Thyroid: No thyromegaly.      Vascular: No carotid bruit (No intracranial bruit heard ) or JVD.   Cardiovascular:      Rate and Rhythm: Normal rate and regular rhythm.      Pulses: Normal pulses.      Heart sounds: Normal heart sounds. No murmur. No friction rub. No gallop.    Pulmonary:      Effort: Pulmonary effort is normal. No respiratory distress.      Breath sounds: Normal breath sounds. No stridor. No wheezing, rhonchi or rales.   Chest:      Chest wall: No tenderness.   Abdominal:      General: Bowel sounds are normal. There is no distension.      Palpations: Abdomen is soft. There is no mass.      Tenderness: There is no abdominal tenderness. There is no guarding or rebound.      Hernia: No hernia is present.   Musculoskeletal: Normal range of motion.         General: Swelling, deformity and signs of injury present. No tenderness.   Lymphadenopathy:      Cervical: No cervical adenopathy.   Skin:     General: Skin is warm and dry.      Coloration: Skin is not jaundiced or pale.      Findings: No bruising, erythema, lesion or rash.   Neurological:      General: No focal deficit present.      Mental Status: She is alert and oriented to person, place, and time. Mental status is at baseline.      Cranial Nerves: No cranial nerve deficit.      Sensory: No sensory deficit.      Motor: No weakness.      Coordination: Coordination normal.      Gait: Gait normal.      Deep Tendon Reflexes: Reflexes are normal and symmetric. Reflexes normal.   Psychiatric:         Mood and Affect: Mood normal.         Behavior: Behavior normal.         Thought Content: Thought content normal.         Judgment: Judgment normal.         Assessment:       1. Encounter for preventive health examination     2. Cryptic tonsil    3. Essential hypertension    4. Cervical disc displacement    5. Tinnitus of both ears    6. Constipation, unspecified constipation type    7. DDD (degenerative disc disease), cervical    8. Vitamin D deficiency    9. Good hypertension control    10. Edema, unspecified type    11. BMI 32.0-32.9,adult        Plan:       1. Encounter for preventive health examination  - Comprehensive Metabolic Panel; Future  - Lipid Panel; Future  - CBC auto differential; Future    2. Cryptic tonsil  Asymptomatic at present.  Suggested twice daily warm salt water gargles as a preventative measure.  A soft toothbrush can sometimes help remove the debris.  Regarding tonsillectomy, if she can tolerate the condition and she is not getting severely ill from repeated infections it is her option.  She will have to  the annoyance of the condition verses the inconvenience and possible risk of surgery.  She is strongly leaning against surgery at this time.    3. Essential hypertension  Well controlled, no changes needed  - Comprehensive Metabolic Panel; Future  - Lipid Panel; Future  - CBC auto differential; Future  - potassium chloride SA (K-DUR,KLOR-CON) 20 MEQ tablet; Take 1 tablet (20 mEq total) by mouth once daily.  Dispense: 90 tablet; Refill: 1  - metoprolol succinate (TOPROL-XL) 50 MG 24 hr tablet; Take 1 tablet (50 mg total) by mouth once daily.  Dispense: 90 tablet; Refill: 3    4. Cervical disc displacement  Asymptomatic at this moment, she has been out of her gabapentin which controls the radicular symptoms in some of the pain for nearly a year.  Gabapentin was refilled and she was given ramping up instructions of one pill per day for one week, two a day for 2nd week, then advanced to three a day if needed    5. Tinnitus of both ears  Tone suggestive of a vascular home.  No intracranial bruit or carotid bruit noted, ENT workup negative.  Patient reluctant to proceed with MRI.  There is a remote  possibility of an aneurysm but more likely an encircling blood vessel may be responsible.  This would require neuro surgery to correct if found.  She does not believe the problem warrants the expense and she does not feel that she would except surgery at this time.    6. Constipation, unspecified constipation type  Refill Linzess working well  - linaCLOtide (LINZESS) 290 mcg Cap capsule; Take 1 capsule (290 mcg total) by mouth once daily.  Dispense: 90 capsule; Refill: 3    7. DDD (degenerative disc disease), cervical  See above  - gabapentin (NEURONTIN) 300 MG capsule; Take 1 capsule (300 mg total) by mouth 3 (three) times daily.  Dispense: 90 capsule; Refill: 3    8. Vitamin D deficiency  - Vitamin D; Future    9. Good hypertension control    10. Edema, unspecified type  - furosemide (LASIX) 40 MG tablet; Take 1 tablet (40 mg total) by mouth once daily.  Dispense: 90 tablet; Refill: 3    11. BMI 32.0-32.9,adult  Encouraged to continue weight loss

## 2021-01-12 DIAGNOSIS — E55.9 VITAMIN D DEFICIENCY: ICD-10-CM

## 2021-01-12 RX ORDER — ERGOCALCIFEROL 1.25 MG/1
CAPSULE ORAL
Qty: 12 CAPSULE | Refills: 0 | Status: SHIPPED | OUTPATIENT
Start: 2021-01-12 | End: 2021-04-08

## 2021-03-04 DIAGNOSIS — M50.30 DDD (DEGENERATIVE DISC DISEASE), CERVICAL: ICD-10-CM

## 2021-03-05 RX ORDER — GABAPENTIN 300 MG/1
CAPSULE ORAL
Qty: 90 CAPSULE | Refills: 0 | OUTPATIENT
Start: 2021-03-05

## 2021-04-07 DIAGNOSIS — E55.9 VITAMIN D DEFICIENCY: ICD-10-CM

## 2021-04-07 DIAGNOSIS — I10 ESSENTIAL HYPERTENSION: ICD-10-CM

## 2021-04-08 RX ORDER — ERGOCALCIFEROL 1.25 MG/1
CAPSULE ORAL
Qty: 12 CAPSULE | Refills: 1 | Status: SHIPPED | OUTPATIENT
Start: 2021-04-08 | End: 2021-09-02

## 2021-04-08 RX ORDER — POTASSIUM CHLORIDE 20 MEQ/1
TABLET, EXTENDED RELEASE ORAL
Qty: 90 TABLET | Refills: 1 | Status: SHIPPED | OUTPATIENT
Start: 2021-04-08 | End: 2022-10-20 | Stop reason: DRUGHIGH

## 2021-05-28 ENCOUNTER — OFFICE VISIT (OUTPATIENT)
Dept: URGENT CARE | Facility: CLINIC | Age: 56
End: 2021-05-28
Payer: COMMERCIAL

## 2021-05-28 VITALS
OXYGEN SATURATION: 98 % | SYSTOLIC BLOOD PRESSURE: 116 MMHG | TEMPERATURE: 97 F | HEART RATE: 77 BPM | RESPIRATION RATE: 16 BRPM | DIASTOLIC BLOOD PRESSURE: 81 MMHG

## 2021-05-28 DIAGNOSIS — Z20.822 ENCOUNTER FOR LABORATORY TESTING FOR COVID-19 VIRUS: ICD-10-CM

## 2021-05-28 PROCEDURE — U0003 INFECTIOUS AGENT DETECTION BY NUCLEIC ACID (DNA OR RNA); SEVERE ACUTE RESPIRATORY SYNDROME CORONAVIRUS 2 (SARS-COV-2) (CORONAVIRUS DISEASE [COVID-19]), AMPLIFIED PROBE TECHNIQUE, MAKING USE OF HIGH THROUGHPUT TECHNOLOGIES AS DESCRIBED BY CMS-2020-01-R: HCPCS | Performed by: NURSE PRACTITIONER

## 2021-05-28 PROCEDURE — 99204 PR OFFICE/OUTPT VISIT, NEW, LEVL IV, 45-59 MIN: ICD-10-PCS | Mod: S$GLB,,, | Performed by: EMERGENCY MEDICINE

## 2021-05-28 PROCEDURE — 99204 OFFICE O/P NEW MOD 45 MIN: CPT | Mod: S$GLB,,, | Performed by: EMERGENCY MEDICINE

## 2021-05-28 PROCEDURE — U0005 INFEC AGEN DETEC AMPLI PROBE: HCPCS | Performed by: NURSE PRACTITIONER

## 2021-05-29 LAB — SARS-COV-2 RNA RESP QL NAA+PROBE: NOT DETECTED

## 2021-07-21 ENCOUNTER — HOSPITAL ENCOUNTER (OUTPATIENT)
Dept: RADIOLOGY | Facility: HOSPITAL | Age: 56
Discharge: HOME | End: 2021-07-21
Attending: OTOLARYNGOLOGY
Payer: COMMERCIAL

## 2021-07-21 ENCOUNTER — HOSPITAL ENCOUNTER (OUTPATIENT)
Dept: CARDIOLOGY | Facility: HOSPITAL | Age: 56
Discharge: HOME | End: 2021-07-21
Attending: OTOLARYNGOLOGY
Payer: COMMERCIAL

## 2021-07-21 DIAGNOSIS — J35.3 ENLARGEMENT OF TONSILS AND ADENOIDS: ICD-10-CM

## 2021-07-21 DIAGNOSIS — J35.03 TONSILLITIS AND ADENOIDITIS, CHRONIC: ICD-10-CM

## 2021-07-21 DIAGNOSIS — J35.03 TONSILLITIS AND ADENOIDITIS, CHRONIC: Primary | ICD-10-CM

## 2021-07-21 DIAGNOSIS — J35.3 ENLARGEMENT OF TONSILS AND ADENOIDS: Primary | ICD-10-CM

## 2021-07-21 PROCEDURE — 93010 EKG 12-LEAD: ICD-10-PCS | Mod: ,,, | Performed by: INTERNAL MEDICINE

## 2021-07-21 PROCEDURE — 93005 ELECTROCARDIOGRAM TRACING: CPT

## 2021-07-21 PROCEDURE — 71046 X-RAY EXAM CHEST 2 VIEWS: CPT | Mod: TC,FY

## 2021-07-21 PROCEDURE — 71046 XR CHEST PA AND LATERAL: ICD-10-PCS | Mod: 26,,, | Performed by: RADIOLOGY

## 2021-07-21 PROCEDURE — 71046 X-RAY EXAM CHEST 2 VIEWS: CPT | Mod: 26,,, | Performed by: RADIOLOGY

## 2021-07-21 PROCEDURE — 93010 ELECTROCARDIOGRAM REPORT: CPT | Mod: ,,, | Performed by: INTERNAL MEDICINE

## 2021-07-23 ENCOUNTER — ANESTHESIA (OUTPATIENT)
Dept: SURGERY | Facility: HOSPITAL | Age: 56
End: 2021-07-23
Payer: COMMERCIAL

## 2021-07-23 ENCOUNTER — HOSPITAL ENCOUNTER (OUTPATIENT)
Facility: HOSPITAL | Age: 56
Discharge: HOME | End: 2021-07-23
Attending: OTOLARYNGOLOGY | Admitting: OTOLARYNGOLOGY
Payer: COMMERCIAL

## 2021-07-23 ENCOUNTER — ANESTHESIA EVENT (OUTPATIENT)
Dept: SURGERY | Facility: HOSPITAL | Age: 56
End: 2021-07-23
Payer: COMMERCIAL

## 2021-07-23 VITALS
SYSTOLIC BLOOD PRESSURE: 157 MMHG | HEIGHT: 63 IN | RESPIRATION RATE: 13 BRPM | DIASTOLIC BLOOD PRESSURE: 88 MMHG | WEIGHT: 191 LBS | HEART RATE: 63 BPM | BODY MASS INDEX: 33.84 KG/M2 | TEMPERATURE: 98 F | OXYGEN SATURATION: 100 %

## 2021-07-23 PROCEDURE — 37000008 HC ANESTHESIA 1ST 15 MINUTES: Performed by: OTOLARYNGOLOGY

## 2021-07-23 PROCEDURE — 36000706: Performed by: OTOLARYNGOLOGY

## 2021-07-23 PROCEDURE — D9220A PRA ANESTHESIA: ICD-10-PCS | Mod: CRNA,,, | Performed by: NURSE ANESTHETIST, CERTIFIED REGISTERED

## 2021-07-23 PROCEDURE — 25000003 PHARM REV CODE 250: Performed by: NURSE ANESTHETIST, CERTIFIED REGISTERED

## 2021-07-23 PROCEDURE — 36000707: Performed by: OTOLARYNGOLOGY

## 2021-07-23 PROCEDURE — 63600175 PHARM REV CODE 636 W HCPCS: Performed by: ANESTHESIOLOGY

## 2021-07-23 PROCEDURE — 88304 TISSUE EXAM BY PATHOLOGIST: CPT | Mod: 26,,, | Performed by: STUDENT IN AN ORGANIZED HEALTH CARE EDUCATION/TRAINING PROGRAM

## 2021-07-23 PROCEDURE — D9220A PRA ANESTHESIA: ICD-10-PCS | Mod: ANES,,, | Performed by: ANESTHESIOLOGY

## 2021-07-23 PROCEDURE — 25000003 PHARM REV CODE 250: Performed by: ANESTHESIOLOGY

## 2021-07-23 PROCEDURE — 25000003 PHARM REV CODE 250: Performed by: OTOLARYNGOLOGY

## 2021-07-23 PROCEDURE — D9220A PRA ANESTHESIA: Mod: ANES,,, | Performed by: ANESTHESIOLOGY

## 2021-07-23 PROCEDURE — 88304 TISSUE EXAM BY PATHOLOGIST: CPT | Mod: 59 | Performed by: STUDENT IN AN ORGANIZED HEALTH CARE EDUCATION/TRAINING PROGRAM

## 2021-07-23 PROCEDURE — 71000033 HC RECOVERY, INTIAL HOUR: Performed by: OTOLARYNGOLOGY

## 2021-07-23 PROCEDURE — 88304 PR  SURG PATH,LEVEL III: ICD-10-PCS | Mod: 26,,, | Performed by: STUDENT IN AN ORGANIZED HEALTH CARE EDUCATION/TRAINING PROGRAM

## 2021-07-23 PROCEDURE — D9220A PRA ANESTHESIA: Mod: CRNA,,, | Performed by: NURSE ANESTHETIST, CERTIFIED REGISTERED

## 2021-07-23 PROCEDURE — 37000009 HC ANESTHESIA EA ADD 15 MINS: Performed by: OTOLARYNGOLOGY

## 2021-07-23 PROCEDURE — 63600175 PHARM REV CODE 636 W HCPCS

## 2021-07-23 PROCEDURE — 63600175 PHARM REV CODE 636 W HCPCS: Performed by: NURSE ANESTHETIST, CERTIFIED REGISTERED

## 2021-07-23 PROCEDURE — 71000015 HC POSTOP RECOV 1ST HR: Performed by: OTOLARYNGOLOGY

## 2021-07-23 RX ORDER — ONDANSETRON 2 MG/ML
INJECTION INTRAMUSCULAR; INTRAVENOUS
Status: DISCONTINUED | OUTPATIENT
Start: 2021-07-23 | End: 2021-07-23

## 2021-07-23 RX ORDER — PROPOFOL 10 MG/ML
VIAL (ML) INTRAVENOUS
Status: DISCONTINUED | OUTPATIENT
Start: 2021-07-23 | End: 2021-07-23

## 2021-07-23 RX ORDER — SODIUM CHLORIDE, SODIUM LACTATE, POTASSIUM CHLORIDE, CALCIUM CHLORIDE 600; 310; 30; 20 MG/100ML; MG/100ML; MG/100ML; MG/100ML
INJECTION, SOLUTION INTRAVENOUS CONTINUOUS
Status: CANCELLED | OUTPATIENT
Start: 2021-07-23

## 2021-07-23 RX ORDER — LIDOCAINE HYDROCHLORIDE 20 MG/ML
INJECTION, SOLUTION EPIDURAL; INFILTRATION; INTRACAUDAL; PERINEURAL
Status: DISCONTINUED | OUTPATIENT
Start: 2021-07-23 | End: 2021-07-23

## 2021-07-23 RX ORDER — MIDAZOLAM HYDROCHLORIDE 1 MG/ML
INJECTION INTRAMUSCULAR; INTRAVENOUS
Status: COMPLETED
Start: 2021-07-23 | End: 2021-07-23

## 2021-07-23 RX ORDER — MIDAZOLAM HYDROCHLORIDE 1 MG/ML
2 INJECTION INTRAMUSCULAR; INTRAVENOUS ONCE
Status: COMPLETED | OUTPATIENT
Start: 2021-07-23 | End: 2021-07-23

## 2021-07-23 RX ORDER — LIDOCAINE HYDROCHLORIDE AND EPINEPHRINE 10; 10 MG/ML; UG/ML
INJECTION, SOLUTION INFILTRATION; PERINEURAL
Status: DISCONTINUED | OUTPATIENT
Start: 2021-07-23 | End: 2021-07-23 | Stop reason: HOSPADM

## 2021-07-23 RX ORDER — MEPERIDINE HYDROCHLORIDE 50 MG/ML
INJECTION INTRAMUSCULAR; INTRAVENOUS; SUBCUTANEOUS
Status: DISCONTINUED | OUTPATIENT
Start: 2021-07-23 | End: 2021-07-23

## 2021-07-23 RX ORDER — OXYCODONE AND ACETAMINOPHEN 5; 325 MG/1; MG/1
1 TABLET ORAL
Status: DISCONTINUED | OUTPATIENT
Start: 2021-07-23 | End: 2021-07-23 | Stop reason: HOSPADM

## 2021-07-23 RX ORDER — SODIUM CHLORIDE, SODIUM LACTATE, POTASSIUM CHLORIDE, CALCIUM CHLORIDE 600; 310; 30; 20 MG/100ML; MG/100ML; MG/100ML; MG/100ML
125 INJECTION, SOLUTION INTRAVENOUS CONTINUOUS
Status: DISCONTINUED | OUTPATIENT
Start: 2021-07-23 | End: 2021-07-23 | Stop reason: HOSPADM

## 2021-07-23 RX ORDER — DEXAMETHASONE SODIUM PHOSPHATE 4 MG/ML
INJECTION, SOLUTION INTRA-ARTICULAR; INTRALESIONAL; INTRAMUSCULAR; INTRAVENOUS; SOFT TISSUE
Status: DISCONTINUED | OUTPATIENT
Start: 2021-07-23 | End: 2021-07-23

## 2021-07-23 RX ORDER — ONDANSETRON 2 MG/ML
4 INJECTION INTRAMUSCULAR; INTRAVENOUS DAILY PRN
Status: DISCONTINUED | OUTPATIENT
Start: 2021-07-23 | End: 2021-07-23 | Stop reason: HOSPADM

## 2021-07-23 RX ORDER — MORPHINE SULFATE 4 MG/ML
2 INJECTION, SOLUTION INTRAMUSCULAR; INTRAVENOUS EVERY 5 MIN PRN
Status: DISCONTINUED | OUTPATIENT
Start: 2021-07-23 | End: 2021-07-23 | Stop reason: HOSPADM

## 2021-07-23 RX ORDER — SODIUM CHLORIDE, SODIUM LACTATE, POTASSIUM CHLORIDE, CALCIUM CHLORIDE 600; 310; 30; 20 MG/100ML; MG/100ML; MG/100ML; MG/100ML
INJECTION, SOLUTION INTRAVENOUS CONTINUOUS
Status: DISCONTINUED | OUTPATIENT
Start: 2021-07-23 | End: 2021-07-23 | Stop reason: HOSPADM

## 2021-07-23 RX ORDER — KETOROLAC TROMETHAMINE 30 MG/ML
15 INJECTION, SOLUTION INTRAMUSCULAR; INTRAVENOUS ONCE
Status: DISCONTINUED | OUTPATIENT
Start: 2021-07-23 | End: 2021-07-23 | Stop reason: HOSPADM

## 2021-07-23 RX ORDER — CEFAZOLIN SODIUM 1 G/3ML
INJECTION, POWDER, FOR SOLUTION INTRAMUSCULAR; INTRAVENOUS
Status: DISCONTINUED | OUTPATIENT
Start: 2021-07-23 | End: 2021-07-23

## 2021-07-23 RX ORDER — LIDOCAINE HYDROCHLORIDE 10 MG/ML
1 INJECTION, SOLUTION EPIDURAL; INFILTRATION; INTRACAUDAL; PERINEURAL ONCE
Status: CANCELLED | OUTPATIENT
Start: 2021-07-23 | End: 2021-07-23

## 2021-07-23 RX ORDER — MIDAZOLAM HYDROCHLORIDE 1 MG/ML
INJECTION INTRAMUSCULAR; INTRAVENOUS
Status: DISCONTINUED | OUTPATIENT
Start: 2021-07-23 | End: 2021-07-23

## 2021-07-23 RX ORDER — SUCCINYLCHOLINE CHLORIDE 20 MG/ML
INJECTION INTRAMUSCULAR; INTRAVENOUS
Status: DISCONTINUED | OUTPATIENT
Start: 2021-07-23 | End: 2021-07-23

## 2021-07-23 RX ORDER — BUPIVACAINE HYDROCHLORIDE 5 MG/ML
INJECTION, SOLUTION EPIDURAL; INTRACAUDAL
Status: DISCONTINUED | OUTPATIENT
Start: 2021-07-23 | End: 2021-07-23 | Stop reason: HOSPADM

## 2021-07-23 RX ADMIN — MORPHINE SULFATE 2 MG: 4 INJECTION, SOLUTION INTRAMUSCULAR; INTRAVENOUS at 01:07

## 2021-07-23 RX ADMIN — ONDANSETRON 4 MG: 2 INJECTION INTRAMUSCULAR; INTRAVENOUS at 11:07

## 2021-07-23 RX ADMIN — LIDOCAINE HYDROCHLORIDE 100 MG: 20 INJECTION, SOLUTION EPIDURAL; INFILTRATION; INTRACAUDAL; PERINEURAL at 11:07

## 2021-07-23 RX ADMIN — PROPOFOL 200 MG: 10 INJECTION, EMULSION INTRAVENOUS at 11:07

## 2021-07-23 RX ADMIN — OXYCODONE AND ACETAMINOPHEN 1 TABLET: 5; 325 TABLET ORAL at 01:07

## 2021-07-23 RX ADMIN — MIDAZOLAM HYDROCHLORIDE 2 MG: 1 INJECTION INTRAMUSCULAR; INTRAVENOUS at 10:07

## 2021-07-23 RX ADMIN — CEFAZOLIN 1 G: 330 INJECTION, POWDER, FOR SOLUTION INTRAMUSCULAR; INTRAVENOUS at 11:07

## 2021-07-23 RX ADMIN — SUCCINYLCHOLINE CHLORIDE 120 MG: 20 INJECTION, SOLUTION INTRAMUSCULAR; INTRAVENOUS at 11:07

## 2021-07-23 RX ADMIN — ONDANSETRON HYDROCHLORIDE 4 MG: 2 SOLUTION INTRAMUSCULAR; INTRAVENOUS at 12:07

## 2021-07-23 RX ADMIN — SODIUM CHLORIDE, SODIUM LACTATE, POTASSIUM CHLORIDE, AND CALCIUM CHLORIDE: .6; .31; .03; .02 INJECTION, SOLUTION INTRAVENOUS at 10:07

## 2021-07-23 RX ADMIN — MIDAZOLAM HYDROCHLORIDE 2 MG: 1 INJECTION, SOLUTION INTRAMUSCULAR; INTRAVENOUS at 10:07

## 2021-07-23 RX ADMIN — MEPERIDINE HYDROCHLORIDE 50 MG: 50 INJECTION INTRAMUSCULAR; INTRAVENOUS; SUBCUTANEOUS at 11:07

## 2021-07-23 RX ADMIN — MIDAZOLAM HYDROCHLORIDE 2 MG: 1 INJECTION, SOLUTION INTRAMUSCULAR; INTRAVENOUS at 11:07

## 2021-07-23 RX ADMIN — DEXAMETHASONE SODIUM PHOSPHATE 16 MG: 4 INJECTION, SOLUTION INTRAMUSCULAR; INTRAVENOUS at 11:07

## 2021-07-23 RX ADMIN — MORPHINE SULFATE 2 MG: 4 INJECTION, SOLUTION INTRAMUSCULAR; INTRAVENOUS at 12:07

## 2021-07-28 LAB
FINAL PATHOLOGIC DIAGNOSIS: NORMAL
GROSS: NORMAL
Lab: NORMAL

## 2021-08-18 DIAGNOSIS — Z12.31 OTHER SCREENING MAMMOGRAM: ICD-10-CM

## 2021-09-02 DIAGNOSIS — E55.9 VITAMIN D DEFICIENCY: ICD-10-CM

## 2021-09-02 RX ORDER — ERGOCALCIFEROL 1.25 MG/1
CAPSULE ORAL
Qty: 12 CAPSULE | Refills: 0 | Status: SHIPPED | OUTPATIENT
Start: 2021-09-02 | End: 2021-10-18

## 2021-09-16 ENCOUNTER — HOSPITAL ENCOUNTER (OUTPATIENT)
Dept: RADIOLOGY | Facility: CLINIC | Age: 56
Discharge: HOME | End: 2021-09-16
Attending: FAMILY MEDICINE
Payer: COMMERCIAL

## 2021-09-16 DIAGNOSIS — Z12.31 OTHER SCREENING MAMMOGRAM: ICD-10-CM

## 2021-09-16 PROCEDURE — 77067 SCR MAMMO BI INCL CAD: CPT | Mod: TC,PO

## 2021-09-16 PROCEDURE — 77067 MAMMO DIGITAL SCREENING BILAT WITH TOMO: ICD-10-PCS | Mod: 26,,, | Performed by: RADIOLOGY

## 2021-09-16 PROCEDURE — 77063 BREAST TOMOSYNTHESIS BI: CPT | Mod: 26,,, | Performed by: RADIOLOGY

## 2021-09-16 PROCEDURE — 77063 MAMMO DIGITAL SCREENING BILAT WITH TOMO: ICD-10-PCS | Mod: 26,,, | Performed by: RADIOLOGY

## 2021-09-16 PROCEDURE — 77067 SCR MAMMO BI INCL CAD: CPT | Mod: 26,,, | Performed by: RADIOLOGY

## 2021-09-27 ENCOUNTER — OFFICE VISIT (OUTPATIENT)
Dept: PAIN MEDICINE | Facility: CLINIC | Age: 56
End: 2021-09-27
Payer: COMMERCIAL

## 2021-09-27 ENCOUNTER — PATIENT OUTREACH (OUTPATIENT)
Dept: ADMINISTRATIVE | Facility: OTHER | Age: 56
End: 2021-09-27

## 2021-09-27 VITALS
DIASTOLIC BLOOD PRESSURE: 81 MMHG | SYSTOLIC BLOOD PRESSURE: 120 MMHG | WEIGHT: 191 LBS | HEART RATE: 64 BPM | HEIGHT: 63 IN | BODY MASS INDEX: 33.84 KG/M2

## 2021-09-27 DIAGNOSIS — M50.30 DDD (DEGENERATIVE DISC DISEASE), CERVICAL: ICD-10-CM

## 2021-09-27 DIAGNOSIS — M25.512 BILATERAL SHOULDER PAIN, UNSPECIFIED CHRONICITY: Primary | ICD-10-CM

## 2021-09-27 DIAGNOSIS — M47.892 OTHER SPONDYLOSIS, CERVICAL REGION: ICD-10-CM

## 2021-09-27 DIAGNOSIS — M54.12 CERVICAL RADICULITIS: ICD-10-CM

## 2021-09-27 DIAGNOSIS — M25.511 BILATERAL SHOULDER PAIN, UNSPECIFIED CHRONICITY: Primary | ICD-10-CM

## 2021-09-27 PROCEDURE — 99999 PR PBB SHADOW E&M-EST. PATIENT-LVL III: ICD-10-PCS | Mod: PBBFAC,,, | Performed by: ANESTHESIOLOGY

## 2021-09-27 PROCEDURE — 3008F PR BODY MASS INDEX (BMI) DOCUMENTED: ICD-10-PCS | Mod: CPTII,S$GLB,, | Performed by: ANESTHESIOLOGY

## 2021-09-27 PROCEDURE — 20610 LARGE JOINT ASPIRATION/INJECTION: BILATERAL GLENOHUMERAL: ICD-10-PCS | Mod: 50,S$GLB,, | Performed by: ANESTHESIOLOGY

## 2021-09-27 PROCEDURE — 99999 PR PBB SHADOW E&M-EST. PATIENT-LVL III: CPT | Mod: PBBFAC,,, | Performed by: ANESTHESIOLOGY

## 2021-09-27 PROCEDURE — 99214 OFFICE O/P EST MOD 30 MIN: CPT | Mod: 25,S$GLB,, | Performed by: ANESTHESIOLOGY

## 2021-09-27 PROCEDURE — 3074F SYST BP LT 130 MM HG: CPT | Mod: CPTII,S$GLB,, | Performed by: ANESTHESIOLOGY

## 2021-09-27 PROCEDURE — 99214 PR OFFICE/OUTPT VISIT, EST, LEVL IV, 30-39 MIN: ICD-10-PCS | Mod: 25,S$GLB,, | Performed by: ANESTHESIOLOGY

## 2021-09-27 PROCEDURE — 1159F MED LIST DOCD IN RCRD: CPT | Mod: CPTII,S$GLB,, | Performed by: ANESTHESIOLOGY

## 2021-09-27 PROCEDURE — 1159F PR MEDICATION LIST DOCUMENTED IN MEDICAL RECORD: ICD-10-PCS | Mod: CPTII,S$GLB,, | Performed by: ANESTHESIOLOGY

## 2021-09-27 PROCEDURE — 3008F BODY MASS INDEX DOCD: CPT | Mod: CPTII,S$GLB,, | Performed by: ANESTHESIOLOGY

## 2021-09-27 PROCEDURE — 3079F PR MOST RECENT DIASTOLIC BLOOD PRESSURE 80-89 MM HG: ICD-10-PCS | Mod: CPTII,S$GLB,, | Performed by: ANESTHESIOLOGY

## 2021-09-27 PROCEDURE — 3074F PR MOST RECENT SYSTOLIC BLOOD PRESSURE < 130 MM HG: ICD-10-PCS | Mod: CPTII,S$GLB,, | Performed by: ANESTHESIOLOGY

## 2021-09-27 PROCEDURE — 3079F DIAST BP 80-89 MM HG: CPT | Mod: CPTII,S$GLB,, | Performed by: ANESTHESIOLOGY

## 2021-09-27 PROCEDURE — 20610 DRAIN/INJ JOINT/BURSA W/O US: CPT | Mod: 50,S$GLB,, | Performed by: ANESTHESIOLOGY

## 2021-09-27 RX ORDER — METHYLPREDNISOLONE ACETATE 40 MG/ML
40 INJECTION, SUSPENSION INTRA-ARTICULAR; INTRALESIONAL; INTRAMUSCULAR; SOFT TISSUE
Status: DISCONTINUED | OUTPATIENT
Start: 2021-09-27 | End: 2021-09-27 | Stop reason: HOSPADM

## 2021-09-27 RX ORDER — BUPIVACAINE HYDROCHLORIDE 2.5 MG/ML
2 INJECTION, SOLUTION INFILTRATION; PERINEURAL
Status: DISCONTINUED | OUTPATIENT
Start: 2021-09-27 | End: 2021-09-27 | Stop reason: HOSPADM

## 2021-09-27 RX ORDER — PROMETHAZINE HYDROCHLORIDE 25 MG/1
25 TABLET ORAL
COMMUNITY
Start: 2021-04-14 | End: 2024-01-02 | Stop reason: SDUPTHER

## 2021-09-27 RX ADMIN — BUPIVACAINE HYDROCHLORIDE 2 ML: 2.5 INJECTION, SOLUTION INFILTRATION; PERINEURAL at 11:09

## 2021-09-27 RX ADMIN — METHYLPREDNISOLONE ACETATE 40 MG: 40 INJECTION, SUSPENSION INTRA-ARTICULAR; INTRALESIONAL; INTRAMUSCULAR; SOFT TISSUE at 11:09

## 2021-10-18 ENCOUNTER — PATIENT MESSAGE (OUTPATIENT)
Dept: FAMILY MEDICINE | Facility: CLINIC | Age: 56
End: 2021-10-18

## 2021-10-18 ENCOUNTER — OFFICE VISIT (OUTPATIENT)
Dept: FAMILY MEDICINE | Facility: CLINIC | Age: 56
End: 2021-10-18
Attending: FAMILY MEDICINE
Payer: COMMERCIAL

## 2021-10-18 ENCOUNTER — HOSPITAL ENCOUNTER (OUTPATIENT)
Dept: RADIOLOGY | Facility: CLINIC | Age: 56
Discharge: HOME | End: 2021-10-18
Attending: FAMILY MEDICINE
Payer: COMMERCIAL

## 2021-10-18 ENCOUNTER — TELEPHONE (OUTPATIENT)
Dept: FAMILY MEDICINE | Facility: CLINIC | Age: 56
End: 2021-10-18

## 2021-10-18 VITALS
HEART RATE: 65 BPM | HEIGHT: 63 IN | TEMPERATURE: 98 F | SYSTOLIC BLOOD PRESSURE: 124 MMHG | BODY MASS INDEX: 34.1 KG/M2 | OXYGEN SATURATION: 97 % | RESPIRATION RATE: 18 BRPM | DIASTOLIC BLOOD PRESSURE: 83 MMHG | WEIGHT: 192.44 LBS

## 2021-10-18 DIAGNOSIS — R93.89 ABNORMAL CXR: ICD-10-CM

## 2021-10-18 DIAGNOSIS — M25.512 CHRONIC PAIN OF BOTH SHOULDERS: ICD-10-CM

## 2021-10-18 DIAGNOSIS — E55.9 VITAMIN D DEFICIENCY: ICD-10-CM

## 2021-10-18 DIAGNOSIS — M50.30 DDD (DEGENERATIVE DISC DISEASE), CERVICAL: ICD-10-CM

## 2021-10-18 DIAGNOSIS — M75.40 IMPINGEMENT SYNDROME OF SHOULDER REGION, UNSPECIFIED LATERALITY: ICD-10-CM

## 2021-10-18 DIAGNOSIS — R60.9 EDEMA, UNSPECIFIED TYPE: ICD-10-CM

## 2021-10-18 DIAGNOSIS — K21.00 GASTROESOPHAGEAL REFLUX DISEASE WITH ESOPHAGITIS WITHOUT HEMORRHAGE: ICD-10-CM

## 2021-10-18 DIAGNOSIS — M25.511 CHRONIC PAIN OF BOTH SHOULDERS: ICD-10-CM

## 2021-10-18 DIAGNOSIS — I10 ESSENTIAL HYPERTENSION: ICD-10-CM

## 2021-10-18 DIAGNOSIS — E87.6 HYPOKALEMIA: ICD-10-CM

## 2021-10-18 DIAGNOSIS — K59.00 CONSTIPATION, UNSPECIFIED CONSTIPATION TYPE: ICD-10-CM

## 2021-10-18 DIAGNOSIS — G89.29 CHRONIC PAIN OF BOTH SHOULDERS: ICD-10-CM

## 2021-10-18 DIAGNOSIS — E78.5 HYPERLIPIDEMIA, UNSPECIFIED HYPERLIPIDEMIA TYPE: ICD-10-CM

## 2021-10-18 DIAGNOSIS — Z00.00 ENCOUNTER FOR PREVENTIVE HEALTH EXAMINATION: Primary | ICD-10-CM

## 2021-10-18 PROCEDURE — 99396 PR PREVENTIVE VISIT,EST,40-64: ICD-10-PCS | Mod: S$GLB,,, | Performed by: FAMILY MEDICINE

## 2021-10-18 PROCEDURE — 99999 PR PBB SHADOW E&M-EST. PATIENT-LVL V: ICD-10-PCS | Mod: PBBFAC,,, | Performed by: FAMILY MEDICINE

## 2021-10-18 PROCEDURE — 3008F BODY MASS INDEX DOCD: CPT | Mod: CPTII,S$GLB,, | Performed by: FAMILY MEDICINE

## 2021-10-18 PROCEDURE — 71046 XR CHEST PA AND LATERAL: ICD-10-PCS | Mod: 26,,, | Performed by: RADIOLOGY

## 2021-10-18 PROCEDURE — 1160F RVW MEDS BY RX/DR IN RCRD: CPT | Mod: CPTII,S$GLB,, | Performed by: FAMILY MEDICINE

## 2021-10-18 PROCEDURE — 1159F MED LIST DOCD IN RCRD: CPT | Mod: CPTII,S$GLB,, | Performed by: FAMILY MEDICINE

## 2021-10-18 PROCEDURE — 71046 X-RAY EXAM CHEST 2 VIEWS: CPT | Mod: 26,,, | Performed by: RADIOLOGY

## 2021-10-18 PROCEDURE — 71046 X-RAY EXAM CHEST 2 VIEWS: CPT | Mod: TC,FY,PO

## 2021-10-18 PROCEDURE — 3074F SYST BP LT 130 MM HG: CPT | Mod: CPTII,S$GLB,, | Performed by: FAMILY MEDICINE

## 2021-10-18 PROCEDURE — 1159F PR MEDICATION LIST DOCUMENTED IN MEDICAL RECORD: ICD-10-PCS | Mod: CPTII,S$GLB,, | Performed by: FAMILY MEDICINE

## 2021-10-18 PROCEDURE — 3074F PR MOST RECENT SYSTOLIC BLOOD PRESSURE < 130 MM HG: ICD-10-PCS | Mod: CPTII,S$GLB,, | Performed by: FAMILY MEDICINE

## 2021-10-18 PROCEDURE — 99396 PREV VISIT EST AGE 40-64: CPT | Mod: S$GLB,,, | Performed by: FAMILY MEDICINE

## 2021-10-18 PROCEDURE — 3079F PR MOST RECENT DIASTOLIC BLOOD PRESSURE 80-89 MM HG: ICD-10-PCS | Mod: CPTII,S$GLB,, | Performed by: FAMILY MEDICINE

## 2021-10-18 PROCEDURE — 3008F PR BODY MASS INDEX (BMI) DOCUMENTED: ICD-10-PCS | Mod: CPTII,S$GLB,, | Performed by: FAMILY MEDICINE

## 2021-10-18 PROCEDURE — 3079F DIAST BP 80-89 MM HG: CPT | Mod: CPTII,S$GLB,, | Performed by: FAMILY MEDICINE

## 2021-10-18 PROCEDURE — 99999 PR PBB SHADOW E&M-EST. PATIENT-LVL V: CPT | Mod: PBBFAC,,, | Performed by: FAMILY MEDICINE

## 2021-10-18 PROCEDURE — 1160F PR REVIEW ALL MEDS BY PRESCRIBER/CLIN PHARMACIST DOCUMENTED: ICD-10-PCS | Mod: CPTII,S$GLB,, | Performed by: FAMILY MEDICINE

## 2021-10-18 RX ORDER — FLUTICASONE PROPIONATE 50 MCG
2 SPRAY, SUSPENSION (ML) NASAL DAILY
Qty: 48 G | Refills: 4 | Status: SHIPPED | OUTPATIENT
Start: 2021-10-18

## 2021-10-18 RX ORDER — METOPROLOL SUCCINATE 50 MG/1
50 TABLET, EXTENDED RELEASE ORAL DAILY
Qty: 90 TABLET | Refills: 3 | Status: SHIPPED | OUTPATIENT
Start: 2021-10-18 | End: 2022-09-22

## 2021-10-18 RX ORDER — FUROSEMIDE 40 MG/1
40 TABLET ORAL DAILY
Qty: 90 TABLET | Refills: 3 | Status: SHIPPED | OUTPATIENT
Start: 2021-10-18 | End: 2022-09-22

## 2021-10-18 RX ORDER — PANTOPRAZOLE SODIUM 40 MG/1
40 TABLET, DELAYED RELEASE ORAL EVERY MORNING
Qty: 90 TABLET | Refills: 3 | Status: SHIPPED | OUTPATIENT
Start: 2021-10-18 | End: 2023-03-17 | Stop reason: SDUPTHER

## 2021-10-18 RX ORDER — ERGOCALCIFEROL 1.25 MG/1
50000 CAPSULE ORAL
Qty: 12 CAPSULE | Refills: 0 | Status: CANCELLED | OUTPATIENT
Start: 2021-10-18

## 2021-10-18 RX ORDER — ERGOCALCIFEROL 1.25 MG/1
50000 CAPSULE ORAL
Qty: 24 CAPSULE | Refills: 3 | Status: SHIPPED | OUTPATIENT
Start: 2021-10-18 | End: 2022-07-25

## 2021-10-19 ENCOUNTER — OFFICE VISIT (OUTPATIENT)
Dept: PHYSICAL MEDICINE AND REHAB | Facility: CLINIC | Age: 56
End: 2021-10-19
Attending: FAMILY MEDICINE
Payer: COMMERCIAL

## 2021-10-19 VITALS — WEIGHT: 192 LBS | HEIGHT: 63 IN | RESPIRATION RATE: 18 BRPM | BODY MASS INDEX: 34.02 KG/M2

## 2021-10-19 DIAGNOSIS — M75.40 IMPINGEMENT SYNDROME OF SHOULDER REGION, UNSPECIFIED LATERALITY: ICD-10-CM

## 2021-10-19 DIAGNOSIS — M50.30 DDD (DEGENERATIVE DISC DISEASE), CERVICAL: ICD-10-CM

## 2021-10-19 DIAGNOSIS — G89.29 CHRONIC PAIN OF BOTH SHOULDERS: ICD-10-CM

## 2021-10-19 DIAGNOSIS — M25.511 CHRONIC PAIN OF BOTH SHOULDERS: ICD-10-CM

## 2021-10-19 DIAGNOSIS — M25.512 CHRONIC PAIN OF BOTH SHOULDERS: ICD-10-CM

## 2021-10-19 DIAGNOSIS — M54.12 CERVICAL RADICULOPATHY: ICD-10-CM

## 2021-10-19 DIAGNOSIS — M75.91 RIGHT SUPRASPINATUS TENDINITIS: Primary | ICD-10-CM

## 2021-10-19 DIAGNOSIS — Z74.09 IMPAIRED FUNCTIONAL MOBILITY AND ACTIVITY TOLERANCE: ICD-10-CM

## 2021-10-19 PROCEDURE — 1160F PR REVIEW ALL MEDS BY PRESCRIBER/CLIN PHARMACIST DOCUMENTED: ICD-10-PCS | Mod: CPTII,S$GLB,, | Performed by: PHYSICAL MEDICINE & REHABILITATION

## 2021-10-19 PROCEDURE — 20611 DRAIN/INJ JOINT/BURSA W/US: CPT | Mod: RT,S$GLB,, | Performed by: PHYSICAL MEDICINE & REHABILITATION

## 2021-10-19 PROCEDURE — 3008F BODY MASS INDEX DOCD: CPT | Mod: CPTII,S$GLB,, | Performed by: PHYSICAL MEDICINE & REHABILITATION

## 2021-10-19 PROCEDURE — 1160F RVW MEDS BY RX/DR IN RCRD: CPT | Mod: CPTII,S$GLB,, | Performed by: PHYSICAL MEDICINE & REHABILITATION

## 2021-10-19 PROCEDURE — 1159F MED LIST DOCD IN RCRD: CPT | Mod: CPTII,S$GLB,, | Performed by: PHYSICAL MEDICINE & REHABILITATION

## 2021-10-19 PROCEDURE — 76881 US COMPL JOINT R-T W/IMG: CPT | Mod: 59,S$GLB,, | Performed by: PHYSICAL MEDICINE & REHABILITATION

## 2021-10-19 PROCEDURE — 1159F PR MEDICATION LIST DOCUMENTED IN MEDICAL RECORD: ICD-10-PCS | Mod: CPTII,S$GLB,, | Performed by: PHYSICAL MEDICINE & REHABILITATION

## 2021-10-19 PROCEDURE — 99999 PR PBB SHADOW E&M-EST. PATIENT-LVL IV: CPT | Mod: PBBFAC,,, | Performed by: PHYSICAL MEDICINE & REHABILITATION

## 2021-10-19 PROCEDURE — 99203 OFFICE O/P NEW LOW 30 MIN: CPT | Mod: 25,S$GLB,, | Performed by: PHYSICAL MEDICINE & REHABILITATION

## 2021-10-19 PROCEDURE — 20611 PR DRAIN/ASP/INJECT MAJOR JOINT/BURSA W/US GUIDANCE: ICD-10-PCS | Mod: RT,S$GLB,, | Performed by: PHYSICAL MEDICINE & REHABILITATION

## 2021-10-19 PROCEDURE — 99999 PR PBB SHADOW E&M-EST. PATIENT-LVL IV: ICD-10-PCS | Mod: PBBFAC,,, | Performed by: PHYSICAL MEDICINE & REHABILITATION

## 2021-10-19 PROCEDURE — 99203 PR OFFICE/OUTPT VISIT, NEW, LEVL III, 30-44 MIN: ICD-10-PCS | Mod: 25,S$GLB,, | Performed by: PHYSICAL MEDICINE & REHABILITATION

## 2021-10-19 PROCEDURE — 76881 PR US EXTREMITY OR AXILLA, TISSUE/MUSCLE/JOINT/NERVE; COMPLETE: ICD-10-PCS | Mod: 59,S$GLB,, | Performed by: PHYSICAL MEDICINE & REHABILITATION

## 2021-10-19 PROCEDURE — 3008F PR BODY MASS INDEX (BMI) DOCUMENTED: ICD-10-PCS | Mod: CPTII,S$GLB,, | Performed by: PHYSICAL MEDICINE & REHABILITATION

## 2021-10-19 RX ORDER — MELOXICAM 15 MG/1
15 TABLET ORAL DAILY
COMMUNITY
Start: 2021-10-08 | End: 2021-10-19

## 2021-10-19 RX ORDER — CELECOXIB 100 MG/1
100 CAPSULE ORAL 2 TIMES DAILY PRN
Qty: 28 CAPSULE | Refills: 0 | Status: SHIPPED | OUTPATIENT
Start: 2021-10-19 | End: 2022-01-26

## 2021-10-27 ENCOUNTER — TELEPHONE (OUTPATIENT)
Dept: PHYSICAL MEDICINE AND REHAB | Facility: CLINIC | Age: 56
End: 2021-10-27
Payer: COMMERCIAL

## 2021-10-28 ENCOUNTER — IMMUNIZATION (OUTPATIENT)
Dept: PRIMARY CARE CLINIC | Facility: CLINIC | Age: 56
End: 2021-10-28
Payer: COMMERCIAL

## 2021-10-28 DIAGNOSIS — Z23 NEED FOR VACCINATION: Primary | ICD-10-CM

## 2021-10-28 DIAGNOSIS — M54.12 CERVICAL RADICULOPATHY: Primary | ICD-10-CM

## 2021-10-28 PROCEDURE — 0003A COVID-19, MRNA, LNP-S, PF, 30 MCG/0.3 ML DOSE VACCINE: CPT | Mod: S$GLB,,, | Performed by: FAMILY MEDICINE

## 2021-10-28 PROCEDURE — 91300 COVID-19, MRNA, LNP-S, PF, 30 MCG/0.3 ML DOSE VACCINE: ICD-10-PCS | Mod: S$GLB,,, | Performed by: FAMILY MEDICINE

## 2021-10-28 PROCEDURE — 91300 COVID-19, MRNA, LNP-S, PF, 30 MCG/0.3 ML DOSE VACCINE: CPT | Mod: S$GLB,,, | Performed by: FAMILY MEDICINE

## 2021-10-28 PROCEDURE — 0003A COVID-19, MRNA, LNP-S, PF, 30 MCG/0.3 ML DOSE VACCINE: ICD-10-PCS | Mod: S$GLB,,, | Performed by: FAMILY MEDICINE

## 2021-11-08 ENCOUNTER — PROCEDURE VISIT (OUTPATIENT)
Dept: PHYSICAL MEDICINE AND REHAB | Facility: CLINIC | Age: 56
End: 2021-11-08
Payer: COMMERCIAL

## 2021-11-08 ENCOUNTER — OFFICE VISIT (OUTPATIENT)
Dept: FAMILY MEDICINE | Facility: CLINIC | Age: 56
End: 2021-11-08
Payer: COMMERCIAL

## 2021-11-08 VITALS
HEART RATE: 69 BPM | HEIGHT: 63 IN | RESPIRATION RATE: 18 BRPM | OXYGEN SATURATION: 95 % | WEIGHT: 193.13 LBS | SYSTOLIC BLOOD PRESSURE: 122 MMHG | TEMPERATURE: 99 F | DIASTOLIC BLOOD PRESSURE: 64 MMHG | BODY MASS INDEX: 34.22 KG/M2

## 2021-11-08 DIAGNOSIS — M54.2 CERVICALGIA: ICD-10-CM

## 2021-11-08 DIAGNOSIS — M54.12 CERVICAL RADICULOPATHY: ICD-10-CM

## 2021-11-08 DIAGNOSIS — S91.332A PUNCTURE WOUND OF LEFT FOOT, INITIAL ENCOUNTER: ICD-10-CM

## 2021-11-08 DIAGNOSIS — Z23 NEED FOR TDAP VACCINATION: Primary | ICD-10-CM

## 2021-11-08 DIAGNOSIS — G56.01 CARPAL TUNNEL SYNDROME OF RIGHT WRIST: Primary | ICD-10-CM

## 2021-11-08 PROCEDURE — 1159F MED LIST DOCD IN RCRD: CPT | Mod: CPTII,S$GLB,, | Performed by: PHYSICIAN ASSISTANT

## 2021-11-08 PROCEDURE — 3074F PR MOST RECENT SYSTOLIC BLOOD PRESSURE < 130 MM HG: ICD-10-PCS | Mod: CPTII,S$GLB,, | Performed by: PHYSICIAN ASSISTANT

## 2021-11-08 PROCEDURE — 99999 PR PBB SHADOW E&M-EST. PATIENT-LVL IV: CPT | Mod: PBBFAC,,, | Performed by: PHYSICIAN ASSISTANT

## 2021-11-08 PROCEDURE — 1160F RVW MEDS BY RX/DR IN RCRD: CPT | Mod: CPTII,S$GLB,, | Performed by: PHYSICIAN ASSISTANT

## 2021-11-08 PROCEDURE — 99213 PR OFFICE/OUTPT VISIT, EST, LEVL III, 20-29 MIN: ICD-10-PCS | Mod: 25,S$GLB,, | Performed by: PHYSICIAN ASSISTANT

## 2021-11-08 PROCEDURE — 90471 TDAP VACCINE GREATER THAN OR EQUAL TO 7YO IM: ICD-10-PCS | Mod: S$GLB,,, | Performed by: PHYSICIAN ASSISTANT

## 2021-11-08 PROCEDURE — 3078F PR MOST RECENT DIASTOLIC BLOOD PRESSURE < 80 MM HG: ICD-10-PCS | Mod: CPTII,S$GLB,, | Performed by: PHYSICIAN ASSISTANT

## 2021-11-08 PROCEDURE — 3078F DIAST BP <80 MM HG: CPT | Mod: CPTII,S$GLB,, | Performed by: PHYSICIAN ASSISTANT

## 2021-11-08 PROCEDURE — 90715 TDAP VACCINE 7 YRS/> IM: CPT | Mod: S$GLB,,, | Performed by: PHYSICIAN ASSISTANT

## 2021-11-08 PROCEDURE — 3074F SYST BP LT 130 MM HG: CPT | Mod: CPTII,S$GLB,, | Performed by: PHYSICIAN ASSISTANT

## 2021-11-08 PROCEDURE — 1160F PR REVIEW ALL MEDS BY PRESCRIBER/CLIN PHARMACIST DOCUMENTED: ICD-10-PCS | Mod: CPTII,S$GLB,, | Performed by: PHYSICIAN ASSISTANT

## 2021-11-08 PROCEDURE — 95886 MUSC TEST DONE W/N TEST COMP: CPT | Mod: S$GLB,,, | Performed by: PHYSICAL MEDICINE & REHABILITATION

## 2021-11-08 PROCEDURE — 95909 PR NERVE CONDUCTION STUDY; 5-6 STUDIES: ICD-10-PCS | Mod: S$GLB,,, | Performed by: PHYSICAL MEDICINE & REHABILITATION

## 2021-11-08 PROCEDURE — 3008F PR BODY MASS INDEX (BMI) DOCUMENTED: ICD-10-PCS | Mod: CPTII,S$GLB,, | Performed by: PHYSICIAN ASSISTANT

## 2021-11-08 PROCEDURE — 99213 OFFICE O/P EST LOW 20 MIN: CPT | Mod: 25,S$GLB,, | Performed by: PHYSICIAN ASSISTANT

## 2021-11-08 PROCEDURE — 3008F BODY MASS INDEX DOCD: CPT | Mod: CPTII,S$GLB,, | Performed by: PHYSICIAN ASSISTANT

## 2021-11-08 PROCEDURE — 95909 NRV CNDJ TST 5-6 STUDIES: CPT | Mod: S$GLB,,, | Performed by: PHYSICAL MEDICINE & REHABILITATION

## 2021-11-08 PROCEDURE — 99999 PR PBB SHADOW E&M-EST. PATIENT-LVL IV: ICD-10-PCS | Mod: PBBFAC,,, | Performed by: PHYSICIAN ASSISTANT

## 2021-11-08 PROCEDURE — 95886 PR EMG COMPLETE, W/ NERVE CONDUCTION STUDIES, 5+ MUSCLES: ICD-10-PCS | Mod: S$GLB,,, | Performed by: PHYSICAL MEDICINE & REHABILITATION

## 2021-11-08 PROCEDURE — 90715 TDAP VACCINE GREATER THAN OR EQUAL TO 7YO IM: ICD-10-PCS | Mod: S$GLB,,, | Performed by: PHYSICIAN ASSISTANT

## 2021-11-08 PROCEDURE — 90471 IMMUNIZATION ADMIN: CPT | Mod: S$GLB,,, | Performed by: PHYSICIAN ASSISTANT

## 2021-11-08 PROCEDURE — 1159F PR MEDICATION LIST DOCUMENTED IN MEDICAL RECORD: ICD-10-PCS | Mod: CPTII,S$GLB,, | Performed by: PHYSICIAN ASSISTANT

## 2021-11-08 RX ORDER — GABAPENTIN 400 MG/1
400 CAPSULE ORAL NIGHTLY
Qty: 30 CAPSULE | Refills: 0 | Status: SHIPPED | OUTPATIENT
Start: 2021-11-08 | End: 2021-12-06

## 2021-11-12 NOTE — PATIENT INSTRUCTIONS
Exercises at Your Workstation: Eyes, Neck, and Head  Breathe deeply as you do your exercises. Inhale through your nose, and exhale through your mouth.   Tired eyes? Stiff neck? A few easy moves can help prevent these kinds of problems. Take a few minutes during your day to do these exercises--right at your desk. They'll loosen up your muscles, keep you more alert, and make a big difference in how you work and feel.    For Your Eyes  Eye Cup  · Lean forward with your elbows on your desk.  · Cup your hands and place them lightly over your closed eyes. Hold for a minute, while breathing deeply in and out.  · Slowly uncover and open your eyes. Repeat 2 times.  Eye Roll  · Close your eyes. Slowly roll your eyeballs clockwise all the way around. Repeat 3 times.  · Now slowly roll them all the way around counterclockwise. Repeat 3 times.  Eye Rest  · Every 20 minutes, look away from the computer screen. Focus on an object at least 20 feet away. Stay focused on this object for a full 20 seconds.    For Your Neck and Head  Warm-up  · Drop your head gently to your chest. While breathing in, slowly roll your head up to your left shoulder. While breathing out, slowly roll your head back to center. Repeat to the right.  · Repeat 3 times on each side.  Head Tilt  · Sit up straight. Tuck in your chin.  · Slowly tip your head to the left. Return to the center. Then, tip your head to the right.  · Repeat 3 times on each side.  If you feel pain while performing these stretching exercises, please stop and consult your doctor.   Head Turn  · Sit up straight.  · Slowly turn your head and look over your left shoulder. Hold for a few seconds. Go back to the center, then repeat to your right.  · Repeat 3 times on each side.    Neck Exercises: Active Neck Rotation    To start, lie on your back, knees bent and feet flat on the floor. Keep your ears, shoulders, and hips aligned, but dont press your lower back to the floor. Rest your  Demi Costa is an 76 year old female who presents with a several year history of stool incontinence.  She had some hemorrhoids banded in 2019 by Dr. González Sorenson.  She had actually has 2 issues.  The 1 is that after a bowel movement she will leak some stool of thickened liquid consistency for several hours.  She has to wear a pad.  Second issues that she has had a little rectal bleeding.  She notes that there is some blood on toilet paper when she wipes.  Sometimes there is little blood in the stool.  Does not sound as though bleeding is too severe.  She is on Coumadin.    Past Medical History:   Diagnosis Date   • Disorder of bone and cartilage, unspecified    • Diverticulosis of colon (without mention of hemorrhage) 05/08/2009   • Dyslipidemia    • Fracture 2001    Right great toe fracture - surgery   • Fracture 04/2017    Left thumb fracture - casted   • Mitral regurgitation     Had MVR   • Mitral valve disorders(424.0)     s/p Maze procedure   • Mitral valve prolapse    • Other and unspecified hyperlipidemia    • Other kyphoscoliosis and scoliosis    • Paroxysmal atrial fibrillation (CMS/HCC)     s/p Maze procedure   • Skin cancer     SCC L forearm 8/2020; SCC L shoulder 9/2021   • Skin lesion of left leg 04/2013    To have Biopsy 5/7/2013   • Undiagnosed cardiac murmurs    • Venous insufficiency of leg 04/2013    Left Lower Extremity W/ Varicosities, To have OR 5/7/2013   • Wears prescription eyeglasses     and contacts     Past Surgical History:   Procedure Laterality Date   • Cataract extraction w/  intraocular lens implant Right 07/09/2020   • Colonoscopy N/A 07/02/2019    Colonoscopy satisfied on 7/2/19. Follow up plan,  repeat a colonoscopy in 10 years.   • Colonoscopy diagnostic  05/08/2009    screen, ?fam hx polyps, diverticulosis 10yr recall   • Ercp  1988/1991   • Esophagogastroduodenoscopy transoral flex w/bx single or mult  03/10/2010    Dysphagia,  gastritis, erosions   • Knee  hands on your pelvis. Breathe deeply and relax.    · Use your neck muscles to turn your head to one side until you feel a stretch in the muscles.  · Hold for 5 seconds. Then turn to the other side.  · Repeat 5 times on each side.  Note: Keep your shoulders on the floor. Dont lift or tuck your chin as you turn your head.      Neck Exercises: Arm Lift            To start, lie on your back, knees bent and feet flat on the floor. Keep your ears, shoulders, and hips aligned, but dont press your lower back to the floor. Rest your hands on your pelvis. Breathe deeply and relax.  · Raise one arm overhead, then lower it. As you lower that arm, raise the other arm.  · Continue to move both arms in slow, smooth arcs. Keep your arms straight and your head and neck relaxed.  · Repeat 10 times with each arm.  For your safety, check with your healthcare provider before starting an exercise program.     Neck Exercises: Head Lifts     Do this exercise on your hands and knees. Keep your knees under your hips and your hands under your shoulders. Keep your spine in a neutral position (not arched or sagging). Keep your ears in line with your shoulders. Hold for a few seconds before starting the exercise.  · Keeping your back straight, slowly drop your chin toward your chest. Tuck in your chin.  · Hold for 5 seconds. Then lift your head until your neck is level with your back.  · Hold for 5 seconds. Repeat 5 times.      Neck Exercises: Neck Flex      To start, sit in a chair with your feet flat on the floor. Your weight should be slightly forward so that youre balanced evenly on your buttocks. Relax your shoulders and keep your head level. Avoid arching your back or rounding your shoulders. Using a chair with arms may help you keep your balance.  · Rest the back of your left hand against your lower back. Place your right palm on the top of your head.  · Gently pull your head forward and down until you feel a stretch in the neck  scope,diagnostic Right 01/01/1983    Knee Arthroscopy   • Laparoscopy,tubal cautery  01/01/1974    Tubal Ligation   • Left heart cath,percutaneous  1969    Cardiac Cath   • Mitral valve repair  08/16/2010   • Mitral valve surgery  08/19/2010    Revision of Mitral Valve Repair   • Reconstruction atria limited mod maze  08/16/2010   • Removal gallbladder  01/01/1985   • Removal of leg veins/ulcer Bilateral     Bilateral EVLA & Phleb   • Rfa endovenous extremity first vein Left 06/26/2017    Left lower leg endovenous laser, ablation and phlebectomy by Dr. Pak at [facility]   • Rfa endovenous extremity first vein Right 07/14/217    Right lower leg endovenous laser, ablation and phlebectomy by Dr. Pak at [facility]   • Toe surgery Right 2001    Right great toe fracture surgery   • Varicose vein surgery Left 05/2013    by Dr. Pak     Family History   Problem Relation Age of Onset   • Heart disease Mother    • Cancer Mother    • Heart disease Father      Social History     Tobacco Use   • Smoking status: Never Smoker   • Smokeless tobacco: Never Used   Substance Use Topics   • Alcohol use: Yes     Alcohol/week: 0.0 standard drinks     Comment: a couple of glasses of wine a week       Prior to Admission Meds:(Not in a hospital admission)    Scheduled Meds:  Continuous Infusions:  PRN Meds:    Allergies:   ALLERGIES:   Allergen Reactions   • Bandaid [Adhesive   (Environmental)] RASH   • Compazine Other (See Comments)     Per patient: Neuro type symptoms         Active Problems:    * No active hospital problems. *    Blood pressure 108/64, pulse 78, temperature 98.1 °F (36.7 °C), temperature source Oral, resp. rate 12, height 5' 6\" (1.676 m), weight 63.7 kg (140 lb 8.7 oz), SpO2 96 %.    Review of Systems   Constitutional: Negative for chills and fever.   HENT: Negative for ear pain and hearing loss.    Eyes: Negative for pain and discharge.   Respiratory: Negative for cough and shortness of breath.    Cardiovascular:  Negative for chest pain and leg swelling.   Gastrointestinal: Negative for constipation and diarrhea.   Endocrine: Negative for cold intolerance and heat intolerance.   Genitourinary: Negative for dysuria and frequency.   Musculoskeletal: Negative for back pain and neck pain.   Skin: Negative for color change and rash.   Allergic/Immunologic: Negative for environmental allergies and food allergies.   Neurological: Negative for seizures and syncope.   Hematological: Negative for adenopathy. Does not bruise/bleed easily.   Psychiatric/Behavioral: Negative for dysphoric mood. The patient is not nervous/anxious.         Physical Exam  Constitutional:       General: She is not in acute distress.  HENT:      Head: Normocephalic.      Nose: Nose normal.   Eyes:      General: No scleral icterus.  Cardiovascular:      Rate and Rhythm: Normal rate and regular rhythm.      Heart sounds: Normal heart sounds. No murmur heard.     Pulmonary:      Effort: No respiratory distress.      Breath sounds: Normal breath sounds.   Abdominal:      Palpations: Abdomen is soft.      Tenderness: There is no abdominal tenderness.   Genitourinary:     Comments: External exam reveals some circumferential hemorrhoids and mucosal prolapse primarily on the right side of the anal verge.  There is a small amount of liquid stool in the area.  Digital rectal exam revealed no masses.  There were some mild to moderate circumferential hemorrhoids internally.  Patient try to tighten her sphincter and she has significantly decreased sphincter tone.  Musculoskeletal:      Cervical back: Neck supple. No tenderness.   Skin:     Findings: No erythema or rash.   Neurological:      Mental Status: She is alert.      Motor: No weakness.      Coordination: Coordination normal.   Psychiatric:         Mood and Affect: Mood normal.         Behavior: Behavior normal.         Assessment:  Hemorrhoids, mucosal prolapse  She has 2 symptoms 1 is that she is having a little  muscles. Dont force the motion.  · Hold for 20 seconds, then return to starting position. Switch arms.  © 2738-3196 [employer]. [address] [place] [address], [place] [address]. All rights reserved. This information is not intended as a substitute for professional medical care. Always follow your healthcare professional's instructions.          Exercises: Neck Isometrics  To start, sit in a chair with your feet flat on the floor. Your weight should be slightly forward so that youre balanced evenly on your buttocks. Relax your shoulders and keep your head level. Using a chair with arms may help you keep your balance.  1. Press your palm against your forehead. Resist with your neck muscles. Hold for 10 seconds. Relax. Repeat 5 times.  2. Do the exercise again, pressing on the side of your head. Repeat 5 times. Switch sides.  3. Do the exercise again, pressing on the back of your head. Repeat 5 times.       For your safety, check with your healthcare provider before starting an exercise program.       Neck Exercises: Passive Neck Rotation        To start, lie on your back, knees bent and feet flat on the floor. Keep your ears, shoulders, and hips aligned, but dont press your lower back to the floor. Rest your hands on your pelvis. Breathe deeply and relax.  · With your neck relaxed, place the palm of one hand on your forehead. Use your hand to turn your head to one side until you feel a stretch in the neck muscles. Do not push through pain.  · Hold for 5 seconds. Then turn to the other side.  · Repeat 5 times on each side.   Note: Keep your shoulders on the floor. Dont lift your chin as you turn your head.    Neck Exercises: Neck Glide  To start, lie on your back, knees bent and feet flat on the floor. Keep your ears, shoulders, and hips aligned. Dont press your lower back to the floor. Rest your hands on your pelvis. Breathe deeply and relax.  · Gently flatten the curve of your neck against the  floor.  · Lengthen your neck as though youre growing taller. Dont jam your chin into your chest, and dont push too hard.  · Hold for 5 seconds. Release. Repeat 3 times.      © 5285-2844 [employer]. [address] [place] [address], [place] [address]. All rights reserved. This information is not intended as a substitute for professional medical care. Always follow your healthcare professional's instructions.           rectal bleeding but this bleeding does not definitely appear severe enough to necessarily warrant surgery by itself.  She also has stool incontinence.  I am not sure how much is due to the mucosal prolapse on the right side verses her significantly decreased sphincter tone.    Plan:  I plan to send her to Dr. Barros in Colorectal surgery for evaluation of this as I do not typically work with stool incontinence which appears to the larger of the 2 issues in my opinion.  A hemorrhoidectomy would likely take care of the mucosal prolapse but I am not sure what should be done with her decreased sphincter tone.    Main Sorenson MD  11/12/2021

## 2021-11-16 ENCOUNTER — HOSPITAL ENCOUNTER (OUTPATIENT)
Dept: RADIOLOGY | Facility: HOSPITAL | Age: 56
Discharge: HOME | End: 2021-11-16
Attending: PHYSICAL MEDICINE & REHABILITATION
Payer: COMMERCIAL

## 2021-11-16 DIAGNOSIS — M54.2 CERVICALGIA: ICD-10-CM

## 2021-11-16 PROCEDURE — 72141 MRI NECK SPINE W/O DYE: CPT | Mod: TC,PO

## 2021-11-23 ENCOUNTER — TELEPHONE (OUTPATIENT)
Dept: PHYSICAL MEDICINE AND REHAB | Facility: CLINIC | Age: 56
End: 2021-11-23
Payer: COMMERCIAL

## 2021-12-02 ENCOUNTER — TELEPHONE (OUTPATIENT)
Dept: PHYSICAL MEDICINE AND REHAB | Facility: CLINIC | Age: 56
End: 2021-12-02
Payer: COMMERCIAL

## 2021-12-03 ENCOUNTER — TELEPHONE (OUTPATIENT)
Dept: PAIN MEDICINE | Facility: CLINIC | Age: 56
End: 2021-12-03
Payer: COMMERCIAL

## 2021-12-03 DIAGNOSIS — M54.12 CERVICAL RADICULITIS: Primary | ICD-10-CM

## 2021-12-21 ENCOUNTER — HOSPITAL ENCOUNTER (OUTPATIENT)
Facility: AMBULARY SURGERY CENTER | Age: 56
Discharge: HOME | End: 2021-12-21
Attending: ANESTHESIOLOGY | Admitting: ANESTHESIOLOGY
Payer: COMMERCIAL

## 2021-12-21 DIAGNOSIS — M54.12 CERVICAL RADICULITIS: ICD-10-CM

## 2021-12-21 PROCEDURE — 62321 NJX INTERLAMINAR CRV/THRC: CPT | Mod: ,,, | Performed by: ANESTHESIOLOGY

## 2021-12-21 PROCEDURE — 62321 PR INJ CERV/THORAC, W/GUIDANCE: ICD-10-PCS | Mod: ,,, | Performed by: ANESTHESIOLOGY

## 2021-12-21 PROCEDURE — 62321 NJX INTERLAMINAR CRV/THRC: CPT | Performed by: ANESTHESIOLOGY

## 2021-12-21 RX ORDER — SODIUM CHLORIDE, SODIUM LACTATE, POTASSIUM CHLORIDE, CALCIUM CHLORIDE 600; 310; 30; 20 MG/100ML; MG/100ML; MG/100ML; MG/100ML
INJECTION, SOLUTION INTRAVENOUS CONTINUOUS
Status: ACTIVE | OUTPATIENT
Start: 2021-12-21

## 2021-12-21 RX ORDER — LIDOCAINE HYDROCHLORIDE 10 MG/ML
INJECTION, SOLUTION EPIDURAL; INFILTRATION; INTRACAUDAL; PERINEURAL
Status: DISCONTINUED | OUTPATIENT
Start: 2021-12-21 | End: 2021-12-21 | Stop reason: HOSPADM

## 2021-12-21 RX ORDER — FENTANYL CITRATE 50 UG/ML
INJECTION, SOLUTION INTRAMUSCULAR; INTRAVENOUS
Status: DISCONTINUED | OUTPATIENT
Start: 2021-12-21 | End: 2021-12-21 | Stop reason: HOSPADM

## 2021-12-21 RX ORDER — MIDAZOLAM HYDROCHLORIDE 2 MG/2ML
INJECTION, SOLUTION INTRAMUSCULAR; INTRAVENOUS
Status: DISCONTINUED | OUTPATIENT
Start: 2021-12-21 | End: 2021-12-21 | Stop reason: HOSPADM

## 2021-12-21 RX ORDER — SODIUM CHLORIDE 9 MG/ML
INJECTION, SOLUTION INTRAMUSCULAR; INTRAVENOUS; SUBCUTANEOUS
Status: DISCONTINUED | OUTPATIENT
Start: 2021-12-21 | End: 2021-12-21 | Stop reason: HOSPADM

## 2021-12-21 RX ORDER — DEXAMETHASONE SODIUM PHOSPHATE 10 MG/ML
INJECTION INTRAMUSCULAR; INTRAVENOUS
Status: DISCONTINUED | OUTPATIENT
Start: 2021-12-21 | End: 2021-12-21 | Stop reason: HOSPADM

## 2021-12-21 RX ADMIN — SODIUM CHLORIDE, SODIUM LACTATE, POTASSIUM CHLORIDE, AND CALCIUM CHLORIDE: .6; .31; .03; .02 INJECTION, SOLUTION INTRAVENOUS at 12:12

## 2021-12-22 ENCOUNTER — TELEPHONE (OUTPATIENT)
Dept: PHYSICAL MEDICINE AND REHAB | Facility: CLINIC | Age: 56
End: 2021-12-22
Payer: COMMERCIAL

## 2021-12-22 VITALS
SYSTOLIC BLOOD PRESSURE: 130 MMHG | HEART RATE: 53 BPM | HEIGHT: 63 IN | TEMPERATURE: 97 F | DIASTOLIC BLOOD PRESSURE: 66 MMHG | BODY MASS INDEX: 34.2 KG/M2 | WEIGHT: 193 LBS | RESPIRATION RATE: 18 BRPM | OXYGEN SATURATION: 94 %

## 2021-12-22 RX ORDER — TRAMADOL HYDROCHLORIDE 50 MG/1
50 TABLET ORAL EVERY 8 HOURS PRN
Qty: 21 TABLET | Refills: 0 | Status: SHIPPED | OUTPATIENT
Start: 2021-12-22 | End: 2024-01-02

## 2021-12-27 ENCOUNTER — TELEPHONE (OUTPATIENT)
Dept: PAIN MEDICINE | Facility: CLINIC | Age: 56
End: 2021-12-27
Payer: COMMERCIAL

## 2022-01-06 ENCOUNTER — TELEPHONE (OUTPATIENT)
Dept: FAMILY MEDICINE | Facility: CLINIC | Age: 57
End: 2022-01-06
Payer: COMMERCIAL

## 2022-01-06 NOTE — TELEPHONE ENCOUNTER
----- Message from Cassy Milner sent at 1/6/2022  3:33 PM CST -----  Type:  RX Refill Request    Who Called:  Pt  Refill or New Rx:  Refill  RX Name and Strength:  ibuprofen (ADVIL,MOTRIN) 800 MG tablet   How is the patient currently taking it? (ex. 1XDay):  As directed  Is this a 30 day or 90 day RX:  30  Preferred Pharmacy with phone number:    [facility] [address] [place] [id number] [employer]  [address] [facility] [address]  Phone: [phone] Fax: [phone]  Local or Mail Order:  Local  Ordering Provider:  Blank Fenton Call Back Number:  [phone] or [phone]  Additional Information:  Please advise--Thank you

## 2022-01-06 NOTE — TELEPHONE ENCOUNTER
Patient requesting refill of Ibuprofen. Upon further assessment it was noted this medication was discontinued by Dr West on 10-. Call placed to patient for notification, recommended for patient to contact Dr West to confirm if this medication can be reinstated. Patient verbalized understanding.

## 2022-01-19 ENCOUNTER — PATIENT OUTREACH (OUTPATIENT)
Dept: ADMINISTRATIVE | Facility: OTHER | Age: 57
End: 2022-01-19
Payer: COMMERCIAL

## 2022-01-20 NOTE — PROGRESS NOTES
Chart was reviewed for overdue Proactive [facility] Encounters (RACHEL)  topics  Updates were requested from care everywhere  Health Maintenance has been updated  LINKS immunization registry triggered      
EMS

## 2022-01-26 ENCOUNTER — OFFICE VISIT (OUTPATIENT)
Dept: PHYSICAL MEDICINE AND REHAB | Facility: CLINIC | Age: 57
End: 2022-01-26
Payer: COMMERCIAL

## 2022-01-26 VITALS — BODY MASS INDEX: 34.2 KG/M2 | WEIGHT: 193 LBS | RESPIRATION RATE: 16 BRPM | HEIGHT: 63 IN

## 2022-01-26 DIAGNOSIS — M75.52 SUBACROMIAL BURSITIS OF LEFT SHOULDER JOINT: Primary | ICD-10-CM

## 2022-01-26 DIAGNOSIS — M50.30 DDD (DEGENERATIVE DISC DISEASE), CERVICAL: ICD-10-CM

## 2022-01-26 DIAGNOSIS — Z74.09 IMPAIRED FUNCTIONAL MOBILITY AND ACTIVITY TOLERANCE: ICD-10-CM

## 2022-01-26 DIAGNOSIS — M54.12 CERVICAL RADICULOPATHY: ICD-10-CM

## 2022-01-26 DIAGNOSIS — M47.812 CERVICAL SPONDYLOSIS: ICD-10-CM

## 2022-01-26 DIAGNOSIS — G56.01 CARPAL TUNNEL SYNDROME OF RIGHT WRIST: ICD-10-CM

## 2022-01-26 PROCEDURE — 99999 PR PBB SHADOW E&M-EST. PATIENT-LVL IV: CPT | Mod: PBBFAC,,, | Performed by: PHYSICAL MEDICINE & REHABILITATION

## 2022-01-26 PROCEDURE — 3008F PR BODY MASS INDEX (BMI) DOCUMENTED: ICD-10-PCS | Mod: CPTII,S$GLB,, | Performed by: PHYSICAL MEDICINE & REHABILITATION

## 2022-01-26 PROCEDURE — 1159F PR MEDICATION LIST DOCUMENTED IN MEDICAL RECORD: ICD-10-PCS | Mod: CPTII,S$GLB,, | Performed by: PHYSICAL MEDICINE & REHABILITATION

## 2022-01-26 PROCEDURE — 1160F PR REVIEW ALL MEDS BY PRESCRIBER/CLIN PHARMACIST DOCUMENTED: ICD-10-PCS | Mod: CPTII,S$GLB,, | Performed by: PHYSICAL MEDICINE & REHABILITATION

## 2022-01-26 PROCEDURE — 20610 DRAIN/INJ JOINT/BURSA W/O US: CPT | Mod: LT,S$GLB,, | Performed by: PHYSICAL MEDICINE & REHABILITATION

## 2022-01-26 PROCEDURE — 99213 OFFICE O/P EST LOW 20 MIN: CPT | Mod: 25,S$GLB,, | Performed by: PHYSICAL MEDICINE & REHABILITATION

## 2022-01-26 PROCEDURE — 20610 LARGE JOINT ASPIRATION/INJECTION: L SUBACROMIAL BURSA: ICD-10-PCS | Mod: LT,S$GLB,, | Performed by: PHYSICAL MEDICINE & REHABILITATION

## 2022-01-26 PROCEDURE — 1159F MED LIST DOCD IN RCRD: CPT | Mod: CPTII,S$GLB,, | Performed by: PHYSICAL MEDICINE & REHABILITATION

## 2022-01-26 PROCEDURE — 1160F RVW MEDS BY RX/DR IN RCRD: CPT | Mod: CPTII,S$GLB,, | Performed by: PHYSICAL MEDICINE & REHABILITATION

## 2022-01-26 PROCEDURE — 99999 PR PBB SHADOW E&M-EST. PATIENT-LVL IV: ICD-10-PCS | Mod: PBBFAC,,, | Performed by: PHYSICAL MEDICINE & REHABILITATION

## 2022-01-26 PROCEDURE — 3008F BODY MASS INDEX DOCD: CPT | Mod: CPTII,S$GLB,, | Performed by: PHYSICAL MEDICINE & REHABILITATION

## 2022-01-26 PROCEDURE — 99213 PR OFFICE/OUTPT VISIT, EST, LEVL III, 20-29 MIN: ICD-10-PCS | Mod: 25,S$GLB,, | Performed by: PHYSICAL MEDICINE & REHABILITATION

## 2022-01-26 RX ORDER — METHOCARBAMOL 750 MG/1
750 TABLET, FILM COATED ORAL 4 TIMES DAILY PRN
Qty: 45 TABLET | Refills: 0 | Status: SHIPPED | OUTPATIENT
Start: 2022-01-26 | End: 2022-02-10 | Stop reason: SDUPTHER

## 2022-01-26 RX ORDER — NABUMETONE 500 MG/1
500 TABLET, FILM COATED ORAL 2 TIMES DAILY PRN
Qty: 30 TABLET | Refills: 0 | Status: SHIPPED | OUTPATIENT
Start: 2022-01-26 | End: 2024-01-02

## 2022-01-26 RX ORDER — PREGABALIN 75 MG/1
CAPSULE ORAL
Qty: 56 CAPSULE | Refills: 0 | Status: SHIPPED | OUTPATIENT
Start: 2022-01-26 | End: 2022-02-10 | Stop reason: SDUPTHER

## 2022-01-26 RX ADMIN — TRIAMCINOLONE ACETONIDE 40 MG: 40 INJECTION, SUSPENSION INTRA-ARTICULAR; INTRAMUSCULAR at 02:01

## 2022-01-27 ENCOUNTER — TELEPHONE (OUTPATIENT)
Dept: ORTHOPEDICS | Facility: CLINIC | Age: 57
End: 2022-01-27

## 2022-01-28 RX ORDER — TRIAMCINOLONE ACETONIDE 40 MG/ML
40 INJECTION, SUSPENSION INTRA-ARTICULAR; INTRAMUSCULAR
Status: DISCONTINUED | OUTPATIENT
Start: 2022-01-26 | End: 2022-01-28 | Stop reason: HOSPADM

## 2022-01-28 NOTE — PROCEDURES
Large Joint Aspiration/Injection: L subacromial bursa    Date/Time: 1/26/2022 2:00 PM  Performed by: Rayo West MD  Authorized by: Rayo West MD     Indications:  Pain, diagnostic evaluation and joint swelling    Local anesthesia used?: Yes    Anesthesia:  Local infiltration  Local anesthetic:  Lidocaine 2% without epinephrine  Anesthetic total (ml):  2      Details:  Needle Size:  25 G  Approach:  Lateral  Location:  Shoulder  Site:  L subacromial bursa  Medications:  40 mg triamcinolone acetonide 40 mg/mL    Procedure:LEFT subacromial bursa injection with ultrasound guidance    Preprocedure diagnosis:LEFT rotator cuff tendinopathy    Postprocedure diagnosis:  Same    Anesthetic:  Local    Assistant:  None    Procedure in detail:  Risks and benefits were discussed and written informed consent was obtained.  The patient was placed in seated position next the ultrasound machine.  The left shoulder was placed in the modified crass position.  Using a linear transducer on a GE logic the ultrasound, the subacromial bursa was identified in the long axis.  Distally skin was cleaned with alcohol x2 and allowed to dry.  Skin in tract was anesthetized using a 1.5 in 27 gauge needle and approximately 2 cc of 1% lidocaine.  The needle was advanced into the subacromial bursa under direct ultrasound visualization were a 4 cc solution of 1 cc of 40 milligrams/milliliter of Kenalog and 3 cc of 0.50% ropivacaine was injected into the bursa.  Needle was removed.  Band-Aid was applied.  The patient tolerated the procedure well without any adverse events.

## 2022-01-28 NOTE — PROGRESS NOTES
Chief Complaint   Patient presents with    Neck Pain         HPI: Lynn Naidu is a  56 y.o. female who presents today for followup. she was last seen approximately 1 month ago which time she received a cervical interlaminar epidural steroid injection.  Unfortunately, this did not provide her with any meaningful improvement pain or function.  Prior to this, she had a subacromial bursa injection which helped with right shoulder pain.  She complains of left shoulder pain and neck pain which has continued.  Symptoms are worse at night with sleeping and stiff 1st thing in the morning.  I neck pain is worse with rotation to the left and right.        Review of Systems   Constitutional: Negative for chills and fever.   HENT: Negative for congestion and tinnitus.    Eyes: Negative for blurred vision and photophobia.   Respiratory: Negative for shortness of breath and wheezing.    Cardiovascular: Negative for chest pain and palpitations.   Gastrointestinal: Negative for nausea and vomiting.   Genitourinary: Negative for dysuria and frequency.   Musculoskeletal: Positive for joint pain and neck pain. Negative for myalgias.   Skin: Negative for itching and rash.   Neurological: Negative for speech change and focal weakness.   Endo/Heme/Allergies: Negative for environmental allergies. Does not bruise/bleed easily.   Psychiatric/Behavioral: Negative for depression. The patient is not nervous/anxious.             Past Medical History:   Diagnosis Date    Allergy     Bronchitis 7-17-15    Cervical disc displacement     Edema     Insomnia     Plantar fasciitis           Current Outpatient Medications on File Prior to Visit   Medication Sig Dispense Refill    BIONECT 0.2 % Crea Apply topically daily as needed.       desonide (DESOWEN) 0.05 % cream Apply topically daily as needed.       diazePAM (VALIUM) 10 MG Tab Take 10 mg by mouth every evening.  1    ergocalciferol (ERGOCALCIFEROL) 50,000 unit Cap Take 1 capsule  (50,000 Units total) by mouth twice a week. 24 capsule 3    FLUoxetine 20 MG capsule Take 20 mg by mouth once daily.  2    fluticasone propionate (FLONASE) 50 mcg/actuation nasal spray 2 sprays (100 mcg total) by Each Nostril route once daily. 48 g 4    furosemide (LASIX) 40 MG tablet Take 1 tablet (40 mg total) by mouth once daily. 90 tablet 3    hydrOXYzine HCl (ATARAX) 25 MG tablet Take 1 tablet (25 mg total) by mouth 3 (three) times daily as needed. (Patient taking differently: Take 25 mg by mouth once daily.) 90 tablet 1    L.acid/L.casei/B.bif/B.lisa/FOS (PROBIOTIC BLEND ORAL) Take 1 capsule by mouth once daily. florify = 10 Billion      linaCLOtide (LINZESS) 290 mcg Cap capsule Take 1 capsule (290 mcg total) by mouth once daily. 90 capsule 3    metoprolol succinate (TOPROL-XL) 50 MG 24 hr tablet Take 1 tablet (50 mg total) by mouth once daily. 90 tablet 3    pantoprazole (PROTONIX) 40 MG tablet Take 1 tablet (40 mg total) by mouth every morning. 90 tablet 3    potassium chloride SA (K-DUR,KLOR-CON) 20 MEQ tablet Take 1 tablet by mouth once daily 90 tablet 1    promethazine (PHENERGAN) 25 MG tablet Take 25 mg by mouth.      SULFACLEANSE 8-4 8-4 % Susp Apply topically nightly as needed.       traMADoL (ULTRAM) 50 mg tablet Take 1 tablet (50 mg total) by mouth every 8 (eight) hours as needed for Pain. 21 tablet 0    triamcinolone acetonide 0.1% (KENALOG) 0.1 % cream Apply topically daily as needed.        Current Facility-Administered Medications on File Prior to Visit   Medication Dose Route Frequency Provider Last Rate Last Admin    lactated ringers infusion   Intravenous Continuous Thomas Ivory MD 25 mL/hr at 12/21/21 1240 New Bag at 12/21/21 1240              Physical Exam:  Vitals:    01/26/22 1424   Resp: 16     Physical Exam  Constitutional:       General: She is not in acute distress.     Appearance: Normal appearance.   HENT:      Head: Normocephalic and atraumatic.      Nose: Nose normal.  No congestion.      Mouth/Throat:      Mouth: Mucous membranes are moist.      Pharynx: Oropharynx is clear.   Eyes:      Extraocular Movements: Extraocular movements intact.      Pupils: Pupils are equal, round, and reactive to light.   Neck:      Trachea: Trachea and phonation normal.     Pulmonary:      Effort: Pulmonary effort is normal. No respiratory distress.   Abdominal:      General: Abdomen is flat. There is no distension.   Skin:     General: Skin is warm and dry.   Neurological:      Mental Status: She is alert and oriented to person, place, and time. Mental status is at baseline.      Gait: Gait is intact.   Psychiatric:         Mood and Affect: Mood and affect normal.         Behavior: Behavior normal.       Left Shoulder Exam     Tenderness   The patient is experiencing tenderness in the acromion.    Range of Motion   Active abduction: abnormal     Tests   Schultz test: positive  Impingement: positive            MRI cervical spine without contrast     CLINICAL DATA: Chronic neck pain     FINDINGS: Multiplanar noncontrast imaging was performed.     Marrow signal and alignment are within normal limits. Signal within the cervical spinal cord is normal.     C2-3: No significant abnormalities.     C3-4: No significant abnormalities.     C4-5: Minimal broad-based disc bulging and mild right-sided facet hypertrophy are noted. There is mild right C5 foraminal narrowing.     C5-6: Shallow midline disc protrusion results in mild central canal stenosis. Disc material abuts and slightly indents the ventral aspect of the cervical cord just to the right of midline. There is mild left and moderate right-sided foraminal stenosis.     C6-7: Mild broad-based disc bulge and shallow midline disc protrusion are noted. There is resultant mild central canal stenosis. Disc material abuts and slightly flattens the ventral aspect of the cervical cord. There is evidence of moderate bilateral foraminal stenosis.     C7-T1:  Bilateral degenerative facet hypertrophy, with mild right and probable moderate left-sided foraminal stenosis.     IMPRESSION:  1. Changes of cervical degenerative disc and facet disease as described, most pronounced at the C5-6 and C6-7 levels. Please see details as noted at each individual level above.      Assessment:  Subacromial bursitis of left shoulder joint  -     Large Joint Aspiration/Injection: L subacromial bursa    Carpal tunnel syndrome of right wrist  -     Ambulatory referral/consult to Orthopedics; Future; Expected date: 02/02/2022    DDD (degenerative disc disease), cervical    Cervical radiculopathy    Cervical spondylosis    Impaired functional mobility and activity tolerance    Other orders  -     nabumetone (RELAFEN) 500 MG tablet; Take 1 tablet (500 mg total) by mouth 2 (two) times daily as needed for Pain.  Dispense: 30 tablet; Refill: 0  -     pregabalin (LYRICA) 75 MG capsule; Take 1 capsule (75 mg total) by mouth every evening for 4 days, THEN 1 capsule (75 mg total) 2 (two) times daily for 26 days.  Dispense: 56 capsule; Refill: 0  -     methocarbamoL (ROBAXIN) 750 MG Tab; Take 1 tablet (750 mg total) by mouth 4 (four) times daily as needed (muscle spasm).  Dispense: 45 tablet; Refill: 0               PLAN:  Lynn Naidu is a 56 y.o. female with chronic neck pain and left greater than right shoulder pain.  She had a right subacromial bursa injection which helped significantly with her right shoulder pain.  She most recently received a interlaminar epidural steroid injection which previously has helped for 3-5 years and one year respectively, however most recent injection was unfortunately not beneficial.  On today's visit, she complains mainly of left shoulder pain I will proceed with a left subacromial bursa injection.  She may require an additional interlaminar epidural steroid injection.  For medical management, will trial Lyrica 75 mg twice daily, Relafen b.i.d. p.r.n. and  methocarbamol Q 6 p.r.n. for muscle relaxer.                    Rayo West D.O.  Physical Medicine and Rehabilitation

## 2022-01-31 DIAGNOSIS — M25.531 RIGHT WRIST PAIN: Primary | ICD-10-CM

## 2022-02-01 ENCOUNTER — HOSPITAL ENCOUNTER (OUTPATIENT)
Dept: RADIOLOGY | Facility: HOSPITAL | Age: 57
Discharge: HOME | End: 2022-02-01
Attending: ORTHOPAEDIC SURGERY
Payer: COMMERCIAL

## 2022-02-01 ENCOUNTER — OFFICE VISIT (OUTPATIENT)
Dept: ORTHOPEDICS | Facility: CLINIC | Age: 57
End: 2022-02-01
Payer: COMMERCIAL

## 2022-02-01 VITALS — HEIGHT: 63 IN | WEIGHT: 193 LBS | BODY MASS INDEX: 34.2 KG/M2 | RESPIRATION RATE: 16 BRPM

## 2022-02-01 DIAGNOSIS — G56.01 CARPAL TUNNEL SYNDROME OF RIGHT WRIST: ICD-10-CM

## 2022-02-01 DIAGNOSIS — M25.531 RIGHT WRIST PAIN: ICD-10-CM

## 2022-02-01 PROCEDURE — 99204 OFFICE O/P NEW MOD 45 MIN: CPT | Mod: S$GLB,,, | Performed by: ORTHOPAEDIC SURGERY

## 2022-02-01 PROCEDURE — 1160F RVW MEDS BY RX/DR IN RCRD: CPT | Mod: CPTII,S$GLB,, | Performed by: ORTHOPAEDIC SURGERY

## 2022-02-01 PROCEDURE — 73110 X-RAY EXAM OF WRIST: CPT | Mod: 26,RT,, | Performed by: RADIOLOGY

## 2022-02-01 PROCEDURE — 1159F PR MEDICATION LIST DOCUMENTED IN MEDICAL RECORD: ICD-10-PCS | Mod: CPTII,S$GLB,, | Performed by: ORTHOPAEDIC SURGERY

## 2022-02-01 PROCEDURE — 73110 XR WRIST COMPLETE 3 VIEWS RIGHT: ICD-10-PCS | Mod: 26,RT,, | Performed by: RADIOLOGY

## 2022-02-01 PROCEDURE — 1160F PR REVIEW ALL MEDS BY PRESCRIBER/CLIN PHARMACIST DOCUMENTED: ICD-10-PCS | Mod: CPTII,S$GLB,, | Performed by: ORTHOPAEDIC SURGERY

## 2022-02-01 PROCEDURE — 73110 X-RAY EXAM OF WRIST: CPT | Mod: TC,PN,RT

## 2022-02-01 PROCEDURE — 99999 PR PBB SHADOW E&M-EST. PATIENT-LVL IV: CPT | Mod: PBBFAC,,, | Performed by: ORTHOPAEDIC SURGERY

## 2022-02-01 PROCEDURE — 99204 PR OFFICE/OUTPT VISIT, NEW, LEVL IV, 45-59 MIN: ICD-10-PCS | Mod: S$GLB,,, | Performed by: ORTHOPAEDIC SURGERY

## 2022-02-01 PROCEDURE — 1159F MED LIST DOCD IN RCRD: CPT | Mod: CPTII,S$GLB,, | Performed by: ORTHOPAEDIC SURGERY

## 2022-02-01 PROCEDURE — 3008F PR BODY MASS INDEX (BMI) DOCUMENTED: ICD-10-PCS | Mod: CPTII,S$GLB,, | Performed by: ORTHOPAEDIC SURGERY

## 2022-02-01 PROCEDURE — 3008F BODY MASS INDEX DOCD: CPT | Mod: CPTII,S$GLB,, | Performed by: ORTHOPAEDIC SURGERY

## 2022-02-01 PROCEDURE — 99999 PR PBB SHADOW E&M-EST. PATIENT-LVL IV: ICD-10-PCS | Mod: PBBFAC,,, | Performed by: ORTHOPAEDIC SURGERY

## 2022-02-01 NOTE — PROGRESS NOTES
CC:  56-year-old female presents for evaluation of right carpal tunnel syndrome.  The patient had seen Dr. West for neck and shoulder pain.  His workup we performed a nerve conduction study that showed a severe right carpal tunnel syndrome.  He referred her over here for further evaluation.  When questioning the patient she denies any numbness in the hands.  She denies fumbling and dropping objects.  She denies any weakness.    Past Medical History:   Diagnosis Date    Allergy     Bronchitis 7-17-15    Cervical disc displacement     Edema     Insomnia     Plantar fasciitis        Past Surgical History:   Procedure Laterality Date    COLONOSCOPY  11/01/2017    Dr. Oviedo, normal, ten year recheck    COLONOSCOPY N/A 11/1/2017    Procedure: COLONOSCOPY;  Surgeon: Cameron Oviedo MD;  Location: [facility];  Service: Endoscopy;  Laterality: N/A;    EPIDURAL STEROID INJECTION INTO CERVICAL SPINE N/A 7/22/2019    Procedure: Injection-steroid-epidural-cervical;  Surgeon: Thomas Ivory MD;  Location: [facility];  Service: Pain Management;  Laterality: N/A;  C7-T1    EPIDURAL STEROID INJECTION INTO CERVICAL SPINE N/A 12/21/2021    Procedure: Injection-steroid-epidural-cervical;  Surgeon: Thomas Ivory MD;  Location: [facility];  Service: Pain Management;  Laterality: N/A;  C6-7    TONSILLECTOMY, ADENOIDECTOMY Bilateral 7/23/2021    Procedure: TONSILLECTOMY AND ADENOIDECTOMY;  Surgeon: Anselmo Galvan MD;  Location: [facility];  Service: ENT;  Laterality: Bilateral;    WISDOM TOOTH EXTRACTION         Current Outpatient Medications on File Prior to Visit   Medication Sig Dispense Refill    BIONECT 0.2 % Crea Apply topically daily as needed.       desonide (DESOWEN) 0.05 % cream Apply topically daily as needed.       diazePAM (VALIUM) 10 MG Tab Take 10 mg by mouth every evening.  1    ergocalciferol (ERGOCALCIFEROL) 50,000 unit Cap Take 1 capsule (50,000 Units total) by mouth twice a week. 24 capsule 3    FLUoxetine 20  MG capsule Take 20 mg by mouth once daily.  2    fluticasone propionate (FLONASE) 50 mcg/actuation nasal spray 2 sprays (100 mcg total) by Each Nostril route once daily. 48 g 4    furosemide (LASIX) 40 MG tablet Take 1 tablet (40 mg total) by mouth once daily. 90 tablet 3    hydrOXYzine HCl (ATARAX) 25 MG tablet Take 1 tablet (25 mg total) by mouth 3 (three) times daily as needed. (Patient taking differently: Take 25 mg by mouth once daily.) 90 tablet 1    L.acid/L.casei/B.bif/B.lisa/FOS (PROBIOTIC BLEND ORAL) Take 1 capsule by mouth once daily. florify = 10 Billion      linaCLOtide (LINZESS) 290 mcg Cap capsule Take 1 capsule (290 mcg total) by mouth once daily. 90 capsule 3    methocarbamoL (ROBAXIN) 750 MG Tab Take 1 tablet (750 mg total) by mouth 4 (four) times daily as needed (muscle spasm). 45 tablet 0    metoprolol succinate (TOPROL-XL) 50 MG 24 hr tablet Take 1 tablet (50 mg total) by mouth once daily. 90 tablet 3    nabumetone (RELAFEN) 500 MG tablet Take 1 tablet (500 mg total) by mouth 2 (two) times daily as needed for Pain. 30 tablet 0    pantoprazole (PROTONIX) 40 MG tablet Take 1 tablet (40 mg total) by mouth every morning. 90 tablet 3    potassium chloride SA (K-DUR,KLOR-CON) 20 MEQ tablet Take 1 tablet by mouth once daily 90 tablet 1    pregabalin (LYRICA) 75 MG capsule Take 1 capsule (75 mg total) by mouth every evening for 4 days, THEN 1 capsule (75 mg total) 2 (two) times daily for 26 days. 56 capsule 0    promethazine (PHENERGAN) 25 MG tablet Take 25 mg by mouth.      SULFACLEANSE 8-4 8-4 % Susp Apply topically nightly as needed.       traMADoL (ULTRAM) 50 mg tablet Take 1 tablet (50 mg total) by mouth every 8 (eight) hours as needed for Pain. 21 tablet 0    triamcinolone acetonide 0.1% (KENALOG) 0.1 % cream Apply topically daily as needed.        Current Facility-Administered Medications on File Prior to Visit   Medication Dose Route Frequency Provider Last Rate Last Admin     lactated ringers infusion   Intravenous Continuous Thomas Ivory MD 25 mL/hr at 12/21/21 1240 New Bag at 12/21/21 1240       ROS:    Constitution: Denies chills, fever, and sweats.  HENT: Denies headaches or blurry vision.  Cardiovascular: Denies chest pain or irregular heart beat.  Respiratory: Denies cough or shortness of breath.  Gastrointestinal: Denies abdominal pain, nausea, or vomiting.  Genitourinary:  Denies urinary incontinence, bladder and kidney issues  Musculoskeletal:  Denies muscle cramps.  Neurological: Denies dizziness or focal weakness.  Psychiatric/Behavioral: Normal mental status.  Hematologic/Lymphatic: Denies bleeding problem or easy bruising/bleeding.  Skin: Denies rash or suspicious lesions.    Physical examination     Gen - No acute distress, well nourished, well groomed   Eyes - Extraoccular motions intact, pupils equally round and reactive to light and accommodation   ENT - normocephalic, atruamtic, oropharynx clear   Neck - Supple, no abnormal masses   Cardiovascular - regular rate and rhythm   Pulmonary - clear to auscultation bilaterally, no wheezes, ronchi, or rales   Abdomen - soft, non-tender, non-distended, positive bowel sounds   Psych - The patient is alert and oriented x3 with normal mood and affect    Right Upper Extremity Examination     Skin is intact throughout   Motor is intact distally radial, median, ulnar, AIN, PIN   +2 radial and ulnar pulses   Sensation to light touch is intact distally radial, median, and ulnar   Full ROM at the DIP, PIP, and MCP joints   Wrist shows full ROM   No tenderness to palpation noted     Carpal Tunnel compression test - negative   Phalen's Test - negative  Tinel's Test - negative  Finkelstien's Test - negative    No Ecchymosis noted   No Swelling noted     Triggering of fingers or thumb - negative    X-ray images were examined and personally interpreted by me.  Three views the right wrist dated 02/01/2022 show well-maintained joint spaces with  no advanced arthritic changes and no acute fractures.    Dx:  Carpal tunnel syndrome on nerve conduction study but normal examination and no clinical findings consistent with carpal tunnel syndrome.    Plan:  I discussed the findings with the patient and her .  At this point I do not see any surgical indications because the patient is completely asymptomatic.  This is something we can follow clinically.  She will follow up if she begins to develop tingling or numbness in the thumb, index, or long fingers.    Answers for HPI/ROS submitted by the patient on 1/31/2022  unexpected weight change: No  appetite change : No  sleep disturbance: Yes  IMMUNOCOMPROMISED: No  nervous/ anxious: No  dysphoric mood: No  rash: No  visual disturbance: No  eye redness: No  eye pain: No  ear pain: No  tinnitus: No  hearing loss: No  sinus pressure : No  nosebleeds: No  enviro allergies: Yes  food allergies: No  cough: No  shortness of breath: No  sweating: No  dysuria: No  frequency: Yes  difficulty urinating: No  hematuria: No  painful intercourse: No  chest pain: No  palpitations: No  nausea: No  vomiting: No  diarrhea: No  blood in stool: No  constipation: Yes  headaches: Yes  dizziness: Yes  numbness: Yes  seizures: No  joint swelling: No  myalgia: Yes  weakness: Yes  back pain: No  Pain Chronicity: new  History of trauma: No  Onset: more than 1 month ago  Frequency: intermittently  Progression since onset: unchanged  Injury mechanism: reaching  injury location: at home  pain- numeric: 5/10  pain location: right fingers  pain quality: numbing  Radiating Pain: No  Aggravating factors: extension  fever: No  inability to bear weight: No  itching: No  joint locking: No  limited range of motion: No  stiffness: No  tingling: No  Treatments tried: nothing  physical therapy: not tried  Improvement on treatment: no relief

## 2022-02-10 ENCOUNTER — OFFICE VISIT (OUTPATIENT)
Dept: PHYSICAL MEDICINE AND REHAB | Facility: CLINIC | Age: 57
End: 2022-02-10
Payer: COMMERCIAL

## 2022-02-10 VITALS — BODY MASS INDEX: 34.2 KG/M2 | WEIGHT: 193 LBS | HEIGHT: 63 IN | RESPIRATION RATE: 16 BRPM

## 2022-02-10 DIAGNOSIS — M25.512 CHRONIC PAIN OF BOTH SHOULDERS: ICD-10-CM

## 2022-02-10 DIAGNOSIS — M25.511 CHRONIC PAIN OF BOTH SHOULDERS: ICD-10-CM

## 2022-02-10 DIAGNOSIS — G89.29 CHRONIC PAIN OF BOTH SHOULDERS: ICD-10-CM

## 2022-02-10 DIAGNOSIS — M50.30 DDD (DEGENERATIVE DISC DISEASE), CERVICAL: Primary | ICD-10-CM

## 2022-02-10 DIAGNOSIS — M47.812 CERVICAL SPONDYLOSIS: ICD-10-CM

## 2022-02-10 DIAGNOSIS — M54.12 CERVICAL RADICULOPATHY: ICD-10-CM

## 2022-02-10 PROCEDURE — 99999 PR PBB SHADOW E&M-EST. PATIENT-LVL III: CPT | Mod: PBBFAC,,, | Performed by: PHYSICAL MEDICINE & REHABILITATION

## 2022-02-10 PROCEDURE — 1159F MED LIST DOCD IN RCRD: CPT | Mod: CPTII,S$GLB,, | Performed by: PHYSICAL MEDICINE & REHABILITATION

## 2022-02-10 PROCEDURE — 3008F BODY MASS INDEX DOCD: CPT | Mod: CPTII,S$GLB,, | Performed by: PHYSICAL MEDICINE & REHABILITATION

## 2022-02-10 PROCEDURE — 99214 OFFICE O/P EST MOD 30 MIN: CPT | Mod: S$GLB,,, | Performed by: PHYSICAL MEDICINE & REHABILITATION

## 2022-02-10 PROCEDURE — 99214 PR OFFICE/OUTPT VISIT, EST, LEVL IV, 30-39 MIN: ICD-10-PCS | Mod: S$GLB,,, | Performed by: PHYSICAL MEDICINE & REHABILITATION

## 2022-02-10 PROCEDURE — 1159F PR MEDICATION LIST DOCUMENTED IN MEDICAL RECORD: ICD-10-PCS | Mod: CPTII,S$GLB,, | Performed by: PHYSICAL MEDICINE & REHABILITATION

## 2022-02-10 PROCEDURE — 99999 PR PBB SHADOW E&M-EST. PATIENT-LVL III: ICD-10-PCS | Mod: PBBFAC,,, | Performed by: PHYSICAL MEDICINE & REHABILITATION

## 2022-02-10 PROCEDURE — 3008F PR BODY MASS INDEX (BMI) DOCUMENTED: ICD-10-PCS | Mod: CPTII,S$GLB,, | Performed by: PHYSICAL MEDICINE & REHABILITATION

## 2022-02-10 RX ORDER — PREGABALIN 75 MG/1
CAPSULE ORAL
Qty: 90 CAPSULE | Refills: 1 | Status: SHIPPED | OUTPATIENT
Start: 2022-02-10 | End: 2022-08-08 | Stop reason: SDUPTHER

## 2022-02-10 RX ORDER — METHOCARBAMOL 750 MG/1
750 TABLET, FILM COATED ORAL 4 TIMES DAILY PRN
Qty: 90 TABLET | Refills: 1 | Status: SHIPPED | OUTPATIENT
Start: 2022-02-10 | End: 2024-01-02

## 2022-02-10 NOTE — PROGRESS NOTES
Chief Complaint   Patient presents with    Neck Pain    Shoulder Pain         HPI: Lynn Naidu is a  56 y.o. female who presents today for followup. she was last seen in clinic at which time I performed a left subacromial bursa injection.  This has provided her some improvement in left shoulder pain.  I also started her on Lyrica which has helped somewhat.  She continues to complain of pain mainly at night.  She is going to try a new pillow.  She denies any new or worsening bowel or bladder dysfunction or saddle anesthesia.        Review of Systems   Constitutional: Negative for chills and fever.   HENT: Negative for congestion and tinnitus.    Eyes: Negative for blurred vision and photophobia.   Respiratory: Negative for shortness of breath and wheezing.    Cardiovascular: Negative for chest pain and palpitations.   Gastrointestinal: Negative for nausea and vomiting.   Genitourinary: Negative for dysuria and frequency.   Musculoskeletal: Positive for neck pain. Negative for joint pain and myalgias.   Skin: Negative for itching and rash.   Neurological: Negative for speech change and focal weakness.   Endo/Heme/Allergies: Negative for environmental allergies. Does not bruise/bleed easily.   Psychiatric/Behavioral: Negative for depression. The patient is not nervous/anxious.             Past Medical History:   Diagnosis Date    Allergy     Bronchitis 7-17-15    Cervical disc displacement     Edema     Insomnia     Plantar fasciitis           Current Outpatient Medications on File Prior to Visit   Medication Sig Dispense Refill    BIONECT 0.2 % Crea Apply topically daily as needed.       desonide (DESOWEN) 0.05 % cream Apply topically daily as needed.       diazePAM (VALIUM) 10 MG Tab Take 10 mg by mouth every evening.  1    ergocalciferol (ERGOCALCIFEROL) 50,000 unit Cap Take 1 capsule (50,000 Units total) by mouth twice a week. 24 capsule 3    FLUoxetine 20 MG capsule Take 20 mg by mouth once daily.   2    fluticasone propionate (FLONASE) 50 mcg/actuation nasal spray 2 sprays (100 mcg total) by Each Nostril route once daily. 48 g 4    furosemide (LASIX) 40 MG tablet Take 1 tablet (40 mg total) by mouth once daily. 90 tablet 3    hydrOXYzine HCl (ATARAX) 25 MG tablet Take 1 tablet (25 mg total) by mouth 3 (three) times daily as needed. (Patient taking differently: Take 25 mg by mouth once daily.) 90 tablet 1    L.acid/L.casei/B.bif/B.lisa/FOS (PROBIOTIC BLEND ORAL) Take 1 capsule by mouth once daily. florify = 10 Billion      linaCLOtide (LINZESS) 290 mcg Cap capsule Take 1 capsule (290 mcg total) by mouth once daily. 90 capsule 3    metoprolol succinate (TOPROL-XL) 50 MG 24 hr tablet Take 1 tablet (50 mg total) by mouth once daily. 90 tablet 3    nabumetone (RELAFEN) 500 MG tablet Take 1 tablet (500 mg total) by mouth 2 (two) times daily as needed for Pain. 30 tablet 0    pantoprazole (PROTONIX) 40 MG tablet Take 1 tablet (40 mg total) by mouth every morning. 90 tablet 3    potassium chloride SA (K-DUR,KLOR-CON) 20 MEQ tablet Take 1 tablet by mouth once daily 90 tablet 1    promethazine (PHENERGAN) 25 MG tablet Take 25 mg by mouth.      SULFACLEANSE 8-4 8-4 % Susp Apply topically nightly as needed.       traMADoL (ULTRAM) 50 mg tablet Take 1 tablet (50 mg total) by mouth every 8 (eight) hours as needed for Pain. 21 tablet 0    triamcinolone acetonide 0.1% (KENALOG) 0.1 % cream Apply topically daily as needed.       [DISCONTINUED] methocarbamoL (ROBAXIN) 750 MG Tab Take 1 tablet (750 mg total) by mouth 4 (four) times daily as needed (muscle spasm). 45 tablet 0    [DISCONTINUED] pregabalin (LYRICA) 75 MG capsule Take 1 capsule (75 mg total) by mouth every evening for 4 days, THEN 1 capsule (75 mg total) 2 (two) times daily for 26 days. 56 capsule 0     Current Facility-Administered Medications on File Prior to Visit   Medication Dose Route Frequency Provider Last Rate Last Admin    lactated ringers  infusion   Intravenous Continuous Thomas Ivory MD 25 mL/hr at 12/21/21 1240 New Bag at 12/21/21 1240              Physical Exam:  Vitals:    02/10/22 1313   Resp: 16     Physical Exam  Constitutional:       General: She is not in acute distress.     Appearance: Normal appearance.   HENT:      Head: Normocephalic and atraumatic.      Nose: Nose normal. No congestion.      Mouth/Throat:      Mouth: Mucous membranes are moist.      Pharynx: Oropharynx is clear.   Eyes:      Extraocular Movements: Extraocular movements intact.      Pupils: Pupils are equal, round, and reactive to light.   Neck:      Trachea: Trachea and phonation normal.   Pulmonary:      Effort: Pulmonary effort is normal. No respiratory distress.   Abdominal:      General: Abdomen is flat. There is no distension.   Skin:     General: Skin is warm and dry.   Neurological:      General: No focal deficit present.      Mental Status: She is alert and oriented to person, place, and time. Mental status is at baseline.   Psychiatric:         Mood and Affect: Mood and affect normal.         Behavior: Behavior normal.       Ortho Exam    MRI cervical spine without contrast     CLINICAL DATA: Chronic neck pain     FINDINGS: Multiplanar noncontrast imaging was performed.     Marrow signal and alignment are within normal limits. Signal within the cervical spinal cord is normal.     C2-3: No significant abnormalities.     C3-4: No significant abnormalities.     C4-5: Minimal broad-based disc bulging and mild right-sided facet hypertrophy are noted. There is mild right C5 foraminal narrowing.     C5-6: Shallow midline disc protrusion results in mild central canal stenosis. Disc material abuts and slightly indents the ventral aspect of the cervical cord just to the right of midline. There is mild left and moderate right-sided foraminal stenosis.     C6-7: Mild broad-based disc bulge and shallow midline disc protrusion are noted. There is resultant mild central canal  stenosis. Disc material abuts and slightly flattens the ventral aspect of the cervical cord. There is evidence of moderate bilateral foraminal stenosis.     C7-T1: Bilateral degenerative facet hypertrophy, with mild right and probable moderate left-sided foraminal stenosis.     IMPRESSION:  1. Changes of cervical degenerative disc and facet disease as described, most pronounced at the C5-6 and C6-7 levels. Please see details as noted at each individual level above.      Assessment:  DDD (degenerative disc disease), cervical    Cervical radiculopathy    Chronic pain of both shoulders    Cervical spondylosis    Other orders  -     pregabalin (LYRICA) 75 MG capsule; 1 tablet qam and 2 tablets qhs  Dispense: 90 capsule; Refill: 1  -     methocarbamoL (ROBAXIN) 750 MG Tab; Take 1 tablet (750 mg total) by mouth 4 (four) times daily as needed (muscle spasm).  Dispense: 90 tablet; Refill: 1               PLAN:  Lynn Naidu is a 56 y.o. female with chronic neck pain and bilateral shoulder pain.  History and physical examination is likely consistent with cervical disc protrusion with ventral cord contact, but without any significant central narrowing.  She has had a recent cervical epidural which unfortunately did not provide her with any meaningful improvement in pain or function.  Subacromial bursa injections have been somewhat helpful.  On last visit, started her on Lyrica 75 mg twice daily which has been beneficial.  I will increase this to 75 mg once in the morning and 150 mg at night.  I will continue muscle relaxer and inset as needed.  She will return to clinic as needed

## 2022-02-12 ENCOUNTER — OFFICE VISIT (OUTPATIENT)
Dept: URGENT CARE | Facility: CLINIC | Age: 57
End: 2022-02-12
Payer: COMMERCIAL

## 2022-02-12 VITALS
WEIGHT: 193 LBS | BODY MASS INDEX: 34.2 KG/M2 | OXYGEN SATURATION: 98 % | RESPIRATION RATE: 16 BRPM | DIASTOLIC BLOOD PRESSURE: 78 MMHG | HEIGHT: 63 IN | SYSTOLIC BLOOD PRESSURE: 119 MMHG | TEMPERATURE: 98 F | HEART RATE: 71 BPM

## 2022-02-12 DIAGNOSIS — Z20.822 ENCOUNTER FOR LABORATORY TESTING FOR COVID-19 VIRUS: ICD-10-CM

## 2022-02-12 PROCEDURE — 3074F SYST BP LT 130 MM HG: CPT | Mod: CPTII,S$GLB,, | Performed by: NURSE PRACTITIONER

## 2022-02-12 PROCEDURE — 3078F PR MOST RECENT DIASTOLIC BLOOD PRESSURE < 80 MM HG: ICD-10-PCS | Mod: CPTII,S$GLB,, | Performed by: NURSE PRACTITIONER

## 2022-02-12 PROCEDURE — U0003 INFECTIOUS AGENT DETECTION BY NUCLEIC ACID (DNA OR RNA); SEVERE ACUTE RESPIRATORY SYNDROME CORONAVIRUS 2 (SARS-COV-2) (CORONAVIRUS DISEASE [COVID-19]), AMPLIFIED PROBE TECHNIQUE, MAKING USE OF HIGH THROUGHPUT TECHNOLOGIES AS DESCRIBED BY CMS-2020-01-R: HCPCS | Performed by: NURSE PRACTITIONER

## 2022-02-12 PROCEDURE — 1160F PR REVIEW ALL MEDS BY PRESCRIBER/CLIN PHARMACIST DOCUMENTED: ICD-10-PCS | Mod: CPTII,S$GLB,, | Performed by: NURSE PRACTITIONER

## 2022-02-12 PROCEDURE — 1159F MED LIST DOCD IN RCRD: CPT | Mod: CPTII,S$GLB,, | Performed by: NURSE PRACTITIONER

## 2022-02-12 PROCEDURE — 1160F RVW MEDS BY RX/DR IN RCRD: CPT | Mod: CPTII,S$GLB,, | Performed by: NURSE PRACTITIONER

## 2022-02-12 PROCEDURE — 3008F PR BODY MASS INDEX (BMI) DOCUMENTED: ICD-10-PCS | Mod: CPTII,S$GLB,, | Performed by: NURSE PRACTITIONER

## 2022-02-12 PROCEDURE — U0005 INFEC AGEN DETEC AMPLI PROBE: HCPCS | Performed by: NURSE PRACTITIONER

## 2022-02-12 PROCEDURE — 3078F DIAST BP <80 MM HG: CPT | Mod: CPTII,S$GLB,, | Performed by: NURSE PRACTITIONER

## 2022-02-12 PROCEDURE — 99212 OFFICE O/P EST SF 10 MIN: CPT | Mod: S$GLB,,, | Performed by: NURSE PRACTITIONER

## 2022-02-12 PROCEDURE — 3074F PR MOST RECENT SYSTOLIC BLOOD PRESSURE < 130 MM HG: ICD-10-PCS | Mod: CPTII,S$GLB,, | Performed by: NURSE PRACTITIONER

## 2022-02-12 PROCEDURE — 1159F PR MEDICATION LIST DOCUMENTED IN MEDICAL RECORD: ICD-10-PCS | Mod: CPTII,S$GLB,, | Performed by: NURSE PRACTITIONER

## 2022-02-12 PROCEDURE — 3008F BODY MASS INDEX DOCD: CPT | Mod: CPTII,S$GLB,, | Performed by: NURSE PRACTITIONER

## 2022-02-12 PROCEDURE — 99212 PR OFFICE/OUTPT VISIT, EST, LEVL II, 10-19 MIN: ICD-10-PCS | Mod: S$GLB,,, | Performed by: NURSE PRACTITIONER

## 2022-02-12 NOTE — PROGRESS NOTES
"Subjective:       Patient ID: Lynn Naidu is a 56 y.o. female.    Vitals:  height is 5' 3" (1.6 m) and weight is 87.5 kg (193 lb). Her temperature is 97.9 °F (36.6 °C). Her blood pressure is 119/78 and her pulse is 71. Her respiration is 16 and oxygen saturation is 98%.     Chief Complaint: covid travel    Patient needs PCR covid test to travel. Denies symptoms.   Past Medical History:  No date: Allergy  7-17-15: Bronchitis  No date: Cervical disc displacement  No date: Edema  No date: Insomnia  No date: Plantar fasciitis    Past Surgical History:  11/01/2017: COLONOSCOPY      Comment:  Dr. Oviedo, normal, ten year recheck  11/1/2017: COLONOSCOPY; N/A      Comment:  Procedure: COLONOSCOPY;  Surgeon: Cameron Oviedo MD;                Location: [facility];  Service: Endoscopy;  Laterality:                N/A;  7/22/2019: EPIDURAL STEROID INJECTION INTO CERVICAL SPINE; N/A      Comment:  Procedure: Injection-steroid-epidural-cervical;                 Surgeon: Thomas Ivory MD;  Location: [facility];  Service:                Pain Management;  Laterality: N/A;  C7-T1  12/21/2021: EPIDURAL STEROID INJECTION INTO CERVICAL SPINE; N/A      Comment:  Procedure: Injection-steroid-epidural-cervical;                 Surgeon: Thomas Ivory MD;  Location: [facility];  Service:                Pain Management;  Laterality: N/A;  C6-7  7/23/2021: TONSILLECTOMY, ADENOIDECTOMY; Bilateral      Comment:  Procedure: TONSILLECTOMY AND ADENOIDECTOMY;  Surgeon:                Anselmo Galvan MD;  Location: [facility] OR;  Service: ENT;               Laterality: Bilateral;  No date: WISDOM TOOTH EXTRACTION    Review of patient's family history indicates:  Problem: Heart disease      Relation: Mother          Age of Onset: (Not Specified)  Problem: Stroke      Relation: Mother          Age of Onset: (Not Specified)  Problem: Cancer      Relation: Father          Age of Onset: (Not Specified)          Comment: bone ca, prostate ca   Problem: " Hypertension      Relation: Sister          Age of Onset: (Not Specified)  Problem: Diabetes      Relation: Sister          Age of Onset: (Not Specified)  Problem: Allergies      Relation: Sister          Age of Onset: (Not Specified)  Problem: Asthma      Relation: Sister          Age of Onset: (Not Specified)  Problem: Heart disease      Relation: Brother          Age of Onset: (Not Specified)  Problem: Kidney failure      Relation: Brother          Age of Onset: (Not Specified)  Problem: Hypertension      Relation: Sister          Age of Onset: (Not Specified)  Problem: Allergies      Relation: Sister          Age of Onset: (Not Specified)  Problem: Asthma      Relation: Sister          Age of Onset: (Not Specified)  Problem: Angioedema      Relation: Neg Hx          Age of Onset: (Not Specified)  Problem: Eczema      Relation: Neg Hx          Age of Onset: (Not Specified)  Problem: Immunodeficiency      Relation: Neg Hx          Age of Onset: (Not Specified)  Problem: Urticaria      Relation: Neg Hx          Age of Onset: (Not Specified)      Social History    Socioeconomic History      Marital status:     Tobacco Use      Smoking status: Never Smoker      Smokeless tobacco: Never Used    Substance and Sexual Activity      Alcohol use: No      Drug use: No      Sexual activity: Yes        Partners: Male      Current Outpatient Medications:  BIONECT 0.2 % Crea, Apply topically daily as needed. , Disp: , Rfl:   desonide (DESOWEN) 0.05 % cream, Apply topically daily as needed. , Disp: , Rfl:   diazePAM (VALIUM) 10 MG Tab, Take 10 mg by mouth every evening., Disp: , Rfl: 1  ergocalciferol (ERGOCALCIFEROL) 50,000 unit Cap, Take 1 capsule (50,000 Units total) by mouth twice a week., Disp: 24 capsule, Rfl: 3  FLUoxetine 20 MG capsule, Take 20 mg by mouth once daily., Disp: , Rfl: 2  fluticasone propionate (FLONASE) 50 mcg/actuation nasal spray, 2 sprays (100 mcg total) by Each Nostril route once daily., Disp: 48  g, Rfl: 4  furosemide (LASIX) 40 MG tablet, Take 1 tablet (40 mg total) by mouth once daily., Disp: 90 tablet, Rfl: 3  hydrOXYzine HCl (ATARAX) 25 MG tablet, Take 1 tablet (25 mg total) by mouth 3 (three) times daily as needed. (Patient taking differently: Take 25 mg by mouth once daily.), Disp: 90 tablet, Rfl: 1  L.acid/L.casei/B.bif/B.ilsa/FOS (PROBIOTIC BLEND ORAL), Take 1 capsule by mouth once daily. florify = 10 Billion, Disp: , Rfl:   linaCLOtide (LINZESS) 290 mcg Cap capsule, Take 1 capsule (290 mcg total) by mouth once daily., Disp: 90 capsule, Rfl: 3  methocarbamoL (ROBAXIN) 750 MG Tab, Take 1 tablet (750 mg total) by mouth 4 (four) times daily as needed (muscle spasm)., Disp: 90 tablet, Rfl: 1  metoprolol succinate (TOPROL-XL) 50 MG 24 hr tablet, Take 1 tablet (50 mg total) by mouth once daily., Disp: 90 tablet, Rfl: 3  nabumetone (RELAFEN) 500 MG tablet, Take 1 tablet (500 mg total) by mouth 2 (two) times daily as needed for Pain., Disp: 30 tablet, Rfl: 0  pantoprazole (PROTONIX) 40 MG tablet, Take 1 tablet (40 mg total) by mouth every morning., Disp: 90 tablet, Rfl: 3  potassium chloride SA (K-DUR,KLOR-CON) 20 MEQ tablet, Take 1 tablet by mouth once daily, Disp: 90 tablet, Rfl: 1  pregabalin (LYRICA) 75 MG capsule, 1 tablet qam and 2 tablets qhs, Disp: 90 capsule, Rfl: 1  promethazine (PHENERGAN) 25 MG tablet, Take 25 mg by mouth., Disp: , Rfl:   SULFACLEANSE 8-4 8-4 % Susp, Apply topically nightly as needed. , Disp: , Rfl:   traMADoL (ULTRAM) 50 mg tablet, Take 1 tablet (50 mg total) by mouth every 8 (eight) hours as needed for Pain., Disp: 21 tablet, Rfl: 0  triamcinolone acetonide 0.1% (KENALOG) 0.1 % cream, Apply topically daily as needed. , Disp: , Rfl:     No current facility-administered medications for this visit.  Facility-Administered Medications Ordered in Other Visits:  lactated ringers infusion, , Intravenous, Continuous, Thomas Ivory MD, Last Rate: 25 mL/hr at 12/21/21 1240, New Bag at  12/21/21 1240        Review of patient's allergies indicates:   -- No known drug allergies       Constitution: Negative.   HENT: Negative.    Respiratory: Negative.    Gastrointestinal: Negative.        Objective:      Physical Exam   Constitutional: She is oriented to person, place, and time.   HENT:   Head: Normocephalic and atraumatic.   Pulmonary/Chest: Effort normal. No respiratory distress.   Abdominal: Normal appearance.   Neurological: no focal deficit. She is alert and oriented to person, place, and time.   Psychiatric: Her behavior is normal. Mood normal.         Assessment:       1. Encounter for laboratory testing for COVID-19 virus          Plan:     COVID 19 PCR collected, result retrieval discussed.    Encounter for laboratory testing for COVID-19 virus  -     COVID-19 Routine Screening

## 2022-02-13 LAB — SARS-COV-2 RNA RESP QL NAA+PROBE: NOT DETECTED

## 2022-04-22 ENCOUNTER — OFFICE VISIT (OUTPATIENT)
Dept: URGENT CARE | Facility: CLINIC | Age: 57
End: 2022-04-22
Payer: COMMERCIAL

## 2022-04-22 VITALS
SYSTOLIC BLOOD PRESSURE: 119 MMHG | HEART RATE: 77 BPM | HEIGHT: 63 IN | TEMPERATURE: 98 F | OXYGEN SATURATION: 96 % | DIASTOLIC BLOOD PRESSURE: 82 MMHG | BODY MASS INDEX: 33.73 KG/M2 | RESPIRATION RATE: 16 BRPM | WEIGHT: 190.38 LBS

## 2022-04-22 DIAGNOSIS — Z11.52 ENCOUNTER FOR SCREENING FOR COVID-19: Primary | ICD-10-CM

## 2022-04-22 LAB
CTP QC/QA: YES
SARS-COV-2 AG RESP QL IA.RAPID: NEGATIVE

## 2022-04-22 PROCEDURE — 3079F PR MOST RECENT DIASTOLIC BLOOD PRESSURE 80-89 MM HG: ICD-10-PCS | Mod: CPTII,S$GLB,,

## 2022-04-22 PROCEDURE — 3074F PR MOST RECENT SYSTOLIC BLOOD PRESSURE < 130 MM HG: ICD-10-PCS | Mod: CPTII,S$GLB,,

## 2022-04-22 PROCEDURE — 3079F DIAST BP 80-89 MM HG: CPT | Mod: CPTII,S$GLB,,

## 2022-04-22 PROCEDURE — 1160F RVW MEDS BY RX/DR IN RCRD: CPT | Mod: CPTII,S$GLB,,

## 2022-04-22 PROCEDURE — 1160F PR REVIEW ALL MEDS BY PRESCRIBER/CLIN PHARMACIST DOCUMENTED: ICD-10-PCS | Mod: CPTII,S$GLB,,

## 2022-04-22 PROCEDURE — 3074F SYST BP LT 130 MM HG: CPT | Mod: CPTII,S$GLB,,

## 2022-04-22 PROCEDURE — 87811 SARS-COV-2 COVID19 W/OPTIC: CPT | Mod: S$GLB,,,

## 2022-04-22 PROCEDURE — 99213 PR OFFICE/OUTPT VISIT, EST, LEVL III, 20-29 MIN: ICD-10-PCS | Mod: S$GLB,,,

## 2022-04-22 PROCEDURE — 87811 SARS CORONAVIRUS 2 ANTIGEN POCT, MANUAL READ: ICD-10-PCS | Mod: S$GLB,,,

## 2022-04-22 PROCEDURE — 1159F MED LIST DOCD IN RCRD: CPT | Mod: CPTII,S$GLB,,

## 2022-04-22 PROCEDURE — 1159F PR MEDICATION LIST DOCUMENTED IN MEDICAL RECORD: ICD-10-PCS | Mod: CPTII,S$GLB,,

## 2022-04-22 PROCEDURE — 3008F PR BODY MASS INDEX (BMI) DOCUMENTED: ICD-10-PCS | Mod: CPTII,S$GLB,,

## 2022-04-22 PROCEDURE — 3008F BODY MASS INDEX DOCD: CPT | Mod: CPTII,S$GLB,,

## 2022-04-22 PROCEDURE — 99213 OFFICE O/P EST LOW 20 MIN: CPT | Mod: S$GLB,,,

## 2022-04-22 NOTE — PROGRESS NOTES
"Subjective:       Patient ID: Lynn Naidu is a 57 y.o. female.    Vitals:  height is 5' 3" (1.6 m) and weight is 86.4 kg (190 lb 6.4 oz). Her oral temperature is 97.7 °F (36.5 °C). Her blood pressure is 119/82 and her pulse is 77. Her respiration is 16 and oxygen saturation is 96%.     Chief Complaint: COVID-19 Concerns    Pt states "Needs a rapid antigen test for travel; pt denies any symptoms." Denies known exposures.       Constitution: Negative for activity change, appetite change, chills, sweating and fever.   HENT: Negative for ear pain, sinus pain, sinus pressure and sore throat.    Neck: Negative for neck pain.   Cardiovascular: Negative for chest pain.   Eyes: Negative for blurred vision.   Respiratory: Negative for chest tightness, cough and shortness of breath.    Gastrointestinal: Negative for abdominal pain.   Neurological: Negative for dizziness and history of vertigo.       Objective:      Physical Exam   Constitutional: She is oriented to person, place, and time. She appears well-developed. She is cooperative.  Non-toxic appearance. She does not appear ill. No distress.   HENT:   Head: Normocephalic and atraumatic.   Ears:   Right Ear: Hearing, tympanic membrane and ear canal normal.   Left Ear: Hearing, tympanic membrane and ear canal normal.   Nose: No mucosal edema, rhinorrhea or nasal deformity. No epistaxis. Right sinus exhibits no maxillary sinus tenderness and no frontal sinus tenderness. Left sinus exhibits no maxillary sinus tenderness and no frontal sinus tenderness.   Mouth/Throat: Uvula is midline and mucous membranes are normal. No trismus in the jaw. Normal dentition. No uvula swelling. No posterior oropharyngeal edema.   Eyes: Conjunctivae and lids are normal.   Neck: Trachea normal and phonation normal. No edema present. No erythema present. No neck rigidity present.   Cardiovascular: Normal rate, normal heart sounds and normal pulses.   Pulmonary/Chest: Effort normal and breath " sounds normal. No respiratory distress. She has no decreased breath sounds. She has no rhonchi.   Abdominal: Normal appearance.   Musculoskeletal: Normal range of motion.         General: No deformity. Normal range of motion.   Neurological: She is alert and oriented to person, place, and time.   Skin: Skin is warm, dry, intact and not diaphoretic.   Psychiatric: Her speech is normal and behavior is normal.   Nursing note and vitals reviewed.        Assessment:       No diagnosis found.      Plan:         There are no diagnoses linked to this encounter.

## 2022-04-28 ENCOUNTER — TELEPHONE (OUTPATIENT)
Dept: ORTHOPEDICS | Facility: CLINIC | Age: 57
End: 2022-04-28
Payer: COMMERCIAL

## 2022-04-28 ENCOUNTER — OFFICE VISIT (OUTPATIENT)
Dept: ORTHOPEDICS | Facility: CLINIC | Age: 57
End: 2022-04-28
Payer: COMMERCIAL

## 2022-04-28 ENCOUNTER — HOSPITAL ENCOUNTER (OUTPATIENT)
Dept: RADIOLOGY | Facility: HOSPITAL | Age: 57
Discharge: HOME | End: 2022-04-28
Attending: ORTHOPAEDIC SURGERY
Payer: COMMERCIAL

## 2022-04-28 VITALS — RESPIRATION RATE: 16 BRPM | HEIGHT: 63 IN | BODY MASS INDEX: 33.75 KG/M2 | WEIGHT: 190.5 LBS

## 2022-04-28 DIAGNOSIS — S62.102A CLOSED FRACTURE OF LEFT WRIST, INITIAL ENCOUNTER: Primary | ICD-10-CM

## 2022-04-28 DIAGNOSIS — M25.532 LEFT WRIST PAIN: Primary | ICD-10-CM

## 2022-04-28 DIAGNOSIS — M25.532 LEFT WRIST PAIN: ICD-10-CM

## 2022-04-28 DIAGNOSIS — S52.552A OTHER EXTRAARTICULAR FRACTURE OF LOWER END OF LEFT RADIUS, INITIAL ENCOUNTER FOR CLOSED FRACTURE: Primary | ICD-10-CM

## 2022-04-28 PROCEDURE — 99213 PR OFFICE/OUTPT VISIT, EST, LEVL III, 20-29 MIN: ICD-10-PCS | Mod: 57,S$GLB,, | Performed by: ORTHOPAEDIC SURGERY

## 2022-04-28 PROCEDURE — 1160F RVW MEDS BY RX/DR IN RCRD: CPT | Mod: CPTII,S$GLB,, | Performed by: ORTHOPAEDIC SURGERY

## 2022-04-28 PROCEDURE — 73110 X-RAY EXAM OF WRIST: CPT | Mod: 26,LT,, | Performed by: RADIOLOGY

## 2022-04-28 PROCEDURE — 3008F BODY MASS INDEX DOCD: CPT | Mod: CPTII,S$GLB,, | Performed by: ORTHOPAEDIC SURGERY

## 2022-04-28 PROCEDURE — 25600 PR CLOSED RX DIST RAD/ULNA FX: ICD-10-PCS | Mod: LT,S$GLB,, | Performed by: ORTHOPAEDIC SURGERY

## 2022-04-28 PROCEDURE — 73110 XR WRIST COMPLETE 3 VIEWS LEFT: ICD-10-PCS | Mod: 26,LT,, | Performed by: RADIOLOGY

## 2022-04-28 PROCEDURE — 99999 PR PBB SHADOW E&M-EST. PATIENT-LVL IV: ICD-10-PCS | Mod: PBBFAC,,, | Performed by: ORTHOPAEDIC SURGERY

## 2022-04-28 PROCEDURE — 1159F PR MEDICATION LIST DOCUMENTED IN MEDICAL RECORD: ICD-10-PCS | Mod: CPTII,S$GLB,, | Performed by: ORTHOPAEDIC SURGERY

## 2022-04-28 PROCEDURE — 73110 X-RAY EXAM OF WRIST: CPT | Mod: TC,PN,LT

## 2022-04-28 PROCEDURE — 99999 PR PBB SHADOW E&M-EST. PATIENT-LVL IV: CPT | Mod: PBBFAC,,, | Performed by: ORTHOPAEDIC SURGERY

## 2022-04-28 PROCEDURE — 1159F MED LIST DOCD IN RCRD: CPT | Mod: CPTII,S$GLB,, | Performed by: ORTHOPAEDIC SURGERY

## 2022-04-28 PROCEDURE — 99213 OFFICE O/P EST LOW 20 MIN: CPT | Mod: 57,S$GLB,, | Performed by: ORTHOPAEDIC SURGERY

## 2022-04-28 PROCEDURE — 25600 CLTX DST RDL FX/EPHYS SEP WO: CPT | Mod: LT,S$GLB,, | Performed by: ORTHOPAEDIC SURGERY

## 2022-04-28 PROCEDURE — 3008F PR BODY MASS INDEX (BMI) DOCUMENTED: ICD-10-PCS | Mod: CPTII,S$GLB,, | Performed by: ORTHOPAEDIC SURGERY

## 2022-04-28 PROCEDURE — 1160F PR REVIEW ALL MEDS BY PRESCRIBER/CLIN PHARMACIST DOCUMENTED: ICD-10-PCS | Mod: CPTII,S$GLB,, | Performed by: ORTHOPAEDIC SURGERY

## 2022-04-28 RX ORDER — HYDROCODONE BITARTRATE AND ACETAMINOPHEN 5; 325 MG/1; MG/1
1 TABLET ORAL EVERY 6 HOURS PRN
Qty: 28 TABLET | Refills: 0 | Status: SHIPPED | OUTPATIENT
Start: 2022-04-28 | End: 2022-09-01

## 2022-04-28 NOTE — TELEPHONE ENCOUNTER
----- Message from Bee Calderon sent at 4/28/2022 10:48 AM CDT -----  Regarding: pt called  Name of Who is Calling: ZIGGY CORONEL [[id number]]      What is the request in detail: pt had a fall on a cruise ship and injured her wrist. She would like to be seen today due to her going out of town this weekend. Please advise       Can the clinic reply by [facility]: No      What Number to Call Back if not in [facility]: [phone]

## 2022-04-28 NOTE — PROGRESS NOTES
CC:  57-year-old female presents for evaluation of left wrist pain.  The patient fell while she was on vacation this past week and injured the left wrist.  Date of injury was 04/26/2022.  She fell backwards and landed on her outstretched left upper extremity.  She has had pain and swelling in the left wrist since the injury.    Past Medical History:   Diagnosis Date    Allergy     Bronchitis 7-17-15    Cervical disc displacement     Edema     Insomnia     Plantar fasciitis        Past Surgical History:   Procedure Laterality Date    COLONOSCOPY  11/01/2017    Dr. Oviedo, normal, ten year recheck    COLONOSCOPY N/A 11/1/2017    Procedure: COLONOSCOPY;  Surgeon: Cameron Oviedo MD;  Location: [facility];  Service: Endoscopy;  Laterality: N/A;    EPIDURAL STEROID INJECTION INTO CERVICAL SPINE N/A 7/22/2019    Procedure: Injection-steroid-epidural-cervical;  Surgeon: Thomas Ivory MD;  Location: [facility] OR;  Service: Pain Management;  Laterality: N/A;  C7-T1    EPIDURAL STEROID INJECTION INTO CERVICAL SPINE N/A 12/21/2021    Procedure: Injection-steroid-epidural-cervical;  Surgeon: Thomas Ivory MD;  Location: [facility] OR;  Service: Pain Management;  Laterality: N/A;  C6-7    TONSILLECTOMY, ADENOIDECTOMY Bilateral 7/23/2021    Procedure: TONSILLECTOMY AND ADENOIDECTOMY;  Surgeon: Anselmo Galvan MD;  Location: [facility] OR;  Service: ENT;  Laterality: Bilateral;    WISDOM TOOTH EXTRACTION         Current Outpatient Medications on File Prior to Visit   Medication Sig Dispense Refill    BIONECT 0.2 % Crea Apply topically daily as needed.       desonide (DESOWEN) 0.05 % cream Apply topically daily as needed.       diazePAM (VALIUM) 10 MG Tab Take 10 mg by mouth every evening.  1    ergocalciferol (ERGOCALCIFEROL) 50,000 unit Cap Take 1 capsule (50,000 Units total) by mouth twice a week. 24 capsule 3    FLUoxetine 20 MG capsule Take 20 mg by mouth once daily.  2    fluticasone propionate (FLONASE) 50 mcg/actuation  nasal spray 2 sprays (100 mcg total) by Each Nostril route once daily. 48 g 4    furosemide (LASIX) 40 MG tablet Take 1 tablet (40 mg total) by mouth once daily. 90 tablet 3    hydrOXYzine HCl (ATARAX) 25 MG tablet Take 1 tablet (25 mg total) by mouth 3 (three) times daily as needed. (Patient taking differently: Take 25 mg by mouth once daily.) 90 tablet 1    L.acid/L.casei/B.bif/B.lisa/FOS (PROBIOTIC BLEND ORAL) Take 1 capsule by mouth once daily. florify = 10 Billion      linaCLOtide (LINZESS) 290 mcg Cap capsule Take 1 capsule (290 mcg total) by mouth once daily. 90 capsule 3    methocarbamoL (ROBAXIN) 750 MG Tab Take 1 tablet (750 mg total) by mouth 4 (four) times daily as needed (muscle spasm). 90 tablet 1    metoprolol succinate (TOPROL-XL) 50 MG 24 hr tablet Take 1 tablet (50 mg total) by mouth once daily. 90 tablet 3    nabumetone (RELAFEN) 500 MG tablet Take 1 tablet (500 mg total) by mouth 2 (two) times daily as needed for Pain. 30 tablet 0    pantoprazole (PROTONIX) 40 MG tablet Take 1 tablet (40 mg total) by mouth every morning. 90 tablet 3    potassium chloride SA (K-DUR,KLOR-CON) 20 MEQ tablet Take 1 tablet by mouth once daily 90 tablet 1    pregabalin (LYRICA) 75 MG capsule 1 tablet qam and 2 tablets qhs 90 capsule 1    promethazine (PHENERGAN) 25 MG tablet Take 25 mg by mouth.      SULFACLEANSE 8-4 8-4 % Susp Apply topically nightly as needed.       traMADoL (ULTRAM) 50 mg tablet Take 1 tablet (50 mg total) by mouth every 8 (eight) hours as needed for Pain. 21 tablet 0    triamcinolone acetonide 0.1% (KENALOG) 0.1 % cream Apply topically daily as needed.        Current Facility-Administered Medications on File Prior to Visit   Medication Dose Route Frequency Provider Last Rate Last Admin    lactated ringers infusion   Intravenous Continuous Thomas Ivory MD 25 mL/hr at 12/21/21 1240 New Bag at 12/21/21 1240       ROS:    Constitution: Denies chills, fever, and sweats.  HENT: Denies  headaches or blurry vision.  Cardiovascular: Denies chest pain or irregular heart beat.  Respiratory: Denies cough or shortness of breath.  Gastrointestinal: Denies abdominal pain, nausea, or vomiting.  Genitourinary:  Denies urinary incontinence, bladder and kidney issues  Musculoskeletal:  Denies muscle cramps.  Positive for left wrist pain.  Neurological: Denies dizziness or focal weakness.  Psychiatric/Behavioral: Normal mental status.  Hematologic/Lymphatic: Denies bleeding problem or easy bruising/bleeding.  Skin: Denies rash or suspicious lesions.    Physical examination     Gen - No acute distress, well nourished, well groomed   Eyes - Extraoccular motions intact, pupils equally round and reactive to light and accommodation   ENT - normocephalic, atruamtic, oropharynx clear   Neck - Supple, no abnormal masses   Cardiovascular - regular rate and rhythm   Pulmonary - clear to auscultation bilaterally, no wheezes, ronchi, or rales   Abdomen - soft, non-tender, non-distended, positive bowel sounds   Psych - The patient is alert and oriented x3 with normal mood and affect    Left Upper Extremity Examination     Skin is intact throughout   Motor is intact distally radial, median, ulnar, AIN, PIN   +2 radial and ulnar pulses   Sensation to light touch is intact distally radial, median, and ulnar   Full ROM at the DIP, PIP, and MCP joints   Wrist shows full ROM   Tenderness to palpation noted over the left distal radius.    Carpal Tunnel compression test - negative   Phalen's Test - negative  Tinel's Test - negative  Finkelstien's Test - negative    No Ecchymosis noted   Swelling noted     Triggering of fingers or thumb - negative     X-ray images were examined and personally interpreted by me.  Three views the left wrist dated 04/28/2022 show what appears to be a buckle fracture of the dorsal radial cortex.    Dx:  Nondisplaced fracture left distal radius    Plan:  We placed the patient in a well-molded Exos  splint.  Follow-up in 3 weeks with an x-ray.

## 2022-04-29 ENCOUNTER — OFFICE VISIT (OUTPATIENT)
Dept: URGENT CARE | Facility: CLINIC | Age: 57
End: 2022-04-29
Payer: COMMERCIAL

## 2022-04-29 VITALS
BODY MASS INDEX: 33.66 KG/M2 | OXYGEN SATURATION: 96 % | WEIGHT: 190 LBS | DIASTOLIC BLOOD PRESSURE: 83 MMHG | HEIGHT: 63 IN | TEMPERATURE: 98 F | RESPIRATION RATE: 16 BRPM | HEART RATE: 71 BPM | SYSTOLIC BLOOD PRESSURE: 128 MMHG

## 2022-04-29 DIAGNOSIS — Z20.822 COVID-19 VIRUS NOT DETECTED: ICD-10-CM

## 2022-04-29 DIAGNOSIS — Z20.822 ENCOUNTER FOR LABORATORY TESTING FOR COVID-19 VIRUS: Primary | ICD-10-CM

## 2022-04-29 LAB
CTP QC/QA: YES
SARS-COV-2 AG RESP QL IA.RAPID: NEGATIVE

## 2022-04-29 PROCEDURE — 3074F SYST BP LT 130 MM HG: CPT | Mod: CPTII,S$GLB,, | Performed by: NURSE PRACTITIONER

## 2022-04-29 PROCEDURE — 1159F PR MEDICATION LIST DOCUMENTED IN MEDICAL RECORD: ICD-10-PCS | Mod: CPTII,S$GLB,, | Performed by: NURSE PRACTITIONER

## 2022-04-29 PROCEDURE — 3074F PR MOST RECENT SYSTOLIC BLOOD PRESSURE < 130 MM HG: ICD-10-PCS | Mod: CPTII,S$GLB,, | Performed by: NURSE PRACTITIONER

## 2022-04-29 PROCEDURE — 99213 PR OFFICE/OUTPT VISIT, EST, LEVL III, 20-29 MIN: ICD-10-PCS | Mod: S$GLB,,, | Performed by: NURSE PRACTITIONER

## 2022-04-29 PROCEDURE — 1160F PR REVIEW ALL MEDS BY PRESCRIBER/CLIN PHARMACIST DOCUMENTED: ICD-10-PCS | Mod: CPTII,S$GLB,, | Performed by: NURSE PRACTITIONER

## 2022-04-29 PROCEDURE — 87811 SARS CORONAVIRUS 2 ANTIGEN POCT, MANUAL READ: ICD-10-PCS | Mod: QW,S$GLB,, | Performed by: NURSE PRACTITIONER

## 2022-04-29 PROCEDURE — 99213 OFFICE O/P EST LOW 20 MIN: CPT | Mod: S$GLB,,, | Performed by: NURSE PRACTITIONER

## 2022-04-29 PROCEDURE — 3079F DIAST BP 80-89 MM HG: CPT | Mod: CPTII,S$GLB,, | Performed by: NURSE PRACTITIONER

## 2022-04-29 PROCEDURE — 87811 SARS-COV-2 COVID19 W/OPTIC: CPT | Mod: QW,S$GLB,, | Performed by: NURSE PRACTITIONER

## 2022-04-29 PROCEDURE — 3008F PR BODY MASS INDEX (BMI) DOCUMENTED: ICD-10-PCS | Mod: CPTII,S$GLB,, | Performed by: NURSE PRACTITIONER

## 2022-04-29 PROCEDURE — 1159F MED LIST DOCD IN RCRD: CPT | Mod: CPTII,S$GLB,, | Performed by: NURSE PRACTITIONER

## 2022-04-29 PROCEDURE — 1160F RVW MEDS BY RX/DR IN RCRD: CPT | Mod: CPTII,S$GLB,, | Performed by: NURSE PRACTITIONER

## 2022-04-29 PROCEDURE — 3008F BODY MASS INDEX DOCD: CPT | Mod: CPTII,S$GLB,, | Performed by: NURSE PRACTITIONER

## 2022-04-29 PROCEDURE — 3079F PR MOST RECENT DIASTOLIC BLOOD PRESSURE 80-89 MM HG: ICD-10-PCS | Mod: CPTII,S$GLB,, | Performed by: NURSE PRACTITIONER

## 2022-04-29 NOTE — PROGRESS NOTES
"Subjective:       Patient ID: Lynn Naidu is a 57 y.o. female.    Vitals:  height is 5' 3" (1.6 m) and weight is 86.2 kg (190 lb). Her oral temperature is 98 °F (36.7 °C). Her blood pressure is 128/83 and her pulse is 71. Her respiration is 16 and oxygen saturation is 96%.     Chief Complaint: COVID-19 Concerns    Pt states "Needs rapid covid test for travel; pt denies any symptoms."    ROS    Objective:      Physical Exam   Constitutional: She is oriented to person, place, and time.   HENT:   Head: Normocephalic and atraumatic.   Nose: Nose normal.   Mouth/Throat: Oropharynx is clear.   Eyes: Conjunctivae are normal.   Pulmonary/Chest: Effort normal.   Abdominal: Normal appearance. Soft.   Musculoskeletal: Normal range of motion.         General: Normal range of motion.   Neurological: She is alert and oriented to person, place, and time.   Skin: Skin is warm and dry. Capillary refill takes 2 to 3 seconds.   Psychiatric: Her behavior is normal. Mood normal.   Nursing note and vitals reviewed.        Assessment:       1. Encounter for laboratory testing for COVID-19 virus    2. COVID-19 virus not detected      Covid antigen: Negative    Plan:         Encounter for laboratory testing for COVID-19 virus  -     SARS Coronavirus 2 Antigen, POCT Manual Read    COVID-19 virus not detected                   "

## 2022-05-13 ENCOUNTER — OFFICE VISIT (OUTPATIENT)
Dept: URGENT CARE | Facility: CLINIC | Age: 57
End: 2022-05-13
Payer: COMMERCIAL

## 2022-05-13 VITALS
HEART RATE: 105 BPM | TEMPERATURE: 99 F | WEIGHT: 190 LBS | BODY MASS INDEX: 33.66 KG/M2 | SYSTOLIC BLOOD PRESSURE: 131 MMHG | RESPIRATION RATE: 16 BRPM | OXYGEN SATURATION: 97 % | HEIGHT: 63 IN | DIASTOLIC BLOOD PRESSURE: 88 MMHG

## 2022-05-13 DIAGNOSIS — Z11.52 ENCOUNTER FOR SCREENING LABORATORY TESTING FOR COVID-19 VIRUS: Primary | ICD-10-CM

## 2022-05-13 LAB
CTP QC/QA: YES
SARS-COV-2 AG RESP QL IA.RAPID: NEGATIVE

## 2022-05-13 PROCEDURE — 1159F MED LIST DOCD IN RCRD: CPT | Mod: CPTII,S$GLB,, | Performed by: NURSE PRACTITIONER

## 2022-05-13 PROCEDURE — 99213 OFFICE O/P EST LOW 20 MIN: CPT | Mod: S$GLB,,, | Performed by: NURSE PRACTITIONER

## 2022-05-13 PROCEDURE — 3079F DIAST BP 80-89 MM HG: CPT | Mod: CPTII,S$GLB,, | Performed by: NURSE PRACTITIONER

## 2022-05-13 PROCEDURE — 1160F RVW MEDS BY RX/DR IN RCRD: CPT | Mod: CPTII,S$GLB,, | Performed by: NURSE PRACTITIONER

## 2022-05-13 PROCEDURE — 3079F PR MOST RECENT DIASTOLIC BLOOD PRESSURE 80-89 MM HG: ICD-10-PCS | Mod: CPTII,S$GLB,, | Performed by: NURSE PRACTITIONER

## 2022-05-13 PROCEDURE — 3075F SYST BP GE 130 - 139MM HG: CPT | Mod: CPTII,S$GLB,, | Performed by: NURSE PRACTITIONER

## 2022-05-13 PROCEDURE — 1160F PR REVIEW ALL MEDS BY PRESCRIBER/CLIN PHARMACIST DOCUMENTED: ICD-10-PCS | Mod: CPTII,S$GLB,, | Performed by: NURSE PRACTITIONER

## 2022-05-13 PROCEDURE — 3075F PR MOST RECENT SYSTOLIC BLOOD PRESS GE 130-139MM HG: ICD-10-PCS | Mod: CPTII,S$GLB,, | Performed by: NURSE PRACTITIONER

## 2022-05-13 PROCEDURE — 1159F PR MEDICATION LIST DOCUMENTED IN MEDICAL RECORD: ICD-10-PCS | Mod: CPTII,S$GLB,, | Performed by: NURSE PRACTITIONER

## 2022-05-13 PROCEDURE — 99213 PR OFFICE/OUTPT VISIT, EST, LEVL III, 20-29 MIN: ICD-10-PCS | Mod: S$GLB,,, | Performed by: NURSE PRACTITIONER

## 2022-05-13 PROCEDURE — 87811 SARS CORONAVIRUS 2 ANTIGEN POCT, MANUAL READ: ICD-10-PCS | Mod: QW,S$GLB,, | Performed by: NURSE PRACTITIONER

## 2022-05-13 PROCEDURE — 3008F PR BODY MASS INDEX (BMI) DOCUMENTED: ICD-10-PCS | Mod: CPTII,S$GLB,, | Performed by: NURSE PRACTITIONER

## 2022-05-13 PROCEDURE — 87811 SARS-COV-2 COVID19 W/OPTIC: CPT | Mod: QW,S$GLB,, | Performed by: NURSE PRACTITIONER

## 2022-05-13 PROCEDURE — 3008F BODY MASS INDEX DOCD: CPT | Mod: CPTII,S$GLB,, | Performed by: NURSE PRACTITIONER

## 2022-05-13 NOTE — PROGRESS NOTES
"Subjective:       Patient ID: Lynn Naidu is a 57 y.o. female.    Vitals:  height is 5' 3" (1.6 m) and weight is 86.2 kg (190 lb). Her oral temperature is 98.6 °F (37 °C). Her blood pressure is 131/88 and her pulse is 105. Her respiration is 16 and oxygen saturation is 97%.     Chief Complaint: COVID-19 Concerns    Pt states "Needs rapid covid test for travel; pt denies any symptoms." no known exposure.    Past Medical History:  No date: Allergy  7-17-15: Bronchitis  No date: Cervical disc displacement  No date: Edema  No date: Insomnia  No date: Plantar fasciitis    Past Surgical History:  11/01/2017: COLONOSCOPY      Comment:  Dr. Oviedo, normal, ten year recheck  11/1/2017: COLONOSCOPY; N/A      Comment:  Procedure: COLONOSCOPY;  Surgeon: Cameron Oviedo MD;                Location: [facility];  Service: Endoscopy;  Laterality:                N/A;  7/22/2019: EPIDURAL STEROID INJECTION INTO CERVICAL SPINE; N/A      Comment:  Procedure: Injection-steroid-epidural-cervical;                 Surgeon: Thomas Ivory MD;  Location: [facility] OR;  Service:                Pain Management;  Laterality: N/A;  C7-T1  12/21/2021: EPIDURAL STEROID INJECTION INTO CERVICAL SPINE; N/A      Comment:  Procedure: Injection-steroid-epidural-cervical;                 Surgeon: Thomas Ivory MD;  Location: [facility] OR;  Service:                Pain Management;  Laterality: N/A;  C6-7  7/23/2021: TONSILLECTOMY, ADENOIDECTOMY; Bilateral      Comment:  Procedure: TONSILLECTOMY AND ADENOIDECTOMY;  Surgeon:                Anselmo Galvan MD;  Location: [facility] OR;  Service: ENT;               Laterality: Bilateral;  No date: WISDOM TOOTH EXTRACTION    Review of patient's family history indicates:  Problem: Heart disease      Relation: Mother          Age of Onset: (Not Specified)  Problem: Stroke      Relation: Mother          Age of Onset: (Not Specified)  Problem: Cancer      Relation: Father          Age of Onset: (Not Specified)          " Comment: bone ca, prostate ca   Problem: Hypertension      Relation: Sister          Age of Onset: (Not Specified)  Problem: Diabetes      Relation: Sister          Age of Onset: (Not Specified)  Problem: Allergies      Relation: Sister          Age of Onset: (Not Specified)  Problem: Asthma      Relation: Sister          Age of Onset: (Not Specified)  Problem: Heart disease      Relation: Brother          Age of Onset: (Not Specified)  Problem: Kidney failure      Relation: Brother          Age of Onset: (Not Specified)  Problem: Hypertension      Relation: Sister          Age of Onset: (Not Specified)  Problem: Allergies      Relation: Sister          Age of Onset: (Not Specified)  Problem: Asthma      Relation: Sister          Age of Onset: (Not Specified)  Problem: Angioedema      Relation: Neg Hx          Age of Onset: (Not Specified)  Problem: Eczema      Relation: Neg Hx          Age of Onset: (Not Specified)  Problem: Immunodeficiency      Relation: Neg Hx          Age of Onset: (Not Specified)  Problem: Urticaria      Relation: Neg Hx          Age of Onset: (Not Specified)      Social History    Socioeconomic History      Marital status:     Tobacco Use      Smoking status: Never Smoker      Smokeless tobacco: Never Used    Substance and Sexual Activity      Alcohol use: No      Drug use: No      Sexual activity: Yes        Partners: Male      Current Outpatient Medications:  BIONECT 0.2 % Crea, Apply topically daily as needed. , Disp: , Rfl:   desonide (DESOWEN) 0.05 % cream, Apply topically daily as needed. , Disp: , Rfl:   diazePAM (VALIUM) 10 MG Tab, Take 10 mg by mouth every evening., Disp: , Rfl: 1  ergocalciferol (ERGOCALCIFEROL) 50,000 unit Cap, Take 1 capsule (50,000 Units total) by mouth twice a week., Disp: 24 capsule, Rfl: 3  FLUoxetine 20 MG capsule, Take 20 mg by mouth once daily., Disp: , Rfl: 2  fluticasone propionate (FLONASE) 50 mcg/actuation nasal spray, 2 sprays (100 mcg total) by  Each Nostril route once daily., Disp: 48 g, Rfl: 4  furosemide (LASIX) 40 MG tablet, Take 1 tablet (40 mg total) by mouth once daily., Disp: 90 tablet, Rfl: 3  HYDROcodone-acetaminophen (NORCO) 5-325 mg per tablet, Take 1 tablet by mouth every 6 (six) hours as needed for Pain., Disp: 28 tablet, Rfl: 0  hydrOXYzine HCl (ATARAX) 25 MG tablet, Take 1 tablet (25 mg total) by mouth 3 (three) times daily as needed. (Patient taking differently: Take 25 mg by mouth once daily.), Disp: 90 tablet, Rfl: 1  L.acid/L.casei/B.bif/B.lisa/FOS (PROBIOTIC BLEND ORAL), Take 1 capsule by mouth once daily. florify = 10 Billion, Disp: , Rfl:   linaCLOtide (LINZESS) 290 mcg Cap capsule, Take 1 capsule (290 mcg total) by mouth once daily., Disp: 90 capsule, Rfl: 3  methocarbamoL (ROBAXIN) 750 MG Tab, Take 1 tablet (750 mg total) by mouth 4 (four) times daily as needed (muscle spasm)., Disp: 90 tablet, Rfl: 1  metoprolol succinate (TOPROL-XL) 50 MG 24 hr tablet, Take 1 tablet (50 mg total) by mouth once daily., Disp: 90 tablet, Rfl: 3  nabumetone (RELAFEN) 500 MG tablet, Take 1 tablet (500 mg total) by mouth 2 (two) times daily as needed for Pain., Disp: 30 tablet, Rfl: 0  pantoprazole (PROTONIX) 40 MG tablet, Take 1 tablet (40 mg total) by mouth every morning., Disp: 90 tablet, Rfl: 3  potassium chloride SA (K-DUR,KLOR-CON) 20 MEQ tablet, Take 1 tablet by mouth once daily, Disp: 90 tablet, Rfl: 1  pregabalin (LYRICA) 75 MG capsule, 1 tablet qam and 2 tablets qhs, Disp: 90 capsule, Rfl: 1  promethazine (PHENERGAN) 25 MG tablet, Take 25 mg by mouth., Disp: , Rfl:   SULFACLEANSE 8-4 8-4 % Susp, Apply topically nightly as needed. , Disp: , Rfl:   traMADoL (ULTRAM) 50 mg tablet, Take 1 tablet (50 mg total) by mouth every 8 (eight) hours as needed for Pain., Disp: 21 tablet, Rfl: 0  triamcinolone acetonide 0.1% (KENALOG) 0.1 % cream, Apply topically daily as needed. , Disp: , Rfl:     No current facility-administered medications for this  visit.  Facility-Administered Medications Ordered in Other Visits:  lactated ringers infusion, , Intravenous, Continuous, Thomas Ivory MD, Last Rate: 25 mL/hr at 12/21/21 1240, New Bag at 12/21/21 1240        Review of patient's allergies indicates:   -- No known drug allergies       Constitution: Negative.   HENT: Negative.    Respiratory: Negative.    Gastrointestinal: Negative.    Neurological: Negative.        Objective:      Physical Exam   Constitutional: She is oriented to person, place, and time.   HENT:   Head: Normocephalic and atraumatic.   Cardiovascular: Normal rate.   Pulmonary/Chest: Effort normal.   Abdominal: Normal appearance.   Neurological: no focal deficit. She is alert and oriented to person, place, and time.   Psychiatric: Her behavior is normal. Mood normal.         Assessment:       1. Encounter for screening laboratory testing for COVID-19 virus          Plan:     Rapid Covid 19 test collected and resulted negative. Results discussed with patient, advised to follow up for any further concerns.         Encounter for screening laboratory testing for COVID-19 virus  -     SARS Coronavirus 2 Antigen, POCT Manual Read

## 2022-05-20 DIAGNOSIS — M25.532 LEFT WRIST PAIN: Primary | ICD-10-CM

## 2022-05-23 ENCOUNTER — HOSPITAL ENCOUNTER (OUTPATIENT)
Dept: RADIOLOGY | Facility: HOSPITAL | Age: 57
Discharge: HOME | End: 2022-05-23
Attending: ORTHOPAEDIC SURGERY
Payer: COMMERCIAL

## 2022-05-23 ENCOUNTER — OFFICE VISIT (OUTPATIENT)
Dept: ORTHOPEDICS | Facility: CLINIC | Age: 57
End: 2022-05-23
Payer: COMMERCIAL

## 2022-05-23 VITALS — WEIGHT: 190 LBS | HEIGHT: 63 IN | RESPIRATION RATE: 18 BRPM | BODY MASS INDEX: 33.66 KG/M2

## 2022-05-23 DIAGNOSIS — M25.532 LEFT WRIST PAIN: ICD-10-CM

## 2022-05-23 DIAGNOSIS — S52.552D CLOSED EXTRAARTICULAR FRACTURE OF DISTAL RADIUS, LEFT, WITH ROUTINE HEALING, SUBSEQUENT ENCOUNTER: Primary | ICD-10-CM

## 2022-05-23 PROCEDURE — 99999 PR PBB SHADOW E&M-EST. PATIENT-LVL IV: ICD-10-PCS | Mod: PBBFAC,,, | Performed by: ORTHOPAEDIC SURGERY

## 2022-05-23 PROCEDURE — 73110 X-RAY EXAM OF WRIST: CPT | Mod: 26,LT,, | Performed by: RADIOLOGY

## 2022-05-23 PROCEDURE — 99024 PR POST-OP FOLLOW-UP VISIT: ICD-10-PCS | Mod: S$GLB,,, | Performed by: ORTHOPAEDIC SURGERY

## 2022-05-23 PROCEDURE — 73110 X-RAY EXAM OF WRIST: CPT | Mod: TC,PN,LT

## 2022-05-23 PROCEDURE — 73110 XR WRIST COMPLETE 3 VIEWS LEFT: ICD-10-PCS | Mod: 26,LT,, | Performed by: RADIOLOGY

## 2022-05-23 PROCEDURE — 99999 PR PBB SHADOW E&M-EST. PATIENT-LVL IV: CPT | Mod: PBBFAC,,, | Performed by: ORTHOPAEDIC SURGERY

## 2022-05-23 PROCEDURE — 99024 POSTOP FOLLOW-UP VISIT: CPT | Mod: S$GLB,,, | Performed by: ORTHOPAEDIC SURGERY

## 2022-05-23 NOTE — PROGRESS NOTES
CC:  57-year-old female follows up with a left distal radius fracture.  She has been treated in an Exos splint.  She states her pain is better but she does still have pain.  She presents today wearing the splint and a sling.  She currently rates her pain as a 4/10.    Left Upper Extremity Examination     Skin is intact throughout   Motor is intact distally radial, median, ulnar, AIN, PIN   +2 radial and ulnar pulses   Sensation to light touch is intact distally radial, median, and ulnar   Full ROM at the DIP, PIP, and MCP joints   Wrist shows full ROM   Tenderness to palpation noted over the left distal radius    Carpal Tunnel compression test - negative   Phalen's Test - negative  Tinel's Test - negative  Finkelstien's Test - negative    No Ecchymosis noted   No Swelling noted     Triggering of fingers or thumb - negative    X-ray images were examined and personally interpreted by me.  Three views left wrist dated 05/23/2022 show a healing fracture of the left distal radius dorsal cortex.    Dx:  Healing nondisplaced fracture left distal radius    Plan:  Continue Exos splint.  Were going to refer the patient to therapy to make sure she does not get stiff.  Follow-up in 4 weeks with an x-ray.

## 2022-05-24 ENCOUNTER — CLINICAL SUPPORT (OUTPATIENT)
Dept: REHABILITATION | Facility: HOSPITAL | Age: 57
End: 2022-05-24
Attending: ORTHOPAEDIC SURGERY
Payer: COMMERCIAL

## 2022-05-24 DIAGNOSIS — S52.552D CLOSED EXTRAARTICULAR FRACTURE OF DISTAL RADIUS, LEFT, WITH ROUTINE HEALING, SUBSEQUENT ENCOUNTER: ICD-10-CM

## 2022-05-24 DIAGNOSIS — M25.632 STIFFNESS OF JOINT OF LEFT FOREARM: Primary | ICD-10-CM

## 2022-05-24 DIAGNOSIS — M25.632 STIFFNESS OF LEFT WRIST JOINT: ICD-10-CM

## 2022-05-24 DIAGNOSIS — R29.898 LEFT HAND WEAKNESS: ICD-10-CM

## 2022-05-24 DIAGNOSIS — M25.432 LEFT WRIST EFFUSION: ICD-10-CM

## 2022-05-24 DIAGNOSIS — M25.532 LEFT WRIST PAIN: ICD-10-CM

## 2022-05-24 PROCEDURE — 97110 THERAPEUTIC EXERCISES: CPT | Mod: PN

## 2022-05-24 PROCEDURE — 97165 OT EVAL LOW COMPLEX 30 MIN: CPT | Mod: PN

## 2022-05-24 NOTE — PATIENT INSTRUCTIONS
5-24-22  -wear brace all the time except for hygiene, home therapy, and to let skin breathe as needed   -use hand for very light use in splint  -use hand for no tasks when out of splint  -do below exercises 10 times, 6 x day (medium speed, medium force)      *for exercise # 2 keep digits loose

## 2022-05-24 NOTE — PLAN OF CARE
[facility] Occupational Therapy  Initial Evaluation     Date: 5/24/2022  Name: Lynn Naidu  Clinic Number: [id number]    Therapy Diagnosis:   Encounter Diagnoses   Name Primary?    Closed extraarticular fracture of distal radius, left, with routine healing, subsequent encounter     Stiffness of joint of left forearm Yes    Stiffness of left wrist joint     Left wrist pain     Left wrist effusion     Left hand weakness      Physician: Remy Roche II, MD    Physician Orders: evaluate and tx, see epic  Medical Diagnosis: left distal radius fx (closed, extraarticular)  Surgical Procedure and Date: n/a, n/a  Evaluation Date: 5/24/2022  Insurance Authorization Period Expiration: referral [id number] 5-23-22 thru 5-23-23              Plan of Care Certification Period: 5-24-22 thru 6-21-22  Date of Return to MD: see [facility]    Visit # / Visits authorized: 1 / 1 referral [id number] 5-23-22 thru 5-23-23  Time In:9  Time Out: 930  Total Billable Time: 15 minutes    Precautions:  universal, see epic, protocol if applicable  Subjective     Involved Side: left  Dominant Side: right  Date of Onset: 4-26-22  History of Current Condition/Mechanism of Injury: fell, saw referring md who has treated so far via immobilization, recently sent to OT  Imaging: see epic  Previous Therapy: none    Past Medical History/Physical Systems Review:   Lynn Naidu  has a past medical history of Allergy, Bronchitis, Cervical disc displacement, Edema, Insomnia, and Plantar fasciitis.    Lynn Naidu  has a past surgical history that includes [place] tooth extraction; Colonoscopy (11/01/2017); Colonoscopy (N/A, 11/1/2017); Epidural steroid injection into cervical spine (N/A, 7/22/2019); TONSILLECTOMY, ADENOIDECTOMY (Bilateral, 7/23/2021); and Epidural steroid injection into cervical spine (N/A, 12/21/2021).    Lynn has a current medication list which includes the following prescription(s): bionect, desonide, diazepam,  ergocalciferol, fluoxetine, fluticasone propionate, furosemide, hydrocodone-acetaminophen, hydroxyzine hcl, l.acid/l.casei/b.bif/b.lisa/fos, linaclotide, methocarbamol, metoprolol succinate, nabumetone, pantoprazole, potassium chloride sa, pregabalin, promethazine, sulfacleanse 8-4, tramadol, and triamcinolone acetonide 0.1%, and the following Facility-Administered Medications: lactated ringers.    Review of patient's allergies indicates:   Allergen Reactions    No known drug allergies         Patient's Goals for Therapy: full painless use    Pain:  Functional Pain Scale Rating 0-10:   5/10 on average  3/10 at best  8/10 at worst  Side:left  Location: diffuse wrist  Description: stab, click  Aggravating Factors: ROM  Easing Factors: rest  Occupation:  retired  Working presently: no  Duties: per job if working; typical self and home care    Functional Limitations/Social History:    Previous functional status includes: Independent with all ADLs.     Current Functional Status   Home/Living environment : lives with spouse    Limitation of Functional Status as follows: decreased motion, use, and strength with ADL   ADLs/IADLs:               See above   Leisure: not tested  pain meds: denies  nicotine: denies  caffeine: one 12 ounce soft drink daily    Objective   Posture:visually edematous wrist  Palpation:nt  Sensation:denies burning, numbness, tingling  ROM:  Prom              Right         Left   Sup/pro          90/80        60/70  Df/pf               80/90       50/30 (painless click end range pf)  Rd/ud            20/40        20/40  ra                  80             80  pa                 80             80  1st web length full         full  opp             dpc          3 cm to dpc    Digits index thru small           Full bilaterally       Edema:circumferential     Wrist right 16.2 cm left 17.1 cm  DME:exos wrist splint        CMS Impairment/Limitation/Restriction for FOTO Survey    Therapist reviewed FOTO scores  for Lynn Naidu on 5/24/2022.   FOTO documents entered into [employer] - see Media section.    Limitation Score: 50%  Category: Carrying    Current : CK = at least 40% but < 60% impaired, limited or restricted  Goal: CJ = at least 20% but < 40% impaired, limited or restricted               Treatment     Treatment Time In: 915  Treatment Time Out: 930  Total Treatment time separate from Evaluation time:15      Lynn received therapeutic exercises for 15 minutes including:  -see above          Home Exercise Program/Education:  Issued HEP (see patient instructions in EMR) . Exercises were reviewed and Lynn was able to demonstrate them prior to the end of the session.   Pt received a written copy of exercises to perform at home. Lynn demonstrated good understanding of the education provided.  Pt was advised to perform these exercises free of pain, and to stop performing them if pain occurs.    Patient/Family Education: role of OT, goals for OT, scheduling/cancellations - pt verbalized understanding. Discussed insurance limitations with patient.    Additional Education provided: likely tx progression, expectations of rehab            Painless clicking of varying location with active sup/pro, told patient to let me know if clicking persists    Assessment     Lynn Naidu is a 57 y.o. female referred to outpatient occupational therapy and presents with a medical diagnosis of see above resulting in Decreased ROM, Decreased muscle strength, Decreased functional hand use, Increased pain, Edema and Joint Stiffness and demonstrates limitations as described in the chart below. Following medical record review it is determined that pt will benefit from occupational therapy services in order to maximize pain free and/or functional use of left hand. The following goals were discussed with the patient and patient is in agreement with them as to be addressed in the treatment plan. The patient's rehab potential is good.      Anticipated barriers to occupational therapy: edema, pain, stiffness, weakness  Pt has no cultural, educational or language barriers to learning provided.    Profile and History Assessment of Occupational Performance Level of Clinical Decision Making Complexity Score   Occupational Profile:   Lynn Naidu is a 57 y.o. female who lives with their spouse and is retired Lynn Naidu has difficulty with  ADLs and IADLs as listed previously, which  affecting his/her daily functional abilities.      Comorbidities:    has a past medical history of Allergy, Bronchitis, Cervical disc displacement, Edema, Insomnia, and Plantar fasciitis.    Medical and Therapy History Review:   Brief               Performance Deficits    Physical:  Joint Mobility  Muscle Power/Strength  Edema  Pain    Cognitive:  No Deficits    Psychosocial:    No Deficits     Clinical Decision Making:  low    Assessment Process:  Problem-Focused Assessments    Modification/Need for Assistance:  Not Necessary    Intervention Selection:  Limited Treatment Options       low  Based on PMHX, co morbidities , data from assessments and functional level of assistance required with task and clinical presentation directly impacting function.           The following goals were discussed with the patient and patient is in agreement with them as to be addressed in the treatment plan.     Goals:   stg to be met in 2 weeks 1. Patient will be I with HEP 2. Patient will have 2/10 pain with light use 3. Patient will have = prom of bilateral forearm thru wrist   to enhance affected arm use with ADL      ltg to be met by d/c 1. Patient will be I with d/c HEP 2. Patient will have 1/10 pain with all use 3. Patient will have about 75% /pinch on affected side vs unaffected side to promote full functional use                                                                 4. Patient will be I with all ADL      all goals ongoing unless noted above or met today or in  past but not noted yet    Plan   Certification Period/Plan of care expiration: 5/24/2022 to 6-21-22          Outpatient Occupational Therapy  2 times weekly for 4 weeks to include the following interventions: eval and tx    Above frequency and duration in above dates may change based on patient progress and need for therapy    Dean GUNN CHT

## 2022-06-14 ENCOUNTER — CLINICAL SUPPORT (OUTPATIENT)
Dept: REHABILITATION | Facility: HOSPITAL | Age: 57
End: 2022-06-14
Attending: ORTHOPAEDIC SURGERY
Payer: COMMERCIAL

## 2022-06-14 DIAGNOSIS — R29.898 LEFT HAND WEAKNESS: ICD-10-CM

## 2022-06-14 DIAGNOSIS — M25.632 STIFFNESS OF LEFT WRIST JOINT: ICD-10-CM

## 2022-06-14 DIAGNOSIS — M25.432 LEFT WRIST EFFUSION: ICD-10-CM

## 2022-06-14 DIAGNOSIS — M25.532 LEFT WRIST PAIN: ICD-10-CM

## 2022-06-14 DIAGNOSIS — M25.632 STIFFNESS OF JOINT OF LEFT FOREARM: Primary | ICD-10-CM

## 2022-06-14 PROCEDURE — 97110 THERAPEUTIC EXERCISES: CPT | Mod: PN

## 2022-06-14 NOTE — PROGRESS NOTES
Occupational Therapy Daily Treatment Note     Date: 6/14/2022  Name: Lynn Naidu  Clinic Number: [id number]    Therapy Diagnosis:   Encounter Diagnoses   Name Primary?    Stiffness of joint of left forearm Yes    Stiffness of left wrist joint     Left wrist pain     Left wrist effusion     Left hand weakness      Physician: Remy Roche II, MD    Physician Orders: evaluate and tx, see epic  Medical Diagnosis: left distal radius fx (closed, extraarticular)  Surgical Procedure and Date: n/a, n/a  Evaluation Date: 5/24/2022  Insurance Authorization Period Expiration: referral [id number] 5-23-22 thru 12-31-22  Plan of Care Certification Period: 5-24-22 thru 6-21-22  Date of Return to MD: see epic     Visit # / Visits authorized: 1 / 20 referral [id number] 5-23-22 thru 12-31-22    Time In:9  Time Out: 10  Total Billable Time: 60 minutes    Precautions:  universal, see epic, protocol if applicable    Subjective     Pt reports: intermittent clicking which is slightly painful elicited with active sup/pro and she can't localize the specific spot in wrist that pops  she is compliant with home exercise program.  Response to previous treatment:good  Functional change: light use slowly improving    Pain: 0/10 rest; 4/10 light use  Side:left  Location: diffuse      Wrist ache and pop noted above  Objective       Timed units:  4 therapeutic exercise                            Time:9-10      Measurements:     Prom                 Right      Left   Sup/pro            90/80     70/80  Df/pf                 80/90     58/44  Rd/ud               20/40     20/40  Thumb opp     dpc        3 cm to base of sf        Fluido: n/a    U/s:n/a      UBE: n/a    Scar massage: n/a    Stretches as tolerated-pain free: sup, df, pf, thumb opp      Manual: n/a    ROM: prom df/pf    PRE: n/a      HEP: see above and/or below    Home Exercises and Education Provided     Education provided:     Explained basic concept of creep, tissue  reorientation, collagen reorganization, etc. associated with low load prolonged stretching as well as counterproductive consequences of painful and forceful stretching (increased edema, tissue trauma, ligamentous shortening, etc.)      Issued left medium isotoner therapeutic edema glove; explained purpose, use, and precautions; explained to follow cleaning instructions on tag in glove    Explained ortho is the one to formally say when splint can be d/cd and PRE can begin    progress towards goals   likely tx progression  rationale of rehab interventions    Written Home Exercises/Information Provided: yes.        previously issued exercises and/or other issued home therapy instructions were reviewed if still part of current tx plan as well as any issued today and Lynn was able to demonstrate them prior to the end of the session.  Lynn demonstrated good understanding of the HEP provided.     See EMR under patient instructions and/or media for HEP issued today or in past    Assessment       Resolving tightness essential to promote proper mechanics        Pt would continue to benefit from skilled OT in order to facilitate strong, painless, and full use.    Lynn is progressing well towards her goals and there are no updates to goals at this time unless goals noted below are different than goals on previous notes or evaluation. Pt prognosis is good  Pt will continue to benefit from skilled outpatient occupational therapy to address the deficits listed in the problem list on initial evaluation to provide pt/family education and to maximize pt's level of independence in the home and community environment.     Anticipated barriers to occupational therapy: edema, pain, stiffness, weakness    Pt's spiritual, cultural and educational needs considered and pt agreeable to plan of care and goals.    Goals:  stg to be met in 2 weeks 1. Patient will be I with HEP 2. Patient will have 2/10 pain with light use 3. Patient will have  = prom of bilateral forearm thru wrist   to enhance affected arm use with ADL        ltg to be met by d/c 1. Patient will be I with d/c HEP 2. Patient will have 1/10 pain with all use 3. Patient will have about 75% /pinch on affected side vs unaffected side to promote full functional use                                                                 4. Patient will be I with all ADL       all goals ongoing unless noted above or met today or in past but not noted yet              Any goals met today ?  (if any met see above)    Updates/Grading for next session: prn    Plan: evaluate and tx    Dean GUNN, ASHLEY

## 2022-06-15 DIAGNOSIS — S52.552D CLOSED EXTRAARTICULAR FRACTURE OF DISTAL RADIUS, LEFT, WITH ROUTINE HEALING, SUBSEQUENT ENCOUNTER: Primary | ICD-10-CM

## 2022-06-20 ENCOUNTER — OFFICE VISIT (OUTPATIENT)
Dept: ORTHOPEDICS | Facility: CLINIC | Age: 57
End: 2022-06-20
Payer: COMMERCIAL

## 2022-06-20 ENCOUNTER — HOSPITAL ENCOUNTER (OUTPATIENT)
Dept: RADIOLOGY | Facility: HOSPITAL | Age: 57
Discharge: HOME | End: 2022-06-20
Attending: ORTHOPAEDIC SURGERY
Payer: COMMERCIAL

## 2022-06-20 VITALS — HEIGHT: 63 IN | WEIGHT: 190 LBS | BODY MASS INDEX: 33.66 KG/M2

## 2022-06-20 DIAGNOSIS — S52.552D CLOSED EXTRAARTICULAR FRACTURE OF DISTAL RADIUS, LEFT, WITH ROUTINE HEALING, SUBSEQUENT ENCOUNTER: ICD-10-CM

## 2022-06-20 DIAGNOSIS — S52.552D CLOSED EXTRAARTICULAR FRACTURE OF DISTAL RADIUS, LEFT, WITH ROUTINE HEALING, SUBSEQUENT ENCOUNTER: Primary | ICD-10-CM

## 2022-06-20 PROCEDURE — 73110 X-RAY EXAM OF WRIST: CPT | Mod: TC,PN,LT

## 2022-06-20 PROCEDURE — 1160F RVW MEDS BY RX/DR IN RCRD: CPT | Mod: CPTII,S$GLB,, | Performed by: ORTHOPAEDIC SURGERY

## 2022-06-20 PROCEDURE — 73110 X-RAY EXAM OF WRIST: CPT | Mod: 26,LT,, | Performed by: RADIOLOGY

## 2022-06-20 PROCEDURE — 1160F PR REVIEW ALL MEDS BY PRESCRIBER/CLIN PHARMACIST DOCUMENTED: ICD-10-PCS | Mod: CPTII,S$GLB,, | Performed by: ORTHOPAEDIC SURGERY

## 2022-06-20 PROCEDURE — 3008F PR BODY MASS INDEX (BMI) DOCUMENTED: ICD-10-PCS | Mod: CPTII,S$GLB,, | Performed by: ORTHOPAEDIC SURGERY

## 2022-06-20 PROCEDURE — 99999 PR PBB SHADOW E&M-EST. PATIENT-LVL IV: CPT | Mod: PBBFAC,,, | Performed by: ORTHOPAEDIC SURGERY

## 2022-06-20 PROCEDURE — 1159F MED LIST DOCD IN RCRD: CPT | Mod: CPTII,S$GLB,, | Performed by: ORTHOPAEDIC SURGERY

## 2022-06-20 PROCEDURE — 99024 PR POST-OP FOLLOW-UP VISIT: ICD-10-PCS | Mod: S$GLB,,, | Performed by: ORTHOPAEDIC SURGERY

## 2022-06-20 PROCEDURE — 99024 POSTOP FOLLOW-UP VISIT: CPT | Mod: S$GLB,,, | Performed by: ORTHOPAEDIC SURGERY

## 2022-06-20 PROCEDURE — 73110 XR WRIST COMPLETE 3 VIEWS LEFT: ICD-10-PCS | Mod: 26,LT,, | Performed by: RADIOLOGY

## 2022-06-20 PROCEDURE — 99999 PR PBB SHADOW E&M-EST. PATIENT-LVL IV: ICD-10-PCS | Mod: PBBFAC,,, | Performed by: ORTHOPAEDIC SURGERY

## 2022-06-20 PROCEDURE — 3008F BODY MASS INDEX DOCD: CPT | Mod: CPTII,S$GLB,, | Performed by: ORTHOPAEDIC SURGERY

## 2022-06-20 PROCEDURE — 1159F PR MEDICATION LIST DOCUMENTED IN MEDICAL RECORD: ICD-10-PCS | Mod: CPTII,S$GLB,, | Performed by: ORTHOPAEDIC SURGERY

## 2022-06-20 RX ORDER — METHYLPREDNISOLONE 4 MG/1
TABLET ORAL
COMMUNITY
Start: 2022-06-13

## 2022-06-20 NOTE — PROGRESS NOTES
CC:  57-year-old female follows up with a closed nondisplaced fracture of the left distal radius.  Overall she is doing well.  She is wearing an Exos splint.  She currently rates pain as a 3/10.  She just started therapy due to some insurance delays but is participating in therapy once a week.  She does feel like her symptoms are slowly improving.    Left Upper Extremity Examination     Skin is intact throughout   Motor is intact distally radial, median, ulnar, AIN, PIN   +2 radial and ulnar pulses   Sensation to light touch is intact distally radial, median, and ulnar   Full ROM at the DIP, PIP, and MCP joints   Wrist shows full ROM   Tenderness to palpation noted over the distal radius    Carpal Tunnel compression test - negative   Phalen's Test - negative  Tinel's Test - negative  Finkelstien's Test - negative    No Ecchymosis noted   No Swelling noted     Triggering of fingers or thumb - negative     X-ray images were examined and personally interpreted by me.  Three views left wrist dated 06/20/2022 show a healing nondisplaced fracture of left distal radius    Dx:  Healing nondisplaced fracture left distal radius    Plan:  Continue the Exos and physical therapy.  Follow-up in 6 weeks for re-evaluation.

## 2022-06-21 ENCOUNTER — CLINICAL SUPPORT (OUTPATIENT)
Dept: REHABILITATION | Facility: HOSPITAL | Age: 57
End: 2022-06-21
Attending: ORTHOPAEDIC SURGERY
Payer: COMMERCIAL

## 2022-06-21 DIAGNOSIS — M25.632 STIFFNESS OF JOINT OF LEFT FOREARM: Primary | ICD-10-CM

## 2022-06-21 DIAGNOSIS — M25.632 STIFFNESS OF LEFT WRIST JOINT: ICD-10-CM

## 2022-06-21 DIAGNOSIS — M25.432 LEFT WRIST EFFUSION: ICD-10-CM

## 2022-06-21 DIAGNOSIS — M25.532 LEFT WRIST PAIN: ICD-10-CM

## 2022-06-21 DIAGNOSIS — R29.898 LEFT HAND WEAKNESS: ICD-10-CM

## 2022-06-21 PROCEDURE — 97110 THERAPEUTIC EXERCISES: CPT | Mod: PN

## 2022-06-21 PROCEDURE — 97140 MANUAL THERAPY 1/> REGIONS: CPT | Mod: PN

## 2022-06-21 NOTE — PROGRESS NOTES
Occupational Therapy Daily Treatment Note     Date: 6/21/2022  Name: Lynn Naidu  Clinic Number: [id number]    Therapy Diagnosis:   Encounter Diagnoses   Name Primary?    Stiffness of joint of left forearm Yes    Stiffness of left wrist joint     Left wrist pain     Left wrist effusion     Left hand weakness      Physician: Remy Roche II, MD    Physician Orders: evaluate and tx, see epic  Medical Diagnosis: left distal radius fx (closed, extraarticular)  Surgical Procedure and Date: n/a, n/a  Evaluation Date: 5/24/2022  Insurance Authorization Period Expiration: referral [id number] 5-23-22 thru 12-31-22  Plan of Care Certification Period: 5-24-22 thru 6-21-22  Date of Return to MD: see epic     Visit # / Visits authorized: 2 / 20 referral [id number] 5-23-22 thru 12-31-22    Time In:9  Time Out: 10  Total Billable Time: 60 minutes    Precautions:  universal, see epic, protocol if applicable    Subjective     Pt reports: saw ortho, I told her I sent a message to ortho to see if we can start PRE since it does not say we can or can't in last ortho note  she is compliant with home exercise program.  Response to previous treatment:good  Functional change: light use slowly improving    Pain: 0/10 rest; 4/10 light use  Side:left  Location: diffuse      Wrist ache and pop noted above  Objective       Timed units:  2 manual                                                Time:9-930  2 therapeutic exercise                            Time:930-10      Measurements:     n/a        Fluido: n/a    U/s:n/a      UBE: n/a    Scar massage: n/a    Stretches as tolerated-pain free: sup, df, pf, thumb opp      Manual:     rc txn open pack  rc txn for ext: prepositioned ext, ext ud, ext ud pro  rc txn prepositioned flex, flex ud, flex ud sup      ROM: prom df/pf    PRE: n/a      HEP: see above and/or below    Home Exercises and Education Provided     Education provided:     See above plus reviewed ways to position arms to relax  with HEP    progress towards goals   likely tx progression  rationale of rehab interventions    Written Home Exercises/Information Provided: yes        previously issued exercises and/or other issued home therapy instructions were reviewed if still part of current tx plan as well as any issued today and Lynn was able to demonstrate them prior to the end of the session.  Lynn demonstrated good understanding of the HEP provided.     See EMR under patient instructions and/or media for HEP issued today or in past    Assessment       PRE may help to improve functional use of hand        Pt would continue to benefit from skilled OT in order to facilitate strong, painless, and full use.    Lynn is progressing well towards her goals and there are no updates to goals at this time unless goals noted below are different than goals on previous notes or evaluation. Pt prognosis is good  Pt will continue to benefit from skilled outpatient occupational therapy to address the deficits listed in the problem list on initial evaluation to provide pt/family education and to maximize pt's level of independence in the home and community environment.     Anticipated barriers to occupational therapy: edema, pain, stiffness, weakness    Pt's spiritual, cultural and educational needs considered and pt agreeable to plan of care and goals.    Goals:  stg to be met in 2 weeks 1. Patient will be I with HEP 2. Patient will have 2/10 pain with light use 3. Patient will have = prom of bilateral forearm thru wrist   to enhance affected arm use with ADL        ltg to be met by d/c 1. Patient will be I with d/c HEP 2. Patient will have 1/10 pain with all use 3. Patient will have about 75% /pinch on affected side vs unaffected side to promote full functional use                                                                 4. Patient will be I with all ADL       all goals ongoing unless noted above or met today or in past but not noted  yet              Any goals met today ?  (if any met see above)    Updates/Grading for next session: prn    Plan: evaluate and tx    Dean GUNN CHT

## 2022-07-12 ENCOUNTER — CLINICAL SUPPORT (OUTPATIENT)
Dept: REHABILITATION | Facility: HOSPITAL | Age: 57
End: 2022-07-12
Attending: ORTHOPAEDIC SURGERY
Payer: COMMERCIAL

## 2022-07-12 DIAGNOSIS — M25.532 LEFT WRIST PAIN: ICD-10-CM

## 2022-07-12 DIAGNOSIS — M25.632 STIFFNESS OF LEFT WRIST JOINT: ICD-10-CM

## 2022-07-12 DIAGNOSIS — R29.898 LEFT HAND WEAKNESS: ICD-10-CM

## 2022-07-12 DIAGNOSIS — M25.632 STIFFNESS OF JOINT OF LEFT FOREARM: Primary | ICD-10-CM

## 2022-07-12 DIAGNOSIS — M25.432 LEFT WRIST EFFUSION: ICD-10-CM

## 2022-07-12 PROCEDURE — 97110 THERAPEUTIC EXERCISES: CPT | Mod: PN

## 2022-07-12 NOTE — PROGRESS NOTES
Occupational Therapy Daily Treatment Note     Date: 7/12/2022  Name: Lynn Naidu  Clinic Number: [id number]    Therapy Diagnosis:   Encounter Diagnoses   Name Primary?    Stiffness of joint of left forearm Yes    Stiffness of left wrist joint     Left wrist pain     Left wrist effusion     Left hand weakness      Physician: Remy Roche II, MD    Physician Orders: evaluate and tx, see epic  Medical Diagnosis: left distal radius fx (closed, extraarticular)  Surgical Procedure and Date: n/a, n/a  Evaluation Date: 5/24/2022  Insurance Authorization Period Expiration: referral [id number] 5-23-22 thru 12-31-22  Plan of Care Certification Period: 7-12-22 thru 8-9-22  Date of Return to MD: see epic     Visit # / Visits authorized: 3 / 20 referral [id number] 5-23-22 thru 12-31-22    Time In:9  Time Out: 10  Total Billable Time: 60 minutes    Precautions:  universal, see epic, protocol if applicable    Subjective     Pt reports: an understanding in basket message from referral source since last OT visit clears PRE, understand to ask ortho at next md visit if any activity restrictions apply  she is compliant with home exercise program.  Response to previous treatment:good  Functional change: light use slowly improving day to day    Pain: 0/10 rest; 4/10 light use  Side:left  Location: diffuse      Wrist ache, brief shooting  Objective     untimed units:  fluidotherapy                                          Time:9-915    Timed units:    3 therapeutic exercise                            Time:915-10      Measurements:                               II                III          key            3jaw                   tip  right             45#              25       10                9                     9  left               5                  10       10                2                     4      Fluido: 15 min.    U/s:n/a      UBE: n/a    Scar massage: n/a    Stretches as tolerated-pain free: n/a      Manual:      n/a      ROM: prom df/pf    PRE:     Light grippers x 10  Tan putty 30 each: squeeze, cone, key, 3 jaw, tip, small splint stick pull/push  Yellow putty small splint stick pushdowns 3 pancakes      HEP: see above and/or below    Home Exercises and Education Provided     Education provided:     See above   progress towards goals   likely tx progression  rationale of rehab interventions    Written Home Exercises/Information Provided: no        previously issued exercises and/or other issued home therapy instructions were reviewed if still part of current tx plan as well as any issued today and Lynn was able to demonstrate them prior to the end of the session.  Lynn demonstrated good understanding of the HEP provided.     See EMR under patient instructions and/or media for HEP issued today or in past    Assessment       May benefit from prayer and fist forward stretches        Pt would continue to benefit from skilled OT in order to facilitate strong, painless, and full use.    Lynn is progressing well towards her goals and there are no updates to goals at this time unless goals noted below are different than goals on previous notes or evaluation. Pt prognosis is good  Pt will continue to benefit from skilled outpatient occupational therapy to address the deficits listed in the problem list on initial evaluation to provide pt/family education and to maximize pt's level of independence in the home and community environment.     Anticipated barriers to occupational therapy: edema, pain, stiffness, weakness    Pt's spiritual, cultural and educational needs considered and pt agreeable to plan of care and goals.    Goals:  stg to be met in 2 weeks 1. Patient will be I with HEP 2. Patient will have 2/10 pain with light use 3. Patient will have = prom of bilateral forearm thru wrist   to enhance affected arm use with ADL        ltg to be met by d/c 1. Patient will be I with d/c HEP 2. Patient will have 1/10 pain with  all use 3. Patient will have about 75% /pinch on affected side vs unaffected side to promote full functional use                                                                 4. Patient will be I with all ADL       all goals ongoing unless noted above or met today or in past but not noted yet              Any goals met today ?  (if any met see above)    Updates/Grading for next session: prn    Plan: evaluate and tx    Dean GUNN CHT

## 2022-07-12 NOTE — PLAN OF CARE
Outpatient Therapy Updated Plan of Care     Visit Date: 7/12/2022  Name: Lynn Naidu  Clinic Number: [id number]    Therapy Diagnosis:   Encounter Diagnoses   Name Primary?    Stiffness of joint of left forearm Yes    Stiffness of left wrist joint     Left wrist pain     Left wrist effusion     Left hand weakness      Physician: Remy Roche II, MD    Physician Orders: evaluate and tx, see epic  Medical Diagnosis: see evaluation  Evaluation Date: 5-24-22    Total Visits Received: 4  Cancelled Visits: see epic  No Show Visits: see epic    Current Certification Period 5-24-22 to 6-21-22  Precautions:  universal, see epic  Visits from Evaluation Date:  3  Functional Level Prior to Evaluation:  Decreased rom, use, and strength; pre symptom onset had no deficits with functional use    Subjective     Update: pleased with progress    Objective     Update: see prior notes for measurements    Assessment     Update: wrist tightness persists despite patient stating she is doing HEP, she understands since doctor, per last clinic note, wants splint to be used still (which she says she is) the tightness is to be expected    Previous Short Term Goals Status:   see last note  New Short Term Goals Status:   n/a  Long Term Goal Status:   continue per initial plan of care.  Continue any LTGs added in notes post initial eval  Reasons for Recertification of Therapy:   continued rehab needed to progress tx to maximize functional use and upgrade HEP prn    Plan     Updated Certification Period: 7/12/2022 to 8-9-22  Recommended Treatment Plan: 2 times per week for 4 weeks: evaluate and tx      Dean Fung OT/CHT  7/12/2022    frequency per week may change based on patient progress and need for therapy      I CERTIFY THE NEED FOR THESE SERVICES FURNISHED UNDER THIS PLAN OF TREATMENT AND WHILE UNDER MY CARE    Physician's comments:        Physician's Signature: ___________________________________________________

## 2022-07-19 ENCOUNTER — CLINICAL SUPPORT (OUTPATIENT)
Dept: REHABILITATION | Facility: HOSPITAL | Age: 57
End: 2022-07-19
Attending: ORTHOPAEDIC SURGERY
Payer: COMMERCIAL

## 2022-07-19 DIAGNOSIS — M25.532 LEFT WRIST PAIN: ICD-10-CM

## 2022-07-19 DIAGNOSIS — M25.632 STIFFNESS OF LEFT WRIST JOINT: ICD-10-CM

## 2022-07-19 DIAGNOSIS — M25.432 LEFT WRIST EFFUSION: ICD-10-CM

## 2022-07-19 DIAGNOSIS — M25.632 STIFFNESS OF JOINT OF LEFT FOREARM: Primary | ICD-10-CM

## 2022-07-19 DIAGNOSIS — R29.898 LEFT HAND WEAKNESS: ICD-10-CM

## 2022-07-19 PROCEDURE — 97110 THERAPEUTIC EXERCISES: CPT | Mod: PN

## 2022-07-19 PROCEDURE — 97022 WHIRLPOOL THERAPY: CPT | Mod: PN

## 2022-07-19 NOTE — PROGRESS NOTES
Occupational Therapy Daily Treatment Note     Date: 7/19/2022  Name: Lynn Naidu  Clinic Number: [id number]    Therapy Diagnosis:   Encounter Diagnoses   Name Primary?    Stiffness of joint of left forearm Yes    Stiffness of left wrist joint     Left wrist pain     Left wrist effusion     Left hand weakness      Physician: Remy Roche II, MD    Physician Orders: evaluate and tx, see epic  Medical Diagnosis: left distal radius fx (closed, extraarticular)  Surgical Procedure and Date: n/a, n/a  Evaluation Date: 5/24/2022  Insurance Authorization Period Expiration: referral [id number] 5-23-22 thru 12-31-22  Plan of Care Certification Period: 7-12-22 thru 8-9-22  Date of Return to MD: see epic     Visit # / Visits authorized: 4 / 20 referral [id number] 5-23-22 thru 12-31-22    Time In:9  Time Out: 10  Total Billable Time: 60 minutes    Precautions:  universal, see epic, protocol if applicable    Subjective     Pt reports: intermittent click in wrist that is mildly painful sometimes associated sometimes with thumb arom and sometimes with wrist arom; understands if it becomes painful every time it happens to contact referring doctor  she is compliant with home exercise program.  Response to previous treatment:good  Functional change: light use slowly improving day to day    Pain: 0/10 rest; 4/10 light use  Side:left  Location: diffuse      Wrist ache, brief shooting  Objective     untimed units:  fluidotherapy                                          Time:9-915    Timed units:    3 therapeutic exercise                            Time:915-10      Measurements:     Prom                  Right     Left   Sup/pro             90/80    90/80  Df/pf                 80/90     60/50  Rd/ud              20/40      20/30                         Fluido: 15 min.    U/s:n/a      UBE: level 1 x 10 min.    Scar massage: n/a    Stretches as tolerated-pain free: df, pf, prayer, fist forward      Manual:     n/a      ROM: prom  df/pf    PRE:     n/a    HEP: see above and/or below    Home Exercises and Education Provided     Education provided:   See above    progress towards goals   likely tx progression  rationale of rehab interventions    Written Home Exercises/Information Provided: yes        previously issued exercises and/or other issued home therapy instructions were reviewed if still part of current tx plan as well as any issued today and Lynn was able to demonstrate them prior to the end of the session.  Lynn demonstrated good understanding of the HEP provided.     See EMR under patient instructions and/or media for HEP issued today or in past    Assessment       Considering she is still wearing splint mobility is not poor        Pt would continue to benefit from skilled OT in order to facilitate strong, painless, and full use.    Lynn is progressing well towards her goals and there are no updates to goals at this time unless goals noted below are different than goals on previous notes or evaluation. Pt prognosis is good  Pt will continue to benefit from skilled outpatient occupational therapy to address the deficits listed in the problem list on initial evaluation to provide pt/family education and to maximize pt's level of independence in the home and community environment.     Anticipated barriers to occupational therapy: edema, pain, stiffness, weakness    Pt's spiritual, cultural and educational needs considered and pt agreeable to plan of care and goals.    Goals:  stg to be met in 2 weeks 1. Patient will be I with HEP 2. Patient will have 2/10 pain with light use 3. Patient will have = prom of bilateral forearm thru wrist   to enhance affected arm use with ADL        ltg to be met by d/c 1. Patient will be I with d/c HEP 2. Patient will have 1/10 pain with all use 3. Patient will have about 75% /pinch on affected side vs unaffected side to promote full functional use                                                                  4. Patient will be I with all ADL       all goals ongoing unless noted above or met today or in past but not noted yet              Any goals met today ?  (if any met see above)    Updates/Grading for next session: prn    Plan: evaluate and tx    Dean GUNN CHT

## 2022-07-27 ENCOUNTER — TELEPHONE (OUTPATIENT)
Dept: ORTHOPEDICS | Facility: CLINIC | Age: 57
End: 2022-07-27
Payer: COMMERCIAL

## 2022-07-27 NOTE — TELEPHONE ENCOUNTER
Patient is still having some swelling and shooting pains. States it is tender to the touch. Scheduled her an apt to come discuss with

## 2022-07-27 NOTE — TELEPHONE ENCOUNTER
----- Message from Pradip Ch MA sent at 7/27/2022  1:00 PM CDT -----  Contact: patient  Patient called in and stated she would like a call back regarding some pain she is still having in her left hand.    Call back number is [phone]

## 2022-08-01 ENCOUNTER — HOSPITAL ENCOUNTER (OUTPATIENT)
Dept: RADIOLOGY | Facility: HOSPITAL | Age: 57
Discharge: HOME | End: 2022-08-01
Attending: ORTHOPAEDIC SURGERY
Payer: COMMERCIAL

## 2022-08-01 ENCOUNTER — OFFICE VISIT (OUTPATIENT)
Dept: ORTHOPEDICS | Facility: CLINIC | Age: 57
End: 2022-08-01
Payer: COMMERCIAL

## 2022-08-01 VITALS — WEIGHT: 190 LBS | HEIGHT: 63 IN | BODY MASS INDEX: 33.66 KG/M2

## 2022-08-01 DIAGNOSIS — G90.512 COMPLEX REGIONAL PAIN SYNDROME TYPE 1 OF LEFT UPPER EXTREMITY: Primary | ICD-10-CM

## 2022-08-01 DIAGNOSIS — S52.552D CLOSED EXTRAARTICULAR FRACTURE OF DISTAL RADIUS, LEFT, WITH ROUTINE HEALING, SUBSEQUENT ENCOUNTER: ICD-10-CM

## 2022-08-01 DIAGNOSIS — S52.552D CLOSED EXTRAARTICULAR FRACTURE OF DISTAL RADIUS, LEFT, WITH ROUTINE HEALING, SUBSEQUENT ENCOUNTER: Primary | ICD-10-CM

## 2022-08-01 PROCEDURE — 73110 X-RAY EXAM OF WRIST: CPT | Mod: 26,LT,, | Performed by: RADIOLOGY

## 2022-08-01 PROCEDURE — 1159F MED LIST DOCD IN RCRD: CPT | Mod: CPTII,S$GLB,, | Performed by: ORTHOPAEDIC SURGERY

## 2022-08-01 PROCEDURE — 3008F PR BODY MASS INDEX (BMI) DOCUMENTED: ICD-10-PCS | Mod: CPTII,S$GLB,, | Performed by: ORTHOPAEDIC SURGERY

## 2022-08-01 PROCEDURE — 1159F PR MEDICATION LIST DOCUMENTED IN MEDICAL RECORD: ICD-10-PCS | Mod: CPTII,S$GLB,, | Performed by: ORTHOPAEDIC SURGERY

## 2022-08-01 PROCEDURE — 73110 X-RAY EXAM OF WRIST: CPT | Mod: TC,PN,LT

## 2022-08-01 PROCEDURE — 99213 OFFICE O/P EST LOW 20 MIN: CPT | Mod: S$GLB,,, | Performed by: ORTHOPAEDIC SURGERY

## 2022-08-01 PROCEDURE — 1160F PR REVIEW ALL MEDS BY PRESCRIBER/CLIN PHARMACIST DOCUMENTED: ICD-10-PCS | Mod: CPTII,S$GLB,, | Performed by: ORTHOPAEDIC SURGERY

## 2022-08-01 PROCEDURE — 1160F RVW MEDS BY RX/DR IN RCRD: CPT | Mod: CPTII,S$GLB,, | Performed by: ORTHOPAEDIC SURGERY

## 2022-08-01 PROCEDURE — 99999 PR PBB SHADOW E&M-EST. PATIENT-LVL III: CPT | Mod: PBBFAC,,, | Performed by: ORTHOPAEDIC SURGERY

## 2022-08-01 PROCEDURE — 99213 PR OFFICE/OUTPT VISIT, EST, LEVL III, 20-29 MIN: ICD-10-PCS | Mod: S$GLB,,, | Performed by: ORTHOPAEDIC SURGERY

## 2022-08-01 PROCEDURE — 73110 XR WRIST COMPLETE 3 VIEWS LEFT: ICD-10-PCS | Mod: 26,LT,, | Performed by: RADIOLOGY

## 2022-08-01 PROCEDURE — 99999 PR PBB SHADOW E&M-EST. PATIENT-LVL III: ICD-10-PCS | Mod: PBBFAC,,, | Performed by: ORTHOPAEDIC SURGERY

## 2022-08-01 PROCEDURE — 3008F BODY MASS INDEX DOCD: CPT | Mod: CPTII,S$GLB,, | Performed by: ORTHOPAEDIC SURGERY

## 2022-08-01 NOTE — PROGRESS NOTES
CC:  57-year-old female follows up with a left distal radius fracture.  She fracture of the left distal radius back in April, about 4 months ago.  She continues to complain of pain and swelling in the left wrist.  She complains of pain even with light touch.    Left Upper Extremity Examination     Skin is intact throughout, patient has pain with even light touch to the skin  Motor is intact distally radial, median, ulnar, AIN, PIN   +2 radial and ulnar pulses   Sensation to light touch is intact distally radial, median, and ulnar   Full ROM at the DIP, PIP, and MCP joints   Wrist shows decreased ROM   Tenderness to palpation noted over the distal radius    Carpal Tunnel compression test - negative   Phalen's Test - negative  Tinel's Test - negative  Finkelstien's Test - negative    No Ecchymosis noted   Swelling of the wrist noted     Triggering of fingers or thumb - negative    X-ray images were examined and personally interpreted by me.  Three views left wrist dated 08/01/2022 show a healed distal radius fracture.  No fracture line is currently visible.    Dx:  Healed fracture of left distal radius with new symptoms consistent with complex regional pain syndrome.    Plan:  I reviewed the findings of complex regional pain syndrome with the patient.  On the refer her over to Pain Management for further evaluation and she can follow up with us as needed.

## 2022-08-08 ENCOUNTER — TELEPHONE (OUTPATIENT)
Dept: REHABILITATION | Facility: HOSPITAL | Age: 57
End: 2022-08-08
Payer: COMMERCIAL

## 2022-08-08 ENCOUNTER — OFFICE VISIT (OUTPATIENT)
Dept: PAIN MEDICINE | Facility: CLINIC | Age: 57
End: 2022-08-08
Payer: COMMERCIAL

## 2022-08-08 ENCOUNTER — TELEPHONE (OUTPATIENT)
Dept: PAIN MEDICINE | Facility: CLINIC | Age: 57
End: 2022-08-08

## 2022-08-08 VITALS
BODY MASS INDEX: 33.66 KG/M2 | HEIGHT: 63 IN | DIASTOLIC BLOOD PRESSURE: 84 MMHG | WEIGHT: 190 LBS | SYSTOLIC BLOOD PRESSURE: 131 MMHG | HEART RATE: 86 BPM

## 2022-08-08 DIAGNOSIS — M79.602 LEFT ARM PAIN: Primary | ICD-10-CM

## 2022-08-08 DIAGNOSIS — G90.512 COMPLEX REGIONAL PAIN SYNDROME TYPE 1 OF LEFT UPPER EXTREMITY: ICD-10-CM

## 2022-08-08 DIAGNOSIS — M79.602 LEFT ARM PAIN: ICD-10-CM

## 2022-08-08 PROCEDURE — 99999 PR PBB SHADOW E&M-EST. PATIENT-LVL V: ICD-10-PCS | Mod: PBBFAC,,, | Performed by: ANESTHESIOLOGY

## 2022-08-08 PROCEDURE — 1159F MED LIST DOCD IN RCRD: CPT | Mod: CPTII,S$GLB,, | Performed by: ANESTHESIOLOGY

## 2022-08-08 PROCEDURE — 3079F PR MOST RECENT DIASTOLIC BLOOD PRESSURE 80-89 MM HG: ICD-10-PCS | Mod: CPTII,S$GLB,, | Performed by: ANESTHESIOLOGY

## 2022-08-08 PROCEDURE — 3075F SYST BP GE 130 - 139MM HG: CPT | Mod: CPTII,S$GLB,, | Performed by: ANESTHESIOLOGY

## 2022-08-08 PROCEDURE — 3008F BODY MASS INDEX DOCD: CPT | Mod: CPTII,S$GLB,, | Performed by: ANESTHESIOLOGY

## 2022-08-08 PROCEDURE — 99999 PR PBB SHADOW E&M-EST. PATIENT-LVL V: CPT | Mod: PBBFAC,,, | Performed by: ANESTHESIOLOGY

## 2022-08-08 PROCEDURE — 3075F PR MOST RECENT SYSTOLIC BLOOD PRESS GE 130-139MM HG: ICD-10-PCS | Mod: CPTII,S$GLB,, | Performed by: ANESTHESIOLOGY

## 2022-08-08 PROCEDURE — 1159F PR MEDICATION LIST DOCUMENTED IN MEDICAL RECORD: ICD-10-PCS | Mod: CPTII,S$GLB,, | Performed by: ANESTHESIOLOGY

## 2022-08-08 PROCEDURE — 99214 OFFICE O/P EST MOD 30 MIN: CPT | Mod: S$GLB,,, | Performed by: ANESTHESIOLOGY

## 2022-08-08 PROCEDURE — 99214 PR OFFICE/OUTPT VISIT, EST, LEVL IV, 30-39 MIN: ICD-10-PCS | Mod: S$GLB,,, | Performed by: ANESTHESIOLOGY

## 2022-08-08 PROCEDURE — 3008F PR BODY MASS INDEX (BMI) DOCUMENTED: ICD-10-PCS | Mod: CPTII,S$GLB,, | Performed by: ANESTHESIOLOGY

## 2022-08-08 PROCEDURE — 3079F DIAST BP 80-89 MM HG: CPT | Mod: CPTII,S$GLB,, | Performed by: ANESTHESIOLOGY

## 2022-08-08 RX ORDER — PREGABALIN 75 MG/1
CAPSULE ORAL
Qty: 90 CAPSULE | Refills: 1 | Status: SHIPPED | OUTPATIENT
Start: 2022-08-08 | End: 2022-08-08 | Stop reason: SDUPTHER

## 2022-08-08 RX ORDER — PREGABALIN 75 MG/1
CAPSULE ORAL
Qty: 90 CAPSULE | Refills: 1 | Status: SHIPPED | OUTPATIENT
Start: 2022-08-08 | End: 2022-10-04 | Stop reason: SDUPTHER
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
## 2022-08-08 NOTE — TELEPHONE ENCOUNTER
----- Message from Radha Sandoval sent at 8/8/2022 12:34 PM CDT -----  Contact: Omer / [facility] pharmacy  Type:  Pharmacy Calling to Clarify an RX    Name of Caller:  Omer with anuradha  Pharmacy Name:  [facility]  Prescription Name:  pregabalin (LYRICA) 75 MG capsule  What do they need to clarify?:  [facility] cant fill medication- system is blocking it under his name it bkocks them from filling controlled medication  Best Call Back Number:  [phone]  Additional Information:

## 2022-08-08 NOTE — TELEPHONE ENCOUNTER
Pharmacy said their system is blocking all of Dr Ivory's scripts for controlled substances. I called pt and asking what other pharmacy we can send the meds to. Pt ask for [facility]. Pharmacy changed
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
## 2022-08-09 ENCOUNTER — CLINICAL SUPPORT (OUTPATIENT)
Dept: REHABILITATION | Facility: HOSPITAL | Age: 57
End: 2022-08-09
Attending: ORTHOPAEDIC SURGERY
Payer: COMMERCIAL

## 2022-08-09 DIAGNOSIS — M25.432 LEFT WRIST EFFUSION: ICD-10-CM

## 2022-08-09 DIAGNOSIS — M25.532 LEFT WRIST PAIN: ICD-10-CM

## 2022-08-09 DIAGNOSIS — M79.602 LEFT ARM PAIN: ICD-10-CM

## 2022-08-09 DIAGNOSIS — M25.632 STIFFNESS OF LEFT WRIST JOINT: ICD-10-CM

## 2022-08-09 DIAGNOSIS — M25.632 STIFFNESS OF JOINT OF LEFT FOREARM: Primary | ICD-10-CM

## 2022-08-09 DIAGNOSIS — G90.512 COMPLEX REGIONAL PAIN SYNDROME TYPE 1 OF LEFT UPPER EXTREMITY: ICD-10-CM

## 2022-08-09 DIAGNOSIS — R29.898 LEFT HAND WEAKNESS: ICD-10-CM

## 2022-08-09 PROCEDURE — 97110 THERAPEUTIC EXERCISES: CPT | Mod: PN

## 2022-08-09 PROCEDURE — 97165 OT EVAL LOW COMPLEX 30 MIN: CPT | Mod: PN

## 2022-08-09 NOTE — PLAN OF CARE
[facility] Occupational Therapy  Initial Evaluation     Date: 8/9/2022  Name: Lynn Naidu  Clinic Number: [id number]    Therapy Diagnosis:   Encounter Diagnoses   Name Primary?    Complex regional pain syndrome type 1 of left upper extremity     Left arm pain     Stiffness of joint of left forearm Yes    Stiffness of left wrist joint     Left wrist pain     Left wrist effusion     Left hand weakness      Physician: Remy Roche II, MD    Physician Orders: evaluate and tx, see epic  Medical Diagnosis: left arm pain, CRPS type 1 left arm  Surgical Procedure and Date: n/a, n/a  Evaluation Date: 8/9/2022  Insurance Authorization Period Expiration: referral [id number] 8-8-22 thru 8-8-22            Plan of Care Certification Period: 8-9-22 thru 9-6-22  Date of Return to referral source's office: see epic    Visit # / Visits authorized: 1 / 1 referral [id number] 8-8-22 thru 8-8-22  Time In:3  Time Out: 330  Total Billable Time: 15 minutes    Precautions:  universal, see epic, protocol if applicable  Subjective     Involved Side: left  Dominant Side: right  Date of Onset: 4-26-22 had left distal radius fx  History of Current Condition/Mechanism of Injury: fell, sustained above surgery, treated via immobilization, had therapy, pain and stiffness persisted, sent to pain management who sent patient for therapy and was scheduled with me  Imaging: see epic  Previous Therapy: with me for above fx    Past Medical History/Physical Systems Review:   Lynn Naidu  has a past medical history of Allergy, Bronchitis, Cervical disc displacement, Edema, Insomnia, and Plantar fasciitis.    Lynn Naidu  has a past surgical history that includes [place] tooth extraction; Colonoscopy (11/01/2017); Colonoscopy (N/A, 11/1/2017); Epidural steroid injection into cervical spine (N/A, 7/22/2019); TONSILLECTOMY, ADENOIDECTOMY (Bilateral, 7/23/2021); and Epidural steroid injection into cervical spine (N/A,  12/21/2021).    Lynn has a current medication list which includes the following prescription(s): bionect, desonide, diazepam, ergocalciferol, fluoxetine, fluticasone propionate, furosemide, hydrocodone-acetaminophen, hydroxyzine hcl, l.acid/l.casei/b.bif/b.lisa/fos, linaclotide, methocarbamol, methylprednisolone, metoprolol succinate, nabumetone, pantoprazole, potassium chloride sa, pregabalin, promethazine, sulfacleanse 8-4, tramadol, and triamcinolone acetonide 0.1%, and the following Facility-Administered Medications: lactated ringers.    Review of patient's allergies indicates:   Allergen Reactions    No known drug allergies         Patient's Goals for Therapy: full painless use    Pain:  Functional Pain Scale Rating 0-10:   7/10 on average  6/10 at best  8/10 at worst  Side:left  Location: diffuse wrist  Description: ache  Aggravating Factors: use  Easing Factors: rest  Occupation:  housewife  Duties:  typical self and home care    Functional Limitations/Social History:    Previous functional status includes: Independent with all ADLs.     Current Functional Status   Home/Living environment : lives with spouse    Limitation of Functional Status as follows: decreased motion, use, and strength with ADL   ADLs/IADLs:               See above   Leisure: not tested  pain meds: denies  nicotine: denies  caffeine: about 24 ounces total of soda and tea daily    Objective   Posture:visually edematous wrist and hand, mild shiny appearance of wrist and hand  Palpation:nt  Sensation:denies burning, tingling; has constant numbness sf  ROM:  Prom full forearm, wrist, and hand except                                 Right       Left  Df/pf                        80/90     20/30         Edema:circumferential     Wrist left 16.0 cm left 17.5 cm  DME:understands no mention to wear wrist splint previously issued by ortho in last referring doctor's note, no mention to wear any brace in last referring doctor's note        CMS  Impairment/Limitation/Restriction for FOTO Survey    Will do next visit           Treatment     Treatment Time In: 315  Treatment Time Out: 330  Total Treatment time separate from Evaluation time:15      Lynn received therapeutic exercises for 15 minutes including:  -see above                Home Exercise Program/Education:  Issued HEP (see patient instructions in EMR in media or note) . Exercises were reviewed and Lynn was able to demonstrate them prior to the end of the session.   Pt received a written copy of exercises to perform at home. Lynn demonstrated good understanding of the education provided.  Pt was advised to perform these exercises free of pain, and to stop performing them if pain occurs.    Patient/Family Education: role of OT, goals for OT, scheduling/cancellations - pt verbalized understanding. Discussed insurance limitations with patient.    Additional Education provided: likely tx progression, expectations of rehab    Assessment     Lynn Naidu is a 57 y.o. female referred to outpatient occupational therapy and presents with a medical diagnosis of see above resulting in Decreased ROM, Decreased muscle strength, Decreased functional hand use, Increased pain, Edema and Joint Stiffness and demonstrates limitations as described in the chart below. Following medical record review it is determined that pt will benefit from occupational therapy services in order to maximize pain free and/or functional use of left hand. The following goals were discussed with the patient and patient is in agreement with them as to be addressed in the treatment plan. The patient's rehab potential is good.     Anticipated barriers to occupational therapy: edema, pain, stiffness, weakness; need for education of likely and proper tx progression, estimated tx duration based on extent of symptoms pre OT and current deficits with functional use  Pt has no cultural, educational or language barriers to learning  provided.    Profile and History Assessment of Occupational Performance Level of Clinical Decision Making Complexity Score   Occupational Profile:   Lynn Naidu is a 57 y.o. 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      Comorbidities:    has a past medical history of Allergy, Bronchitis, Cervical disc displacement, Edema, Insomnia, and Plantar fasciitis.    Medical and Therapy History Review:   Brief               Performance Deficits    Physical:  Joint Mobility  Muscle Power/Strength  Edema  Pain    Cognitive:  No Deficits    Psychosocial:    No Deficits     Clinical Decision Making:  low    Assessment Process:  Problem-Focused Assessments    Modification/Need for Assistance:  Not Necessary    Intervention Selection:  Limited Treatment Options       low  Based on PMHX, co morbidities , data from assessments and functional level of assistance required with task and clinical presentation directly impacting function.           The following goals were discussed with the patient and patient is in agreement with them as to be addressed in the treatment plan.     Goals:   stg to be met in 2 weeks 1. Patient will be I with HEP 2. Patient will have 2/10 pain with light use 3. Patient will have = prom of bilateral wrists to enhance affected arm use with ADL      ltg to be met by d/c 1. Patient will be I with d/c HEP 2. Patient will have 1/10 pain with all use 3. Patient will have about 75% /pinch on affected side vs unaffected side to promote full functional use                                                                 4. Patient will be I with all ADL      all goals ongoing unless noted above or met today or in past but not noted yet    Plan   Certification Period/Plan of care expiration: 8/9/2022 to 9-6-22        Outpatient Occupational Therapy  2 times weekly for 4  weeks to include the following interventions: eval and tx    Above frequency and duration in above dates may change based on patient progress and need for therapy    Dean GUNN CHT

## 2022-08-09 NOTE — PATIENT INSTRUCTIONS
8-9-22  -wear left medium isotoner therapeutic glove issued today as tolerated-pain free (remove for hygiene, home program, and to let skin breathe as needed); follow instructions on tag on care instructions  -decrease caffeine intake as much as possible since this substance can amplify your body's pain response as well as negatively influence sleep quality  -keep hand elevated as much as possible  -try to walk at least 30 minutes daily  -drink at least 2 liters of water daily  -do below exercises 10 times, 6 x day (medium speed, medium force)      -keep digits loose for exercise # 2

## 2022-08-15 ENCOUNTER — CLINICAL SUPPORT (OUTPATIENT)
Dept: REHABILITATION | Facility: HOSPITAL | Age: 57
End: 2022-08-15
Attending: ORTHOPAEDIC SURGERY
Payer: COMMERCIAL

## 2022-08-15 DIAGNOSIS — M25.532 LEFT WRIST PAIN: ICD-10-CM

## 2022-08-15 DIAGNOSIS — M25.632 STIFFNESS OF JOINT OF LEFT FOREARM: Primary | ICD-10-CM

## 2022-08-15 DIAGNOSIS — R29.898 LEFT HAND WEAKNESS: ICD-10-CM

## 2022-08-15 DIAGNOSIS — M25.432 LEFT WRIST EFFUSION: ICD-10-CM

## 2022-08-15 DIAGNOSIS — M25.632 STIFFNESS OF LEFT WRIST JOINT: ICD-10-CM

## 2022-08-15 PROCEDURE — 97110 THERAPEUTIC EXERCISES: CPT | Mod: PN

## 2022-08-15 PROCEDURE — 97022 WHIRLPOOL THERAPY: CPT | Mod: PN

## 2022-08-15 NOTE — PROGRESS NOTES
Occupational Therapy Daily Treatment Note     Date: 8/15/2022  Name: Lynn Naidu  Clinic Number: [id number]    Therapy Diagnosis: No diagnosis found.  Physician: Thomas Ivory MD    Physician: Remy Roche II, MD     Physician Orders: evaluate and tx, see epic  Medical Diagnosis: left arm pain, CRPS type 1 left arm  Surgical Procedure and Date: n/a, n/a  Evaluation Date: 8/9/2022  Insurance Authorization Period Expiration: referral [id number] 8-9-22 thru 12-30-22  Plan of Care Certification Period: 8-9-22 thru 9-6-22  Date of Return to referral source's office: see epic     Visit # / Visits authorized: 1 / 25 referral [id number] 8-9-22 thru 12-30-22    Time In:315  Time Out: 4  Total Billable Time: 45 minutes     Visit started late due to patient doing FOTO    Precautions:  universal, see epic, protocol if applicable    Subjective     Pt reports: doing home program. Consistent popping radial wrist with active sup/pro with home program that elicits mild discomfort. Understands if this becomes more painful I may refer her back to referral source. Denies clunking or dead hand syndromes in wrist/hand. Understands mild tension with end range passive composite sf flexion may be due to mild edema in sf.  she is compliant with home exercise program.  Response to previous treatment:good  Functional change: none stated    Pain: up to 5/10 rest; 4/10 light use  Side: left  Location: diffuse      Wrist    ache  Objective       Timed units:  2 therapeutic exercise                 Time:330-4      Untimed units:  fluidotherapy                           Time:315-330      Measurements: n/a        Fluido: 15 min.    U/s:n/a      UBE: level 1 x 10 min.    Scar massage: n/a    Stretches as tolerated-pain free: df, pf      Manual: n/a    ROM: prom df/pf    PRE: n/a      HEP: see above and/or below    Home Exercises and Education Provided     Education provided:       See above          progress towards goals   likely tx  progression  rationale of rehab interventions    Written Home Exercises/Information Provided: yes.        previously issued exercises and/or other issued home therapy instructions were reviewed if still part of current tx plan as well as any issued today and Lynn was able to demonstrate them prior to the end of the session.  Lynn demonstrated good understanding of the HEP provided.     See EMR under patient instructions and/or media for HEP issued today or in past    Assessment     Fixing df/pf hypomobility important to maximize functional use and to promote proper mechanics        Pt would continue to benefit from skilled OT in order to facilitate strong, painless, and full use.    Lynn is progressing well towards her goals and there are no updates to goals at this time unless goals noted below are different than goals on previous notes or evaluation. Pt prognosis is good  Pt will continue to benefit from skilled outpatient occupational therapy to address the deficits listed in the problem list on initial evaluation to provide pt/family education and to maximize pt's level of independence in the home and community environment.     Anticipated barriers to occupational therapy: edema, pain, stiffness, weakness    Pt's spiritual, cultural and educational needs considered and pt agreeable to plan of care and goals.    Goals:  stg to be met in 2 weeks 1. Patient will be I with HEP 2. Patient will have 2/10 pain with light use 3. Patient will have = prom of bilateral wrists to enhance affected arm use with ADL        ltg to be met by d/c 1. Patient will be I with d/c HEP 2. Patient will have 1/10 pain with all use 3. Patient will have about 75% /pinch on affected side vs unaffected side to promote full functional use                                                                 4. Patient will be I with all ADL       all goals ongoing unless noted above or met today or in past but not noted yet              Any  goals met today ?  (if any met see above)    Updates/Grading for next session: prn    Plan: evaluate and tx    Dean GUNN CHT

## 2022-08-19 ENCOUNTER — CLINICAL SUPPORT (OUTPATIENT)
Dept: REHABILITATION | Facility: HOSPITAL | Age: 57
End: 2022-08-19
Attending: ORTHOPAEDIC SURGERY
Payer: COMMERCIAL

## 2022-08-19 DIAGNOSIS — M25.432 LEFT WRIST EFFUSION: ICD-10-CM

## 2022-08-19 DIAGNOSIS — M25.532 LEFT WRIST PAIN: ICD-10-CM

## 2022-08-19 DIAGNOSIS — R29.898 LEFT HAND WEAKNESS: ICD-10-CM

## 2022-08-19 DIAGNOSIS — M25.632 STIFFNESS OF JOINT OF LEFT FOREARM: Primary | ICD-10-CM

## 2022-08-19 DIAGNOSIS — M25.632 STIFFNESS OF LEFT WRIST JOINT: ICD-10-CM

## 2022-08-19 PROCEDURE — 97110 THERAPEUTIC EXERCISES: CPT | Mod: PN

## 2022-08-19 PROCEDURE — 97022 WHIRLPOOL THERAPY: CPT | Mod: PN

## 2022-08-19 PROCEDURE — 97140 MANUAL THERAPY 1/> REGIONS: CPT | Mod: PN

## 2022-08-19 NOTE — PROGRESS NOTES
Occupational Therapy Daily Treatment Note     Date: 8/19/2022  Name: Lynn Naidu  Clinic Number: [id number]    Therapy Diagnosis:   Encounter Diagnoses   Name Primary?    Stiffness of joint of left forearm Yes    Stiffness of left wrist joint     Left wrist pain     Left wrist effusion     Left hand weakness      Physician: Thomas Ivory MD    Physician: Remy Roche II, MD     Physician Orders: evaluate and tx, see epic  Medical Diagnosis: left arm pain, CRPS type 1 left arm  Surgical Procedure and Date: n/a, n/a  Evaluation Date: 8/9/2022  Insurance Authorization Period Expiration: referral [id number] 8-9-22 thru 12-30-22  Plan of Care Certification Period: 8-9-22 thru 9-6-22  Date of Return to referral source's office: see epic     Visit # / Visits authorized: 2 / 25 referral [id number] 8-9-22 thru 12-30-22    Time In:3  Time Out: 4  Total Billable Time: 60 minutes         Precautions:  universal, see epic, protocol if applicable    Subjective     Pt reports: . she is compliant with home exercise program. Mild pulling at sf with closing hand.  Response to previous treatment:good  Functional change: none stated    Pain: 0/10 rest; 3/10 light use  Side: left  Location: diffuse      Wrist    ache  Objective       Timed units:  2 therapeutic exercise                 Time:3-315; 345-4  1 manual                                      Time:330-345    Untimed units:  fluidotherapy                           Time:315-330      Measurements: full passive composite flexion sf with mild tension at end range      Fluido: 15 min.    U/s:n/a      UBE: level 1 x 10 min.    Scar massage: n/a    Stretches as tolerated-pain free: df, pf, composite flex sf      Manual: n/a    ROM: prom df/pf    PRE: n/a      HEP: see above and/or below    Home Exercises and Education Provided     Education provided:               progress towards goals   likely tx progression  rationale of rehab interventions    Written Home  Exercises/Information Provided: yes.        previously issued exercises and/or other issued home therapy instructions were reviewed if still part of current tx plan as well as any issued today and Lynn was able to demonstrate them prior to the end of the session.  Lynn demonstrated good understanding of the HEP provided.     See EMR under patient instructions and/or media for HEP issued today or in past    Assessment     May benefit from prayer and fist forward stretches eventually as df/pf tightness decreases    May benefit from radiocarpal traction and glides to improve passive df/pf flexibility        Pt would continue to benefit from skilled OT in order to facilitate strong, painless, and full use.    Lynn is progressing well towards her goals and there are no updates to goals at this time unless goals noted below are different than goals on previous notes or evaluation. Pt prognosis is good  Pt will continue to benefit from skilled outpatient occupational therapy to address the deficits listed in the problem list on initial evaluation to provide pt/family education and to maximize pt's level of independence in the home and community environment.     Anticipated barriers to occupational therapy: edema, pain, stiffness, weakness    Pt's spiritual, cultural and educational needs considered and pt agreeable to plan of care and goals.    Goals:  stg to be met in 2 weeks 1. Patient will be I with HEP 2. Patient will have 2/10 pain with light use 3. Patient will have = prom of bilateral wrists to enhance affected arm use with ADL        ltg to be met by d/c 1. Patient will be I with d/c HEP 2. Patient will have 1/10 pain with all use 3. Patient will have about 75% /pinch on affected side vs unaffected side to promote full functional use                                                                 4. Patient will be I with all ADL       all goals ongoing unless noted above or met today or in past but not  noted yet              Any goals met today ?  (if any met see above)    Updates/Grading for next session: prn    Plan: evaluate and tx    Dean GUNN CHT

## 2022-08-19 NOTE — PATIENT INSTRUCTIONS
8-19-22   -do below stretch one time, at least 2 x day, at least a 10 minute hold, mild discomfort only; do on small finger

## 2022-08-26 ENCOUNTER — CLINICAL SUPPORT (OUTPATIENT)
Dept: REHABILITATION | Facility: HOSPITAL | Age: 57
End: 2022-08-26
Attending: ORTHOPAEDIC SURGERY
Payer: COMMERCIAL

## 2022-08-26 DIAGNOSIS — R29.898 LEFT HAND WEAKNESS: ICD-10-CM

## 2022-08-26 DIAGNOSIS — M25.532 LEFT WRIST PAIN: ICD-10-CM

## 2022-08-26 DIAGNOSIS — M25.632 STIFFNESS OF JOINT OF LEFT FOREARM: Primary | ICD-10-CM

## 2022-08-26 DIAGNOSIS — M25.632 STIFFNESS OF LEFT WRIST JOINT: ICD-10-CM

## 2022-08-26 DIAGNOSIS — M25.432 LEFT WRIST EFFUSION: ICD-10-CM

## 2022-08-26 PROCEDURE — 97140 MANUAL THERAPY 1/> REGIONS: CPT | Mod: PN

## 2022-08-26 PROCEDURE — 97110 THERAPEUTIC EXERCISES: CPT | Mod: PN

## 2022-08-26 PROCEDURE — 97022 WHIRLPOOL THERAPY: CPT | Mod: PN

## 2022-08-26 NOTE — PROGRESS NOTES
Occupational Therapy Daily Treatment Note     Date: 8/26/2022  Name: Lynn Naidu  Clinic Number: [id number]    Therapy Diagnosis:   Encounter Diagnoses   Name Primary?    Stiffness of joint of left forearm Yes    Stiffness of left wrist joint     Left wrist pain     Left wrist effusion     Left hand weakness      Physician: Thomas Ivory MD    Physician: Remy Roche II, MD     Physician Orders: evaluate and tx, see epic  Medical Diagnosis: left arm pain, CRPS type 1 left arm  Surgical Procedure and Date: n/a, n/a  Evaluation Date: 8/9/2022  Insurance Authorization Period Expiration: referral [id number] 8-9-22 thru 12-30-22  Plan of Care Certification Period: 8-9-22 thru 9-6-22  Date of Return to referral source's office: see epic     Visit # / Visits authorized: 3 / 25 referral [id number] 8-9-22 thru 12-30-22    Time In:3  Time Out: 4  Total Billable Time: 60 minutes         Precautions:  universal, see epic, protocol if applicable    Subjective     Pt reports: . she is compliant with home exercise program. Having some neck and left shoulder pain, says she will tell referring doctor about this, says had a neck injection in December of last year. Intermittent hand tingling with home stretches, told her to tell referring doctor about this.   Response to previous treatment:good  Functional change: none stated    Pain: 0/10 rest; 3/10 light use  Side: left  Location: diffuse      Wrist    ache  Objective       Timed units:  2 therapeutic exercise                 Time:330-4  1 manual                                      Time:315-330    Untimed units:  fluidotherapy                           Time:3-315      Measurements:     Prom     Right          Left   Df/pf      80/90       40/40      Fluido: 15 min.    U/s:n/a      UBE: n/a    Scar massage: n/a    Stretches as tolerated-pain free: df, pf      Manual:     Radiocarpal traction open pack, prepositioned df, prepositioned pf    ROM: prom df/pf    PRE:  n/a      HEP: see above and/or below        Home Exercises and Education Provided     Education provided:     Reviewed to continue decreasing caffeine intake from week to the next    Explained intermittent painless clicking at wrist (unable to localize a spot) that is consistently reproduced with passive pf in clinic today will be monitored and if it persists I may ask her to tell referring doctor, she also says she get painless clicking at wrist consistently with active sup/pro (unable to localize a spot)    Patient got tearful at end of session and I explained she is progressing well with rehab and we will continue to focus on resolving tightness    progress towards goals   likely tx progression  rationale of rehab interventions    Written Home Exercises/Information Provided: no.        previously issued exercises and/or other issued home therapy instructions were reviewed if still part of current tx plan as well as any issued today and Lynn was able to demonstrate them prior to the end of the session.  Lynn demonstrated good understanding of the HEP provided.     See EMR under patient instructions and/or media for HEP issued today or in past    Assessment     Considering extent and duration of stiffness noted on day 1 of OT, progress is satisfactory        Pt would continue to benefit from skilled OT in order to facilitate strong, painless, and full use.    Lynn is progressing well towards her goals and there are no updates to goals at this time unless goals noted below are different than goals on previous notes or evaluation. Pt prognosis is good  Pt will continue to benefit from skilled outpatient occupational therapy to address the deficits listed in the problem list on initial evaluation to provide pt/family education and to maximize pt's level of independence in the home and community environment.     Anticipated barriers to occupational therapy: edema, pain, stiffness, weakness    Pt's spiritual,  cultural and educational needs considered and pt agreeable to plan of care and goals.    Goals:  stg to be met in 2 weeks 1. Patient will be I with HEP 2. Patient will have 2/10 pain with light use 3. Patient will have = prom of bilateral wrists to enhance affected arm use with ADL        ltg to be met by d/c 1. Patient will be I with d/c HEP 2. Patient will have 1/10 pain with all use 3. Patient will have about 75% /pinch on affected side vs unaffected side to promote full functional use                                                                 4. Patient will be I with all ADL       all goals ongoing unless noted above or met today or in past but not noted yet              Any goals met today ?  (if any met see above)    Updates/Grading for next session: prn    Plan: evaluate and tx    Dean GUNN CHT

## 2022-08-29 ENCOUNTER — DOCUMENTATION ONLY (OUTPATIENT)
Dept: REHABILITATION | Facility: HOSPITAL | Age: 57
End: 2022-08-29
Payer: COMMERCIAL

## 2022-08-29 ENCOUNTER — CLINICAL SUPPORT (OUTPATIENT)
Dept: REHABILITATION | Facility: HOSPITAL | Age: 57
End: 2022-08-29
Attending: ORTHOPAEDIC SURGERY
Payer: COMMERCIAL

## 2022-08-29 DIAGNOSIS — M25.432 LEFT WRIST EFFUSION: ICD-10-CM

## 2022-08-29 DIAGNOSIS — M25.632 STIFFNESS OF LEFT WRIST JOINT: ICD-10-CM

## 2022-08-29 DIAGNOSIS — M25.632 STIFFNESS OF JOINT OF LEFT FOREARM: Primary | ICD-10-CM

## 2022-08-29 DIAGNOSIS — M25.532 LEFT WRIST PAIN: ICD-10-CM

## 2022-08-29 DIAGNOSIS — R29.898 LEFT HAND WEAKNESS: ICD-10-CM

## 2022-08-29 PROCEDURE — 97022 WHIRLPOOL THERAPY: CPT | Mod: PN

## 2022-08-29 PROCEDURE — 97110 THERAPEUTIC EXERCISES: CPT | Mod: PN

## 2022-08-29 PROCEDURE — 97140 MANUAL THERAPY 1/> REGIONS: CPT | Mod: PN

## 2022-08-29 NOTE — PROGRESS NOTES
Outpatient Therapy Discharge Summary     Date: 8-29-22      Name: Lynn Naidu  [facility] Number: [id number]    Therapy Diagnosis: No diagnosis found.  Physician: No ref. provider found    Physician Orders: see evaluation  Medical Diagnosis: see evaluation  Evaluation Date: 5-24-22      Date of Last visit: 7-19-22  Total Visits Received: 5  Cancelled Visits: see epic  No Show Visits: see epic    Assessment    Goals: see 7-19-22 note    Discharge reason: Other:  saw dr. Roche who sent patient to pain management    Plan   This patient is discharged from Occupational Therapy from dr. Roche's order which triggered OT evaluation on 5-24-22

## 2022-08-29 NOTE — PROGRESS NOTES
gets  Occupational Therapy Daily Treatment Note     Date: 8/29/2022  Name: Lynn Naidu  Clinic Number: [id number]    Therapy Diagnosis:   Encounter Diagnoses   Name Primary?    Stiffness of joint of left forearm Yes    Stiffness of left wrist joint     Left wrist pain     Left wrist effusion     Left hand weakness      Physician: Thomas Ivory MD    Physician: Remy Roche II, MD     Physician Orders: evaluate and tx, see epic  Medical Diagnosis: left arm pain, CRPS type 1 left arm  Surgical Procedure and Date: n/a, n/a  Evaluation Date: 8/9/2022  Insurance Authorization Period Expiration: referral [id number] 8-9-22 thru 12-30-22  Plan of Care Certification Period: 8-9-22 thru 9-6-22  Date of Return to referral source's office: see epic     Visit # / Visits authorized: 4 / 25 referral [id number] 8-9-22 thru 12-30-22    Time In:3  Time Out: 4  Total Billable Time: 60 minutes         Precautions:  universal, see epic, protocol if applicable    Subjective     Pt reports: . she is compliant with home exercise program. Gets tingling sometimes with home stretches. Scheduled to get stellate ganglion block tomorrow.  Response to previous treatment:good  Functional change: light use continues to improve  Pain: 0/10 rest; 3/10 light use  Side: left  Location: diffuse      Wrist    ache  Objective       Timed units:  1 therapeutic exercise                 Time:345-4  2 manual                                      Time:315-345    Untimed units:  fluidotherapy                           Time:3-315      Measurements:     N/a    Fluido: 15 min.    U/s:n/a      UBE: n/a    Scar massage: n/a    Stretches as tolerated-pain free: df, pf      Manual:     Radiocarpal traction open pack, prepositioned df, prepositioned pf    ROM: prom df/pf    PRE: n/a      HEP: see above and/or below    N/a    Home Exercises and Education Provided     Education provided:     progress towards goals   likely tx progression  rationale of rehab  "interventions    Written Home Exercises/Information Provided: no.        previously issued exercises and/or other issued home therapy instructions were reviewed if still part of current tx plan as well as any issued today and Lynn was able to demonstrate them prior to the end of the session.  Lynn demonstrated good understanding of the HEP provided.     See EMR under patient instructions and/or media for HEP issued today or in past    Assessment     Looks faithful with home program, glides on radiocarpal joint will not be done since review of xray today shows "rounded ossification" (see epic for details)      Pt would continue to benefit from skilled OT in order to facilitate strong, painless, and full use.    Lynn is progressing well towards her goals and there are no updates to goals at this time unless goals noted below are different than goals on previous notes or evaluation. Pt prognosis is good  Pt will continue to benefit from skilled outpatient occupational therapy to address the deficits listed in the problem list on initial evaluation to provide pt/family education and to maximize pt's level of independence in the home and community environment.     Anticipated barriers to occupational therapy: edema, pain, stiffness, weakness    Pt's spiritual, cultural and educational needs considered and pt agreeable to plan of care and goals.    Goals:  stg to be met in 2 weeks 1. Patient will be I with HEP 2. Patient will have 2/10 pain with light use 3. Patient will have = prom of bilateral wrists to enhance affected arm use with ADL        ltg to be met by d/c 1. Patient will be I with d/c HEP 2. Patient will have 1/10 pain with all use 3. Patient will have about 75% /pinch on affected side vs unaffected side to promote full functional use                                                                 4. Patient will be I with all ADL       all goals ongoing unless noted above or met today or in past but not " noted yet              Any goals met today ?  (if any met see above)    Updates/Grading for next session: prn    Plan: evaluate and tx    Dean GUNN CHT

## 2022-08-30 ENCOUNTER — HOSPITAL ENCOUNTER (OUTPATIENT)
Facility: AMBULARY SURGERY CENTER | Age: 57
Discharge: HOME | End: 2022-08-30
Attending: ANESTHESIOLOGY | Admitting: ANESTHESIOLOGY
Payer: COMMERCIAL

## 2022-08-30 VITALS
OXYGEN SATURATION: 93 % | DIASTOLIC BLOOD PRESSURE: 79 MMHG | TEMPERATURE: 98 F | HEIGHT: 63 IN | HEART RATE: 74 BPM | RESPIRATION RATE: 20 BRPM | BODY MASS INDEX: 33.66 KG/M2 | WEIGHT: 190 LBS | SYSTOLIC BLOOD PRESSURE: 130 MMHG

## 2022-08-30 DIAGNOSIS — M79.603 ARM PAIN: Primary | ICD-10-CM

## 2022-08-30 PROCEDURE — 64510 PR INJECT NERV BLCK,STELLATE GANGLION: ICD-10-PCS | Mod: LT,,, | Performed by: ANESTHESIOLOGY

## 2022-08-30 PROCEDURE — 64510 N BLOCK STELLATE GANGLION: CPT | Performed by: ANESTHESIOLOGY

## 2022-08-30 PROCEDURE — 64510 N BLOCK STELLATE GANGLION: CPT | Mod: LT,,, | Performed by: ANESTHESIOLOGY

## 2022-08-30 RX ORDER — DEXAMETHASONE SODIUM PHOSPHATE 10 MG/ML
INJECTION INTRAMUSCULAR; INTRAVENOUS
Status: DISCONTINUED | OUTPATIENT
Start: 2022-08-30 | End: 2022-08-30 | Stop reason: HOSPADM

## 2022-08-30 RX ORDER — LIDOCAINE HYDROCHLORIDE 10 MG/ML
INJECTION, SOLUTION EPIDURAL; INFILTRATION; INTRACAUDAL; PERINEURAL
Status: DISCONTINUED | OUTPATIENT
Start: 2022-08-30 | End: 2022-08-30 | Stop reason: HOSPADM

## 2022-08-30 RX ORDER — BUPIVACAINE HYDROCHLORIDE AND EPINEPHRINE 2.5; 5 MG/ML; UG/ML
INJECTION, SOLUTION EPIDURAL; INFILTRATION; INTRACAUDAL; PERINEURAL
Status: DISCONTINUED | OUTPATIENT
Start: 2022-08-30 | End: 2022-08-30 | Stop reason: HOSPADM

## 2022-08-30 RX ORDER — MIDAZOLAM HYDROCHLORIDE 1 MG/ML
INJECTION, SOLUTION INTRAMUSCULAR; INTRAVENOUS
Status: DISCONTINUED | OUTPATIENT
Start: 2022-08-30 | End: 2022-08-30 | Stop reason: HOSPADM

## 2022-08-30 RX ORDER — FENTANYL CITRATE 50 UG/ML
INJECTION, SOLUTION INTRAMUSCULAR; INTRAVENOUS
Status: DISCONTINUED | OUTPATIENT
Start: 2022-08-30 | End: 2022-08-30 | Stop reason: HOSPADM

## 2022-08-30 RX ORDER — SODIUM CHLORIDE, SODIUM LACTATE, POTASSIUM CHLORIDE, CALCIUM CHLORIDE 600; 310; 30; 20 MG/100ML; MG/100ML; MG/100ML; MG/100ML
INJECTION, SOLUTION INTRAVENOUS CONTINUOUS
Status: DISCONTINUED | OUTPATIENT
Start: 2022-08-30 | End: 2022-08-30 | Stop reason: HOSPADM

## 2022-08-30 RX ADMIN — SODIUM CHLORIDE, SODIUM LACTATE, POTASSIUM CHLORIDE, CALCIUM CHLORIDE: 600; 310; 30; 20 INJECTION, SOLUTION INTRAVENOUS at 07:08

## 2022-08-30 NOTE — OP NOTE
- concern for superimposed infection  - Wound Care consulted  - APAP prn pain     Procedure Date: 8/30/2022    PROCEDURE: Stellate ganglion block, left-sided     DIAGNOSIS: Left arm pain    PHYSICIAN: Thomas Ivory MD     MEDICATIONS INJECTED: Treatment dose: Bupivicane 0.25%, 5ml and Dexamethasone 10mg     LOCAL ANESTHETIC INJECTED: Lidocaine 1% 2 ml per site.     SEDATION MEDICATIONS:  RN IV sedation    ESTIMATED BLOOD LOSS: None     COMPLICATIONS: None     TECHNIQUE: A time-out taken to identify patient and placed in supine position prior to starting   the procedure. The patient was placed in a supine position, prepped and draped in the usual sterile fashion using ChloraPrep and sterile towels. The area to be injected was determined under fluoroscopic guidance in AP and slight oblique view. The C7 vertebral body was identified and the target area between the transverse process and vertebral body was identified . Local anesthetic was given by raising a wheel with a 25-gauge 1.5 inch needle. 2in inch 25-gauge bent-tip spinal needle was introduced toward the target area until it made contact with the vertebral body. After negative aspiration for blood or air, 1ml contrast dye was injected to confirm appropriate placement and that there was no vascular uptake, and this was also performed under live digital subtraction. The test dose as noted above was given over 4 minutes and there were no signs of HR or BP changes, no tinnitus or circumoral numbness. Again, aspiration was negative for blood or air and the treatment medication was then given slowly over 5 minutes. The patient tolerated the procedure well.     The patient was monitored after the procedure. The patient was given post procedure and discharge instructions to follow at home. The patient was discharged in a stable condition.

## 2022-08-30 NOTE — DISCHARGE SUMMARY
[facility]  Discharge Note  Short Stay    Admit Date: 8/30/2022    Discharge Date and Time: 8/30/2022    Attending Physician: Thomas Ivory MD     Discharge Provider: Thomas Ivory    Diagnoses:  There are no hospital problems to display for this patient.      Hospital Course: Stellate ganglion block  Discharged Condition: Good    Final Diagnoses: There are no hospital problems to display for this patient.      Disposition: Home or Self Care    Follow up/Patient Instructions:    Medications:  Reconciled Home Medications:      Medication List        CONTINUE taking these medications      diazePAM 10 MG Tab  Commonly known as: VALIUM  Take 10 mg by mouth every evening.     ergocalciferol 50,000 unit Cap  Commonly known as: ERGOCALCIFEROL  TAKE 1 CAPSULE BY MOUTH TWICE A WEEK     FLUoxetine 20 MG capsule  Take 20 mg by mouth once daily.     fluticasone propionate 50 mcg/actuation nasal spray  Commonly known as: FLONASE  2 sprays (100 mcg total) by Each Nostril route once daily.     furosemide 40 MG tablet  Commonly known as: LASIX  Take 1 tablet (40 mg total) by mouth once daily.     HYDROcodone-acetaminophen 5-325 mg per tablet  Commonly known as: NORCO  Take 1 tablet by mouth every 6 (six) hours as needed for Pain.     linaCLOtide 290 mcg Cap capsule  Commonly known as: LINZESS  Take 1 capsule (290 mcg total) by mouth once daily.     methocarbamoL 750 MG Tab  Commonly known as: ROBAXIN  Take 1 tablet (750 mg total) by mouth 4 (four) times daily as needed (muscle spasm).     methylPREDNISolone 4 mg tablet  Commonly known as: MEDROL DOSEPACK  Take by mouth.     metoprolol succinate 50 MG 24 hr tablet  Commonly known as: TOPROL-XL  Take 1 tablet (50 mg total) by mouth once daily.     nabumetone 500 MG tablet  Commonly known as: RELAFEN  Take 1 tablet (500 mg total) by mouth 2 (two) times daily as needed for Pain.     pantoprazole 40 MG tablet  Commonly known as: PROTONIX  Take 1 tablet (40 mg total) by mouth every  morning.     potassium chloride SA 20 MEQ tablet  Commonly known as: K-DUR,KLOR-CON  Take 1 tablet by mouth once daily     pregabalin 75 MG capsule  Commonly known as: LYRICA  1 tablet qam and 2 tablets qhs     promethazine 25 MG tablet  Commonly known as: PHENERGAN  Take 25 mg by mouth.     SULFACLEANSE 8-4 8-4 % Susp  Generic drug: sulfacetamide sodium-sulfur  Apply topically nightly as needed.     traMADoL 50 mg tablet  Commonly known as: ULTRAM  Take 1 tablet (50 mg total) by mouth every 8 (eight) hours as needed for Pain.            STOP taking these medications      BIONECT 0.2 % Crea  Generic drug: sodium hyaluronate     desonide 0.05 % cream  Commonly known as: DESOWEN     PROBIOTIC BLEND ORAL     triamcinolone acetonide 0.1% 0.1 % cream  Commonly known as: KENALOG            ASK your doctor about these medications      hydrOXYzine HCL 25 MG tablet  Commonly known as: ATARAX  Take 1 tablet (25 mg total) by mouth 3 (three) times daily as needed.            Discharge Procedure Orders   Notify your health care provider if you experience any of the following:  temperature >100.4     Notify your health care provider if you experience any of the following:  severe uncontrolled pain     Notify your health care provider if you experience any of the following:  redness, tenderness, or signs of infection (pain, swelling, redness, odor or green/yellow discharge around incision site)     Activity as tolerated        Follow up with MD in 2-3 weeks    Discharge Procedure Orders (must include Diet, Follow-up, Activity):   Discharge Procedure Orders (must include Diet, Follow-up, Activity)   Notify your health care provider if you experience any of the following:  temperature >100.4     Notify your health care provider if you experience any of the following:  severe uncontrolled pain     Notify your health care provider if you experience any of the following:  redness, tenderness, or signs of infection (pain, swelling,  redness, odor or green/yellow discharge around incision site)     Activity as tolerated

## 2022-09-01 ENCOUNTER — TELEPHONE (OUTPATIENT)
Dept: FAMILY MEDICINE | Facility: CLINIC | Age: 57
End: 2022-09-01
Payer: COMMERCIAL

## 2022-09-01 ENCOUNTER — TELEPHONE (OUTPATIENT)
Dept: PAIN MEDICINE | Facility: CLINIC | Age: 57
End: 2022-09-01
Payer: COMMERCIAL

## 2022-09-01 DIAGNOSIS — Z12.31 VISIT FOR SCREENING MAMMOGRAM: Primary | ICD-10-CM

## 2022-09-01 RX ORDER — HYDROCODONE BITARTRATE AND ACETAMINOPHEN 5; 325 MG/1; MG/1
1 TABLET ORAL EVERY 6 HOURS PRN
Qty: 28 TABLET | Refills: 0 | Status: SHIPPED | OUTPATIENT
Start: 2022-09-01 | End: 2022-10-04 | Stop reason: SDUPTHER

## 2022-09-01 NOTE — TELEPHONE ENCOUNTER
----- Message from Keenan Sinclair sent at 9/1/2022 12:59 PM CDT -----  Type: Needs Medical Advice  Who Called:  patient  Symptoms (please be specific):   having pain from procedure  How long has patient had these symptoms:  since tuesday  Pharmacy name and phone #:    [id number] - [place] - [address]  [address] [place] [address]  Phone: [phone] Fax: [phone]       Best Call Back Number: [phone]   Additional Information: she wants to know if she could get some pain meds called in

## 2022-09-02 ENCOUNTER — CLINICAL SUPPORT (OUTPATIENT)
Dept: REHABILITATION | Facility: HOSPITAL | Age: 57
End: 2022-09-02
Attending: ORTHOPAEDIC SURGERY
Payer: COMMERCIAL

## 2022-09-02 DIAGNOSIS — M25.632 STIFFNESS OF LEFT WRIST JOINT: ICD-10-CM

## 2022-09-02 DIAGNOSIS — R29.898 LEFT HAND WEAKNESS: ICD-10-CM

## 2022-09-02 DIAGNOSIS — M25.532 LEFT WRIST PAIN: ICD-10-CM

## 2022-09-02 DIAGNOSIS — M25.632 STIFFNESS OF JOINT OF LEFT FOREARM: Primary | ICD-10-CM

## 2022-09-02 DIAGNOSIS — M25.432 LEFT WRIST EFFUSION: ICD-10-CM

## 2022-09-02 PROCEDURE — 97022 WHIRLPOOL THERAPY: CPT | Mod: PN

## 2022-09-02 PROCEDURE — 97110 THERAPEUTIC EXERCISES: CPT | Mod: PN

## 2022-09-02 NOTE — PROGRESS NOTES
"gets  Occupational Therapy Daily Treatment Note     Date: 9/2/2022  Name: Lynn Naidu  Clinic Number: [id number]    Therapy Diagnosis:   Encounter Diagnoses   Name Primary?    Stiffness of joint of left forearm Yes    Stiffness of left wrist joint     Left wrist pain     Left wrist effusion     Left hand weakness      Physician: Thomas Ivory MD    Physician: Remy Roche II, MD     Physician Orders: evaluate and tx, see epic  Medical Diagnosis: left arm pain, CRPS type 1 left arm  Surgical Procedure and Date: n/a, n/a  Evaluation Date: 8/9/2022  Insurance Authorization Period Expiration: referral [id number] 8-9-22 thru 12-30-22  Plan of Care Certification Period: 8-9-22 thru 9-6-22  Date of Return to referral source's office: see epic     Visit # / Visits authorized: 5 / 25 referral [id number] 8-9-22 thru 12-30-22    Time In:3  Time Out: 4  Total Billable Time: 60 minutes         Precautions:  universal, see epic, protocol if applicable    Subjective     Pt reports: . she is compliant with home exercise program. Says doing df, pf stretches with arm supported on rolled up towel on table makes shoulder hurt. Understands to put elbow in "L" shape by side and support bilateral elbows and forearms with pillows and/or towels to make left shoulder not hurt with df/pf stretches.  Response to previous treatment:good  Functional change: light use continues to improve day to day  Pain: 0/10 rest; 3/10 light use  Side: left  Location: diffuse      Wrist    ache  Objective       Timed units:  3 therapeutic exercise                 Time:315-4      Untimed units:  fluidotherapy                           Time:3-315      Measurements:     N/a    Fluido: 15 min.    U/s:n/a      UBE: n/a    Scar massage: n/a    Stretches as tolerated-pain free: df, pf      Manual:     N/a    ROM: prom df/pf    PRE: n/a      HEP: see above and/or below    N/a    Home Exercises and Education Provided     Education provided:     See above, patient " seems more at ease with alternative position of proximal arm noted above with df/pf stretches    progress towards goals   likely tx progression  rationale of rehab interventions    Written Home Exercises/Information Provided: no.        previously issued exercises and/or other issued home therapy instructions were reviewed if still part of current tx plan as well as any issued today and Lynn was able to demonstrate them prior to the end of the session.  Lynn demonstrated good understanding of the HEP provided.     See EMR under patient instructions and/or media for HEP issued today or in past    Assessment     Likely will need prayer and fist forward stretches in future to maximize passive df/pf long term        Pt would continue to benefit from skilled OT in order to facilitate strong, painless, and full use.    Lynn is progressing well towards her goals and there are no updates to goals at this time unless goals noted below are different than goals on previous notes or evaluation. Pt prognosis is good  Pt will continue to benefit from skilled outpatient occupational therapy to address the deficits listed in the problem list on initial evaluation to provide pt/family education and to maximize pt's level of independence in the home and community environment.     Anticipated barriers to occupational therapy: edema, pain, stiffness, weakness    Pt's spiritual, cultural and educational needs considered and pt agreeable to plan of care and goals.    Goals:  stg to be met in 2 weeks 1. Patient will be I with HEP 2. Patient will have 2/10 pain with light use 3. Patient will have = prom of bilateral wrists to enhance affected arm use with ADL        ltg to be met by d/c 1. Patient will be I with d/c HEP 2. Patient will have 1/10 pain with all use 3. Patient will have about 75% /pinch on affected side vs unaffected side to promote full functional use                                                                 4.  Patient will be I with all ADL       all goals ongoing unless noted above or met today or in past but not noted yet              Any goals met today ?  (if any met see above)    Updates/Grading for next session: prn    Plan: evaluate and tx    Dean GUNN CHT

## 2022-09-08 ENCOUNTER — CLINICAL SUPPORT (OUTPATIENT)
Dept: REHABILITATION | Facility: HOSPITAL | Age: 57
End: 2022-09-08
Attending: ORTHOPAEDIC SURGERY
Payer: COMMERCIAL

## 2022-09-08 DIAGNOSIS — M25.632 STIFFNESS OF JOINT OF LEFT FOREARM: Primary | ICD-10-CM

## 2022-09-08 DIAGNOSIS — M25.632 STIFFNESS OF LEFT WRIST JOINT: ICD-10-CM

## 2022-09-08 DIAGNOSIS — M25.432 LEFT WRIST EFFUSION: ICD-10-CM

## 2022-09-08 DIAGNOSIS — R29.898 LEFT HAND WEAKNESS: ICD-10-CM

## 2022-09-08 DIAGNOSIS — M25.532 LEFT WRIST PAIN: ICD-10-CM

## 2022-09-08 PROCEDURE — 97110 THERAPEUTIC EXERCISES: CPT | Mod: PN

## 2022-09-08 PROCEDURE — 97140 MANUAL THERAPY 1/> REGIONS: CPT | Mod: PN

## 2022-09-08 PROCEDURE — 97022 WHIRLPOOL THERAPY: CPT | Mod: PN

## 2022-09-08 NOTE — PLAN OF CARE
Outpatient Therapy Updated Plan of Care     Visit Date: 9/8/2022  Name: Lynn Naidu  Clinic Number: [id number]    Therapy Diagnosis:   Encounter Diagnoses   Name Primary?    Stiffness of joint of left forearm Yes    Stiffness of left wrist joint     Left wrist pain     Left wrist effusion     Left hand weakness      Physician: Thomas Ivory MD    Physician Orders: evaluate and tx, see epic  Medical Diagnosis: see evaluation  Evaluation Date: 8-9-22    Total Visits Received: 7  Cancelled Visits: see epic  No Show Visits: see epic    Current Certification Period:  8-9-22 to 9-6-22  Precautions:  universal, see epic  Visits from Evaluation Date:  6  Functional Level Prior to Evaluation:  Decreased rom, use, and strength; pre symptom onset had no deficits with functional use     Subjective     Update: pleased with progress    Objective     Update: see prior notes for measurements    Assessment     Update: edema, pain, and stiffness continues to decrease    Previous Short Term Goals Status:   see last note  New Short Term Goals Status:   n/a  Long Term Goal Status:   continue per initial plan of care.  Continue any LTGs added in notes post initial eval  Reasons for Recertification of Therapy:   continued rehab needed to progress tx to maximize functional use and upgrade HEP prn    Plan     Updated Certification Period: 9/8/2022 to 12-1-22  Recommended Treatment Plan: 2 times per week for 12 weeks: evaluate and tx      Dean Fung OT/CHT  9/8/2022    frequency per week may change based on patient progress and need for therapy      I CERTIFY THE NEED FOR THESE SERVICES FURNISHED UNDER THIS PLAN OF TREATMENT AND WHILE UNDER MY CARE    Physician's comments:        Physician's Signature: ___________________________________________________

## 2022-09-08 NOTE — PROGRESS NOTES
Occupational Therapy Daily Treatment Note     Date: 9/8/2022  Name: Lynn Naidu  Clinic Number: [id number]    Therapy Diagnosis:   Encounter Diagnoses   Name Primary?    Stiffness of joint of left forearm Yes    Stiffness of left wrist joint     Left wrist pain     Left wrist effusion     Left hand weakness      Physician: Thomas Ivory MD    Physician: Remy Roche II, MD     Physician Orders: evaluate and tx, see epic  Medical Diagnosis: left arm pain, CRPS type 1 left arm  Surgical Procedure and Date: n/a, n/a  Evaluation Date: 8/9/2022  Insurance Authorization Period Expiration: referral [id number] 8-9-22 thru 12-30-22  Plan of Care Certification Period: 9-8-22 thru 12-1-22  Date of Return to referral source's office: see epic     Visit # / Visits authorized: 6 / 25 referral [id number] 8-9-22 thru 12-30-22    Time In:3  Time Out: 4  Total Billable Time: 60 minutes         Precautions:  universal, see epic, protocol if applicable    Subjective     Pt reports: . she is compliant with home exercise program.   Response to previous treatment:good  Functional change: light use continues to improve day to day with self and home care  Pain: 0/10 rest; 3/10 light use  Side: left  Location: diffuse      Wrist    ache  Objective       Timed units:  2 manual                                    time:315-345  1 therapeutic exercise                 Time:345-4      Untimed units:  fluidotherapy                           Time:3-315      Measurements:     Prom df/pf right 80/90 left 60/54    Fluido: 15 min.    U/s:n/a      UBE: n/a    Scar massage: n/a    Stretches as tolerated-pain free: df, pf      Manual:     Radiocarpal joint traction open pack    Radiocarpal joint traction for extension: prepositioned extension, extension ulnar deviation, extension ulnar deviation pronation    Radiocarpal joint traction prepositioned flexion, flexion ulnar deviation, flexion ulnar deviation supinatio    ROM: prom df/pf    PRE:  n/a      HEP: see above and/or below    N/a    Home Exercises and Education Provided     Education provided:     Explained wrist creases becoming more evident which indicates edema is decreasing      progress towards goals   likely tx progression  rationale of rehab interventions    Written Home Exercises/Information Provided: no.        previously issued exercises and/or other issued home therapy instructions were reviewed if still part of current tx plan as well as any issued today and Lynn was able to demonstrate them prior to the end of the session.  Lynn demonstrated good understanding of the HEP provided.     See EMR under patient instructions and/or media for HEP issued today or in past    Assessment     Patient with a more encouraged outlook and seems more at ease in therapy vs 2 weeks ago or so, looks  consistent with home program        Pt would continue to benefit from skilled OT in order to facilitate strong, painless, and full use.    Lynn is progressing well towards her goals and there are no updates to goals at this time unless goals noted below are different than goals on previous notes or evaluation. Pt prognosis is good  Pt will continue to benefit from skilled outpatient occupational therapy to address the deficits listed in the problem list on initial evaluation to provide pt/family education and to maximize pt's level of independence in the home and community environment.     Anticipated barriers to occupational therapy: edema, pain, stiffness, weakness    Pt's spiritual, cultural and educational needs considered and pt agreeable to plan of care and goals.    Goals:  stg to be met in 2 weeks 1. Patient will be I with HEP 2. Patient will have 2/10 pain with light use 3. Patient will have = prom of bilateral wrists to enhance affected arm use with ADL        ltg to be met by d/c 1. Patient will be I with d/c HEP 2. Patient will have 1/10 pain with all use 3. Patient will have about 75%  /pinch on affected side vs unaffected side to promote full functional use                                                                 4. Patient will be I with all ADL       all goals ongoing unless noted above or met today or in past but not noted yet              Any goals met today ?  (if any met see above)    Updates/Grading for next session: prn    Plan: evaluate and tx    Dean GUNN CHT

## 2022-09-16 ENCOUNTER — CLINICAL SUPPORT (OUTPATIENT)
Dept: REHABILITATION | Facility: HOSPITAL | Age: 57
End: 2022-09-16
Attending: ORTHOPAEDIC SURGERY
Payer: COMMERCIAL

## 2022-09-16 DIAGNOSIS — M25.632 STIFFNESS OF JOINT OF LEFT FOREARM: Primary | ICD-10-CM

## 2022-09-16 DIAGNOSIS — M25.532 LEFT WRIST PAIN: ICD-10-CM

## 2022-09-16 DIAGNOSIS — M25.632 STIFFNESS OF LEFT WRIST JOINT: ICD-10-CM

## 2022-09-16 DIAGNOSIS — R29.898 LEFT HAND WEAKNESS: ICD-10-CM

## 2022-09-16 DIAGNOSIS — M25.432 LEFT WRIST EFFUSION: ICD-10-CM

## 2022-09-16 PROCEDURE — 97140 MANUAL THERAPY 1/> REGIONS: CPT | Mod: PN

## 2022-09-16 PROCEDURE — 97110 THERAPEUTIC EXERCISES: CPT | Mod: PN

## 2022-09-16 PROCEDURE — 97022 WHIRLPOOL THERAPY: CPT | Mod: PN

## 2022-09-16 NOTE — PROGRESS NOTES
Occupational Therapy Daily Treatment Note     Date: 9/16/2022  Name: Lynn Naidu  Clinic Number: [id number]    Therapy Diagnosis:   Encounter Diagnoses   Name Primary?    Stiffness of joint of left forearm Yes    Stiffness of left wrist joint     Left wrist pain     Left wrist effusion     Left hand weakness      Physician: Thomas Ivory MD    Physician: Remy Roche II, MD     Physician Orders: evaluate and tx, see epic  Medical Diagnosis: left arm pain, CRPS type 1 left arm  Surgical Procedure and Date: n/a, n/a  Evaluation Date: 8/9/2022  Insurance Authorization Period Expiration: referral [id number] 8-9-22 thru 12-30-22  Plan of Care Certification Period: 9-8-22 thru 12-1-22  Date of Return to referral source's office: see epic     Visit # / Visits authorized: 7 / 25 referral [id number] 8-9-22 thru 12-30-22    Time In:3  Time Out: 4  Total Billable Time: 60 minutes         Precautions:  universal, see epic, protocol if applicable    Subjective     Pt reports: . she is compliant with home exercise program.   Response to previous treatment:good  Functional change: carrying light items with good increase in last 2 weeks or so  Pain: 0/10 rest; 3/10 light use  Side: left  Location: diffuse      Wrist    ache  Objective       Timed units:  2 manual                                    time:315-345  1 therapeutic exercise                 Time:345-4      Untimed units:  fluidotherapy                           Time:3-315      Measurements:     N/a  Fluido: 15 min.    U/s:n/a      UBE: n/a    Scar massage: n/a    Stretches as tolerated-pain free: df, pf      Manual:     Radiocarpal joint traction open pack    Radiocarpal joint traction for extension: prepositioned extension, extension ulnar deviation, extension ulnar deviation pronation    Radiocarpal joint traction prepositioned flexion, flexion ulnar deviation, flexion ulnar deviation supinatio    ROM: prom df/pf    PRE: n/a      HEP: see above and/or below        Home  Exercises and Education Provided     Education provided:     Explained to continue gentle prolonged daily stretches    progress towards goals   likely tx progression  rationale of rehab interventions    Written Home Exercises/Information Provided: no.        previously issued exercises and/or other issued home therapy instructions were reviewed if still part of current tx plan as well as any issued today and Lynn was able to demonstrate them prior to the end of the session.  Lynn demonstrated good understanding of the HEP provided.     See EMR under patient instructions and/or media for HEP issued today or in past    Assessment     As wrist tightness decreases prayer and fist forward stretches likely will be beneficial        Pt would continue to benefit from skilled OT in order to facilitate strong, painless, and full use.    Lynn is progressing well towards her goals and there are no updates to goals at this time unless goals noted below are different than goals on previous notes or evaluation. Pt prognosis is good  Pt will continue to benefit from skilled outpatient occupational therapy to address the deficits listed in the problem list on initial evaluation to provide pt/family education and to maximize pt's level of independence in the home and community environment.     Anticipated barriers to occupational therapy: edema, pain, stiffness, weakness    Pt's spiritual, cultural and educational needs considered and pt agreeable to plan of care and goals.    Goals:  stg to be met in 2 weeks 1. Patient will be I with HEP 2. Patient will have 2/10 pain with light use 3. Patient will have = prom of bilateral wrists to enhance affected arm use with ADL        ltg to be met by d/c 1. Patient will be I with d/c HEP 2. Patient will have 1/10 pain with all use 3. Patient will have about 75% /pinch on affected side vs unaffected side to promote full functional use                                                                  4. Patient will be I with all ADL       all goals ongoing unless noted above or met today or in past but not noted yet              Any goals met today ?  (if any met see above)    Updates/Grading for next session: prn    Plan: evaluate and tx    Dean GUNN CHT

## 2022-09-19 ENCOUNTER — HOSPITAL ENCOUNTER (OUTPATIENT)
Dept: RADIOLOGY | Facility: CLINIC | Age: 57
Discharge: HOME | End: 2022-09-19
Attending: FAMILY MEDICINE
Payer: COMMERCIAL

## 2022-09-19 VITALS — BODY MASS INDEX: 33.68 KG/M2 | WEIGHT: 190.06 LBS | HEIGHT: 63 IN

## 2022-09-19 DIAGNOSIS — Z12.31 VISIT FOR SCREENING MAMMOGRAM: ICD-10-CM

## 2022-09-19 PROCEDURE — 77067 SCR MAMMO BI INCL CAD: CPT | Mod: TC,PO

## 2022-09-19 PROCEDURE — 77063 BREAST TOMOSYNTHESIS BI: CPT | Mod: 26,,, | Performed by: RADIOLOGY

## 2022-09-19 PROCEDURE — 77067 MAMMO DIGITAL SCREENING BILAT WITH TOMO: ICD-10-PCS | Mod: 26,,, | Performed by: RADIOLOGY

## 2022-09-19 PROCEDURE — 77067 SCR MAMMO BI INCL CAD: CPT | Mod: 26,,, | Performed by: RADIOLOGY

## 2022-09-19 PROCEDURE — 77063 MAMMO DIGITAL SCREENING BILAT WITH TOMO: ICD-10-PCS | Mod: 26,,, | Performed by: RADIOLOGY

## 2022-09-23 ENCOUNTER — CLINICAL SUPPORT (OUTPATIENT)
Dept: REHABILITATION | Facility: HOSPITAL | Age: 57
End: 2022-09-23
Attending: ORTHOPAEDIC SURGERY
Payer: COMMERCIAL

## 2022-09-23 DIAGNOSIS — M25.632 STIFFNESS OF LEFT WRIST JOINT: ICD-10-CM

## 2022-09-23 DIAGNOSIS — R29.898 LEFT HAND WEAKNESS: ICD-10-CM

## 2022-09-23 DIAGNOSIS — M25.532 LEFT WRIST PAIN: ICD-10-CM

## 2022-09-23 DIAGNOSIS — M25.632 STIFFNESS OF JOINT OF LEFT FOREARM: Primary | ICD-10-CM

## 2022-09-23 DIAGNOSIS — M25.432 LEFT WRIST EFFUSION: ICD-10-CM

## 2022-09-23 PROCEDURE — 97140 MANUAL THERAPY 1/> REGIONS: CPT | Mod: PN

## 2022-09-23 PROCEDURE — 97022 WHIRLPOOL THERAPY: CPT | Mod: PN

## 2022-09-23 PROCEDURE — 97110 THERAPEUTIC EXERCISES: CPT | Mod: PN

## 2022-09-23 NOTE — PROGRESS NOTES
ccupational Therapy Daily Treatment Note     Date: 9/23/2022  Name: Lynn Naidu  Clinic Number: [id number]    Therapy Diagnosis:   Encounter Diagnoses   Name Primary?    Stiffness of joint of left forearm Yes    Stiffness of left wrist joint     Left wrist effusion     Left hand weakness     Left wrist pain      Physician: Thomas Ivory MD    Physician: Remy Roche II, MD     Physician Orders: evaluate and tx, see epic  Medical Diagnosis: left arm pain, CRPS type 1 left arm  Surgical Procedure and Date: n/a, n/a  Evaluation Date: 8/9/2022  Insurance Authorization Period Expiration: referral [id number] 8-9-22 thru 12-30-22  Plan of Care Certification Period: 9-8-22 thru 12-1-22  Date of Return to referral source's office: see epic     Visit # / Visits authorized: 8 / 25 referral [id number] 8-9-22 thru 12-30-22    Time In:4  Time Out: 5  Total Billable Time: 60 minutes         Precautions:  universal, see epic, protocol if applicable    Subjective     Pt reports: . she is compliant with home exercise program. Pleased with progress, doing some of the previously issued arom exercises in morning to offset tightness from sleeping.  Response to previous treatment:good  Functional change: light use continues to improve  Pain: 0/10 rest; 3/10 light use  Side: left  Location: diffuse      Wrist    ache  Objective       Timed units:  2 manual                                    time:415-445  1 therapeutic exercise                 Time:445-5      Untimed units:  fluidotherapy                           Time:4-415      Measurements:     Prom       df/pf              right 80/90     left 70/60    Sf composite flex dpc bilaterally with no tension noted at end range for left sf        Fluido: 15 min.    U/s:n/a      UBE: n/a    Scar massage: n/a    Stretches as tolerated-pain free: df, pf      Manual:     Radiocarpal joint traction open pack    Radiocarpal joint traction for extension: prepositioned extension, extension ulnar  deviation, extension ulnar deviation pronation    Radiocarpal joint traction prepositioned flexion, flexion ulnar deviation, flexion ulnar deviation supinatio    ROM: prom df/pf    PRE: n/a      HEP: see above and/or below        Home Exercises and Education Provided     Education provided:     Reviewed prom progress since day 1 of OT    progress towards goals   likely tx progression  rationale of rehab interventions    Written Home Exercises/Information Provided: no.        previously issued exercises and/or other issued home therapy instructions were reviewed if still part of current tx plan as well as any issued today and Lynn was able to demonstrate them prior to the end of the session.  Lynn demonstrated good understanding of the HEP provided.     See EMR under patient instructions and/or media for HEP issued today or in past    Assessment     Looks faithful with home program      Pt would continue to benefit from skilled OT in order to facilitate strong, painless, and full use.    Lynn is progressing well towards her goals and there are no updates to goals at this time unless goals noted below are different than goals on previous notes or evaluation. Pt prognosis is good  Pt will continue to benefit from skilled outpatient occupational therapy to address the deficits listed in the problem list on initial evaluation to provide pt/family education and to maximize pt's level of independence in the home and community environment.     Anticipated barriers to occupational therapy: edema, pain, stiffness, weakness    Pt's spiritual, cultural and educational needs considered and pt agreeable to plan of care and goals.    Goals:  stg to be met in 2 weeks 1. Patient will be I with HEP 2. Patient will have 2/10 pain with light use 3. Patient will have = prom of bilateral wrists to enhance affected arm use with ADL        ltg to be met by d/c 1. Patient will be I with d/c HEP 2. Patient will have 1/10 pain with all use  3. Patient will have about 75% /pinch on affected side vs unaffected side to promote full functional use                                                                 4. Patient will be I with all ADL       all goals ongoing unless noted above or met today or in past but not noted yet              Any goals met today ?  (if any met see above)    Updates/Grading for next session: prn    Plan: evaluate and tx    Dean GUNN, ASHLEY

## 2022-09-26 ENCOUNTER — CLINICAL SUPPORT (OUTPATIENT)
Dept: REHABILITATION | Facility: HOSPITAL | Age: 57
End: 2022-09-26
Attending: ORTHOPAEDIC SURGERY
Payer: COMMERCIAL

## 2022-09-26 DIAGNOSIS — M25.432 LEFT WRIST EFFUSION: ICD-10-CM

## 2022-09-26 DIAGNOSIS — M25.632 STIFFNESS OF JOINT OF LEFT FOREARM: Primary | ICD-10-CM

## 2022-09-26 DIAGNOSIS — M25.532 LEFT WRIST PAIN: ICD-10-CM

## 2022-09-26 DIAGNOSIS — R29.898 LEFT HAND WEAKNESS: ICD-10-CM

## 2022-09-26 DIAGNOSIS — M25.632 STIFFNESS OF LEFT WRIST JOINT: ICD-10-CM

## 2022-09-26 PROCEDURE — 97140 MANUAL THERAPY 1/> REGIONS: CPT | Mod: PN

## 2022-09-26 PROCEDURE — 97110 THERAPEUTIC EXERCISES: CPT | Mod: PN

## 2022-09-26 PROCEDURE — 97022 WHIRLPOOL THERAPY: CPT | Mod: PN

## 2022-09-26 NOTE — PROGRESS NOTES
cupational Therapy Daily Treatment Note     Date: 9/26/2022  Name: Lynn Naidu  Clinic Number: [id number]    Therapy Diagnosis:   Encounter Diagnoses   Name Primary?    Stiffness of joint of left forearm Yes    Stiffness of left wrist joint     Left wrist effusion     Left hand weakness     Left wrist pain      Physician: Thomas Ivory MD    Physician: Remy Roche II, MD     Physician Orders: evaluate and tx, see epic  Medical Diagnosis: left arm pain, CRPS type 1 left arm  Surgical Procedure and Date: n/a, n/a  Evaluation Date: 8/9/2022  Insurance Authorization Period Expiration: referral [id number] 8-9-22 thru 12-30-22  Plan of Care Certification Period: 9-8-22 thru 12-1-22  Date of Return to referral source's office: see epic     Visit # / Visits authorized: 9 / 25 referral [id number] 8-9-22 thru 12-30-22    Time In:3  Time Out: 4  Total Billable Time: 60 minutes         Precautions:  universal, see epic, protocol if applicable    Subjective     Pt reports: . she is compliant with home exercise program. Understands composite flexion stretches on sf may elicit some mild pulling along dorsal ulnar forearm. Encouraged by progress in last few weeks or so.  Response to previous treatment:good  Functional change: light use continues to improve  Pain: 0/10 rest; 3/10 light use  Side: left  Location: diffuse      Wrist    ache  Objective       Timed units:  2 manual                                    time:415-445  1 therapeutic exercise                 Time:445-5      Untimed units:  fluidotherapy                           Time:4-415      Measurements:     N/a      Fluido: 15 min.    U/s:n/a      UBE: n/a    Scar massage: n/a    Stretches as tolerated-pain free: df, pf      Manual:     Radiocarpal joint traction open pack    Radiocarpal joint traction for extension: prepositioned extension, extension ulnar deviation, extension ulnar deviation pronation    Radiocarpal joint traction prepositioned flexion, flexion ulnar  deviation, flexion ulnar deviation supinatio    ROM: prom df/pf    PRE: n/a      HEP: see above and/or below        Home Exercises and Education Provided     Education provided:       progress towards goals   likely tx progression  rationale of rehab interventions    Written Home Exercises/Information Provided: no.        previously issued exercises and/or other issued home therapy instructions were reviewed if still part of current tx plan as well as any issued today and Lynn was able to demonstrate them prior to the end of the session.  Lynn demonstrated good understanding of the HEP provided.     See EMR under patient instructions and/or media for HEP issued today or in past    Assessment     Prayer and fist forward stretches may help to improve passive pf/df, good reduction in stiffness since day 1 of OT plus reactivity with good decrease as well since initial evaluation      Pt would continue to benefit from skilled OT in order to facilitate strong, painless, and full use.    Lynn is progressing well towards her goals and there are no updates to goals at this time unless goals noted below are different than goals on previous notes or evaluation. Pt prognosis is good  Pt will continue to benefit from skilled outpatient occupational therapy to address the deficits listed in the problem list on initial evaluation to provide pt/family education and to maximize pt's level of independence in the home and community environment.     Anticipated barriers to occupational therapy: edema, pain, stiffness, weakness    Pt's spiritual, cultural and educational needs considered and pt agreeable to plan of care and goals.    Goals:  stg to be met in 2 weeks 1. Patient will be I with HEP 2. Patient will have 2/10 pain with light use 3. Patient will have = prom of bilateral wrists to enhance affected arm use with ADL        ltg to be met by d/c 1. Patient will be I with d/c HEP 2. Patient will have 1/10 pain with all use 3.  Patient will have about 75% /pinch on affected side vs unaffected side to promote full functional use                                                                 4. Patient will be I with all ADL       all goals ongoing unless noted above or met today or in past but not noted yet              Any goals met today ?  (if any met see above)    Updates/Grading for next session: prn    Plan: evaluate and tx    Dean GUNN CHT

## 2022-09-30 ENCOUNTER — CLINICAL SUPPORT (OUTPATIENT)
Dept: REHABILITATION | Facility: HOSPITAL | Age: 57
End: 2022-09-30
Attending: ORTHOPAEDIC SURGERY
Payer: COMMERCIAL

## 2022-09-30 DIAGNOSIS — M25.532 LEFT WRIST PAIN: ICD-10-CM

## 2022-09-30 DIAGNOSIS — M25.632 STIFFNESS OF LEFT WRIST JOINT: ICD-10-CM

## 2022-09-30 DIAGNOSIS — M25.432 LEFT WRIST EFFUSION: ICD-10-CM

## 2022-09-30 DIAGNOSIS — R29.898 LEFT HAND WEAKNESS: ICD-10-CM

## 2022-09-30 DIAGNOSIS — M25.632 STIFFNESS OF JOINT OF LEFT FOREARM: Primary | ICD-10-CM

## 2022-09-30 PROCEDURE — 97110 THERAPEUTIC EXERCISES: CPT | Mod: PN

## 2022-09-30 PROCEDURE — 97022 WHIRLPOOL THERAPY: CPT | Mod: PN

## 2022-09-30 PROCEDURE — 97140 MANUAL THERAPY 1/> REGIONS: CPT | Mod: PN

## 2022-09-30 NOTE — PROGRESS NOTES
cupational Therapy Daily Treatment Note     Date: 9/30/2022  Name: Lynn Naidu  Clinic Number: [id number]    Therapy Diagnosis:   Encounter Diagnoses   Name Primary?    Stiffness of joint of left forearm Yes    Stiffness of left wrist joint     Left wrist effusion     Left hand weakness     Left wrist pain      Physician: Thomas Ivory MD    Physician: Remy Roche II, MD     Physician Orders: evaluate and tx, see epic  Medical Diagnosis: left arm pain, CRPS type 1 left arm  Surgical Procedure and Date: n/a, n/a  Evaluation Date: 8/9/2022  Insurance Authorization Period Expiration: referral [id number] 8-9-22 thru 12-30-22  Plan of Care Certification Period: 9-8-22 thru 12-1-22  Date of Return to referral source's office: see epic     Visit # / Visits authorized: 10 / 25 referral [id number] 8-9-22 thru 12-30-22    Time In:4  Time Out:5  Total Billable Time: 60 minutes         Precautions:  universal, see epic, protocol if applicable    Subjective     Pt reports: . she is compliant with home exercise program. Morning tightness persists but fades throughout the day. Says current edema glove getting tattered so requested info on how to get a new one (I have none in stock as of today).  Response to previous treatment:good  Functional change: light use continues to improve day to day but firm  still difficult  Pain: 0/10 rest; 3/10 light use  Side: left  Location: diffuse      Wrist    ache  Objective       Timed units:  2 manual                                    time:415-445  1 therapeutic exercise                 Time:445-5      Untimed units:  fluidotherapy                           Time:4-415      Measurements:     N/a      Fluido: 15 min.    U/s:n/a      UBE: n/a    Scar massage: n/a    Stretches as tolerated-pain free: df, pf, prayer, fist forward      Manual:     Radiocarpal joint traction open pack    Radiocarpal joint traction for extension: prepositioned extension, extension ulnar deviation, extension  ulnar deviation pronation    Radiocarpal joint traction prepositioned flexion, flexion ulnar deviation, flexion ulnar deviation supinatio    ROM: prom df/pf    PRE: n/a      HEP: see above and/or below        Home Exercises and Education Provided     Education provided:       progress towards goals   likely tx progression  rationale of rehab interventions    Written Home Exercises/Information Provided: yes        previously issued exercises and/or other issued home therapy instructions were reviewed if still part of current tx plan as well as any issued today and Lynn was able to demonstrate them prior to the end of the session.  Lynn demonstrated good understanding of the HEP provided.     See EMR under patient instructions and/or media for HEP issued today or in past    Assessment     May eventually benefit from  and pinch PRE to improve functional use      Pt would continue to benefit from skilled OT in order to facilitate strong, painless, and full use.    Lynn is progressing well towards her goals and there are no updates to goals at this time unless goals noted below are different than goals on previous notes or evaluation. Pt prognosis is good  Pt will continue to benefit from skilled outpatient occupational therapy to address the deficits listed in the problem list on initial evaluation to provide pt/family education and to maximize pt's level of independence in the home and community environment.     Anticipated barriers to occupational therapy: edema, pain, stiffness, weakness    Pt's spiritual, cultural and educational needs considered and pt agreeable to plan of care and goals.    Goals:  stg to be met in 2 weeks 1. Patient will be I with HEP 2. Patient will have 2/10 pain with light use 3. Patient will have = prom of bilateral wrists to enhance affected arm use with ADL        ltg to be met by d/c 1. Patient will be I with d/c HEP 2. Patient will have 1/10 pain with all use 3. Patient will have  about 75% /pinch on affected side vs unaffected side to promote full functional use                                                                 4. Patient will be I with all ADL       all goals ongoing unless noted above or met today or in past but not noted yet              Any goals met today ?  (if any met see above)    Updates/Grading for next session: prn    Plan: evaluate and tx    Dean GUNN CHT

## 2022-10-03 ENCOUNTER — CLINICAL SUPPORT (OUTPATIENT)
Dept: REHABILITATION | Facility: HOSPITAL | Age: 57
End: 2022-10-03
Attending: ORTHOPAEDIC SURGERY
Payer: COMMERCIAL

## 2022-10-03 DIAGNOSIS — R29.898 LEFT HAND WEAKNESS: ICD-10-CM

## 2022-10-03 DIAGNOSIS — M25.632 STIFFNESS OF JOINT OF LEFT FOREARM: Primary | ICD-10-CM

## 2022-10-03 DIAGNOSIS — M25.632 STIFFNESS OF LEFT WRIST JOINT: ICD-10-CM

## 2022-10-03 DIAGNOSIS — M25.532 LEFT WRIST PAIN: ICD-10-CM

## 2022-10-03 DIAGNOSIS — M25.432 LEFT WRIST EFFUSION: ICD-10-CM

## 2022-10-03 PROCEDURE — 97110 THERAPEUTIC EXERCISES: CPT | Mod: PN

## 2022-10-03 PROCEDURE — 97022 WHIRLPOOL THERAPY: CPT | Mod: PN

## 2022-10-03 PROCEDURE — 97140 MANUAL THERAPY 1/> REGIONS: CPT | Mod: PN

## 2022-10-03 NOTE — PROGRESS NOTES
cupational Therapy Daily Treatment Note     Date: 10/3/2022  Name: Lynn Naidu  Clinic Number: [id number]    Therapy Diagnosis:   Encounter Diagnoses   Name Primary?    Stiffness of joint of left forearm Yes    Stiffness of left wrist joint     Left wrist effusion     Left hand weakness     Left wrist pain      Physician: Thomas Ivory MD    Physician: Remy Roche II, MD     Physician Orders: evaluate and tx, see epic  Medical Diagnosis: left arm pain, CRPS type 1 left arm  Surgical Procedure and Date: n/a, n/a  Evaluation Date: 8/9/2022  Insurance Authorization Period Expiration: referral [id number] 8-9-22 thru 12-30-22  Plan of Care Certification Period: 9-8-22 thru 12-1-22  Date of Return to referral source's office: see epic     Visit # / Visits authorized: 11 / 25 referral [id number] 8-9-22 thru 12-30-22    Time In:3  Time Out:4  Total Billable Time: 60 minutes         Precautions:  universal, see epic, protocol if applicable    Subjective     Pt reports: . she is compliant with home exercise program. Understands alternative version of prayer stretch reviewed today since one issued previously hurts her left shoulder.  Response to previous treatment:good  Functional change: none stated  Pain: 0/10 rest; 3/10 light use  Side: left  Location: diffuse      Wrist    ache  Objective       Timed units:  2 manual                                    time:315-345  1 therapeutic exercise                 Time:345-4      Untimed units:  fluidotherapy                           Time:3-315      Measurements:     Prom df/pf         right 80/90    left 72/70      Fluido: 15 min.    U/s:n/a      UBE: n/a    Scar massage: n/a    Stretches as tolerated-pain free: df, pf, prayer, fist forward      Manual:     Radiocarpal joint traction open pack    Radiocarpal joint traction for extension: prepositioned extension, extension ulnar deviation, extension ulnar deviation pronation    Radiocarpal joint traction prepositioned flexion,  flexion ulnar deviation, flexion ulnar deviation supinatio    ROM: prom df/pf    PRE: n/a      HEP: see above and/or below          Home Exercises and Education Provided     Education provided:       progress towards goals   likely tx progression  rationale of rehab interventions    Written Home Exercises/Information Provided: no      previously issued exercises and/or other issued home therapy instructions were reviewed if still part of current tx plan as well as any issued today and Lynn was able to demonstrate them prior to the end of the session.  Lynn demonstrated good understanding of the HEP provided.     See EMR under patient instructions and/or media for HEP issued today or in past    Assessment     Edema, pain, and stiffness decreasing consistently        Pt would continue to benefit from skilled OT in order to facilitate strong, painless, and full use.    Lynn is progressing well towards her goals and there are no updates to goals at this time unless goals noted below are different than goals on previous notes or evaluation. Pt prognosis is good  Pt will continue to benefit from skilled outpatient occupational therapy to address the deficits listed in the problem list on initial evaluation to provide pt/family education and to maximize pt's level of independence in the home and community environment.     Anticipated barriers to occupational therapy: edema, pain, stiffness, weakness    Pt's spiritual, cultural and educational needs considered and pt agreeable to plan of care and goals.    Goals:  stg to be met in 2 weeks 1. Patient will be I with HEP 2. Patient will have 2/10 pain with light use 3. Patient will have = prom of bilateral wrists to enhance affected arm use with ADL        ltg to be met by d/c 1. Patient will be I with d/c HEP 2. Patient will have 1/10 pain with all use 3. Patient will have about 75% /pinch on affected side vs unaffected side to promote full functional use                                                                  4. Patient will be I with all ADL       all goals ongoing unless noted above or met today or in past but not noted yet              Any goals met today ?  (if any met see above)    Updates/Grading for next session: prn    Plan: evaluate and tx    Dean GUNN CHT

## 2022-10-04 ENCOUNTER — OFFICE VISIT (OUTPATIENT)
Dept: PAIN MEDICINE | Facility: CLINIC | Age: 57
End: 2022-10-04
Payer: COMMERCIAL

## 2022-10-04 VITALS
HEIGHT: 63 IN | DIASTOLIC BLOOD PRESSURE: 78 MMHG | HEART RATE: 91 BPM | BODY MASS INDEX: 33.66 KG/M2 | WEIGHT: 190 LBS | SYSTOLIC BLOOD PRESSURE: 126 MMHG

## 2022-10-04 DIAGNOSIS — M50.30 DDD (DEGENERATIVE DISC DISEASE), CERVICAL: ICD-10-CM

## 2022-10-04 DIAGNOSIS — M47.892 OTHER SPONDYLOSIS, CERVICAL REGION: ICD-10-CM

## 2022-10-04 DIAGNOSIS — M79.602 LEFT ARM PAIN: Primary | ICD-10-CM

## 2022-10-04 DIAGNOSIS — M25.511 BILATERAL SHOULDER PAIN, UNSPECIFIED CHRONICITY: ICD-10-CM

## 2022-10-04 DIAGNOSIS — G90.512 COMPLEX REGIONAL PAIN SYNDROME TYPE 1 OF LEFT UPPER EXTREMITY: Primary | ICD-10-CM

## 2022-10-04 DIAGNOSIS — G90.512 COMPLEX REGIONAL PAIN SYNDROME TYPE 1 OF LEFT UPPER EXTREMITY: ICD-10-CM

## 2022-10-04 DIAGNOSIS — M25.512 BILATERAL SHOULDER PAIN, UNSPECIFIED CHRONICITY: ICD-10-CM

## 2022-10-04 DIAGNOSIS — M79.602 LEFT ARM PAIN: ICD-10-CM

## 2022-10-04 PROCEDURE — 1159F PR MEDICATION LIST DOCUMENTED IN MEDICAL RECORD: ICD-10-PCS | Mod: CPTII,S$GLB,, | Performed by: PHYSICIAN ASSISTANT

## 2022-10-04 PROCEDURE — 99999 PR PBB SHADOW E&M-EST. PATIENT-LVL III: CPT | Mod: PBBFAC,,, | Performed by: PHYSICIAN ASSISTANT

## 2022-10-04 PROCEDURE — 3074F SYST BP LT 130 MM HG: CPT | Mod: CPTII,S$GLB,, | Performed by: PHYSICIAN ASSISTANT

## 2022-10-04 PROCEDURE — 3008F PR BODY MASS INDEX (BMI) DOCUMENTED: ICD-10-PCS | Mod: CPTII,S$GLB,, | Performed by: PHYSICIAN ASSISTANT

## 2022-10-04 PROCEDURE — 3078F DIAST BP <80 MM HG: CPT | Mod: CPTII,S$GLB,, | Performed by: PHYSICIAN ASSISTANT

## 2022-10-04 PROCEDURE — 3078F PR MOST RECENT DIASTOLIC BLOOD PRESSURE < 80 MM HG: ICD-10-PCS | Mod: CPTII,S$GLB,, | Performed by: PHYSICIAN ASSISTANT

## 2022-10-04 PROCEDURE — 99999 PR PBB SHADOW E&M-EST. PATIENT-LVL III: ICD-10-PCS | Mod: PBBFAC,,, | Performed by: PHYSICIAN ASSISTANT

## 2022-10-04 PROCEDURE — 1159F MED LIST DOCD IN RCRD: CPT | Mod: CPTII,S$GLB,, | Performed by: PHYSICIAN ASSISTANT

## 2022-10-04 PROCEDURE — 3074F PR MOST RECENT SYSTOLIC BLOOD PRESSURE < 130 MM HG: ICD-10-PCS | Mod: CPTII,S$GLB,, | Performed by: PHYSICIAN ASSISTANT

## 2022-10-04 PROCEDURE — 3008F BODY MASS INDEX DOCD: CPT | Mod: CPTII,S$GLB,, | Performed by: PHYSICIAN ASSISTANT

## 2022-10-04 PROCEDURE — 99214 PR OFFICE/OUTPT VISIT, EST, LEVL IV, 30-39 MIN: ICD-10-PCS | Mod: S$GLB,,, | Performed by: PHYSICIAN ASSISTANT

## 2022-10-04 PROCEDURE — 99214 OFFICE O/P EST MOD 30 MIN: CPT | Mod: S$GLB,,, | Performed by: PHYSICIAN ASSISTANT

## 2022-10-04 RX ORDER — HYDROCODONE BITARTRATE AND ACETAMINOPHEN 5; 325 MG/1; MG/1
1 TABLET ORAL EVERY 6 HOURS PRN
Qty: 28 TABLET | Refills: 0 | Status: SHIPPED | OUTPATIENT
Start: 2022-10-04 | End: 2022-11-03

## 2022-10-04 RX ORDER — PREGABALIN 75 MG/1
CAPSULE ORAL
Qty: 90 CAPSULE | Refills: 1 | Status: SHIPPED | OUTPATIENT
Start: 2022-10-04 | End: 2022-12-06
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
## 2022-10-07 ENCOUNTER — CLINICAL SUPPORT (OUTPATIENT)
Dept: REHABILITATION | Facility: HOSPITAL | Age: 57
End: 2022-10-07
Attending: ORTHOPAEDIC SURGERY
Payer: COMMERCIAL

## 2022-10-07 DIAGNOSIS — M25.632 STIFFNESS OF JOINT OF LEFT FOREARM: Primary | ICD-10-CM

## 2022-10-07 DIAGNOSIS — R29.898 LEFT HAND WEAKNESS: ICD-10-CM

## 2022-10-07 DIAGNOSIS — M25.432 LEFT WRIST EFFUSION: ICD-10-CM

## 2022-10-07 DIAGNOSIS — M25.632 STIFFNESS OF LEFT WRIST JOINT: ICD-10-CM

## 2022-10-07 DIAGNOSIS — M25.532 LEFT WRIST PAIN: ICD-10-CM

## 2022-10-07 PROCEDURE — 97022 WHIRLPOOL THERAPY: CPT | Mod: PN

## 2022-10-07 PROCEDURE — 97140 MANUAL THERAPY 1/> REGIONS: CPT | Mod: PN

## 2022-10-07 NOTE — PROGRESS NOTES
cupational Therapy Daily Treatment Note     Date: 10/7/2022  Name: Lynn Naidu  Clinic Number: [id number]    Therapy Diagnosis:   Encounter Diagnoses   Name Primary?    Stiffness of joint of left forearm Yes    Stiffness of left wrist joint     Left wrist effusion     Left hand weakness     Left wrist pain      Physician: Thomas Ivory MD    Physician: Remy Roche II, MD     Physician Orders: evaluate and tx, see epic  Medical Diagnosis: left arm pain, CRPS type 1 left arm  Surgical Procedure and Date: n/a, n/a  Evaluation Date: 8/9/2022  Insurance Authorization Period Expiration: referral [id number] 8-9-22 thru 12-30-22  Plan of Care Certification Period: 9-8-22 thru 12-1-22  Date of Return to referral source's office: see epic     Visit # / Visits authorized: 12 / 25 referral [id number] 8-9-22 thru 12-30-22    Time In:3  Time Out:4  Total Billable Time: 60 minutes         Precautions:  universal, see epic, protocol if applicable    Subjective     Pt reports: . she is compliant with home exercise program. Says left shoulder hurts sometimes although has had a history of neck and left shoulder pain in past.  Response to previous treatment:good  Functional change: light use continues to improve  Pain: 0/10 rest; 3/10 light use  Side: left  Location: diffuse      Wrist    ache  Objective       Timed units:  2 manual                                    time:315-345  1 therapeutic exercise                 Time:345-4      Untimed units:  fluidotherapy                           Time:3-315      Measurements:     N/a    Fluido: 15 min.    U/s:n/a      UBE: n/a    Scar massage: n/a    Stretches as tolerated-pain free: df, pf, fist forward      Manual:     Radiocarpal joint traction open pack    Radiocarpal joint traction for extension: prepositioned extension, extension ulnar deviation, extension ulnar deviation pronation    Radiocarpal joint traction prepositioned flexion, flexion ulnar deviation, flexion ulnar deviation  supinatio    ROM: prom df/pf    PRE: n/a      HEP: see above and/or below          Home Exercises and Education Provided     Education provided:     Explained d/c likely will happen around mid month and she may get a home  and pinch PRE routine pending  and pinch testing      progress towards goals   likely tx progression  rationale of rehab interventions    Written Home Exercises/Information Provided: no      previously issued exercises and/or other issued home therapy instructions were reviewed if still part of current tx plan as well as any issued today and Lynn was able to demonstrate them prior to the end of the session.  Lynn demonstrated good understanding of the HEP provided.     See EMR under patient instructions and/or media for HEP issued today or in past    Assessment     Good reduction in wrist tightness vs 1 month ago      Pt would continue to benefit from skilled OT in order to facilitate strong, painless, and full use.    Lynn is progressing well towards her goals and there are no updates to goals at this time unless goals noted below are different than goals on previous notes or evaluation. Pt prognosis is good  Pt will continue to benefit from skilled outpatient occupational therapy to address the deficits listed in the problem list on initial evaluation to provide pt/family education and to maximize pt's level of independence in the home and community environment.     Anticipated barriers to occupational therapy: edema, pain, stiffness, weakness    Pt's spiritual, cultural and educational needs considered and pt agreeable to plan of care and goals.    Goals:  stg to be met in 2 weeks 1. Patient will be I with HEP 2. Patient will have 2/10 pain with light use 3. Patient will have = prom of bilateral wrists to enhance affected arm use with ADL        ltg to be met by d/c 1. Patient will be I with d/c HEP 2. Patient will have 1/10 pain with all use 3. Patient will have about 75% /pinch  on affected side vs unaffected side to promote full functional use                                                                 4. Patient will be I with all ADL       all goals ongoing unless noted above or met today or in past but not noted yet              Any goals met today ?  (if any met see above)    Updates/Grading for next session: prn    Plan: evaluate and tx    Dean GUNN CHT

## 2022-10-10 ENCOUNTER — CLINICAL SUPPORT (OUTPATIENT)
Dept: REHABILITATION | Facility: HOSPITAL | Age: 57
End: 2022-10-10
Attending: ORTHOPAEDIC SURGERY
Payer: COMMERCIAL

## 2022-10-10 DIAGNOSIS — M25.432 LEFT WRIST EFFUSION: ICD-10-CM

## 2022-10-10 DIAGNOSIS — M25.632 STIFFNESS OF LEFT WRIST JOINT: ICD-10-CM

## 2022-10-10 DIAGNOSIS — M25.532 LEFT WRIST PAIN: ICD-10-CM

## 2022-10-10 DIAGNOSIS — R29.898 LEFT HAND WEAKNESS: ICD-10-CM

## 2022-10-10 DIAGNOSIS — M25.632 STIFFNESS OF JOINT OF LEFT FOREARM: Primary | ICD-10-CM

## 2022-10-10 PROCEDURE — 97110 THERAPEUTIC EXERCISES: CPT | Mod: PN

## 2022-10-10 PROCEDURE — 97140 MANUAL THERAPY 1/> REGIONS: CPT | Mod: PN

## 2022-10-10 PROCEDURE — 97022 WHIRLPOOL THERAPY: CPT | Mod: PN

## 2022-10-10 NOTE — PROGRESS NOTES
upational Therapy Daily Treatment Note     Date: 10/10/2022  Name: Lynn Naidu  Clinic Number: [id number]    Therapy Diagnosis:   Encounter Diagnoses   Name Primary?    Stiffness of joint of left forearm Yes    Stiffness of left wrist joint     Left wrist effusion     Left hand weakness     Left wrist pain      Physician: Thomas Ivory MD    Physician: Remy Roche II, MD     Physician Orders: evaluate and tx, see epic  Medical Diagnosis: left arm pain, CRPS type 1 left arm  Surgical Procedure and Date: n/a, n/a  Evaluation Date: 8/9/2022  Insurance Authorization Period Expiration: referral [id number] 8-9-22 thru 12-30-22  Plan of Care Certification Period: 9-8-22 thru 12-1-22  Date of Return to referral source's office: see epic     Visit # / Visits authorized: 13 / 25 referral [id number] 8-9-22 thru 12-30-22    Time In:3  Time Out:4  Total Billable Time: 60 minutes         Precautions:  universal, see epic, protocol if applicable    Subjective     Pt reports: . she is compliant with home exercise program.   Response to previous treatment:good  Functional change: light use continues to improve day to day   Pain: 0/10 rest; 3/10 light use  Side: left  Location: diffuse      Wrist    ache  Objective       Timed units:  2 manual                                    time:315-345  1 therapeutic exercise                 Time:345-4      Untimed units:  fluidotherapy                           Time:3-315      Measurements:     Circumferential wrist right 16.8 cm left 17.2 cm    Fluido: 15 min.    U/s:n/a      UBE: n/a    Scar massage: n/a    Stretches as tolerated-pain free: df, pf, fist forward      Manual:     Radiocarpal joint traction open pack    Radiocarpal joint traction for extension: prepositioned extension, extension ulnar deviation, extension ulnar deviation pronation    Radiocarpal joint traction prepositioned flexion, flexion ulnar deviation, flexion ulnar deviation supinatio    ROM: prom df/pf    PRE:  n/a      HEP: see above and/or below          Home Exercises and Education Provided     Education provided:           progress towards goals   likely tx progression  rationale of rehab interventions    Written Home Exercises/Information Provided: no      previously issued exercises and/or other issued home therapy instructions were reviewed if still part of current tx plan as well as any issued today and Lynn was able to demonstrate them prior to the end of the session.  Lynn demonstrated good understanding of the HEP provided.     See EMR under patient instructions and/or media for HEP issued today or in past    Assessment     Looks consistent with home program    Pt would continue to benefit from skilled OT in order to facilitate strong, painless, and full use.    Lynn is progressing well towards her goals and there are no updates to goals at this time unless goals noted below are different than goals on previous notes or evaluation. Pt prognosis is good  Pt will continue to benefit from skilled outpatient occupational therapy to address the deficits listed in the problem list on initial evaluation to provide pt/family education and to maximize pt's level of independence in the home and community environment.     Anticipated barriers to occupational therapy: edema, pain, stiffness, weakness    Pt's spiritual, cultural and educational needs considered and pt agreeable to plan of care and goals.    Goals:  stg to be met in 2 weeks 1. Patient will be I with HEP 2. Patient will have 2/10 pain with light use 3. Patient will have = prom of bilateral wrists to enhance affected arm use with ADL        ltg to be met by d/c 1. Patient will be I with d/c HEP 2. Patient will have 1/10 pain with all use 3. Patient will have about 75% /pinch on affected side vs unaffected side to promote full functional use                                                                 4. Patient will be I with all ADL       all goals  ongoing unless noted above or met today or in past but not noted yet              Any goals met today ?  (if any met see above)    Updates/Grading for next session: prn    Plan: evaluate and tx    Dean GUNN CHT

## 2022-10-14 ENCOUNTER — CLINICAL SUPPORT (OUTPATIENT)
Dept: REHABILITATION | Facility: HOSPITAL | Age: 57
End: 2022-10-14
Attending: ORTHOPAEDIC SURGERY
Payer: COMMERCIAL

## 2022-10-14 DIAGNOSIS — R29.898 LEFT HAND WEAKNESS: ICD-10-CM

## 2022-10-14 DIAGNOSIS — M25.532 LEFT WRIST PAIN: ICD-10-CM

## 2022-10-14 DIAGNOSIS — M25.432 LEFT WRIST EFFUSION: ICD-10-CM

## 2022-10-14 DIAGNOSIS — M25.632 STIFFNESS OF JOINT OF LEFT FOREARM: Primary | ICD-10-CM

## 2022-10-14 DIAGNOSIS — M25.632 STIFFNESS OF LEFT WRIST JOINT: ICD-10-CM

## 2022-10-14 PROCEDURE — 97022 WHIRLPOOL THERAPY: CPT | Mod: PN

## 2022-10-14 PROCEDURE — 97140 MANUAL THERAPY 1/> REGIONS: CPT | Mod: PN

## 2022-10-14 PROCEDURE — 97110 THERAPEUTIC EXERCISES: CPT | Mod: PN

## 2022-10-14 NOTE — PROGRESS NOTES
upational Therapy Daily Treatment Note     Date: 10/14/2022  Name: Lynn Naidu  Clinic Number: [id number]    Therapy Diagnosis:   Encounter Diagnoses   Name Primary?    Stiffness of joint of left forearm Yes    Stiffness of left wrist joint     Left wrist effusion     Left hand weakness     Left wrist pain      Physician: Thomas Ivory MD    Physician: Remy Roche II, MD     Physician Orders: evaluate and tx, see epic  Medical Diagnosis: left arm pain, CRPS type 1 left arm  Surgical Procedure and Date: n/a, n/a  Evaluation Date: 8/9/2022  Insurance Authorization Period Expiration: referral [id number] 8-9-22 thru 12-30-22  Plan of Care Certification Period: 9-8-22 thru 12-1-22  Date of Return to referral source's office: see epic     Visit # / Visits authorized: 14 / 25 referral [id number] 8-9-22 thru 12-30-22    Time In:3  Time Out:4  Total Billable Time: 60 minutes         Precautions:  universal, see epic, protocol if applicable    Subjective     Pt reports: . she is compliant with home exercise program. Understands d/c at next visit is likely.  Response to previous treatment:good  Functional change: good increase with basic ADL since 4 weeks ago   Pain: 0/10 rest; 3/10 light use  Side: left  Location: diffuse      Wrist    ache  Objective       Timed units:  2 manual                                    time:315-345  1 therapeutic exercise                 Time:345-4      Untimed units:  fluidotherapy                           Time:3-315      Measurements:     Prom      right               left  Df/pf       80/90           74/78      Fluido: 15 min.    U/s:n/a      UBE: n/a    Scar massage: n/a    Stretches as tolerated-pain free: df, pf, fist forward      Manual:     Radiocarpal joint traction open pack    Radiocarpal joint traction for extension: prepositioned extension, extension ulnar deviation, extension ulnar deviation pronation    Radiocarpal joint traction prepositioned flexion, flexion ulnar deviation,  flexion ulnar deviation supinatio    ROM: prom df/pf    PRE: n/a      HEP: see above and/or below          Home Exercises and Education Provided     Education provided:     See above      progress towards goals   likely tx progression  rationale of rehab interventions    Written Home Exercises/Information Provided: no      previously issued exercises and/or other issued home therapy instructions were reviewed if still part of current tx plan as well as any issued today and Lynn was able to demonstrate them prior to the end of the session.  Lynn demonstrated good understanding of the HEP provided.     See EMR under patient instructions and/or media for HEP issued today or in past    Assessment     Outstanding reduction in stiffness since day 1 of OT, d/c HEP will be essential to resolve last bit of tightness and improve functional use       Pt would continue to benefit from skilled OT in order to facilitate strong, painless, and full use.    Lynn is progressing well towards her goals and there are no updates to goals at this time unless goals noted below are different than goals on previous notes or evaluation. Pt prognosis is good  Pt will continue to benefit from skilled outpatient occupational therapy to address the deficits listed in the problem list on initial evaluation to provide pt/family education and to maximize pt's level of independence in the home and community environment.     Anticipated barriers to occupational therapy: edema, pain, stiffness, weakness    Pt's spiritual, cultural and educational needs considered and pt agreeable to plan of care and goals.    Goals:  stg to be met in 2 weeks 1. Patient will be I with HEP 2. Patient will have 2/10 pain with light use 3. Patient will have = prom of bilateral wrists to enhance affected arm use with ADL        ltg to be met by d/c 1. Patient will be I with d/c HEP 2. Patient will have 1/10 pain with all use 3. Patient will have about 75% /pinch on  affected side vs unaffected side to promote full functional use                                                                 4. Patient will be I with all ADL       all goals ongoing unless noted above or met today or in past but not noted yet              Any goals met today ?  (if any met see above)    Updates/Grading for next session: prn    Plan: evaluate and tx    Dean GUNN CHT

## 2022-10-17 ENCOUNTER — CLINICAL SUPPORT (OUTPATIENT)
Dept: REHABILITATION | Facility: HOSPITAL | Age: 57
End: 2022-10-17
Attending: ORTHOPAEDIC SURGERY
Payer: COMMERCIAL

## 2022-10-17 DIAGNOSIS — M25.632 STIFFNESS OF LEFT WRIST JOINT: ICD-10-CM

## 2022-10-17 DIAGNOSIS — M25.532 LEFT WRIST PAIN: ICD-10-CM

## 2022-10-17 DIAGNOSIS — M25.632 STIFFNESS OF JOINT OF LEFT FOREARM: Primary | ICD-10-CM

## 2022-10-17 DIAGNOSIS — M25.432 LEFT WRIST EFFUSION: ICD-10-CM

## 2022-10-17 DIAGNOSIS — R29.898 LEFT HAND WEAKNESS: ICD-10-CM

## 2022-10-17 PROCEDURE — 97140 MANUAL THERAPY 1/> REGIONS: CPT | Mod: PN

## 2022-10-17 PROCEDURE — 97022 WHIRLPOOL THERAPY: CPT | Mod: PN

## 2022-10-17 PROCEDURE — 97110 THERAPEUTIC EXERCISES: CPT | Mod: PN

## 2022-10-17 NOTE — PROGRESS NOTES
upational Therapy Daily Treatment Note     Date: 10/17/2022  Name: Lynn Naidu  Clinic Number: [id number]    Therapy Diagnosis:   Encounter Diagnoses   Name Primary?    Stiffness of joint of left forearm Yes    Stiffness of left wrist joint     Left wrist effusion     Left hand weakness     Left wrist pain      Physician: Thomas Ivory MD    Physician: Remy Roche II, MD     Physician Orders: evaluate and tx, see epic  Medical Diagnosis: left arm pain, CRPS type 1 left arm  Surgical Procedure and Date: n/a, n/a  Evaluation Date: 8/9/2022  Insurance Authorization Period Expiration: referral [id number] 8-9-22 thru 12-30-22  Plan of Care Certification Period: 9-8-22 thru 12-1-22  Date of Return to referral source's office: see epic     Visit # / Visits authorized: 15 / 25 referral [id number] 8-9-22 thru 12-30-22    Time In:3  Time Out:4  Total Billable Time: 60 minutes         Precautions:  universal, see epic, protocol if applicable    Subjective     Pt reports: . she is compliant with home exercise program. Opening bottles still difficult. Understands to tell referring doctor that painless and intermittent wrist clicking with stretching and some light use continues.  Response to previous treatment:good  Functional change: use with all ADL with good increase since 4 weeks ago   Pain: 0/10 rest; 3/10 light use  Side: left  Location: diffuse      Wrist    ache  Objective       Timed units:  2 manual                                    time:315-345  1 therapeutic exercise                 Time:345-4      Untimed units:  fluidotherapy                           Time:3-315      Measurements:     N/a    Fluido: 15 min.    U/s:n/a      UBE: n/a    Scar massage: n/a    Stretches as tolerated-pain free: df, pf, fist forward      Manual:     Radiocarpal joint traction open pack    Radiocarpal joint traction for extension: prepositioned extension, extension ulnar deviation, extension ulnar deviation pronation    Radiocarpal joint  traction prepositioned flexion, flexion ulnar deviation, flexion ulnar deviation supinatio    ROM: prom df/pf    PRE: n/a      HEP: see above and/or below          Home Exercises and Education Provided     Education provided:     Explained continued clinic OT nonessential         Written Home Exercises/Information Provided: yes      previously issued exercises and/or other issued home therapy instructions were reviewed if still part of current tx plan as well as any issued today and Lynn was able to demonstrate them prior to the end of the session.  Lynn demonstrated good understanding of the HEP provided.     See EMR under patient instructions and/or media for HEP issued today or in past    Assessment       D/c home program should help to maximize functional use long term       Pt's spiritual, cultural and educational needs considered and pt agreeable to plan of care and goals.    Goals:  stg to be met in 2 weeks 1. Patient will be I with HEP 2. Patient will have 2/10 pain with light use 3. Patient will have = prom of bilateral wrists to enhance affected arm use with ADL        ltg to be met by d/c 1. Patient will be I with d/c HEP 2. Patient will have 1/10 pain with all use 3. Patient will have about 75% /pinch on affected side vs unaffected side to promote full functional use                                                                 4. Patient will be I with all ADL       Stg 1 met  Ltg 1 met              Any goals met today ?  (if any met see above)    Updates/Grading for next session: prn    Plan: evaluate and tx    Dean GUNN CHT

## 2022-10-17 NOTE — PATIENT INSTRUCTIONS
10-17-22  -continue compression glove and stretches at least until 12-31-22 or until you feel like tightness is gone

## 2022-10-18 ENCOUNTER — DOCUMENTATION ONLY (OUTPATIENT)
Dept: REHABILITATION | Facility: HOSPITAL | Age: 57
End: 2022-10-18
Payer: COMMERCIAL

## 2022-10-18 NOTE — PROGRESS NOTES
Outpatient Therapy Discharge Summary     Date:10-18-22      Name: Lynn Naidu  [facility] Number: [id number]    Therapy Diagnosis: No diagnosis found.  Physician: No ref. provider found    Physician Orders: see evaluation  Medical Diagnosis: see evaluation  Evaluation Date: 8-9-22      Date of Last visit: 10-17-22  Total Visits Received: 16  Cancelled Visits: see epic  No Show Visits: see epic    Assessment    Goals: see last note    Discharge reason: Patient has reached the maximum rehab potential for the present time    Plan   This patient is discharged from Occupational Therapy

## 2022-10-19 ENCOUNTER — LAB VISIT (OUTPATIENT)
Dept: LAB | Facility: HOSPITAL | Age: 57
End: 2022-10-19
Attending: FAMILY MEDICINE
Payer: COMMERCIAL

## 2022-10-19 ENCOUNTER — OFFICE VISIT (OUTPATIENT)
Dept: FAMILY MEDICINE | Facility: CLINIC | Age: 57
End: 2022-10-19
Attending: FAMILY MEDICINE
Payer: COMMERCIAL

## 2022-10-19 VITALS
RESPIRATION RATE: 18 BRPM | SYSTOLIC BLOOD PRESSURE: 122 MMHG | BODY MASS INDEX: 37.24 KG/M2 | DIASTOLIC BLOOD PRESSURE: 84 MMHG | WEIGHT: 210.19 LBS | OXYGEN SATURATION: 98 % | HEART RATE: 72 BPM | TEMPERATURE: 98 F | HEIGHT: 63 IN

## 2022-10-19 DIAGNOSIS — E66.01 SEVERE OBESITY (BMI 35.0-39.9) WITH COMORBIDITY: ICD-10-CM

## 2022-10-19 DIAGNOSIS — M50.30 DDD (DEGENERATIVE DISC DISEASE), CERVICAL: ICD-10-CM

## 2022-10-19 DIAGNOSIS — E55.9 VITAMIN D DEFICIENCY: ICD-10-CM

## 2022-10-19 DIAGNOSIS — S52.552D CLOSED EXTRAARTICULAR FRACTURE OF DISTAL RADIUS, LEFT, WITH ROUTINE HEALING, SUBSEQUENT ENCOUNTER: ICD-10-CM

## 2022-10-19 DIAGNOSIS — R60.9 EDEMA, UNSPECIFIED TYPE: ICD-10-CM

## 2022-10-19 DIAGNOSIS — Z00.00 ENCOUNTER FOR PREVENTIVE HEALTH EXAMINATION: Primary | ICD-10-CM

## 2022-10-19 DIAGNOSIS — I10 ESSENTIAL HYPERTENSION: ICD-10-CM

## 2022-10-19 DIAGNOSIS — M54.12 CERVICAL RADICULITIS: ICD-10-CM

## 2022-10-19 DIAGNOSIS — G56.42 COMPLEX REGIONAL PAIN SYNDROME TYPE 2 OF LEFT UPPER EXTREMITY: ICD-10-CM

## 2022-10-19 DIAGNOSIS — Z00.00 ENCOUNTER FOR PREVENTIVE HEALTH EXAMINATION: ICD-10-CM

## 2022-10-19 LAB
25(OH)D3+25(OH)D2 SERPL-MCNC: 27 NG/ML (ref 30–96)
ALBUMIN SERPL BCP-MCNC: 3.5 G/DL (ref 3.5–5.2)
ALP SERPL-CCNC: 103 U/L (ref 55–135)
ALT SERPL W/O P-5'-P-CCNC: 10 U/L (ref 10–44)
ANION GAP SERPL CALC-SCNC: 11 MMOL/L (ref 8–16)
AST SERPL-CCNC: 12 U/L (ref 10–40)
BASOPHILS # BLD AUTO: 0.04 K/UL (ref 0–0.2)
BASOPHILS NFR BLD: 0.6 % (ref 0–1.9)
BILIRUB SERPL-MCNC: 0.4 MG/DL (ref 0.1–1)
BUN SERPL-MCNC: 15 MG/DL (ref 6–20)
CALCIUM SERPL-MCNC: 8.8 MG/DL (ref 8.7–10.5)
CHLORIDE SERPL-SCNC: 104 MMOL/L (ref 95–110)
CHOLEST SERPL-MCNC: 206 MG/DL (ref 120–199)
CHOLEST/HDLC SERPL: 4.4 {RATIO} (ref 2–5)
CO2 SERPL-SCNC: 25 MMOL/L (ref 23–29)
CREAT SERPL-MCNC: 1 MG/DL (ref 0.5–1.4)
DIFFERENTIAL METHOD: ABNORMAL
EOSINOPHIL # BLD AUTO: 0.2 K/UL (ref 0–0.5)
EOSINOPHIL NFR BLD: 2.6 % (ref 0–8)
ERYTHROCYTE [DISTWIDTH] IN BLOOD BY AUTOMATED COUNT: 13.6 % (ref 11.5–14.5)
EST. GFR  (NO RACE VARIABLE): >60 ML/MIN/1.73 M^2
GLUCOSE SERPL-MCNC: 92 MG/DL (ref 70–110)
HCT VFR BLD AUTO: 41.6 % (ref 37–48.5)
HDLC SERPL-MCNC: 47 MG/DL (ref 40–75)
HDLC SERPL: 22.8 % (ref 20–50)
HGB BLD-MCNC: 13.5 G/DL (ref 12–16)
IMM GRANULOCYTES # BLD AUTO: 0.01 K/UL (ref 0–0.04)
IMM GRANULOCYTES NFR BLD AUTO: 0.2 % (ref 0–0.5)
LDLC SERPL CALC-MCNC: 138.8 MG/DL (ref 63–159)
LYMPHOCYTES # BLD AUTO: 3.6 K/UL (ref 1–4.8)
LYMPHOCYTES NFR BLD: 54.3 % (ref 18–48)
MCH RBC QN AUTO: 29.7 PG (ref 27–31)
MCHC RBC AUTO-ENTMCNC: 32.5 G/DL (ref 32–36)
MCV RBC AUTO: 91 FL (ref 82–98)
MONOCYTES # BLD AUTO: 0.4 K/UL (ref 0.3–1)
MONOCYTES NFR BLD: 6 % (ref 4–15)
NEUTROPHILS # BLD AUTO: 2.4 K/UL (ref 1.8–7.7)
NEUTROPHILS NFR BLD: 36.3 % (ref 38–73)
NONHDLC SERPL-MCNC: 159 MG/DL
NRBC BLD-RTO: 0 /100 WBC
PLATELET # BLD AUTO: 370 K/UL (ref 150–450)
PMV BLD AUTO: 9.6 FL (ref 9.2–12.9)
POTASSIUM SERPL-SCNC: 2.8 MMOL/L (ref 3.5–5.1)
PROT SERPL-MCNC: 7.1 G/DL (ref 6–8.4)
RBC # BLD AUTO: 4.55 M/UL (ref 4–5.4)
SODIUM SERPL-SCNC: 140 MMOL/L (ref 136–145)
TRIGL SERPL-MCNC: 101 MG/DL (ref 30–150)
WBC # BLD AUTO: 6.65 K/UL (ref 3.9–12.7)

## 2022-10-19 PROCEDURE — 1159F PR MEDICATION LIST DOCUMENTED IN MEDICAL RECORD: ICD-10-PCS | Mod: CPTII,S$GLB,, | Performed by: FAMILY MEDICINE

## 2022-10-19 PROCEDURE — 82306 VITAMIN D 25 HYDROXY: CPT | Performed by: FAMILY MEDICINE

## 2022-10-19 PROCEDURE — 3074F SYST BP LT 130 MM HG: CPT | Mod: CPTII,S$GLB,, | Performed by: FAMILY MEDICINE

## 2022-10-19 PROCEDURE — 99999 PR PBB SHADOW E&M-EST. PATIENT-LVL V: CPT | Mod: PBBFAC,,, | Performed by: FAMILY MEDICINE

## 2022-10-19 PROCEDURE — 99999 PR PBB SHADOW E&M-EST. PATIENT-LVL V: ICD-10-PCS | Mod: PBBFAC,,, | Performed by: FAMILY MEDICINE

## 2022-10-19 PROCEDURE — 1160F PR REVIEW ALL MEDS BY PRESCRIBER/CLIN PHARMACIST DOCUMENTED: ICD-10-PCS | Mod: CPTII,S$GLB,, | Performed by: FAMILY MEDICINE

## 2022-10-19 PROCEDURE — 1159F MED LIST DOCD IN RCRD: CPT | Mod: CPTII,S$GLB,, | Performed by: FAMILY MEDICINE

## 2022-10-19 PROCEDURE — 3079F PR MOST RECENT DIASTOLIC BLOOD PRESSURE 80-89 MM HG: ICD-10-PCS | Mod: CPTII,S$GLB,, | Performed by: FAMILY MEDICINE

## 2022-10-19 PROCEDURE — 85025 COMPLETE CBC W/AUTO DIFF WBC: CPT | Performed by: FAMILY MEDICINE

## 2022-10-19 PROCEDURE — 3079F DIAST BP 80-89 MM HG: CPT | Mod: CPTII,S$GLB,, | Performed by: FAMILY MEDICINE

## 2022-10-19 PROCEDURE — 1160F RVW MEDS BY RX/DR IN RCRD: CPT | Mod: CPTII,S$GLB,, | Performed by: FAMILY MEDICINE

## 2022-10-19 PROCEDURE — 99396 PR PREVENTIVE VISIT,EST,40-64: ICD-10-PCS | Mod: S$GLB,,, | Performed by: FAMILY MEDICINE

## 2022-10-19 PROCEDURE — 80053 COMPREHEN METABOLIC PANEL: CPT | Performed by: FAMILY MEDICINE

## 2022-10-19 PROCEDURE — 3074F PR MOST RECENT SYSTOLIC BLOOD PRESSURE < 130 MM HG: ICD-10-PCS | Mod: CPTII,S$GLB,, | Performed by: FAMILY MEDICINE

## 2022-10-19 PROCEDURE — 36415 COLL VENOUS BLD VENIPUNCTURE: CPT | Mod: PO | Performed by: FAMILY MEDICINE

## 2022-10-19 PROCEDURE — 99396 PREV VISIT EST AGE 40-64: CPT | Mod: S$GLB,,, | Performed by: FAMILY MEDICINE

## 2022-10-19 PROCEDURE — 80061 LIPID PANEL: CPT | Performed by: FAMILY MEDICINE

## 2022-10-19 RX ORDER — TRIAMCINOLONE ACETONIDE 1 MG/G
PASTE DENTAL
COMMUNITY
Start: 2022-04-26

## 2022-10-19 RX ORDER — METOPROLOL SUCCINATE 50 MG/1
50 TABLET, EXTENDED RELEASE ORAL DAILY
Qty: 90 TABLET | Refills: 3 | Status: SHIPPED | OUTPATIENT
Start: 2022-10-19 | End: 2023-11-06 | Stop reason: SDUPTHER

## 2022-10-19 RX ORDER — FUROSEMIDE 40 MG/1
40 TABLET ORAL DAILY
Qty: 90 TABLET | Refills: 3 | Status: SHIPPED | OUTPATIENT
Start: 2022-10-19 | End: 2023-11-06 | Stop reason: SDUPTHER

## 2022-10-19 NOTE — PROGRESS NOTES
Subjective:       Patient ID: Lynn Naidu is a 57 y.o. female.    Chief Complaint: Annual Exam (Pt states that she is here for her annual exam )    57-year-old female coming in for annual exam.  Several months ago she fell while she was on a cruise in [facility] fracturing her left wrist.  She has subsequently developed complex regional pain syndrome of the left upper extremity and has had a stellate ganglion block with improvement.  She has another injection this coming Friday and will be leaving Sunday on a cruise to [place].  She has a history of degenerative disc disease of the cervical spine with radiculopathy and has had several epidural steroid injections in the cervical spine.  She has bilateral tinnitus, hypertension, and vitamin-D deficiency.  Her Pap smears have been handled by Dr. Starr and have been normal.  She request we send a copy of her mammogram to Dr. Starr.  Her last colonoscopy was done November 1, 2017 by Dr. Oviedo and was normal with a 10 year recheck due in November 2027.  We had a discussion of the shingles vaccine and she declined to have a flu vaccine.  She needs refills of Lasix and Toprol but has not been taking her potassium with the Lasix.    Past Medical History:  No date: Allergy  7-17-15: Bronchitis  No date: Cervical disc displacement  No date: Edema  No date: Insomnia  No date: Plantar fasciitis    Past Surgical History:  11/01/2017: COLONOSCOPY      Comment:  Dr. Oviedo, normal, ten year recheck  11/1/2017: COLONOSCOPY; N/A      Comment:  Procedure: COLONOSCOPY;  Surgeon: Cameron Oviedo MD;                Location: [facility];  Service: Endoscopy;  Laterality:                N/A;  7/22/2019: EPIDURAL STEROID INJECTION INTO CERVICAL SPINE; N/A      Comment:  Procedure: Injection-steroid-epidural-cervical;                 Surgeon: Thomas Ivory MD;  Location: [facility];  Service:                Pain Management;  Laterality: N/A;  C7-T1  12/21/2021: EPIDURAL STEROID  INJECTION INTO CERVICAL SPINE; N/A      Comment:  Procedure: Injection-steroid-epidural-cervical;                 Surgeon: Thomas Ivory MD;  Location: [facility] OR;  Service:                Pain Management;  Laterality: N/A;  C6-7  8/30/2022: INJECTION OF ANESTHETIC AGENT AROUND STELLATE GANGLION;   Left      Comment:  Procedure: BLOCK, GANGLION, STELLATE;  Surgeon: Thomas Ivory MD;  Location: [facility] OR;  Service: Pain Management;                 Laterality: Left;  7/23/2021: TONSILLECTOMY, ADENOIDECTOMY; Bilateral      Comment:  Procedure: TONSILLECTOMY AND ADENOIDECTOMY;  Surgeon:                Anselmo Galvan MD;  Location: [facility] OR;  Service: ENT;               Laterality: Bilateral;  No date: WISDOM TOOTH EXTRACTION    Review of patient's family history indicates:    Social History    Tobacco Use      Smoking status: Never      Smokeless tobacco: Never    Alcohol use: No    Drug use: No    Current Outpatient Medications on File Prior to Visit:  diazePAM (VALIUM) 10 MG Tab, Take 10 mg by mouth every evening., Disp: , Rfl: 1  ergocalciferol (ERGOCALCIFEROL) 50,000 unit Cap, TAKE 1 CAPSULE BY MOUTH TWICE A WEEK, Disp: 24 capsule, Rfl: 0  FLUoxetine 20 MG capsule, Take 20 mg by mouth once daily., Disp: , Rfl: 2  fluticasone propionate (FLONASE) 50 mcg/actuation nasal spray, 2 sprays (100 mcg total) by Each Nostril route once daily., Disp: 48 g, Rfl: 4  HYDROcodone-acetaminophen (NORCO) 5-325 mg per tablet, Take 1 tablet by mouth every 6 (six) hours as needed for Pain., Disp: 28 tablet, Rfl: 0  hydrOXYzine HCl (ATARAX) 25 MG tablet, Take 1 tablet (25 mg total) by mouth 3 (three) times daily as needed. (Patient taking differently: Take 25 mg by mouth once daily.), Disp: 90 tablet, Rfl: 1  LINZESS 290 mcg Cap capsule, Take 1 capsule by mouth once daily, Disp: 90 capsule, Rfl: 0  methocarbamoL (ROBAXIN) 750 MG Tab, Take 1 tablet (750 mg total) by mouth 4 (four) times daily as needed (muscle spasm)., Disp: 90  tablet, Rfl: 1  nabumetone (RELAFEN) 500 MG tablet, Take 1 tablet (500 mg total) by mouth 2 (two) times daily as needed for Pain., Disp: 30 tablet, Rfl: 0  pantoprazole (PROTONIX) 40 MG tablet, Take 1 tablet (40 mg total) by mouth every morning., Disp: 90 tablet, Rfl: 3  pregabalin (LYRICA) 75 MG capsule, 1 tablet qam and 2 tablets qhs, Disp: 90 capsule, Rfl: 1  SULFACLEANSE 8-4 8-4 % Susp, Apply topically nightly as needed. , Disp: , Rfl:   triamcinolone acetonide 0.1% (KENALOG) 0.1 % paste, SMARTSIG:Sparingly TO TEETH 3 Times Daily, Disp: , Rfl:   [DISCONTINUED] furosemide (LASIX) 40 MG tablet, Take 1 tablet by mouth once daily, Disp: 90 tablet, Rfl: 0  [DISCONTINUED] metoprolol succinate (TOPROL-XL) 50 MG 24 hr tablet, Take 1 tablet by mouth once daily, Disp: 90 tablet, Rfl: 0  methylPREDNISolone (MEDROL DOSEPACK) 4 mg tablet, Take by mouth., Disp: , Rfl:   potassium chloride SA (K-DUR,KLOR-CON) 20 MEQ tablet, Take 1 tablet by mouth once daily (Patient not taking: Reported on 10/19/2022), Disp: 90 tablet, Rfl: 1  promethazine (PHENERGAN) 25 MG tablet, Take 25 mg by mouth., Disp: , Rfl:   traMADoL (ULTRAM) 50 mg tablet, Take 1 tablet (50 mg total) by mouth every 8 (eight) hours as needed for Pain. (Patient not taking: Reported on 10/19/2022), Disp: 21 tablet, Rfl: 0    Current Facility-Administered Medications on File Prior to Visit:  lactated ringers infusion, , Intravenous, Continuous, Thomas Ivory MD, Last Rate: 25 mL/hr at 12/21/21 1240, New Bag at 12/21/21 1240          Review of Systems   Constitutional:  Negative for chills, diaphoresis, fatigue, fever and unexpected weight change.   HENT:  Negative for congestion, ear pain, hearing loss, postnasal drip, sinus pressure and sore throat.    Eyes:  Negative for itching and visual disturbance.   Respiratory:  Negative for cough, chest tightness, shortness of breath and wheezing.    Cardiovascular:  Negative for chest pain, palpitations and leg swelling.    Gastrointestinal:  Negative for abdominal pain, blood in stool, constipation, diarrhea, nausea and vomiting.   Genitourinary:  Negative for dysuria, frequency and hematuria.   Musculoskeletal:  Negative for arthralgias, back pain, joint swelling and myalgias.   Neurological:  Positive for numbness (Neuropathic pain left upper extremity). Negative for dizziness and headaches.   Hematological:  Negative for adenopathy.   Psychiatric/Behavioral:  Negative for sleep disturbance. The patient is not nervous/anxious.      Objective:      Physical Exam  Vitals and nursing note reviewed.   Constitutional:       General: She is not in acute distress.     Appearance: Normal appearance. She is well-developed. She is obese. She is not ill-appearing, toxic-appearing or diaphoretic.      Comments: Good blood pressure control   Severe obesity with a BMI of 37.2 she is up 17.7 lb from her last physical October 18, 2021   HENT:      Head: Normocephalic and atraumatic.      Right Ear: Tympanic membrane, ear canal and external ear normal. There is no impacted cerumen.      Left Ear: Tympanic membrane, ear canal and external ear normal. There is no impacted cerumen.      Nose: Nose normal. No congestion or rhinorrhea.      Mouth/Throat:      Mouth: Mucous membranes are moist.      Pharynx: Oropharynx is clear. No oropharyngeal exudate or posterior oropharyngeal erythema.   Eyes:      General: No scleral icterus.        Right eye: No discharge.         Left eye: No discharge.      Extraocular Movements: Extraocular movements intact.      Conjunctiva/sclera: Conjunctivae normal.      Pupils: Pupils are equal, round, and reactive to light.   Neck:      Thyroid: No thyromegaly.      Vascular: No carotid bruit or JVD.   Cardiovascular:      Rate and Rhythm: Normal rate and regular rhythm.      Pulses: Normal pulses.      Heart sounds: Normal heart sounds. No murmur heard.    No friction rub. No gallop.   Pulmonary:      Effort: Pulmonary  effort is normal. No respiratory distress.      Breath sounds: Normal breath sounds. No stridor. No wheezing, rhonchi or rales.   Chest:      Chest wall: No tenderness.   Abdominal:      General: Bowel sounds are normal. There is no distension.      Palpations: Abdomen is soft. There is no mass.      Tenderness: There is no abdominal tenderness. There is no guarding or rebound.      Hernia: No hernia is present.   Musculoskeletal:         General: Signs of injury (left wrist stiffness and tenderness) present. No swelling or tenderness. Normal range of motion.      Cervical back: Normal range of motion and neck supple. No rigidity or tenderness.      Right lower leg: No edema.      Left lower leg: No edema.   Lymphadenopathy:      Cervical: No cervical adenopathy.   Skin:     General: Skin is warm and dry.      Coloration: Skin is not jaundiced or pale.      Findings: No bruising, erythema, lesion or rash.   Neurological:      General: No focal deficit present.      Mental Status: She is alert and oriented to person, place, and time. Mental status is at baseline.      Cranial Nerves: No cranial nerve deficit.      Sensory: No sensory deficit.      Motor: No weakness.      Coordination: Coordination normal.      Gait: Gait normal.      Deep Tendon Reflexes: Reflexes are normal and symmetric. Reflexes normal.   Psychiatric:         Mood and Affect: Mood normal.         Behavior: Behavior normal.         Judgment: Judgment normal.       Assessment:       1. Encounter for preventive health examination    2. Essential hypertension    3. DDD (degenerative disc disease), cervical    4. Cervical radiculitis    5. Vitamin D deficiency    6. Complex regional pain syndrome type 2 of left upper extremity    7. Edema, unspecified type    8. Closed extraarticular fracture of distal radius, left, with routine healing, subsequent encounter    9. Severe obesity (BMI 35.0-39.9) with comorbidity          Plan:       1. Encounter for  preventive health examination  - Comprehensive Metabolic Panel; Future  - CBC Auto Differential; Future  - Lipid Panel; Future    2. Essential hypertension  Fair control no changes needed  - Comprehensive Metabolic Panel; Future  - CBC Auto Differential; Future  - Lipid Panel; Future  - metoprolol succinate (TOPROL-XL) 50 MG 24 hr tablet; Take 1 tablet (50 mg total) by mouth once daily.  Dispense: 90 tablet; Refill: 3    3. DDD (degenerative disc disease), cervical  Fair control with epidural steroid injections    4. Cervical radiculitis  Followed by Dr. Ivory    5. Vitamin D deficiency  Recheck ordered, we are still occasionally having supply issues with the testing re agents  - Vitamin D; Future    6. Complex regional pain syndrome type 2 of left upper extremity  Status post stellate ganglia block    7. Edema, unspecified type  Controlled with p.r.n. use, discussed salt restriction  - furosemide (LASIX) 40 MG tablet; Take 1 tablet (40 mg total) by mouth once daily.  Dispense: 90 tablet; Refill: 3    8. Closed extraarticular fracture of distal radius, left, with routine healing, subsequent encounter    9. Severe obesity (BMI 35.0-39.9) with comorbidity  Discussed new medications to assist with obesity including wegovy and rybelsus

## 2022-10-20 ENCOUNTER — TELEPHONE (OUTPATIENT)
Dept: FAMILY MEDICINE | Facility: CLINIC | Age: 57
End: 2022-10-20
Payer: COMMERCIAL

## 2022-10-20 DIAGNOSIS — E87.6 HYPOKALEMIA: Primary | ICD-10-CM

## 2022-10-20 DIAGNOSIS — I10 ESSENTIAL HYPERTENSION: ICD-10-CM

## 2022-10-20 RX ORDER — POTASSIUM CHLORIDE 20 MEQ/1
TABLET, EXTENDED RELEASE ORAL
Qty: 190 TABLET | Refills: 1 | Status: SHIPPED | OUTPATIENT
Start: 2022-10-20 | End: 2023-05-31 | Stop reason: SDUPTHER

## 2022-10-20 NOTE — TELEPHONE ENCOUNTER
Call placed to patient for notification. Patient verbalized understanding. Patient confirms she is taking high dose Vitamin D twice a week. Requesting to know what dosage of over the counter Vitamin D she should start taking. Patient states at office visit it was discussed she is not taking Potassium supplement. States she was advised once lab results were available she would receive further direction on if she should resume Potassium. Preferred pharmacy is [facility] Will send follow up message to Dr. Parson for notification.

## 2022-10-20 NOTE — TELEPHONE ENCOUNTER
----- Message from Remy Parson MD sent at 10/20/2022  8:14 AM CDT -----  Her vitamin-D level is still low and she is supposed to be taking a high dose pill twice a week.  Is she doing so?  If so I would suggest we put her on an over-the-counter daily supplement and continue the high dose twice a week.    Her chemistry panel looks good except for a potassium level of 2.8.  Is she still taking her potassium supplement along with her Lasix?    Her cholesterol levels are getting worse but her cardiovascular risk score is still only 6% so she does not need a statin.    The blood count is normal with no anemia, normal white blood cells, and normal platelets.      Patient notified by e-mail

## 2022-10-21 ENCOUNTER — TELEPHONE (OUTPATIENT)
Dept: PAIN MEDICINE | Facility: CLINIC | Age: 57
End: 2022-10-21
Payer: COMMERCIAL

## 2022-10-21 ENCOUNTER — TELEPHONE (OUTPATIENT)
Dept: FAMILY MEDICINE | Facility: CLINIC | Age: 57
End: 2022-10-21
Payer: COMMERCIAL

## 2022-10-21 NOTE — TELEPHONE ENCOUNTER
Call placed to patient for notification. Patient verbalized understanding. Follow up lab appointment scheduled for the date of 11-2-22. Patient agreed to appointment date, time, and location. Patient also advised appointment details are available in [facility] to reference date, time, location, and name of provider if needed.

## 2022-10-21 NOTE — TELEPHONE ENCOUNTER
Potassium sent to [facility].   She will take two tablets, 40meq, twice a day for five days, then reduce to one twice a day.  After 10-12 days we will check a potassium level, order is in

## 2022-10-21 NOTE — TELEPHONE ENCOUNTER
----- Message from Tequila Lemons sent at 10/21/2022  8:28 AM CDT -----  Contact: pt at [phone]  Type: Needs Medical Advice  Who Called:  Pt   Best Call Back Number: [phone]    Additional Information: Pt called in to get a time and instructions for her procedure please call back to advise

## 2022-10-31 ENCOUNTER — PATIENT OUTREACH (OUTPATIENT)
Dept: ADMINISTRATIVE | Facility: HOSPITAL | Age: 57
End: 2022-10-31
Payer: COMMERCIAL

## 2022-10-31 ENCOUNTER — TELEPHONE (OUTPATIENT)
Dept: FAMILY MEDICINE | Facility: CLINIC | Age: 57
End: 2022-10-31
Payer: COMMERCIAL

## 2022-10-31 DIAGNOSIS — E66.01 SEVERE OBESITY (BMI 35.0-39.9) WITH COMORBIDITY: Primary | ICD-10-CM

## 2022-10-31 NOTE — TELEPHONE ENCOUNTER
----- Message from Tequila Lemons sent at 10/31/2022  2:00 PM CDT -----  Type: Needs Medical Advice  Who Called:  Pt   Best Call Back Number: [phone]  Additional Information: pt said this is the number that should be contracted

## 2022-10-31 NOTE — TELEPHONE ENCOUNTER
Patient states she discussed weight loss medication with Dr. Parson who recommended for her to see if she can find Wegovy or Rybelsus while she was traveling [place] the [place]. Patient states she was not able to find medication in [facility], and medication was too expensive in [facility]. States she contacted her insurance ([employer]) and was advised these medications would be covered by insurance with prior authorization; cost will be $40 for 90 day supply and $20 for one month supply. Patient states office would need to call [phone] to complete prior authorization. Will send message to Dr. Parson for notification, as order for medication would need to be placed in order for prior authorization to be performed. Please advise. Thank you.

## 2022-11-01 NOTE — TELEPHONE ENCOUNTER
Okay, now for the next problem.  There are no 90 day prescriptions until we reach the final dose in a gradual increase.  She will take a 0.25 mg dose weekly for four weeks then a 0.5 mg dose weekly for four weeks, then a 1mg dose weekly for four weeks, then a 1.7mg dose weekly for four weeks and finally when she reaches the 2.4mg dose she can do a 90 day supply.  Is that acceptable?

## 2022-11-01 NOTE — TELEPHONE ENCOUNTER
Call placed to patient for notification. Patient verbalized understanding. Patient selected Wegovy.

## 2022-11-01 NOTE — TELEPHONE ENCOUNTER
Patient agrees with instructions in attached message regarding Wegovy. Will send follow up message to Dr. Parson for notification.

## 2022-11-01 NOTE — TELEPHONE ENCOUNTER
Does she have a preference.    Wegovy is injectable, once a week and is indicated for weight loss  Rybelsus is a pill but is NOT indicated for weight loss and there is virtually no chance they will approve a prior authorization unless she is diabetic (which she is not)

## 2022-11-02 ENCOUNTER — TELEPHONE (OUTPATIENT)
Dept: FAMILY MEDICINE | Facility: CLINIC | Age: 57
End: 2022-11-02
Payer: COMMERCIAL

## 2022-11-02 ENCOUNTER — DOCUMENTATION ONLY (OUTPATIENT)
Dept: FAMILY MEDICINE | Facility: CLINIC | Age: 57
End: 2022-11-02
Payer: COMMERCIAL

## 2022-11-02 ENCOUNTER — LAB VISIT (OUTPATIENT)
Dept: LAB | Facility: HOSPITAL | Age: 57
End: 2022-11-02
Attending: FAMILY MEDICINE
Payer: COMMERCIAL

## 2022-11-02 DIAGNOSIS — E87.6 HYPOKALEMIA: ICD-10-CM

## 2022-11-02 LAB — POTASSIUM SERPL-SCNC: 4.1 MMOL/L (ref 3.5–5.1)

## 2022-11-02 PROCEDURE — 36415 COLL VENOUS BLD VENIPUNCTURE: CPT | Performed by: FAMILY MEDICINE

## 2022-11-02 PROCEDURE — 84132 ASSAY OF SERUM POTASSIUM: CPT | Performed by: FAMILY MEDICINE

## 2022-11-02 RX ORDER — SEMAGLUTIDE 0.25 MG/.5ML
0.25 INJECTION, SOLUTION SUBCUTANEOUS
Qty: 2 ML | Refills: 0 | Status: SHIPPED | OUTPATIENT
Start: 2022-11-02 | End: 2023-01-03 | Stop reason: DRUGHIGH

## 2022-11-02 NOTE — TELEPHONE ENCOUNTER
----- Message from Murtaza Marcus sent at 11/2/2022  1:24 PM CDT -----  Type:  Test Results    Who Called:  Pt     Name of Test  Potassium [EXV183]      Date of Test: 11/2/22    Ordering Provider: ANNA MARIE COATS    Where the test was performed: [facility], [facility]    Would the patient rather a call back or a response via [facility]?  Call back     Best Call Back Number: [phone] (mobile)      Additional Information:

## 2022-11-02 NOTE — TELEPHONE ENCOUNTER
Patient viewed lab results online on 11-2-22 @ 1:33 pm. Call placed to patient who confirmed she read results/verbalized understanding.

## 2022-11-03 ENCOUNTER — TELEPHONE (OUTPATIENT)
Dept: FAMILY MEDICINE | Facility: CLINIC | Age: 57
End: 2022-11-03
Payer: COMMERCIAL

## 2022-11-03 NOTE — TELEPHONE ENCOUNTER
Notified [facility] pharmacy, and patient the PA from [employer] for Wegovy Auto injectors approved

## 2022-11-07 ENCOUNTER — TELEPHONE (OUTPATIENT)
Dept: FAMILY MEDICINE | Facility: CLINIC | Age: 57
End: 2022-11-07
Payer: COMMERCIAL

## 2022-11-07 NOTE — TELEPHONE ENCOUNTER
----- Message from Kyle Nova sent at 11/7/2022 10:38 AM CST -----  Type: Needs Medical Advice  Who Called:  Pt  Symptoms (please be specific):  issues with RX    Best Call Back Number: [phone] or [phone]   Additional Information: Sts that she is having issues getting one of her Rx refilled and has questions about it that she wants to talk about.  Please advise -- Thank you

## 2022-11-07 NOTE — TELEPHONE ENCOUNTER
Patient cannot find Wegovy at any local pharmacy due  back order. Advised her she will have to wait for supply. She is using it for weight loss and not diabetes.

## 2022-11-22 ENCOUNTER — OFFICE VISIT (OUTPATIENT)
Dept: PAIN MEDICINE | Facility: CLINIC | Age: 57
End: 2022-11-22
Payer: COMMERCIAL

## 2022-11-22 VITALS
HEART RATE: 99 BPM | DIASTOLIC BLOOD PRESSURE: 87 MMHG | WEIGHT: 210 LBS | HEIGHT: 63 IN | SYSTOLIC BLOOD PRESSURE: 125 MMHG | BODY MASS INDEX: 37.21 KG/M2

## 2022-11-22 DIAGNOSIS — M25.512 BILATERAL SHOULDER PAIN, UNSPECIFIED CHRONICITY: ICD-10-CM

## 2022-11-22 DIAGNOSIS — M50.30 DDD (DEGENERATIVE DISC DISEASE), CERVICAL: ICD-10-CM

## 2022-11-22 DIAGNOSIS — M47.892 OTHER SPONDYLOSIS, CERVICAL REGION: ICD-10-CM

## 2022-11-22 DIAGNOSIS — M25.511 BILATERAL SHOULDER PAIN, UNSPECIFIED CHRONICITY: ICD-10-CM

## 2022-11-22 DIAGNOSIS — G90.512 COMPLEX REGIONAL PAIN SYNDROME TYPE 1 OF LEFT UPPER EXTREMITY: Primary | ICD-10-CM

## 2022-11-22 DIAGNOSIS — M79.602 LEFT ARM PAIN: ICD-10-CM

## 2022-11-22 PROCEDURE — 3079F DIAST BP 80-89 MM HG: CPT | Mod: CPTII,S$GLB,, | Performed by: PHYSICIAN ASSISTANT

## 2022-11-22 PROCEDURE — 3074F SYST BP LT 130 MM HG: CPT | Mod: CPTII,S$GLB,, | Performed by: PHYSICIAN ASSISTANT

## 2022-11-22 PROCEDURE — 99213 PR OFFICE/OUTPT VISIT, EST, LEVL III, 20-29 MIN: ICD-10-PCS | Mod: S$GLB,,, | Performed by: PHYSICIAN ASSISTANT

## 2022-11-22 PROCEDURE — 3008F PR BODY MASS INDEX (BMI) DOCUMENTED: ICD-10-PCS | Mod: CPTII,S$GLB,, | Performed by: PHYSICIAN ASSISTANT

## 2022-11-22 PROCEDURE — 99213 OFFICE O/P EST LOW 20 MIN: CPT | Mod: S$GLB,,, | Performed by: PHYSICIAN ASSISTANT

## 2022-11-22 PROCEDURE — 99999 PR PBB SHADOW E&M-EST. PATIENT-LVL IV: ICD-10-PCS | Mod: PBBFAC,,, | Performed by: PHYSICIAN ASSISTANT

## 2022-11-22 PROCEDURE — 1159F PR MEDICATION LIST DOCUMENTED IN MEDICAL RECORD: ICD-10-PCS | Mod: CPTII,S$GLB,, | Performed by: PHYSICIAN ASSISTANT

## 2022-11-22 PROCEDURE — 3008F BODY MASS INDEX DOCD: CPT | Mod: CPTII,S$GLB,, | Performed by: PHYSICIAN ASSISTANT

## 2022-11-22 PROCEDURE — 3079F PR MOST RECENT DIASTOLIC BLOOD PRESSURE 80-89 MM HG: ICD-10-PCS | Mod: CPTII,S$GLB,, | Performed by: PHYSICIAN ASSISTANT

## 2022-11-22 PROCEDURE — 1159F MED LIST DOCD IN RCRD: CPT | Mod: CPTII,S$GLB,, | Performed by: PHYSICIAN ASSISTANT

## 2022-11-22 PROCEDURE — 99999 PR PBB SHADOW E&M-EST. PATIENT-LVL IV: CPT | Mod: PBBFAC,,, | Performed by: PHYSICIAN ASSISTANT

## 2022-11-22 PROCEDURE — 3074F PR MOST RECENT SYSTOLIC BLOOD PRESSURE < 130 MM HG: ICD-10-PCS | Mod: CPTII,S$GLB,, | Performed by: PHYSICIAN ASSISTANT
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
## 2022-11-23 ENCOUNTER — TELEPHONE (OUTPATIENT)
Dept: PAIN MEDICINE | Facility: CLINIC | Age: 57
End: 2022-11-23
Payer: COMMERCIAL

## 2022-11-23 DIAGNOSIS — M79.602 LEFT ARM PAIN: Primary | ICD-10-CM

## 2022-11-23 NOTE — TELEPHONE ENCOUNTER
Order Date:11/23/2022   Ordering User:JOSE HINKLE [[id number]]   Encounter Provider:Jose Hinkle PA-C [[id number]]   Authorizing MARLEN mckeon: Jose Hinkle PA-C [[id number]]   Supervising Provider:OZIEL LUONG [[id number]]   Type of Supervision:Supervision Required   Department:[facility] PAIN MANAGEMENT[[id number]]      Common Order Information   Procedure -> Other (Specify location and laterality) Cmt: stellate ganglion              block left      Pre-op Diagnosis -> Left arm pain       Order Specific Information   Order: Procedure Order to Pain Management [Custom: R   ]  Order #:        Left message with  to please return call

## 2022-12-27 ENCOUNTER — HOSPITAL ENCOUNTER (OUTPATIENT)
Facility: AMBULARY SURGERY CENTER | Age: 57
Discharge: HOME | End: 2022-12-27
Attending: ANESTHESIOLOGY | Admitting: ANESTHESIOLOGY
Payer: COMMERCIAL

## 2022-12-27 DIAGNOSIS — M79.603 ARM PAIN: ICD-10-CM

## 2022-12-27 PROCEDURE — 64510 N BLOCK STELLATE GANGLION: CPT | Mod: LT | Performed by: ANESTHESIOLOGY

## 2022-12-27 PROCEDURE — 64510 PR INJECT NERV BLCK,STELLATE GANGLION: ICD-10-PCS | Mod: LT,,, | Performed by: ANESTHESIOLOGY

## 2022-12-27 PROCEDURE — 64510 N BLOCK STELLATE GANGLION: CPT | Mod: LT,,, | Performed by: ANESTHESIOLOGY

## 2022-12-27 RX ORDER — MIDAZOLAM HYDROCHLORIDE 1 MG/ML
INJECTION, SOLUTION INTRAMUSCULAR; INTRAVENOUS
Status: DISCONTINUED | OUTPATIENT
Start: 2022-12-27 | End: 2022-12-27 | Stop reason: HOSPADM

## 2022-12-27 RX ORDER — BUPIVACAINE HYDROCHLORIDE AND EPINEPHRINE 2.5; 5 MG/ML; UG/ML
INJECTION, SOLUTION EPIDURAL; INFILTRATION; INTRACAUDAL; PERINEURAL
Status: DISCONTINUED | OUTPATIENT
Start: 2022-12-27 | End: 2022-12-27 | Stop reason: HOSPADM

## 2022-12-27 RX ORDER — FENTANYL CITRATE 50 UG/ML
INJECTION, SOLUTION INTRAMUSCULAR; INTRAVENOUS
Status: DISCONTINUED | OUTPATIENT
Start: 2022-12-27 | End: 2022-12-27 | Stop reason: HOSPADM

## 2022-12-27 RX ORDER — SODIUM CHLORIDE, SODIUM LACTATE, POTASSIUM CHLORIDE, CALCIUM CHLORIDE 600; 310; 30; 20 MG/100ML; MG/100ML; MG/100ML; MG/100ML
INJECTION, SOLUTION INTRAVENOUS CONTINUOUS
Status: ACTIVE | OUTPATIENT
Start: 2022-12-27

## 2022-12-27 RX ORDER — ONDANSETRON 2 MG/ML
4 INJECTION INTRAMUSCULAR; INTRAVENOUS ONCE
Status: COMPLETED | OUTPATIENT
Start: 2022-12-27 | End: 2022-12-27

## 2022-12-27 RX ORDER — HYDROCODONE BITARTRATE AND ACETAMINOPHEN 5; 325 MG/1; MG/1
1 TABLET ORAL EVERY 6 HOURS PRN
Qty: 28 TABLET | Refills: 0 | Status: SHIPPED | OUTPATIENT
Start: 2022-12-27 | End: 2023-01-26

## 2022-12-27 RX ADMIN — ONDANSETRON 4 MG: 2 INJECTION INTRAMUSCULAR; INTRAVENOUS at 08:12

## 2022-12-27 NOTE — PLAN OF CARE
Patient is being driven home by Thomas. She was brought tot he car via wheelchair. She was sent home with the ice pack that was used in recovery and a can of ginger ale.

## 2022-12-27 NOTE — DISCHARGE SUMMARY
[facility]  Discharge Note  Short Stay    Procedure(s) (LRB):  BLOCK, GANGLION, STELLATE (Left)      OUTCOME: Patient tolerated treatment/procedure well without complication and is now ready for discharge.    DISPOSITION: Home or Self Care    FINAL DIAGNOSIS:  <principal problem not specified>    FOLLOWUP: In clinic    DISCHARGE INSTRUCTIONS:    Discharge Procedure Orders   Notify your health care provider if you experience any of the following:  temperature >100.4     Notify your health care provider if you experience any of the following:  severe uncontrolled pain     Notify your health care provider if you experience any of the following:  redness, tenderness, or signs of infection (pain, swelling, redness, odor or green/yellow discharge around incision site)     Activity as tolerated        TIME SPENT ON DISCHARGE: 30 minutes

## 2022-12-27 NOTE — OP NOTE
Procedure Date: 12/27/2022    PROCEDURE: Stellate ganglion block, left-sided     DIAGNOSIS: Left arm pain    PHYSICIAN: Thomas Ivory MD     MEDICATIONS INJECTED: Treatment dose: Bupivicane 0.25%, 5ml and Dexamethasone 10mg     LOCAL ANESTHETIC INJECTED: Lidocaine 1% 2 ml per site.     SEDATION MEDICATIONS:  RN IV sedation    ESTIMATED BLOOD LOSS: None     COMPLICATIONS: None     TECHNIQUE: A time-out taken to identify patient and placed in supine position prior to starting   the procedure. The patient was placed in a supine position, prepped and draped in the usual sterile fashion using ChloraPrep and sterile towels. The area to be injected was determined under fluoroscopic guidance in AP and slight oblique view. The C7 vertebral body was identified and the target area between the transverse process and vertebral body was identified . Local anesthetic was given by raising a wheel with a 25-gauge 1.5 inch needle. 2in inch 25-gauge bent-tip spinal needle was introduced toward the target area until it made contact with the vertebral body. After negative aspiration for blood or air, 1ml contrast dye was injected to confirm appropriate placement and that there was no vascular uptake, and this was also performed under live digital subtraction. The test dose as noted above was given over 4 minutes and there were no signs of HR or BP changes, no tinnitus or circumoral numbness. Again, aspiration was negative for blood or air and the treatment medication was then given slowly over 5 minutes. The patient tolerated the procedure well.     The patient was monitored after the procedure. The patient was given post procedure and discharge instructions to follow at home. The patient was discharged in a stable condition.

## 2022-12-27 NOTE — H&P
CC: arm p[ain    HPI: The patient is a 57 y.o. female with a history of arm pain here for stellate block. There are no major changes in history and physical from 11/2022 by Jania.    Past Medical History:   Diagnosis Date    Allergy     Bronchitis 7-17-15    Cervical disc displacement     Edema     Insomnia     Plantar fasciitis        Past Surgical History:   Procedure Laterality Date    COLONOSCOPY  11/01/2017    Dr. Oviedo, normal, ten year recheck    COLONOSCOPY N/A 11/1/2017    Procedure: COLONOSCOPY;  Surgeon: Cameron Oviedo MD;  Location: [facility];  Service: Endoscopy;  Laterality: N/A;    EPIDURAL STEROID INJECTION INTO CERVICAL SPINE N/A 7/22/2019    Procedure: Injection-steroid-epidural-cervical;  Surgeon: Thomas Ivory MD;  Location: [facility];  Service: Pain Management;  Laterality: N/A;  C7-T1    EPIDURAL STEROID INJECTION INTO CERVICAL SPINE N/A 12/21/2021    Procedure: Injection-steroid-epidural-cervical;  Surgeon: Thomas Ivory MD;  Location: [facility] OR;  Service: Pain Management;  Laterality: N/A;  C6-7    INJECTION OF ANESTHETIC AGENT AROUND STELLATE GANGLION Left 8/30/2022    Procedure: BLOCK, GANGLION, STELLATE;  Surgeon: Thomas Ivory MD;  Location: [facility] OR;  Service: Pain Management;  Laterality: Left;    INJECTION OF ANESTHETIC AGENT AROUND STELLATE GANGLION Left 10/21/2022    Procedure: BLOCK, GANGLION, STELLATE Left;  Surgeon: Thomas Ivory MD;  Location: [facility] OR;  Service: Pain Management;  Laterality: Left;    TONSILLECTOMY, ADENOIDECTOMY Bilateral 7/23/2021    Procedure: TONSILLECTOMY AND ADENOIDECTOMY;  Surgeon: Anselmo Galvan MD;  Location: [facility] OR;  Service: ENT;  Laterality: Bilateral;    WISDOM TOOTH EXTRACTION         Family History   Problem Relation Age of Onset    Heart disease Mother     Stroke Mother     Cancer Father         bone ca, prostate ca     Hypertension Sister     Diabetes Sister     Allergies Sister     Asthma Sister     Heart disease Brother     Kidney failure Brother   "   Hypertension Sister     Allergies Sister     Asthma Sister     Angioedema Neg Hx     Eczema Neg Hx     Immunodeficiency Neg Hx     Urticaria Neg Hx        Social History     Socioeconomic History    Marital status:    Tobacco Use    Smoking status: Never    Smokeless tobacco: Never   Substance and Sexual Activity    Alcohol use: No    Drug use: No    Sexual activity: Yes     Partners: Male       Current Facility-Administered Medications   Medication Dose Route Frequency Provider Last Rate Last Admin    lactated ringers infusion   Intravenous Continuous Thomas Ivory MD         Facility-Administered Medications Ordered in Other Encounters   Medication Dose Route Frequency Provider Last Rate Last Admin    lactated ringers infusion   Intravenous Continuous Thomas Ivory MD 25 mL/hr at 12/21/21 1240 New Bag at 12/21/21 1240       Review of patient's allergies indicates:   Allergen Reactions    No known drug allergies        Vitals:    12/21/22 0835 12/27/22 0714 12/27/22 0721   BP:   132/70   Pulse:   79   Resp:   18   Temp:  96.6 °F (35.9 °C)    TempSrc:  Skin    SpO2:   95%   Weight: 95.3 kg (210 lb)     Height: 5' 3" (1.6 m)         REVIEW OF SYSTEMS:     GENERAL: No weight loss, malaise or fevers.  HEENT:  No recent changes in vision or hearing  NECK: Negative for lumps, no difficulty with swallowing.  RESPIRATORY: Negative for cough, wheezing or shortness of breath, patient denies any recent URI.  CARDIOVASCULAR: Negative for chest pain, leg swelling or palpitations.  GI: Negative for abdominal discomfort, blood in stools or black stools or change in bowel habits.  MUSCULOSKELETAL: See HPI.  SKIN: Negative for lesions, rash, and itching.  PSYCH: No suicidal or homicidal ideations, no current mood disturbances.  HEMATOLOGY/LYMPHOLOGY: Negative for prolonged bleeding, bruising easily or swollen nodes. Patient is not currently taking any anti-coagulants  ENDO: No history of diabetes or thyroid " dysfunction  NEURO: No history of syncope, paralysis, seizures or tremors.All other reviewed and negative other than HPI.    Physical exam:  Gen: A and O x3, pleasant, well-groomed  Skin: No rashes or obvious lesions  HEENT: PERRLA, no obvious deformities on ears or in canals. No thyroid masses, trachea midline, no palpable lymph nodes in neck, axilla.  CVS: Regular rate and rhythm, normal S1 and S2, no murmurs.  Resp: Clear to auscultation bilaterally.  Abdomen: Soft, NT/ND, normal bowel sounds present.  Musculoskeletal/Neuro: Moving all extremities    Assessment:  Arm pain          PLAN: Stellate block      This patient has been cleared for surgery in an ambulatory surgical facility    ASA 3,  Mallampatti Score 3  No history of anesthetic complications  Plan for RN IV sedation  '

## 2022-12-28 VITALS
SYSTOLIC BLOOD PRESSURE: 140 MMHG | HEART RATE: 80 BPM | BODY MASS INDEX: 37.21 KG/M2 | DIASTOLIC BLOOD PRESSURE: 77 MMHG | HEIGHT: 63 IN | WEIGHT: 210 LBS | TEMPERATURE: 97 F | OXYGEN SATURATION: 95 % | RESPIRATION RATE: 16 BRPM

## 2022-12-28 NOTE — DISCHARGE INSTRUCTIONS
Before leaving, please make sure you have all your personal belongings such as glasses, purses, wallets, keys, cell phones, jewelry, jackets etc Recovery After Procedural Sedation (Adult)      You have been given medicine by vein to make you sleep during your surgery. This may have included both a pain medicine and sleeping medicine. Most of the effects have worn off. But you may still have some drowsiness for the next 6 to 8 hours.  Home care  Follow these guidelines when you get home:  · For the next 8 hours, you should be watched by a responsible adult. This person should make sure your condition is not getting worse.  · Don't drink any alcohol for the next 24 hours.  · Don't drive, operate dangerous machinery, or make important business or personal decisions during the next 24 hours.  Note: Your healthcare provider may tell you not to take any medicine by mouth for pain or sleep in the next 4 hours. These medicines may react with the medicines you were given in the hospital. This could cause a much stronger response than usual.  Follow-up care  Follow up with your healthcare provider if you are not alert and back to your usual level of activity within 12 hours.  When to seek medical advice  Call your healthcare provider right away if any of these occur:  · Drowsiness gets worse  · Weakness or dizziness gets worse  · Repeated vomiting  · You can't be awakened   Date Last Reviewed: 10/18/2016  © 5347-5962 [employer]. [address] [place] [address]. All rights reserved. This information is not intended as a substitute for professional medical care. Always follow your healthcare professional's instructions.      Pain injection instructions:     This procedure may take a couple weeks to relieve pain  You may get some pain relief from the local anesthetic initally.    No driving for 24 hrs.   Activity as tolerated- gradually increase activities.  Dont lift over 10 lbs for 24 hrs   No heat  at injection sites x 2 days. No heating pads, hot tubs, saunas, or swimming in any body of water or pool for 2 days.  Use ice pack for mild swelling and for comfort , apply for 20 minutes, remove for 20 minute intervals. No direct contact of ice itself  to skin.  May shower today. Do not allow shower water to hit on injection site for 2 days.No tub baths for two days.      Resume Aspirin, Plavix, or Coumadin the day after the procedure unless otherwise instructed.   If diabetic,monitor your glucose carefully as steroids can increase your glucose level    Seek immediate medical help for:   Severe increase in your usual pain or appearance of new pain.  Prolonged (more than 8 hours) or increasing weakness or numbness in the legs or arms. Numbing medicine was injected and can affect the messages to and from the brain and legs or arms.  .    Fever above 100.4F ,Drainage,redness,active bleeding, or increased swelling at the injection site.  Headache, shortness of breath, chest pain, or breathing problems.     25

## 2022-12-28 NOTE — PROGRESS NOTES
Pt stated that everything was great except where the iv was placed. Stated it hurt and informed the nurse that it hurt and now is swollen and bruised. Request iv to be placed further up for any future injections.

## 2023-01-03 ENCOUNTER — TELEPHONE (OUTPATIENT)
Dept: FAMILY MEDICINE | Facility: CLINIC | Age: 58
End: 2023-01-03
Payer: COMMERCIAL

## 2023-01-03 DIAGNOSIS — E66.01 SEVERE OBESITY (BMI 35.0-39.9) WITH COMORBIDITY: ICD-10-CM

## 2023-01-03 RX ORDER — SEMAGLUTIDE 0.5 MG/.5ML
0.5 INJECTION, SOLUTION SUBCUTANEOUS
Qty: 2 ML | Refills: 1 | Status: SHIPPED | OUTPATIENT
Start: 2023-01-03 | End: 2023-01-24 | Stop reason: DRUGHIGH

## 2023-01-03 NOTE — TELEPHONE ENCOUNTER
Is she ready to step it up to the 0.5 mg weekly dose.  Each step up increases the appetite suppression effect but if you go up too fast the nausea can become a problem.

## 2023-01-03 NOTE — TELEPHONE ENCOUNTER
Patient came into the office and states that she is ready to go up to the 0.5/1.0 on the wegovy, patient states that it can be called into the [facility] on [address], patient states that they are having trouble getting the wegovy 0.25 in, patient states she would like to have the new one ordered before she runs out, where the pharmacy can order asap. Please advise

## 2023-01-03 NOTE — TELEPHONE ENCOUNTER
----- Message from Ofelia Juan J sent at 1/3/2023 10:45 AM CST -----  Regarding: refill request/ change script  Type:  RX Refill Request    Who Called:  Lynn  Refill or New Rx:  refill  RX Name and Strength:  semaglutide, weight loss, (WEGOVY) 0.25 mg/0.5 mL PnIj 2 mL 0 11/2/2022    Sig - Route: Inject 0.25 mg into the skin every 7 days. - Subcutaneous   Sent to pharmacy as: semaglutide, weight loss, (WEGOVY) 0.25 mg/0.5 mL PnIj   E-Prescribing Status: Receipt confirmed by pharmacy (11/2/2022 12:55 PM CDT)       How is the patient currently taking it? (ex. 1XDay):  see above  Is this a 30 day or 90 day RX:  see above   Preferred Pharmacy with phone number:        [facility] [address] [place] [address] [facility]  [address] [address] [address]  Phone: [phone] Fax: [phone]      Local or Mail Order:  local  Ordering Provider:  Dr Zhu  Best Call Back Number:  [phone]    Additional Information:  Pt was wondering if she could get the dose upped to .5 mg instead of .25 becase it goes through too fast. Please call to advise

## 2023-01-23 ENCOUNTER — TELEPHONE (OUTPATIENT)
Dept: FAMILY MEDICINE | Facility: CLINIC | Age: 58
End: 2023-01-23
Payer: COMMERCIAL

## 2023-01-23 ENCOUNTER — TELEPHONE (OUTPATIENT)
Dept: FAMILY MEDICINE | Facility: CLINIC | Age: 58
End: 2023-01-23

## 2023-01-23 ENCOUNTER — CLINICAL SUPPORT (OUTPATIENT)
Dept: FAMILY MEDICINE | Facility: CLINIC | Age: 58
End: 2023-01-23
Payer: COMMERCIAL

## 2023-01-23 VITALS — WEIGHT: 210.31 LBS | BODY MASS INDEX: 37.26 KG/M2 | BODY MASS INDEX: 37.26 KG/M2 | WEIGHT: 210.31 LBS

## 2023-01-23 DIAGNOSIS — E66.9 OBESITY, UNSPECIFIED CLASSIFICATION, UNSPECIFIED OBESITY TYPE, UNSPECIFIED WHETHER SERIOUS COMORBIDITY PRESENT: Primary | ICD-10-CM

## 2023-01-23 DIAGNOSIS — E66.01 SEVERE OBESITY (BMI 35.0-39.9) WITH COMORBIDITY: ICD-10-CM

## 2023-01-23 PROCEDURE — 99999 PR PBB SHADOW E&M-EST. PATIENT-LVL I: CPT | Mod: PBBFAC,,,

## 2023-01-23 PROCEDURE — 99999 PR PBB SHADOW E&M-EST. PATIENT-LVL I: ICD-10-PCS | Mod: PBBFAC,,,

## 2023-01-23 NOTE — TELEPHONE ENCOUNTER
----- Message from Diana Lozano sent at 1/23/2023 12:29 PM CST -----  Regarding: medication request  Contact: PATIENT  Please refill the medication(s) listed below. Please call the patient when the prescription(s) is ready for  at this phone number     ZIGGY CORONEL [[id number]]  [phone]         Patient is requesting a refill for prescription below. She stated the mg should be 1.0 instead of 0.5. Please advise!      Medication #1 semaglutide, weight loss, (WEGOVY) 0.5 mg/0.5 mL EddieIj      Preferred Pharmacy:    [facility] [address] [place] [address] [facility]  [address] [facility] [address]  Phone: [phone] Fax: [phone]

## 2023-01-23 NOTE — TELEPHONE ENCOUNTER
----- Message from Digna Thomas sent at 1/23/2023 12:41 PM CST -----  Contact: pt at : [phone]  Type: Needs Medical Advice  Who Called:  pt  Pharmacy name and phone #:    [facility] [address] [facility] [place] [facility] [address] [address] [address]  [address] [address]  [facility] [address]  Phone: [phone] Fax: [phone]  Best Call Back Number: [phone]  Additional Information: pt is calling the office to correct the dosage of the refill request for semaglutide, weight loss, (WEGOVY) 1.7 mg/1.7 mL PnIj. The pt needs the 1.7 instead of the 1.0 that she originally asked for. Please call back to advise.

## 2023-01-24 NOTE — TELEPHONE ENCOUNTER
Patient states she performed office visit with Dr. Parson in October and was weighed at that visit.  was seen by Dr. Ivory in October and December and received steroid injections at both visits. States she gained 5 pounds after receiving steroid injections. Patient states she has been taking 0.5 mg dose. Will send follow up message to Dr. Parson for notification.

## 2023-01-24 NOTE — TELEPHONE ENCOUNTER
Patient states she read her letter which includes dosage instructions wrong. States she is requesting the 1 mg dosage. Please be advised.

## 2023-01-24 NOTE — TELEPHONE ENCOUNTER
Why does she want to skip the 1 mg dose for four weeks and go to the 1.7 Wegovy?  Both the Ozempic and the Wegovy have a 1 mg dose before going higher.

## 2023-01-24 NOTE — TELEPHONE ENCOUNTER
The 0.5 mg was sent in on January 3rd.  What has she been taking?  She should stay on 0.5 for four weeks and then 1 mg for four weeks.  It does not look so far as if she has lost any weight .

## 2023-01-25 NOTE — TELEPHONE ENCOUNTER
Call placed to patient. No answer received. Left message indicating prescription sent to pharmacy.  Also sent [facility] portal message to patient for notification.

## 2023-01-27 ENCOUNTER — OFFICE VISIT (OUTPATIENT)
Dept: PAIN MEDICINE | Facility: CLINIC | Age: 58
End: 2023-01-27
Payer: COMMERCIAL

## 2023-01-27 DIAGNOSIS — G90.512 COMPLEX REGIONAL PAIN SYNDROME TYPE 1 OF LEFT UPPER EXTREMITY: ICD-10-CM

## 2023-01-27 DIAGNOSIS — G90.512 COMPLEX REGIONAL PAIN SYNDROME TYPE 1 OF LEFT UPPER EXTREMITY: Primary | ICD-10-CM

## 2023-01-27 DIAGNOSIS — M79.602 LEFT ARM PAIN: ICD-10-CM

## 2023-01-27 DIAGNOSIS — M25.512 BILATERAL SHOULDER PAIN, UNSPECIFIED CHRONICITY: ICD-10-CM

## 2023-01-27 DIAGNOSIS — M25.511 BILATERAL SHOULDER PAIN, UNSPECIFIED CHRONICITY: ICD-10-CM

## 2023-01-27 PROCEDURE — 1159F MED LIST DOCD IN RCRD: CPT | Mod: CPTII,S$GLB,, | Performed by: PHYSICIAN ASSISTANT

## 2023-01-27 PROCEDURE — 99999 PR PBB SHADOW E&M-EST. PATIENT-LVL IV: ICD-10-PCS | Mod: PBBFAC,,, | Performed by: PHYSICIAN ASSISTANT

## 2023-01-27 PROCEDURE — 99999 PR PBB SHADOW E&M-EST. PATIENT-LVL IV: CPT | Mod: PBBFAC,,, | Performed by: PHYSICIAN ASSISTANT

## 2023-01-27 PROCEDURE — 1159F PR MEDICATION LIST DOCUMENTED IN MEDICAL RECORD: ICD-10-PCS | Mod: CPTII,S$GLB,, | Performed by: PHYSICIAN ASSISTANT

## 2023-01-27 PROCEDURE — 99214 PR OFFICE/OUTPT VISIT, EST, LEVL IV, 30-39 MIN: ICD-10-PCS | Mod: S$GLB,,, | Performed by: PHYSICIAN ASSISTANT

## 2023-01-27 PROCEDURE — 99214 OFFICE O/P EST MOD 30 MIN: CPT | Mod: S$GLB,,, | Performed by: PHYSICIAN ASSISTANT

## 2023-01-27 RX ORDER — PREGABALIN 75 MG/1
CAPSULE ORAL
Qty: 90 CAPSULE | Refills: 5 | Status: SHIPPED | OUTPATIENT
Start: 2023-01-27 | End: 2024-01-02
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
## 2023-02-22 DIAGNOSIS — E66.01 SEVERE OBESITY (BMI 35.0-39.9) WITH COMORBIDITY: Primary | ICD-10-CM

## 2023-02-22 DIAGNOSIS — E66.01 SEVERE OBESITY (BMI 35.0-39.9) WITH COMORBIDITY: ICD-10-CM

## 2023-02-22 NOTE — TELEPHONE ENCOUNTER
No new care gaps identified.  [facility] Embedded Care Gaps. Reference number: [id number]. 2/22/2023   3:29:58 PM CST

## 2023-02-22 NOTE — TELEPHONE ENCOUNTER
Care Due:                  Date            Visit Type   Department     Provider  --------------------------------------------------------------------------------                                EP -                              PRIMARY      [facility]  Last Visit: 10-      CARE ([facility])   PRACTICE       Remy Parson                              EP -                              PRIMARY      [facility]  Next Visit: 10-      CARE ([facility])   PRACTICE       Remy Parson                                                            Last  Test          Frequency    Reason                     Performed    Due Date  --------------------------------------------------------------------------------    HBA1C.......  6 months...  semaglutide,.............  Not Found    Overdue    [employer] Embedded Care Gaps. Reference number: [id number]. 2/22/2023   3:10:29 PM CST

## 2023-02-22 NOTE — TELEPHONE ENCOUNTER
----- Message from Eamon Mortensen sent at 2/22/2023  3:01 PM CST -----  Type:  RX Refill Request    Who Called:  Patient  Refill or New Rx:  New  RX Name and Strength:  semaglutide, weight loss, 1.7 mg/0.5 mL PnIj      How is the patient currently taking it? (ex. 1XDay):  1 XWeek  Is this a 30 day or 90 day RX:        [facility] [address] [place] [id number] [address]  [address] [facility] [address]  Phone: [phone] Fax: [phone]      Local or Mail Order:  Local  Ordering Provider:  Blank Fenton Call Back Number:  [phone]  Additional Information:

## 2023-02-23 NOTE — TELEPHONE ENCOUNTER
Not seen since last October, she needs to come in for a weight check and a blood pressure check.  She may be able to reduce medications if she is losing weight

## 2023-02-24 ENCOUNTER — PATIENT MESSAGE (OUTPATIENT)
Dept: FAMILY MEDICINE | Facility: CLINIC | Age: 58
End: 2023-02-24
Payer: COMMERCIAL

## 2023-02-27 ENCOUNTER — TELEPHONE (OUTPATIENT)
Dept: FAMILY MEDICINE | Facility: CLINIC | Age: 58
End: 2023-02-27

## 2023-02-27 ENCOUNTER — CLINICAL SUPPORT (OUTPATIENT)
Dept: FAMILY MEDICINE | Facility: CLINIC | Age: 58
End: 2023-02-27
Payer: COMMERCIAL

## 2023-02-27 VITALS — HEART RATE: 112 BPM | DIASTOLIC BLOOD PRESSURE: 94 MMHG | SYSTOLIC BLOOD PRESSURE: 116 MMHG

## 2023-02-27 DIAGNOSIS — I10 HYPERTENSION, UNSPECIFIED TYPE: Primary | ICD-10-CM

## 2023-02-27 PROCEDURE — 99999 PR PBB SHADOW E&M-EST. PATIENT-LVL I: CPT | Mod: PBBFAC,,,

## 2023-02-27 PROCEDURE — 99999 PR PBB SHADOW E&M-EST. PATIENT-LVL I: ICD-10-PCS | Mod: PBBFAC,,,

## 2023-02-28 ENCOUNTER — TELEPHONE (OUTPATIENT)
Dept: FAMILY MEDICINE | Facility: CLINIC | Age: 58
End: 2023-02-28
Payer: COMMERCIAL

## 2023-02-28 NOTE — TELEPHONE ENCOUNTER
Supposedly is taking Lasix 40 mg daily and Toprol-XL 50 mg one daily.  It looks like her pulse is still running high in the blood pressure is not yet well controlled.  I would suggest we increase the Toprol to the 100 mg to help bring down the pulse as well as the blood pressure.

## 2023-02-28 NOTE — TELEPHONE ENCOUNTER
----- Message from Walter Wallis sent at 2/28/2023  1:20 PM CST -----  Contact: self  Type:  Patient Returning Call    Who Called:  Patient  Who Left Message for Patient:  Doris  Does the patient know what this is regarding?:  yes  Best Call Back Number:  [phone]    Additional Information:  Please call back

## 2023-02-28 NOTE — TELEPHONE ENCOUNTER
Patient confirms she is taking Furosemide 40 mg daily and Metoprolol Succinate 50 mg once daily. Patient states 10 days ago she left for a one week long cruise, and she forgot her medication at home. Patient states that is the only time she did not take her medication. Patient states she was recently seen by BERNADINE Harman (1-27-23) and her blood pressure at the time of that visit was not elevated. Patient states she does not want to increase her medication if she does not have to, but will increase it if she needs to. Will send follow up message to Dr. Parson for notification.

## 2023-03-01 NOTE — TELEPHONE ENCOUNTER
Blood pressures and pulse taken by Leslee in office:    Patient came in today for blood pressure check.      122/96 112- after several minutes 116/94    Is this accurate or not.  Diastolics are elevated X 2 with target below 80.  Pulse is elevated.

## 2023-03-01 NOTE — TELEPHONE ENCOUNTER
Patient pushing back about increasing Metoprolol by thinking that she missed the week of it and may not need to. Then asked if she does increase it and it gets under control if she can go back to previous dose. Explained to patient that dose she is controlled at is where she would need to stay to keep it under control. Offered to patient to do a log daily for a week then have  review and see where she is. Does that sound ok?

## 2023-03-09 ENCOUNTER — PATIENT MESSAGE (OUTPATIENT)
Dept: FAMILY MEDICINE | Facility: CLINIC | Age: 58
End: 2023-03-09
Payer: COMMERCIAL

## 2023-03-17 DIAGNOSIS — K21.00 GASTROESOPHAGEAL REFLUX DISEASE WITH ESOPHAGITIS WITHOUT HEMORRHAGE: ICD-10-CM

## 2023-03-17 DIAGNOSIS — E66.01 SEVERE OBESITY (BMI 35.0-39.9) WITH COMORBIDITY: ICD-10-CM

## 2023-03-17 RX ORDER — PANTOPRAZOLE SODIUM 40 MG/1
40 TABLET, DELAYED RELEASE ORAL EVERY MORNING
Qty: 90 TABLET | Refills: 3 | Status: SHIPPED | OUTPATIENT
Start: 2023-03-17 | End: 2023-07-17 | Stop reason: DRUGHIGH

## 2023-03-17 RX ORDER — SEMAGLUTIDE 2.4 MG/.75ML
2.4 INJECTION, SOLUTION SUBCUTANEOUS
Qty: 4 EACH | Refills: 2 | Status: SHIPPED | OUTPATIENT
Start: 2023-03-17 | End: 2023-05-05 | Stop reason: SDUPTHER

## 2023-03-17 NOTE — TELEPHONE ENCOUNTER
Wegovy 2.4 mg sent to [facility], we need to get a weight on her about the end of April if she is going to qualify to keep using it.      Protonix refill sent to [facility] also.

## 2023-03-17 NOTE — TELEPHONE ENCOUNTER
No new care gaps identified.  [facility] Embedded Care Gaps. Reference number: [id number]. 3/17/2023   4:23:39 PM CDT

## 2023-03-17 NOTE — TELEPHONE ENCOUNTER
See bp log on your desk.       Patient also needs next dose [facility]

## 2023-04-24 ENCOUNTER — CLINICAL SUPPORT (OUTPATIENT)
Dept: FAMILY MEDICINE | Facility: CLINIC | Age: 58
End: 2023-04-24
Payer: COMMERCIAL

## 2023-04-24 VITALS — WEIGHT: 195.56 LBS | BODY MASS INDEX: 34.64 KG/M2

## 2023-04-24 DIAGNOSIS — E66.9 OBESITY, UNSPECIFIED CLASSIFICATION, UNSPECIFIED OBESITY TYPE, UNSPECIFIED WHETHER SERIOUS COMORBIDITY PRESENT: Primary | ICD-10-CM

## 2023-04-24 PROCEDURE — 99999 PR PBB SHADOW E&M-EST. PATIENT-LVL I: CPT | Mod: PBBFAC,,,

## 2023-04-24 PROCEDURE — 99999 PR PBB SHADOW E&M-EST. PATIENT-LVL I: ICD-10-PCS | Mod: PBBFAC,,,

## 2023-05-05 DIAGNOSIS — E66.01 SEVERE OBESITY (BMI 35.0-39.9) WITH COMORBIDITY: ICD-10-CM

## 2023-05-05 RX ORDER — SEMAGLUTIDE 2.4 MG/.75ML
2.4 INJECTION, SOLUTION SUBCUTANEOUS
Qty: 4 EACH | Refills: 2 | Status: SHIPPED | OUTPATIENT
Start: 2023-05-05 | End: 2023-07-31

## 2023-05-05 NOTE — TELEPHONE ENCOUNTER
Care Due:                  Date            Visit Type   Department     Provider  --------------------------------------------------------------------------------                                EP -                              PRIMARY      [facility]  Last Visit: 10-      CARE ([facility])   PRACTICE       Remy Parson                              EP -                              PRIMARY      [facility]  Next Visit: 10-      CARE ([facility])   PRACTICE       Remy Parson                                                            Last  Test          Frequency    Reason                     Performed    Due Date  --------------------------------------------------------------------------------    HBA1C.......  6 months...  semaglutide,.............  Not Found    Overdue    [employer] Embedded Care Due Messages. Reference number: [id number].   5/05/2023 11:21:46 AM CDT

## 2023-05-05 NOTE — TELEPHONE ENCOUNTER
Meghann STAPLETON Staff  Caller: Tj from Pharm (Today, 10:08 AM)    ----- Message from Meghann Gonzalez sent at 5/5/2023 10:08 AM CDT -----  Contact: Tj from Pharm  Type:  Needs Medical Advice    Who Called: Tj from Pharm    Pharmacy name and phone #:   [facility] [place], [place] [facility] [address]  [address]  [facility] [address]  Phone: [phone] Fax: [phone]    Would the patient rather a call back or a response via [facility]?call   Best Call Back Number: [phone]  Additional Information: States that ins company is requesting a 90 day supply of Rx semaglutide, weight loss, (WEGOVY) 2.4 mg/0.75 mL PnIj. Please advise thank you

## 2023-05-24 ENCOUNTER — CLINICAL SUPPORT (OUTPATIENT)
Dept: FAMILY MEDICINE | Facility: CLINIC | Age: 58
End: 2023-05-24
Payer: COMMERCIAL

## 2023-05-24 VITALS — BODY MASS INDEX: 33.39 KG/M2 | WEIGHT: 188.5 LBS

## 2023-05-24 DIAGNOSIS — R63.4 WEIGHT DECREASE: Primary | ICD-10-CM

## 2023-05-24 PROCEDURE — 99999 PR PBB SHADOW E&M-EST. PATIENT-LVL I: ICD-10-PCS | Mod: PBBFAC,,,

## 2023-05-24 PROCEDURE — 99999 PR PBB SHADOW E&M-EST. PATIENT-LVL I: CPT | Mod: PBBFAC,,,

## 2023-05-29 ENCOUNTER — TELEPHONE (OUTPATIENT)
Dept: FAMILY MEDICINE | Facility: CLINIC | Age: 58
End: 2023-05-29
Payer: COMMERCIAL

## 2023-05-29 DIAGNOSIS — I10 ESSENTIAL HYPERTENSION: Primary | ICD-10-CM

## 2023-05-29 DIAGNOSIS — E87.6 HYPOKALEMIA: ICD-10-CM

## 2023-05-29 NOTE — TELEPHONE ENCOUNTER
Patient going to [facility] for a month and wants a letter to take with her stating she takes Wegovy. She is flying there and needs it since it's an injection.     She also needs a refill on the potassium- taking one a day since about a month after you told her to take 2 a day. Asked her why she changed to 1 a day. She responded because potassium can effect your heart if you get too much.

## 2023-05-30 ENCOUNTER — LAB VISIT (OUTPATIENT)
Dept: LAB | Facility: HOSPITAL | Age: 58
End: 2023-05-30
Attending: FAMILY MEDICINE
Payer: COMMERCIAL

## 2023-05-30 DIAGNOSIS — I10 ESSENTIAL HYPERTENSION: ICD-10-CM

## 2023-05-30 DIAGNOSIS — E87.6 HYPOKALEMIA: ICD-10-CM

## 2023-05-30 LAB
ANION GAP SERPL CALC-SCNC: 7 MMOL/L (ref 8–16)
BUN SERPL-MCNC: 11 MG/DL (ref 6–20)
CALCIUM SERPL-MCNC: 9.8 MG/DL (ref 8.7–10.5)
CHLORIDE SERPL-SCNC: 107 MMOL/L (ref 95–110)
CO2 SERPL-SCNC: 23 MMOL/L (ref 23–29)
CREAT SERPL-MCNC: 1.1 MG/DL (ref 0.5–1.4)
EST. GFR  (NO RACE VARIABLE): 57.9 ML/MIN/1.73 M^2
GLUCOSE SERPL-MCNC: 114 MG/DL (ref 70–110)
POTASSIUM SERPL-SCNC: 3.8 MMOL/L (ref 3.5–5.1)
SODIUM SERPL-SCNC: 137 MMOL/L (ref 136–145)

## 2023-05-30 PROCEDURE — 36415 COLL VENOUS BLD VENIPUNCTURE: CPT | Mod: PO | Performed by: FAMILY MEDICINE

## 2023-05-30 PROCEDURE — 80048 BASIC METABOLIC PNL TOTAL CA: CPT | Performed by: FAMILY MEDICINE

## 2023-05-30 NOTE — TELEPHONE ENCOUNTER
It will affect it even more if you do not get enough.  Order in for a BMP, get it as soon as possible.

## 2023-05-30 NOTE — TELEPHONE ENCOUNTER
Call placed to patient for notification. Patient verbalized understanding. Lab appointment scheduled for today's date 5-30-23. Patient agreed to appointment date, time, and location.

## 2023-05-31 ENCOUNTER — TELEPHONE (OUTPATIENT)
Dept: FAMILY MEDICINE | Facility: CLINIC | Age: 58
End: 2023-05-31
Payer: COMMERCIAL

## 2023-05-31 DIAGNOSIS — E87.6 HYPOKALEMIA: ICD-10-CM

## 2023-05-31 RX ORDER — POTASSIUM CHLORIDE 20 MEQ/1
20 TABLET, EXTENDED RELEASE ORAL 2 TIMES DAILY
Qty: 180 TABLET | Refills: 1 | Status: SHIPPED | OUTPATIENT
Start: 2023-05-31 | End: 2023-10-18

## 2023-05-31 RX ORDER — POTASSIUM CHLORIDE 20 MEQ/1
TABLET, EXTENDED RELEASE ORAL
Qty: 190 TABLET | Refills: 1 | Status: CANCELLED | OUTPATIENT
Start: 2023-05-31

## 2023-05-31 NOTE — TELEPHONE ENCOUNTER
No care due was identified.  [facility] Embedded Care Due Messages. Reference number: [id number].   5/31/2023 1:48:14 PM CDT

## 2023-06-19 NOTE — PATIENT INSTRUCTIONS
Nail Fungal Infection  A nail fungal infection changes the way fingernails and toenails look. They may thicken, discolor, change shape, or split. This condition is hard to treat because nails grow slowly and have limited blood supply. The infection often comes back after treatment.  There are 2 types of medicines used to treat this condition:  Topical anti-fungal medicines. These are applied to the surface of the skin and nail area. These medicines are not very effective because they cant get deep into the nail.  Oral antifungal medicines. These medicines work better because they go into the nail from the inside out. But the infection may still come back. It may take 9 to 12 months for your nail to look normal again. This means you are cured. You can repeat treatment if needed. Most people take these medicines without any problems. It is rare to stop therapy because of side effects. But your healthcare provider may give you some monitoring tests. Talk about possible side effects with your provider before starting treatment.  If medicines fail, the nail can be removed surgically or chemically. These methods physically remove the fungus from the body. This helps medical treatment be more effective.  Home care  Use medicines exactly as directed for as long as directed. Treating a fungal infection can take longer than other kinds of infections.  Smoking is a risk factor for fungal infection. This is one more reason to quit.  Wear absorbent socks, and shoes that let your feet breathe. Sweaty feet increase your risk of fungal infection. They also make an existing infection harder to treat.  Use footwear when in damp public places like swimming pools, gyms, and shower rooms. This will help you avoid the fungus that grows there.  Don't share nail clippers or scissors with others.  Follow-up care  Follow up with your healthcare provider, or as advised.  When to seek medical advice  Call your healthcare provider right away  if any of these occur:  Skin by the nail becomes red, swollen, painful, or drains pus (a creamy yellow or white liquid)  Side effects from oral anti-fungal medicines  Date Last Reviewed: 8/1/2016  © 3392-9319 [employer]. [address] [place] [address]. All rights reserved. This information is not intended as a substitute for professional medical care. Always follow your healthcare professional's instructions.

## 2023-06-20 ENCOUNTER — OFFICE VISIT (OUTPATIENT)
Dept: PODIATRY | Facility: CLINIC | Age: 58
End: 2023-06-20
Payer: COMMERCIAL

## 2023-06-20 VITALS — HEIGHT: 63 IN | RESPIRATION RATE: 16 BRPM | WEIGHT: 183.5 LBS | BODY MASS INDEX: 32.51 KG/M2

## 2023-06-20 DIAGNOSIS — B35.1 ONYCHOMYCOSIS DUE TO DERMATOPHYTE: Primary | ICD-10-CM

## 2023-06-20 DIAGNOSIS — L60.3 ONYCHODYSTROPHY: ICD-10-CM

## 2023-06-20 PROCEDURE — 1159F PR MEDICATION LIST DOCUMENTED IN MEDICAL RECORD: ICD-10-PCS | Mod: CPTII,S$GLB,, | Performed by: PODIATRIST

## 2023-06-20 PROCEDURE — 1160F RVW MEDS BY RX/DR IN RCRD: CPT | Mod: CPTII,S$GLB,, | Performed by: PODIATRIST

## 2023-06-20 PROCEDURE — 1160F PR REVIEW ALL MEDS BY PRESCRIBER/CLIN PHARMACIST DOCUMENTED: ICD-10-PCS | Mod: CPTII,S$GLB,, | Performed by: PODIATRIST

## 2023-06-20 PROCEDURE — 99203 OFFICE O/P NEW LOW 30 MIN: CPT | Mod: S$GLB,,, | Performed by: PODIATRIST

## 2023-06-20 PROCEDURE — 99203 PR OFFICE/OUTPT VISIT, NEW, LEVL III, 30-44 MIN: ICD-10-PCS | Mod: S$GLB,,, | Performed by: PODIATRIST

## 2023-06-20 PROCEDURE — 3008F BODY MASS INDEX DOCD: CPT | Mod: CPTII,S$GLB,, | Performed by: PODIATRIST

## 2023-06-20 PROCEDURE — 99999 PR PBB SHADOW E&M-EST. PATIENT-LVL IV: CPT | Mod: PBBFAC,,, | Performed by: PODIATRIST

## 2023-06-20 PROCEDURE — 1159F MED LIST DOCD IN RCRD: CPT | Mod: CPTII,S$GLB,, | Performed by: PODIATRIST

## 2023-06-20 PROCEDURE — 99999 PR PBB SHADOW E&M-EST. PATIENT-LVL IV: ICD-10-PCS | Mod: PBBFAC,,, | Performed by: PODIATRIST

## 2023-06-20 PROCEDURE — 3008F PR BODY MASS INDEX (BMI) DOCUMENTED: ICD-10-PCS | Mod: CPTII,S$GLB,, | Performed by: PODIATRIST

## 2023-06-20 RX ORDER — CICLOPIROX 80 MG/ML
SOLUTION TOPICAL NIGHTLY
Qty: 6.6 ML | Refills: 3 | Status: SHIPPED | OUTPATIENT
Start: 2023-06-20 | End: 2023-09-26

## 2023-06-20 NOTE — PROGRESS NOTES
"  [address] [facility] [address]  [place] [address]  Phone: [phone]   [phone]    Patient's PCP:Remy Parson MD  Referring Provider: Aaareferral Self    Subjective:      Chief Complaint:: Nail Problem (Bilateral great toe)    HPI  Lynn Naidu is a 58 y.o. female who presents today with a complaint of toe fungus. The current episode started months ago.  The symptoms include white spot on toe. Probable cause of complaint discoloration following trips to the nail salon.    Vitals:    06/20/23 1102   Resp: 16   Weight: 83.2 kg (183 lb 8 oz)   Height: 5' 3" (1.6 m)   PainSc: 0-No pain      Shoe Size: 9    Past Surgical History:   Procedure Laterality Date    COLONOSCOPY  11/01/2017    Dr. Oviedo, normal, ten year recheck    COLONOSCOPY N/A 11/1/2017    Procedure: COLONOSCOPY;  Surgeon: Cameron Oviedo MD;  Location: [facility];  Service: Endoscopy;  Laterality: N/A;    EPIDURAL STEROID INJECTION INTO CERVICAL SPINE N/A 7/22/2019    Procedure: Injection-steroid-epidural-cervical;  Surgeon: Thomas Ivory MD;  Location: [facility] OR;  Service: Pain Management;  Laterality: N/A;  C7-T1    EPIDURAL STEROID INJECTION INTO CERVICAL SPINE N/A 12/21/2021    Procedure: Injection-steroid-epidural-cervical;  Surgeon: Thomas Ivory MD;  Location: [facility] OR;  Service: Pain Management;  Laterality: N/A;  C6-7    INJECTION OF ANESTHETIC AGENT AROUND STELLATE GANGLION Left 8/30/2022    Procedure: BLOCK, GANGLION, STELLATE;  Surgeon: Thomas Ivory MD;  Location: [facility] OR;  Service: Pain Management;  Laterality: Left;    INJECTION OF ANESTHETIC AGENT AROUND STELLATE GANGLION Left 10/21/2022    Procedure: BLOCK, GANGLION, STELLATE Left;  Surgeon: Thomas Ivory MD;  Location: [facility] OR;  Service: Pain Management;  Laterality: Left;    INJECTION OF ANESTHETIC AGENT AROUND STELLATE GANGLION Left 12/27/2022    Procedure: BLOCK, GANGLION, STELLATE;  Surgeon: Thomas Ivory MD;  Location: [facility] OR;  Service: Pain Management;  Laterality: Left;  " left    TONSILLECTOMY, ADENOIDECTOMY Bilateral 7/23/2021    Procedure: TONSILLECTOMY AND ADENOIDECTOMY;  Surgeon: Anselmo Galvan MD;  Location: [facility] OR;  Service: ENT;  Laterality: Bilateral;    WISDOM TOOTH EXTRACTION       Past Medical History:   Diagnosis Date    Allergy     Bronchitis 7-17-15    Cervical disc displacement     Edema     Insomnia     Plantar fasciitis      Family History   Problem Relation Age of Onset    Heart disease Mother     Stroke Mother     Cancer Father         bone ca, prostate ca     Hypertension Sister     Diabetes Sister     Allergies Sister     Asthma Sister     Heart disease Brother     Kidney failure Brother     Hypertension Sister     Allergies Sister     Asthma Sister     Angioedema Neg Hx     Eczema Neg Hx     Immunodeficiency Neg Hx     Urticaria Neg Hx         Social History:   Marital Status:   Alcohol History:  reports no history of alcohol use.  Tobacco History:  reports that she has never smoked. She has never used smokeless tobacco.  Drug History:  reports no history of drug use.    Review of patient's allergies indicates:   Allergen Reactions    No known drug allergies        Current Outpatient Medications   Medication Sig Dispense Refill    diazePAM (VALIUM) 10 MG Tab Take 10 mg by mouth every evening.  1    ergocalciferol (ERGOCALCIFEROL) 50,000 unit Cap Take 1 capsule (50,000 Units total) by mouth twice a week. 24 capsule 3    FLUoxetine 20 MG capsule Take 20 mg by mouth once daily.  2    fluticasone propionate (FLONASE) 50 mcg/actuation nasal spray 2 sprays (100 mcg total) by Each Nostril route once daily. 48 g 4    furosemide (LASIX) 40 MG tablet Take 1 tablet (40 mg total) by mouth once daily. 90 tablet 3    hydrOXYzine HCl (ATARAX) 25 MG tablet Take 1 tablet (25 mg total) by mouth 3 (three) times daily as needed. (Patient taking differently: Take 25 mg by mouth once daily.) 90 tablet 1    linaCLOtide (LINZESS) 290 mcg Cap capsule Take 1 capsule by mouth  once daily 90 capsule 3    methocarbamoL (ROBAXIN) 750 MG Tab Take 1 tablet (750 mg total) by mouth 4 (four) times daily as needed (muscle spasm). 90 tablet 1    methylPREDNISolone (MEDROL DOSEPACK) 4 mg tablet Take by mouth.      metoprolol succinate (TOPROL-XL) 50 MG 24 hr tablet Take 1 tablet (50 mg total) by mouth once daily. 90 tablet 3    nabumetone (RELAFEN) 500 MG tablet Take 1 tablet (500 mg total) by mouth 2 (two) times daily as needed for Pain. 30 tablet 0    pantoprazole (PROTONIX) 40 MG tablet Take 1 tablet (40 mg total) by mouth every morning. 90 tablet 3    potassium chloride SA (K-DUR,KLOR-CON) 20 MEQ tablet Take 1 tablet (20 mEq total) by mouth 2 (two) times daily. 180 tablet 1    pregabalin (LYRICA) 75 MG capsule TAKE 1 CAPSULE BY MOUTH EVERY MORNING AND 2 CAPSULES AT BEDTIME 90 capsule 5    promethazine (PHENERGAN) 25 MG tablet Take 25 mg by mouth.      semaglutide, weight loss, (WEGOVY) 2.4 mg/0.75 mL PnIj Inject 2.4 mg into the skin every 7 days. 4 each 2    SULFACLEANSE 8-4 8-4 % Susp Apply topically nightly as needed.       traMADoL (ULTRAM) 50 mg tablet Take 1 tablet (50 mg total) by mouth every 8 (eight) hours as needed for Pain. 21 tablet 0    triamcinolone acetonide 0.1% (KENALOG) 0.1 % paste SMARTSIG:Sparingly TO TEETH 3 Times Daily      ciclopirox (PENLAC) 8 % Soln Apply topically nightly. 6.6 mL 3     No current facility-administered medications for this visit.     Facility-Administered Medications Ordered in Other Visits   Medication Dose Route Frequency Provider Last Rate Last Admin    lactated ringers infusion   Intravenous Continuous Thomas Ivory MD 25 mL/hr at 12/21/21 1240 New Bag at 12/21/21 1240    lactated ringers infusion   Intravenous Continuous Thomas Ivory MD           Review of Systems   Constitutional:  Negative for chills, fatigue, fever and unexpected weight change.   HENT:  Negative for hearing loss and trouble swallowing.    Eyes:  Negative for photophobia and visual  disturbance.   Respiratory:  Negative for cough, shortness of breath and wheezing.    Cardiovascular:  Negative for chest pain, palpitations and leg swelling.   Gastrointestinal:  Negative for abdominal pain and nausea.   Genitourinary:  Negative for dysuria and frequency.   Musculoskeletal:  Negative for arthralgias, back pain, gait problem, joint swelling and myalgias.   Skin:  Negative for rash and wound.   Neurological:  Negative for tremors, seizures, speech difficulty, weakness and headaches.   Hematological:  Does not bruise/bleed easily.       Objective:        Physical Exam:   Foot Exam    General  General Appearance: appears stated age and healthy   Orientation: alert and oriented to person, place, and time   Affect: appropriate   Gait: unimpaired       Right Foot/Ankle     Inspection and Palpation  Ecchymosis: none  Tenderness: none   Swelling: none   Arch: normal  Skin Exam: skin intact; no drainage, no ulcer and no erythema   Fungus Toenails: present    Neurovascular  Dorsalis pedis: 2+  Posterior tibial: 2+  Capillary Refill: 2+  Varicose veins: not present  Saphenous nerve sensation: normal  Tibial nerve sensation: normal  Superficial peroneal nerve sensation: normal  Deep peroneal nerve sensation: normal  Sural nerve sensation: normal    Edema  Type of edema: non-pitting    Muscle Strength  Ankle dorsiflexion: 5  Ankle plantar flexion: 5  Ankle inversion: 5  Ankle eversion: 5  Great toe extension: 5  Great toe flexion: 5    Range of Motion    Normal right ankle ROM    Tests  Anterior drawer: negative   Talar tilt: negative   PT Tinel's sign: negative    Paresthesia: negative  Comments  Very slight discoloration nails 1 through 5    Left Foot/Ankle      Inspection and Palpation  Ecchymosis: none  Tenderness: none   Swelling: none   Arch: normal  Skin Exam: skin intact; no drainage, no ulcer and no erythema   Fungus Toenails: present    Neurovascular  Dorsalis pedis: 2+  Posterior tibial: 2+  Capillary  refill: 2+  Varicose veins: not present  Saphenous nerve sensation: normal  Tibial nerve sensation: normal  Superficial peroneal nerve sensation: normal  Deep peroneal nerve sensation: normal  Sural nerve sensation: normal    Edema  Type of edema: non-pitting    Muscle Strength  Ankle dorsiflexion: 5  Ankle plantar flexion: 5  Ankle inversion: 5  Ankle eversion: 5  Great toe extension: 5  Great toe flexion: 5    Range of Motion    Normal left ankle ROM    Tests  Anterior drawer: negative   Talar tilt: negative   PT Tinel's sign: negative  Paresthesia: negative  Comments  Very slight discoloration nails 1 through 5  Physical Exam  Cardiovascular:      Pulses:           Dorsalis pedis pulses are 2+ on the right side and 2+ on the left side.        Posterior tibial pulses are 2+ on the right side and 2+ on the left side.   Feet:      Right foot:      Skin integrity: No ulcer or erythema.      Toenail Condition: Fungal disease present.     Left foot:      Skin integrity: No ulcer or erythema.      Toenail Condition: Fungal disease present.            Right Ankle/Foot Exam     Range of Motion   The patient has normal right ankle ROM.    Comments:  Very slight discoloration nails 1 through 5    Left Ankle/Foot Exam     Range of Motion   The patient has normal left ankle ROM.     Comments:  Very slight discoloration nails 1 through 5      Muscle Strength   Right Lower Extremity   Ankle Dorsiflexion:  5   Plantar flexion:  5/5  Left Lower Extremity   Ankle Dorsiflexion:  5   Plantar flexion:  5/5     Vascular Exam     Right Pulses  Dorsalis Pedis:      2+  Posterior Tibial:      2+        Left Pulses  Dorsalis Pedis:      2+  Posterior Tibial:      2+         Imaging: none            Assessment:       1. Onychomycosis due to dermatophyte    2. Onychodystrophy      Plan:   Onychomycosis due to dermatophyte  -     ciclopirox (PENLAC) 8 % Soln; Apply topically nightly.  Dispense: 6.6 mL; Refill:  3    Onychodystrophy      Follow up if symptoms worsen or fail to improve.    Procedures        Fungal infection of toenails explained. Treatment options including no treatment, periodic debridement, topical medications, oral medications, and removal of the nail were discussed, as well as success rates and risks of recurrence. We agreed on topical medication        Counseling:     I provided patient education verbally regarding:   Patient diagnosis, treatment options, as well as alternatives, risks, and benefits.     This note was created using Dragon voice recognition software that occasionally misinterpreted phrases or words.

## 2023-07-17 ENCOUNTER — OFFICE VISIT (OUTPATIENT)
Dept: GASTROENTEROLOGY | Facility: CLINIC | Age: 58
End: 2023-07-17
Payer: COMMERCIAL

## 2023-07-17 VITALS
HEART RATE: 111 BPM | WEIGHT: 181.88 LBS | BODY MASS INDEX: 32.23 KG/M2 | SYSTOLIC BLOOD PRESSURE: 123 MMHG | DIASTOLIC BLOOD PRESSURE: 92 MMHG | HEIGHT: 63 IN

## 2023-07-17 DIAGNOSIS — K59.09 CHRONIC CONSTIPATION: ICD-10-CM

## 2023-07-17 DIAGNOSIS — R10.10 PAIN OF UPPER ABDOMEN: Primary | ICD-10-CM

## 2023-07-17 DIAGNOSIS — R03.0 ELEVATED BLOOD PRESSURE READING: ICD-10-CM

## 2023-07-17 DIAGNOSIS — K21.00 GASTROESOPHAGEAL REFLUX DISEASE WITH ESOPHAGITIS WITHOUT HEMORRHAGE: ICD-10-CM

## 2023-07-17 DIAGNOSIS — Z87.898 HISTORY OF NAUSEA AND VOMITING: ICD-10-CM

## 2023-07-17 DIAGNOSIS — Z12.11 SCREENING FOR COLON CANCER: ICD-10-CM

## 2023-07-17 PROCEDURE — 3080F DIAST BP >= 90 MM HG: CPT | Mod: CPTII,S$GLB,,

## 2023-07-17 PROCEDURE — 3074F PR MOST RECENT SYSTOLIC BLOOD PRESSURE < 130 MM HG: ICD-10-PCS | Mod: CPTII,S$GLB,,

## 2023-07-17 PROCEDURE — 99999 PR PBB SHADOW E&M-EST. PATIENT-LVL V: ICD-10-PCS | Mod: PBBFAC,,,

## 2023-07-17 PROCEDURE — 1160F PR REVIEW ALL MEDS BY PRESCRIBER/CLIN PHARMACIST DOCUMENTED: ICD-10-PCS | Mod: CPTII,S$GLB,,

## 2023-07-17 PROCEDURE — 1160F RVW MEDS BY RX/DR IN RCRD: CPT | Mod: CPTII,S$GLB,,

## 2023-07-17 PROCEDURE — 3074F SYST BP LT 130 MM HG: CPT | Mod: CPTII,S$GLB,,

## 2023-07-17 PROCEDURE — 99214 PR OFFICE/OUTPT VISIT, EST, LEVL IV, 30-39 MIN: ICD-10-PCS | Mod: S$GLB,,,

## 2023-07-17 PROCEDURE — 1159F MED LIST DOCD IN RCRD: CPT | Mod: CPTII,S$GLB,,

## 2023-07-17 PROCEDURE — 3080F PR MOST RECENT DIASTOLIC BLOOD PRESSURE >= 90 MM HG: ICD-10-PCS | Mod: CPTII,S$GLB,,

## 2023-07-17 PROCEDURE — 1159F PR MEDICATION LIST DOCUMENTED IN MEDICAL RECORD: ICD-10-PCS | Mod: CPTII,S$GLB,,

## 2023-07-17 PROCEDURE — 99214 OFFICE O/P EST MOD 30 MIN: CPT | Mod: S$GLB,,,

## 2023-07-17 PROCEDURE — 99999 PR PBB SHADOW E&M-EST. PATIENT-LVL V: CPT | Mod: PBBFAC,,,

## 2023-07-17 PROCEDURE — 3008F BODY MASS INDEX DOCD: CPT | Mod: CPTII,S$GLB,,

## 2023-07-17 PROCEDURE — 3008F PR BODY MASS INDEX (BMI) DOCUMENTED: ICD-10-PCS | Mod: CPTII,S$GLB,,

## 2023-07-17 RX ORDER — PANTOPRAZOLE SODIUM 40 MG/1
40 TABLET, DELAYED RELEASE ORAL EVERY MORNING
Qty: 90 TABLET | Refills: 3 | Status: SHIPPED | OUTPATIENT
Start: 2023-07-17

## 2023-07-17 NOTE — PROGRESS NOTES
Subjective:       Patient ID: Lynn Naidu is a 58 y.o. female Body mass index is 32.22 kg/m².    Chief Complaint: Abdominal Pain    This patient is new to me.  Established patient of Dr. Oviedo.     Patient's  present and assisting with history.    Abdominal Pain  This is a new (started after trip to [facility] 06/2023 - unsure if it may have been due to heat) problem. The current episode started more than 1 month ago. The onset quality is sudden. The problem occurs intermittently (every other day). Duration: lasts 30 minutes. The problem has been gradually improving. The pain is located in the RUQ, LUQ and periumbilical region. The pain is at a severity of 0/10 (denies pain currently). The quality of the pain is cramping. The abdominal pain does not radiate. Associated symptoms include constipation (Typically having 2-3 BMs daily rated stool 6 on [place] scale; taking Linzess 290 mcg with relief), nausea (resolved), vomiting (Resolved; reports 1 episode of vomiting up undigested food [facility]; denies hematemesis or coffee-ground emesis) and weight loss (Intentional; currently on Wegovy for weight loss). Pertinent negatives include no anorexia, arthralgias, belching, diarrhea, dysuria, fever, flatus, frequency, headaches, hematochezia, hematuria, melena or myalgias. The pain is aggravated by eating (worsened in the heat). The pain is relieved by Bowel movements and passing flatus. She has tried proton pump inhibitors for the symptoms. The treatment provided no relief. Her past medical history is significant for GERD (well controlled taking Protonix 40 mg 1-2 times weekly). There is no history of abdominal surgery, colon cancer, Crohn's disease, gallstones, irritable bowel syndrome, pancreatitis, PUD or ulcerative colitis. Patient's medical history does not include kidney stones and UTI.     Review of Systems   Constitutional:  Positive for weight loss (Intentional; currently on Wegovy for  weight loss). Negative for activity change, appetite change, chills, diaphoresis, fatigue, fever and unexpected weight change.   HENT:  Negative for sore throat and trouble swallowing.    Respiratory:  Negative for cough, choking and shortness of breath.    Cardiovascular:  Negative for chest pain.   Gastrointestinal:  Positive for abdominal pain, constipation (Typically having 2-3 BMs daily rated stool 6 on Isabella scale; taking Linzess 290 mcg with relief), nausea (resolved) and vomiting (Resolved; reports 1 episode of vomiting up undigested food while she was in [place]; denies hematemesis or coffee-ground emesis). Negative for abdominal distention, anal bleeding, anorexia, blood in stool, diarrhea, flatus, hematochezia, melena and rectal pain.   Genitourinary:  Negative for dysuria, frequency and hematuria.   Musculoskeletal:  Negative for arthralgias and myalgias.   Neurological:  Negative for headaches.       Patient's last menstrual period was 02/11/2009.  Past Medical History:   Diagnosis Date    Allergy     Bronchitis 07/17/2015    Cervical disc displacement     Edema     Insomnia     Plantar fasciitis      Past Surgical History:   Procedure Laterality Date    COLONOSCOPY  11/01/2017    Dr. Oviedo, normal, ten year recheck    COLONOSCOPY N/A 11/01/2017    Procedure: COLONOSCOPY;  Surgeon: Cameron Oviedo MD;  Location: [facility];  Service: Endoscopy;  Laterality: N/A;    EPIDURAL STEROID INJECTION INTO CERVICAL SPINE N/A 07/22/2019    Procedure: Injection-steroid-epidural-cervical;  Surgeon: Thomas Ivory MD;  Location: [facility] OR;  Service: Pain Management;  Laterality: N/A;  C7-T1    EPIDURAL STEROID INJECTION INTO CERVICAL SPINE N/A 12/21/2021    Procedure: Injection-steroid-epidural-cervical;  Surgeon: Thomas Ivory MD;  Location: [facility] OR;  Service: Pain Management;  Laterality: N/A;  C6-7    INJECTION OF ANESTHETIC AGENT AROUND STELLATE GANGLION Left 08/30/2022    Procedure: BLOCK, GANGLION, STELLATE;   Surgeon: Thomas Ivory MD;  Location: [facility] OR;  Service: Pain Management;  Laterality: Left;    INJECTION OF ANESTHETIC AGENT AROUND STELLATE GANGLION Left 10/21/2022    Procedure: BLOCK, GANGLION, STELLATE Left;  Surgeon: Thomas Ivory MD;  Location: [facility] OR;  Service: Pain Management;  Laterality: Left;    INJECTION OF ANESTHETIC AGENT AROUND STELLATE GANGLION Left 12/27/2022    Procedure: BLOCK, GANGLION, STELLATE;  Surgeon: Thomas Ivory MD;  Location: [facility] OR;  Service: Pain Management;  Laterality: Left;  left    TONSILLECTOMY, ADENOIDECTOMY Bilateral 07/23/2021    Procedure: TONSILLECTOMY AND ADENOIDECTOMY;  Surgeon: Anselmo Galvan MD;  Location: [facility] OR;  Service: ENT;  Laterality: Bilateral;    WISDOM TOOTH EXTRACTION       Family History   Problem Relation Age of Onset    Heart disease Mother     Stroke Mother     Cancer Father         bone ca, prostate ca     Hypertension Sister     Diabetes Sister     Allergies Sister     Asthma Sister     Hypertension Sister     Allergies Sister     Asthma Sister     Heart disease Brother     Kidney failure Brother     Angioedema Neg Hx     Eczema Neg Hx     Immunodeficiency Neg Hx     Urticaria Neg Hx     Colon cancer Neg Hx     Colon polyps Neg Hx     Esophageal cancer Neg Hx     Stomach cancer Neg Hx      Social History     Tobacco Use    Smoking status: Never    Smokeless tobacco: Never   Substance Use Topics    Alcohol use: No    Drug use: No     Wt Readings from Last 10 Encounters:   07/17/23 82.5 kg (181 lb 14.1 oz)   06/20/23 83.2 kg (183 lb 8 oz)   05/24/23 85.5 kg (188 lb 7.9 oz)   04/24/23 88.7 kg (195 lb 8.8 oz)   01/27/23 (P) 95.4 kg (210 lb 5.1 oz)   01/23/23 95.4 kg (210 lb 5.1 oz)   01/23/23 95.4 kg (210 lb 5.1 oz)   12/21/22 95.3 kg (210 lb)   11/22/22 95.3 kg (210 lb)   10/17/22 86.2 kg (190 lb 0.6 oz)     Lab Results   Component Value Date    WBC 6.65 10/19/2022    HGB 13.5 10/19/2022    HCT 41.6 10/19/2022    MCV 91 10/19/2022     10/19/2022      CMP  Sodium   Date Value Ref Range Status   05/30/2023 137 136 - 145 mmol/L Final     Potassium   Date Value Ref Range Status   05/30/2023 3.8 3.5 - 5.1 mmol/L Final     Chloride   Date Value Ref Range Status   05/30/2023 107 95 - 110 mmol/L Final     CO2   Date Value Ref Range Status   05/30/2023 23 23 - 29 mmol/L Final     Glucose   Date Value Ref Range Status   05/30/2023 114 (H) 70 - 110 mg/dL Final     BUN   Date Value Ref Range Status   05/30/2023 11 6 - 20 mg/dL Final     Creatinine   Date Value Ref Range Status   05/30/2023 1.1 0.5 - 1.4 mg/dL Final     Calcium   Date Value Ref Range Status   05/30/2023 9.8 8.7 - 10.5 mg/dL Final     Total Protein   Date Value Ref Range Status   10/19/2022 7.1 6.0 - 8.4 g/dL Final     Albumin   Date Value Ref Range Status   10/19/2022 3.5 3.5 - 5.2 g/dL Final     Total Bilirubin   Date Value Ref Range Status   10/19/2022 0.4 0.1 - 1.0 mg/dL Final     Comment:     For infants and newborns, interpretation of results should be based  on gestational age, weight and in agreement with clinical  observations.    Premature Infant recommended reference ranges:  Up to 24 hours.............<8.0 mg/dL  Up to 48 hours............<12.0 mg/dL  3-5 days..................<15.0 mg/dL  6-29 days.................<15.0 mg/dL       Alkaline Phosphatase   Date Value Ref Range Status   10/19/2022 103 55 - 135 U/L Final     AST   Date Value Ref Range Status   10/19/2022 12 10 - 40 U/L Final     ALT   Date Value Ref Range Status   10/19/2022 10 10 - 44 U/L Final     Anion Gap   Date Value Ref Range Status   05/30/2023 7 (L) 8 - 16 mmol/L Final     eGFR if    Date Value Ref Range Status   10/18/2021 >60.0 >60 mL/min/1.73 m^2 Final     eGFR if non    Date Value Ref Range Status   10/18/2021 >60.0 >60 mL/min/1.73 m^2 Final     Comment:     Calculation used to obtain the estimated glomerular filtration  rate (eGFR) is the CKD-EPI equation.        Lab Results    Component Value Date    AMYLASE 49 08/31/2016     Lab Results   Component Value Date    TSH 2.884 10/11/2018     Reviewed prior medical records including endoscopy history (see surgical history).    Objective:      Physical Exam  Vitals and nursing note reviewed.   Constitutional:       General: She is not in acute distress.     Appearance: Normal appearance. She is obese. She is not ill-appearing.   HENT:      Mouth/Throat:      Lips: Pink. No lesions.   Cardiovascular:      Rate and Rhythm: Normal rate.      Pulses: Normal pulses.      Heart sounds: Normal heart sounds.   Pulmonary:      Effort: Pulmonary effort is normal. No respiratory distress.      Breath sounds: Normal breath sounds.   Abdominal:      General: Abdomen is protuberant. Bowel sounds are normal. There is no distension or abdominal bruit. There are no signs of injury.      Palpations: Abdomen is soft. There is no shifting dullness, fluid wave, hepatomegaly, splenomegaly or mass.      Tenderness: There is abdominal tenderness in the right upper quadrant and left upper quadrant. There is no guarding or rebound. Negative signs include Johnson's sign, Rovsing's sign and McBurney's sign.   Skin:     General: Skin is warm and dry.      Coloration: Skin is not jaundiced or pale.   Neurological:      Mental Status: She is alert and oriented to person, place, and time.   Psychiatric:         Attention and Perception: Attention normal.         Mood and Affect: Mood normal.         Speech: Speech normal.         Behavior: Behavior normal.       Assessment:       1. Pain of upper abdomen    2. History of nausea and vomiting    3. Gastroesophageal reflux disease with esophagitis without hemorrhage    4. Chronic constipation    5. Screening for colon cancer    6. Elevated blood pressure reading        Plan:       Pain of upper abdomen  - schedule EGD, discussed procedure with patient, including risks and benefits, patient verbalized understanding  -     US  Abdomen Complete; Future; Expected date: 07/17/2023  -     Case Request Endoscopy: EGD (ESOPHAGOGASTRODUODENOSCOPY)    History of nausea and vomiting  - schedule EGD, discussed procedure with patient, including risks and benefits, patient verbalized understanding  -     US Abdomen Complete; Future; Expected date: 07/17/2023  -     Case Request Endoscopy: EGD (ESOPHAGOGASTRODUODENOSCOPY)    Gastroesophageal reflux disease with esophagitis without hemorrhage  - schedule EGD, discussed procedure with patient, including risks and benefits, patient verbalized understanding  -discussed PPI's work to block acid production and are taken daily, patient verbalized understanding.  -Avoid large meals, avoid eating within 2-3 hours of bedtime (avoid late night eating & lying down soon after eating), elevate head of bed if nocturnal symptoms are present, smoking cessation (if current smoker), & weight loss (if overweight).   -Avoid known foods which trigger reflux symptoms & to minimize/avoid high-fat foods, chocolate, caffeine, citrus, alcohol, & tomato products.  -Avoid/limit use of NSAID's, since they can cause GI upset, bleeding, and/or ulcers. If needed, take with food.  - START: pantoprazole (PROTONIX) 40 MG tablet; Take 1 tablet (40 mg total) by mouth every morning.  Dispense: 90 tablet; Refill: 3    Chronic constipation   -Recommend daily exercise as tolerated, adequate water intake (six 8-oz glasses of water daily), and high fiber diet. OTC fiber supplements are recommended if diet does not reach daily fiber goal (20-30 grams daily), such as Metamucil, Citrucel, or FiberCon (take as directed, separate from other oral medications by >2 hours).  -continue Linzess 290 mcg once daily    Screening for colon cancer  -followup for surveillance colonoscopy 11/20/2027    Elevated blood pressure reading  -  monitor blood pressure at home and follow-up with PCP &/or cardiologist  - patient denies headache, chest pain, shortness of  breath, or blurred vision; if blood pressure remains elevated or symptoms occur to go to ER.    Follow up in about 4 weeks (around 8/14/2023), or if symptoms worsen or fail to improve.      If no improvement in symptoms or symptoms worsen, call/follow-up at clinic or go to ER.        45 minutes of total time spent on the encounter, which includes face to face time and non-face to face time preparing to see the patient (eg, review of tests), Obtaining and/or reviewing separately obtained history, Documenting clinical information in the electronic or other health record, Independently interpreting results (not separately reported) and communicating results to the patient/family/caregiver, or Care coordination (not separately reported).     A dictation software program was used for this note. Please expect some simple typographical  errors in this note.

## 2023-07-28 DIAGNOSIS — E66.01 SEVERE OBESITY (BMI 35.0-39.9) WITH COMORBIDITY: ICD-10-CM

## 2023-07-28 NOTE — TELEPHONE ENCOUNTER
Care Due:                  Date            Visit Type   Department     Provider  --------------------------------------------------------------------------------                                EP -                              PRIMARY      [facility]  Last Visit: 10-      CARE ([facility])   PRACTICE       Remy Parson                              EP -                              PRIMARY      [facility]  Next Visit: 10-      CARE ([facility])   PRACTICE       Remy Parson                                                            Last  Test          Frequency    Reason                     Performed    Due Date  --------------------------------------------------------------------------------    HBA1C.......  6 months...  semaglutide,.............  Not Found    Overdue    [employer] Embedded Care Due Messages. Reference number: [id number].   7/28/2023 5:12:11 PM CDT

## 2023-07-29 NOTE — TELEPHONE ENCOUNTER
Refill Routing Note   Medication(s) are not appropriate for processing by [facility] for the following reason(s):      Required labs outdated    [facility] action(s):  Defer Care Due:  Labs due            Appointments  past 12m or future 3m with PCP    Date Provider   Last Visit   10/19/2022 Remy Parson MD   Next Visit   10/20/2023 Remy Parson MD   ED visits in past 90 days: 0        Note composed:3:09 PM 07/29/2023

## 2023-07-31 RX ORDER — SEMAGLUTIDE 2.4 MG/.75ML
2.4 INJECTION, SOLUTION SUBCUTANEOUS
Qty: 12 ML | Refills: 0 | Status: SHIPPED | OUTPATIENT
Start: 2023-07-31 | End: 2023-10-17

## 2023-08-01 ENCOUNTER — TELEPHONE (OUTPATIENT)
Dept: FAMILY MEDICINE | Facility: CLINIC | Age: 58
End: 2023-08-01
Payer: COMMERCIAL

## 2023-08-01 DIAGNOSIS — Z12.31 SCREENING MAMMOGRAM FOR BREAST CANCER: Primary | ICD-10-CM

## 2023-08-01 NOTE — TELEPHONE ENCOUNTER
Prior authorization for Wegovy has been approved Start Date:07/02/2023;Coverage End Date:07/31/2024;

## 2023-08-01 NOTE — TELEPHONE ENCOUNTER
----- Message from Digna Thomas sent at 8/1/2023  2:59 PM CDT -----  Contact: Patient  Type: Needs Medical Advice    Who Called:  Patient  What is this regarding?:  Pt is needing orders for her mammo by 9/19/23.   Best Call Back Number:[phone]  Additional Information:  Please call the patient back at the phone number listed above to advise. Thank you!

## 2023-08-03 ENCOUNTER — HOSPITAL ENCOUNTER (OUTPATIENT)
Dept: RADIOLOGY | Facility: HOSPITAL | Age: 58
Discharge: HOME | End: 2023-08-03
Payer: COMMERCIAL

## 2023-08-03 ENCOUNTER — TELEPHONE (OUTPATIENT)
Dept: GASTROENTEROLOGY | Facility: CLINIC | Age: 58
End: 2023-08-03
Payer: COMMERCIAL

## 2023-08-03 DIAGNOSIS — R10.10 PAIN OF UPPER ABDOMEN: ICD-10-CM

## 2023-08-03 PROCEDURE — 76700 US EXAM ABDOM COMPLETE: CPT | Mod: TC,PO

## 2023-08-03 NOTE — TELEPHONE ENCOUNTER
----- Message from Adelita Islas sent at 8/3/2023 12:06 PM CDT -----  Regarding: advise  Contact: patient  Type: Needs Medical Advice  Who Called:  Patient  Symptoms (please be specific):    How long has patient had these symptoms:    Pharmacy name and phone #:    Best Call Back Number: [phone]    Additional Information: Needs to be advised on instructions regarding procedure thanks!

## 2023-08-03 NOTE — TELEPHONE ENCOUNTER
Returned call to patient to advise that provider ha not reviewed results and provided recommendation. Once recommendations are provided she will be contacted.

## 2023-08-04 ENCOUNTER — TELEPHONE (OUTPATIENT)
Dept: GASTROENTEROLOGY | Facility: CLINIC | Age: 58
End: 2023-08-04
Payer: COMMERCIAL

## 2023-08-04 DIAGNOSIS — K76.89 LIVER CYST: Primary | ICD-10-CM

## 2023-08-04 NOTE — TELEPHONE ENCOUNTER
Placed call to patient to further explain ultrasound report. Also advised patient that she will need to hold Wegovy as least 7 days before her procedure.

## 2023-08-04 NOTE — TELEPHONE ENCOUNTER
----- Message from Mary Tinajero sent at 8/4/2023  4:01 PM CDT -----  Regarding: results  Contact: patient  Type:  Test Results    Who Called:  patient  Name of Test (Lab/Mammo/Etc):  ultrasound  Date of Test:  08/03/23  Ordering Provider:  Shikha Griffiths  Where the test was performed:  [facility]  Best Call Back Number:  [phone]  Additional Information:  Please call patient with results.  Thanks!

## 2023-08-21 ENCOUNTER — TELEPHONE (OUTPATIENT)
Dept: HEPATOLOGY | Facility: CLINIC | Age: 58
End: 2023-08-21
Payer: COMMERCIAL

## 2023-08-21 NOTE — TELEPHONE ENCOUNTER
Shikha Griffiths NP ordered that patient be scheduled for a hepatology consult visit for liver cysts.  Patient placed on PA Scheuermann's schedule in error.  I spoke with patient and and the above relayed.  I stressed that appt with PA Scheuermann was cancelled on 8/22/23.  Unsure if patient needs MD or CALE appt.  Please advise.

## 2023-08-30 ENCOUNTER — TELEPHONE (OUTPATIENT)
Dept: GASTROENTEROLOGY | Facility: CLINIC | Age: 58
End: 2023-08-30
Payer: COMMERCIAL

## 2023-08-30 NOTE — TELEPHONE ENCOUNTER
----- Message from Ester Locke sent at 8/30/2023 10:48 AM CDT -----  Contact: self  Type:  Patient Returning Call    Who Called:  pt   Who Left Message for Patient:  Jonas Guardado  Does the patient know what this is regarding?:  yes  Best Call Back Number:  [phone]   Additional Information:  thanks

## 2023-08-31 ENCOUNTER — ANESTHESIA EVENT (OUTPATIENT)
Dept: ENDOSCOPY | Facility: HOSPITAL | Age: 58
End: 2023-08-31
Payer: COMMERCIAL

## 2023-08-31 ENCOUNTER — ANESTHESIA (OUTPATIENT)
Dept: ENDOSCOPY | Facility: HOSPITAL | Age: 58
End: 2023-08-31
Payer: COMMERCIAL

## 2023-08-31 ENCOUNTER — HOSPITAL ENCOUNTER (OUTPATIENT)
Facility: HOSPITAL | Age: 58
Discharge: HOME | End: 2023-08-31
Attending: INTERNAL MEDICINE | Admitting: FAMILY MEDICINE
Payer: COMMERCIAL

## 2023-08-31 DIAGNOSIS — L29.9 ITCHING: ICD-10-CM

## 2023-08-31 DIAGNOSIS — R10.13 EPIGASTRIC ABDOMINAL PAIN: ICD-10-CM

## 2023-08-31 DIAGNOSIS — K29.70 GASTRITIS, PRESENCE OF BLEEDING UNSPECIFIED, UNSPECIFIED CHRONICITY, UNSPECIFIED GASTRITIS TYPE: Primary | ICD-10-CM

## 2023-08-31 DIAGNOSIS — K44.9 HIATAL HERNIA: ICD-10-CM

## 2023-08-31 PROCEDURE — 25000003 PHARM REV CODE 250: Performed by: INTERNAL MEDICINE

## 2023-08-31 PROCEDURE — 43239 EGD BIOPSY SINGLE/MULTIPLE: CPT | Performed by: INTERNAL MEDICINE

## 2023-08-31 PROCEDURE — 27201012 HC FORCEPS, HOT/COLD, DISP: Performed by: INTERNAL MEDICINE

## 2023-08-31 PROCEDURE — D9220A PRA ANESTHESIA: Mod: ANES,,, | Performed by: ANESTHESIOLOGY

## 2023-08-31 PROCEDURE — D9220A PRA ANESTHESIA: Mod: CRNA,,, | Performed by: NURSE ANESTHETIST, CERTIFIED REGISTERED

## 2023-08-31 PROCEDURE — 25000003 PHARM REV CODE 250: Performed by: NURSE ANESTHETIST, CERTIFIED REGISTERED

## 2023-08-31 PROCEDURE — 37000008 HC ANESTHESIA 1ST 15 MINUTES: Performed by: INTERNAL MEDICINE

## 2023-08-31 PROCEDURE — 43239 EGD BIOPSY SINGLE/MULTIPLE: CPT | Mod: ,,, | Performed by: INTERNAL MEDICINE

## 2023-08-31 PROCEDURE — 43239 PR EGD, FLEX, W/BIOPSY, SGL/MULTI: ICD-10-PCS | Mod: ,,, | Performed by: INTERNAL MEDICINE

## 2023-08-31 PROCEDURE — 63600175 PHARM REV CODE 636 W HCPCS: Performed by: NURSE ANESTHETIST, CERTIFIED REGISTERED

## 2023-08-31 PROCEDURE — D9220A PRA ANESTHESIA: ICD-10-PCS | Mod: CRNA,,, | Performed by: NURSE ANESTHETIST, CERTIFIED REGISTERED

## 2023-08-31 PROCEDURE — D9220A PRA ANESTHESIA: ICD-10-PCS | Mod: ANES,,, | Performed by: ANESTHESIOLOGY

## 2023-08-31 RX ORDER — LIDOCAINE HYDROCHLORIDE 20 MG/ML
INJECTION INTRAVENOUS
Status: DISCONTINUED | OUTPATIENT
Start: 2023-08-31 | End: 2023-08-31

## 2023-08-31 RX ORDER — PROPOFOL 10 MG/ML
VIAL (ML) INTRAVENOUS
Status: DISCONTINUED | OUTPATIENT
Start: 2023-08-31 | End: 2023-08-31

## 2023-08-31 RX ORDER — SODIUM CHLORIDE 9 MG/ML
INJECTION, SOLUTION INTRAVENOUS CONTINUOUS
Status: DISCONTINUED | OUTPATIENT
Start: 2023-08-31 | End: 2023-08-31 | Stop reason: HOSPADM

## 2023-08-31 RX ADMIN — PROPOFOL 150 MG: 10 INJECTION, EMULSION INTRAVENOUS at 08:08

## 2023-08-31 RX ADMIN — PROPOFOL 40 MG: 10 INJECTION, EMULSION INTRAVENOUS at 08:08

## 2023-08-31 RX ADMIN — LIDOCAINE HYDROCHLORIDE 50 MG: 20 INJECTION, SOLUTION INTRAVENOUS at 08:08

## 2023-08-31 RX ADMIN — SODIUM CHLORIDE: 9 INJECTION, SOLUTION INTRAVENOUS at 08:08

## 2023-08-31 NOTE — PROVATION PATIENT INSTRUCTIONS
Discharge Summary/Instructions after an Endoscopic Procedure  Patient Name: Lynn Naidu  Patient MRN: [id number]  Patient YOB: 1965 Thursday, August 31, 2023  aCmeron Hayes MD  Dear patient,  As a result of recent federal legislation (The Federal Cures Act), you may   receive lab or pathology results from your procedure in your [facility]   account before your physician is able to contact you. Your physician or   their representative will relay the results to you with their   recommendations at their soonest availability.  Thank you,  RESTRICTIONS:  During your procedure today, you received medications for sedation.  These   medications may affect your judgment, balance and coordination.  Therefore,   for 24 hours, you have the following restrictions:   - DO NOT drive a car, operate machinery, make legal/financial decisions,   sign important papers or drink alcohol.    ACTIVITY:  Today: no heavy lifting, straining or running due to procedural   sedation/anesthesia.  The following day: return to full activity including work.  DIET:  Eat and drink normally unless instructed otherwise.     TREATMENT FOR COMMON SIDE EFFECTS:  - Mild abdominal pain, nausea, belching, bloating or excessive gas:  rest,   eat lightly and use a heating pad.  - Sore Throat: treat with throat lozenges and/or gargle with warm salt   water.  - Because air was used during the procedure, expelling large amounts of air   from your rectum or belching is normal.  - If a bowel prep was taken, you may not have a bowel movement for 1-3 days.    This is normal.  SYMPTOMS TO WATCH FOR AND REPORT TO YOUR PHYSICIAN:  1. Abdominal pain or bloating, other than gas cramps.  2. Chest pain.  3. Back pain.  4. Signs of infection such as: chills or fever occurring within 24 hours   after the procedure.  5. Rectal bleeding, which would show as bright red, maroon, or black stools.   (A tablespoon of blood from the rectum is not serious, especially  if   hemorrhoids are present.)  6. Vomiting.  7. Weakness or dizziness.  GO DIRECTLY TO THE NEAREST EMERGENCY ROOM IF YOU HAVE ANY OF THE FOLLOWING:      Difficulty breathing              Chills and/or fever over 101 F   Persistent vomiting and/or vomiting blood   Severe abdominal pain   Severe chest pain   Black, tarry stools   Bleeding- more than one tablespoon   Any other symptom or condition that you feel may need urgent attention  Your doctor recommends these additional instructions:  If any biopsies were taken, your doctors clinic will contact you in 1 to 2   weeks with any results.  - Patient has a contact number available for emergencies.  The signs and   symptoms of potential delayed complications were discussed with the   patient.  Return to normal activities tomorrow.  Written discharge   instructions were provided to the patient.   - Resume previous diet.   - Continue present medications.   - No aspirin, ibuprofen, naproxen, or other non-steroidal anti-inflammatory   drugs.   - Await pathology results.   - Discharge patient to home (ambulatory).   - Follow an antireflux regimen.   - Return to GI clinic PRN.  For questions, problems or results please call your physician - Cameron Hayes MD at Work:  [phone].  [facility], EMERGENCY ROOM PHONE NUMBER: [phone]  IF A COMPLICATION OR EMERGENCY SITUATION ARISES AND YOU ARE UNABLE TO REACH   YOUR PHYSICIAN - GO DIRECTLY TO THE EMERGENCY ROOM.  Cameron Hayes MD  8/31/2023 8:47:00 AM  This report has been verified and signed electronically.  Dear patient,  As a result of recent federal legislation (The Federal Cures Act), you may   receive lab or pathology results from your procedure in your [facility]   account before your physician is able to contact you. Your physician or   their representative will relay the results to you with their   recommendations at their soonest availability.  Thank you,  PROVATION

## 2023-08-31 NOTE — PLAN OF CARE
Vss, bruno po fluids, denies pain, ambulates easily. IV removed, catheter intact. Discharge instructions provided and states understanding. States ready to go home.  Discharged from facility with family per wheelchair.

## 2023-08-31 NOTE — H&P
CC: Epigastric pain    58 year old female with above. States that symptoms are stable, no alleviating/exacerbating factors. No bleeding or weight loss.     ROS:  No headache, no fever/chills, no chest pain/SOB, no nausea/vomiting/diarrhea/constipation/GI bleeding/abdominal pain, no dysuria/hematuria.    VSSAF   Exam:   Alert and oriented x 3; no apparent distress   PERRLA, sclera anicteric  CV: Regular rate/rhythm, normal PMI   Lungs: Clear bilaterally with no wheeze/rales   Abdomen: Soft, NT/ND, normal bowel sounds   Ext: No cyanosis, clubbing     Impression:   As above    Plan:   Proceed with endoscopy. Further recs to follow.

## 2023-08-31 NOTE — ANESTHESIA PREPROCEDURE EVALUATION
08/31/2023  Lynn Naidu is a 58 y.o., female.      Pre-op Assessment    I have reviewed the Patient Summary Reports.     I have reviewed the Nursing Notes. I have reviewed the NPO Status.   I have reviewed the Medications.     Review of Systems  Anesthesia Hx:  No problems with previous Anesthesia    Social:  Non-Smoker    Cardiovascular:   Hypertension, well controlled    Pulmonary:  Pulmonary Normal    Renal/:  Renal/ Normal     Musculoskeletal:   Arthritis   Spine Disorders: cervical Disc disease and Degenerative disease    Neurological:   Neuromuscular Disease,    Endocrine:  Endocrine Normal        Physical Exam  General: Well nourished, Cooperative, Alert and Oriented    Airway:  Mallampati: II   Mouth Opening: Normal  TM Distance: Normal  Neck ROM: Normal ROM        Anesthesia Plan  Type of Anesthesia, risks & benefits discussed:    Anesthesia Type: Gen ETT, Gen Supraglottic Airway, Gen Natural Airway, MAC  Intra-op Monitoring Plan: Standard ASA Monitors  Post Op Pain Control Plan: multimodal analgesia  Induction:  IV  Airway Plan: Direct, Video and Fiberoptic, Post-Induction  Informed Consent: Informed consent signed with the Patient and all parties understand the risks and agree with anesthesia plan.  All questions answered.   ASA Score: 2    Ready For Surgery From Anesthesia Perspective.     .

## 2023-08-31 NOTE — TRANSFER OF CARE
"Anesthesia Transfer of Care Note    Patient: Lynn Naidu    Procedure(s) Performed: Procedure(s) (LRB):  EGD (ESOPHAGOGASTRODUODENOSCOPY) (N/A)    Patient location: PACU    Anesthesia Type: general    Transport from OR: Transported from OR on 2-3 L/min O2 by NC with adequate spontaneous ventilation    Post pain: adequate analgesia    Post assessment: no apparent anesthetic complications and tolerated procedure well    Post vital signs: stable    Level of consciousness: awake, alert and oriented    Nausea/Vomiting: no nausea/vomiting    Complications: none    Transfer of care protocol was followed      Last vitals:   Visit Vitals  /78 (BP Location: Left arm, Patient Position: Lying)   Pulse 89   Temp 36.5 °C (97.7 °F) (Skin)   Resp 16   Ht 5' 3" (1.6 m)   Wt 78.9 kg (174 lb)   LMP 02/11/2009   SpO2 96%   Breastfeeding No   BMI 30.82 kg/m²     "

## 2023-09-01 VITALS
OXYGEN SATURATION: 96 % | DIASTOLIC BLOOD PRESSURE: 78 MMHG | HEART RATE: 94 BPM | BODY MASS INDEX: 30.83 KG/M2 | TEMPERATURE: 97 F | WEIGHT: 174 LBS | SYSTOLIC BLOOD PRESSURE: 119 MMHG | HEIGHT: 63 IN | RESPIRATION RATE: 18 BRPM

## 2023-09-05 ENCOUNTER — TELEPHONE (OUTPATIENT)
Dept: GASTROENTEROLOGY | Facility: CLINIC | Age: 58
End: 2023-09-05
Payer: COMMERCIAL

## 2023-09-05 RX ORDER — AMOXICILLIN 500 MG/1
1000 CAPSULE ORAL EVERY 12 HOURS
Qty: 40 CAPSULE | Refills: 0 | Status: SHIPPED | OUTPATIENT
Start: 2023-09-05 | End: 2023-09-15

## 2023-09-05 RX ORDER — CLARITHROMYCIN 500 MG/1
500 TABLET, FILM COATED ORAL 2 TIMES DAILY
Qty: 20 TABLET | Refills: 0 | Status: SHIPPED | OUTPATIENT
Start: 2023-09-05 | End: 2023-09-15

## 2023-09-05 NOTE — TELEPHONE ENCOUNTER
----- Message from Veda Herndon sent at 9/5/2023  1:14 PM CDT -----  Contact: self  Type:  Needs Medical Advice    Who Called: self  Would the patient rather a call back or a response via [facility]? call  Best Call Back Number: [phone] (home) [phone] (work)    Additional Information: pt would like nurse or dr to call and explain her biopsy results. Please advise and thank you.

## 2023-09-05 NOTE — TELEPHONE ENCOUNTER
Returned call to patient to advise that te provider has not review results to provide  recommendations.

## 2023-09-06 ENCOUNTER — TELEPHONE (OUTPATIENT)
Dept: FAMILY MEDICINE | Facility: CLINIC | Age: 58
End: 2023-09-06
Payer: COMMERCIAL

## 2023-09-06 NOTE — TELEPHONE ENCOUNTER
Patient taking 2 abx for h pylori per Dr. Oviedo-she is prone to yeast infection and asking for Diflucan. [facility]    She has been reading up on h pylori and it recommends family members get tested. She is asking if  should be tested. He has no symptoms.

## 2023-09-06 NOTE — TELEPHONE ENCOUNTER
She cannot take Diflucan with the Biaxin antibiotic that Dr. Oviedo gave her.  It can cause heart problems.      The simplest way to check family members for the H pylori is a stool specimen looking for the antigen.  They do not have to have symptoms.  Commonly the symptoms are very minor or non-existent but they could still be potentially contagious

## 2023-09-06 NOTE — TELEPHONE ENCOUNTER
----- Message from Isabella Hallman MA sent at 9/6/2023 11:01 AM CDT -----  Type: Needs Medical Advice  Who Called:  pt   Pharmacy name and phone #:    [id number]  [place] - [address]  [address]  [place] [address]  Phone: [phone] Fax: [phone]        Best Call Back Number: [phone]    Additional Information: please advise pt don't want to get infected by yeast  while taking antibiotic medication please call something in for the yeast infection

## 2023-09-08 ENCOUNTER — TELEPHONE (OUTPATIENT)
Dept: GASTROENTEROLOGY | Facility: CLINIC | Age: 58
End: 2023-09-08
Payer: COMMERCIAL

## 2023-09-08 RX ORDER — NYSTATIN 100000 [USP'U]/ML
4 SUSPENSION ORAL 4 TIMES DAILY
Qty: 160 ML | Refills: 0 | Status: SHIPPED | OUTPATIENT
Start: 2023-09-08 | End: 2023-09-18

## 2023-09-08 NOTE — TELEPHONE ENCOUNTER
----- Message from Kia Perkins sent at 9/8/2023  8:56 AM CDT -----  Contact: self  Type: Needs Medical Advice  Who Called:  pt  Symptoms (please be specific):  white on toungue  How long has patient had these symptoms:  n/a  Pharmacy name and phone #:    [facility] - [place], [place] - [address] [address]  [address]  [facility] [address]  Phone: [phone] Fax: [phone]     Best Call Back Number: [phone]   Additional Information: on Wednesday found out she had a bacteria infection her tongue is white. Ent stated she had thrust. Pt is on 2 different antibiotic please advise she wants to know if she should be doing anything different

## 2023-09-08 NOTE — TELEPHONE ENCOUNTER
----- Message from Jonas Guardado LPN sent at 9/8/2023  9:08 AM CDT -----  Contact: self  Patient has thrush.   ----- Message -----  From: Kia Perkins  Sent: 9/8/2023   9:00 AM CDT  To: Preet Barnes Staff    Type: Needs Medical Advice  Who Called:  pt  Symptoms (please be specific):  white on toungue  How long has patient had these symptoms:  n/a  [facility] and phone #[facility]    [facility] [place] [place] - [address]  [address]  [facility] [address]  Phone: [phone] Fax: [phone]     Best Call Back Number: [phone]   Additional Information: on Wednesday found out she had a bacteria infection her tongue is white. Ent stated she had thrust. Pt is on 2 different antibiotic please advise she wants to know if she should be doing anything different

## 2023-09-20 ENCOUNTER — HOSPITAL ENCOUNTER (OUTPATIENT)
Dept: RADIOLOGY | Facility: CLINIC | Age: 58
Discharge: HOME | End: 2023-09-20
Attending: FAMILY MEDICINE
Payer: COMMERCIAL

## 2023-09-20 DIAGNOSIS — Z12.31 SCREENING MAMMOGRAM FOR BREAST CANCER: ICD-10-CM

## 2023-09-20 PROCEDURE — 77063 BREAST TOMOSYNTHESIS BI: CPT | Mod: 26,,, | Performed by: RADIOLOGY

## 2023-09-20 PROCEDURE — 77067 MAMMO DIGITAL SCREENING BILAT WITH TOMO: ICD-10-PCS | Mod: 26,,, | Performed by: RADIOLOGY

## 2023-09-20 PROCEDURE — 77067 SCR MAMMO BI INCL CAD: CPT | Mod: TC,PO

## 2023-09-20 PROCEDURE — 77067 SCR MAMMO BI INCL CAD: CPT | Mod: 26,,, | Performed by: RADIOLOGY

## 2023-09-20 PROCEDURE — 77063 MAMMO DIGITAL SCREENING BILAT WITH TOMO: ICD-10-PCS | Mod: 26,,, | Performed by: RADIOLOGY

## 2023-09-26 DIAGNOSIS — B35.1 ONYCHOMYCOSIS DUE TO DERMATOPHYTE: ICD-10-CM

## 2023-09-26 RX ORDER — CICLOPIROX 80 MG/ML
SOLUTION TOPICAL NIGHTLY
Qty: 7 ML | Refills: 0 | Status: SHIPPED | OUTPATIENT
Start: 2023-09-26 | End: 2024-01-02

## 2023-09-29 ENCOUNTER — TELEPHONE (OUTPATIENT)
Dept: GASTROENTEROLOGY | Facility: CLINIC | Age: 58
End: 2023-09-29
Payer: COMMERCIAL

## 2023-09-29 RX ORDER — NYSTATIN 100000 [USP'U]/ML
6 SUSPENSION ORAL 4 TIMES DAILY
Qty: 240 ML | Refills: 0 | Status: SHIPPED | OUTPATIENT
Start: 2023-09-29 | End: 2023-10-09

## 2023-09-29 NOTE — TELEPHONE ENCOUNTER
----- Message from Cameron Oviedo MD sent at 9/29/2023  2:33 PM CDT -----  Regarding: RE: refill  Contact: patient  Oral thrush is something that she will need to speak with primary care about.  She did not have esophageal candida when we scoped her.  This would thus not be a GI issue.  As far as the white spots, I am not certain what to make of that but she may need to see dermatology.      ----- Message -----  From: Lisa Dias LPN  Sent: 9/29/2023   2:28 PM CDT  To: Cameron Oviedo MD  Subject: FW: refill                                        Patient states the she has thrush again.  She is also getting white spots on her face.  ----- Message -----  From: Mary Tinajero  Sent: 9/29/2023   2:24 PM CDT  To: Preet Barnes Staff  Subject: refill                                           Type:  RX Refill Request    Who Called:  patient  Refill or New Rx:  refill  RX Name and Strength:  nystatin  How is the patient currently taking it? (ex. 1XDay):  as directed  Is this a 30 day or 90 day RX:  30  Preferred Pharmacy with phone number:    [facility] - [place], [place] - [address]  [address] [facility] [address]  Phone: [phone] Fax: [phone]      Local or Mail Order:  local  Ordering Provider:  Dr. Oviedo  Best Call Back Number:  [phone]    Additional Information:  Patient states the she has thrush again.  She is also getting white spots on her face.  Could you call something else in as well.  Please call patient to advise.  Thanks!

## 2023-09-29 NOTE — TELEPHONE ENCOUNTER
Call placed to MsHakan Lynn, informed her per Dr. Hayes Oral thrush is something that she will need to speak with primary care about.  She did not have esophageal candida when we scoped her.  This would thus not be a GI issue.  As far as the white spots, I am not certain what to make of that but she may need to see dermatology. She stated she was recently on antibiotics for h. Pylori and is adamant that this is thrush from those antibiotics.

## 2023-10-09 ENCOUNTER — OFFICE VISIT (OUTPATIENT)
Dept: HEPATOLOGY | Facility: CLINIC | Age: 58
End: 2023-10-09
Payer: COMMERCIAL

## 2023-10-09 ENCOUNTER — TELEPHONE (OUTPATIENT)
Dept: HEPATOLOGY | Facility: CLINIC | Age: 58
End: 2023-10-09
Payer: COMMERCIAL

## 2023-10-09 ENCOUNTER — LAB VISIT (OUTPATIENT)
Dept: LAB | Facility: HOSPITAL | Age: 58
End: 2023-10-09
Attending: INTERNAL MEDICINE
Payer: COMMERCIAL

## 2023-10-09 DIAGNOSIS — K76.89 LIVER CYST: ICD-10-CM

## 2023-10-09 LAB
ALBUMIN SERPL BCP-MCNC: 3.8 G/DL (ref 3.5–5.2)
ALP SERPL-CCNC: 102 U/L (ref 55–135)
ALT SERPL W/O P-5'-P-CCNC: 16 U/L (ref 10–44)
ANION GAP SERPL CALC-SCNC: 7 MMOL/L (ref 8–16)
AST SERPL-CCNC: 15 U/L (ref 10–40)
BASOPHILS # BLD AUTO: 0.04 K/UL (ref 0–0.2)
BASOPHILS NFR BLD: 0.5 % (ref 0–1.9)
BILIRUB DIRECT SERPL-MCNC: 0.1 MG/DL (ref 0.1–0.3)
BILIRUB SERPL-MCNC: 0.3 MG/DL (ref 0.1–1)
BUN SERPL-MCNC: 11 MG/DL (ref 6–20)
CALCIUM SERPL-MCNC: 9.3 MG/DL (ref 8.7–10.5)
CHLORIDE SERPL-SCNC: 104 MMOL/L (ref 95–110)
CO2 SERPL-SCNC: 28 MMOL/L (ref 23–29)
CREAT SERPL-MCNC: 1.1 MG/DL (ref 0.5–1.4)
DIFFERENTIAL METHOD: ABNORMAL
EOSINOPHIL # BLD AUTO: 0.2 K/UL (ref 0–0.5)
EOSINOPHIL NFR BLD: 1.9 % (ref 0–8)
ERYTHROCYTE [DISTWIDTH] IN BLOOD BY AUTOMATED COUNT: 12.9 % (ref 11.5–14.5)
EST. GFR  (NO RACE VARIABLE): 57.9 ML/MIN/1.73 M^2
GLUCOSE SERPL-MCNC: 90 MG/DL (ref 70–110)
HCT VFR BLD AUTO: 44.9 % (ref 37–48.5)
HGB BLD-MCNC: 14.8 G/DL (ref 12–16)
IMM GRANULOCYTES # BLD AUTO: 0.01 K/UL (ref 0–0.04)
IMM GRANULOCYTES NFR BLD AUTO: 0.1 % (ref 0–0.5)
INR PPP: 1 (ref 0.8–1.2)
LYMPHOCYTES # BLD AUTO: 5.1 K/UL (ref 1–4.8)
LYMPHOCYTES NFR BLD: 65.1 % (ref 18–48)
MCH RBC QN AUTO: 29.5 PG (ref 27–31)
MCHC RBC AUTO-ENTMCNC: 33 G/DL (ref 32–36)
MCV RBC AUTO: 90 FL (ref 82–98)
MONOCYTES # BLD AUTO: 0.4 K/UL (ref 0.3–1)
MONOCYTES NFR BLD: 4.5 % (ref 4–15)
NEUTROPHILS # BLD AUTO: 2.2 K/UL (ref 1.8–7.7)
NEUTROPHILS NFR BLD: 27.9 % (ref 38–73)
NRBC BLD-RTO: 0 /100 WBC
PLATELET # BLD AUTO: 312 K/UL (ref 150–450)
PMV BLD AUTO: 10.5 FL (ref 9.2–12.9)
POTASSIUM SERPL-SCNC: 3.5 MMOL/L (ref 3.5–5.1)
PROT SERPL-MCNC: 7.4 G/DL (ref 6–8.4)
PROTHROMBIN TIME: 10.7 SEC (ref 9–12.5)
RBC # BLD AUTO: 5.01 M/UL (ref 4–5.4)
SODIUM SERPL-SCNC: 139 MMOL/L (ref 136–145)
WBC # BLD AUTO: 7.84 K/UL (ref 3.9–12.7)

## 2023-10-09 PROCEDURE — 82248 BILIRUBIN DIRECT: CPT | Performed by: INTERNAL MEDICINE

## 2023-10-09 PROCEDURE — 1159F MED LIST DOCD IN RCRD: CPT | Mod: CPTII,S$GLB,, | Performed by: INTERNAL MEDICINE

## 2023-10-09 PROCEDURE — 85025 COMPLETE CBC W/AUTO DIFF WBC: CPT | Performed by: INTERNAL MEDICINE

## 2023-10-09 PROCEDURE — 99999 PR PBB SHADOW E&M-EST. PATIENT-LVL III: CPT | Mod: PBBFAC,,, | Performed by: INTERNAL MEDICINE

## 2023-10-09 PROCEDURE — 99213 OFFICE O/P EST LOW 20 MIN: CPT | Mod: S$GLB,,, | Performed by: INTERNAL MEDICINE

## 2023-10-09 PROCEDURE — 85610 PROTHROMBIN TIME: CPT | Performed by: INTERNAL MEDICINE

## 2023-10-09 PROCEDURE — 99999 PR PBB SHADOW E&M-EST. PATIENT-LVL III: ICD-10-PCS | Mod: PBBFAC,,, | Performed by: INTERNAL MEDICINE

## 2023-10-09 PROCEDURE — 99213 PR OFFICE/OUTPT VISIT, EST, LEVL III, 20-29 MIN: ICD-10-PCS | Mod: S$GLB,,, | Performed by: INTERNAL MEDICINE

## 2023-10-09 PROCEDURE — 36415 COLL VENOUS BLD VENIPUNCTURE: CPT | Mod: PN | Performed by: INTERNAL MEDICINE

## 2023-10-09 PROCEDURE — 80053 COMPREHEN METABOLIC PANEL: CPT | Performed by: INTERNAL MEDICINE

## 2023-10-09 PROCEDURE — 1160F RVW MEDS BY RX/DR IN RCRD: CPT | Mod: CPTII,S$GLB,, | Performed by: INTERNAL MEDICINE

## 2023-10-09 PROCEDURE — 1159F PR MEDICATION LIST DOCUMENTED IN MEDICAL RECORD: ICD-10-PCS | Mod: CPTII,S$GLB,, | Performed by: INTERNAL MEDICINE

## 2023-10-09 PROCEDURE — 1160F PR REVIEW ALL MEDS BY PRESCRIBER/CLIN PHARMACIST DOCUMENTED: ICD-10-PCS | Mod: CPTII,S$GLB,, | Performed by: INTERNAL MEDICINE

## 2023-10-09 NOTE — PROGRESS NOTES
HEPATOLOGY CONSULTATION    Referring Physician: Shikha Griffiths NP  Current Corresponding Physician: Shikha Griffiths NP, Remy Parson MD     Reason for Consultation: Consultation for evaluation of Liver cyst    History of Present Illness: Lynn Naidu is a 58 y.o. female with a hx of htn, elevated BMI and DDD who presents for evaluation of incidentally found liver cysts on abdo US done to evaluate upper abdominal pain. W/u included an EGD with gastic biopsies +H pylori. Treatment of H pylori resulted in improvement of her abdo pain:    Abdo US 8/3/23: The liver is normal in size and echogenicity. There are numerous hepatic cysts. There is a: Cysts in the left lobe measuring 8.5 x 5.0 x 4.9 cm with multiple septations. There is no biliary duct dilatation. There is hepatopedal flow within the portal vein. No renal cysts    Labs 10/19/22: ALT 10, aST 12, ALKP 103, Tbil 0.4    The patient denies any symptoms of decompensated cirrhosis, including no ascites or edema, cognitive problems that would suggest hepatic encephalopathy, or GI bleeding from varices.     Chief Complaint   Patient presents with    Liver cyst       Past Medical History:   Diagnosis Date    Allergy     Bronchitis 2015    Cervical disc displacement     Edema     Hypertension     Insomnia     Liver cyst     Plantar fasciitis      Outpatient Encounter Medications as of 10/9/2023   Medication Sig Dispense Refill    [] amoxicillin (AMOXIL) 500 MG capsule Take 2 capsules (1,000 mg total) by mouth every 12 (twelve) hours. for 10 days 40 capsule 0    ciclopirox (PENLAC) 8 % Soln APPLY TOPICALLY NIGHTLY 7 mL 0    [] clarithromycin (BIAXIN) 500 MG tablet Take 1 tablet (500 mg total) by mouth 2 (two) times daily. for 10 days 20 tablet 0    diazePAM (VALIUM) 10 MG Tab Take 10 mg by mouth every evening.  1    ergocalciferol (ERGOCALCIFEROL) 50,000 unit Cap Take 1 capsule (50,000 Units total) by mouth twice a week. 24 capsule 3     FLUoxetine 20 MG capsule Take 20 mg by mouth once daily.  2    fluticasone propionate (FLONASE) 50 mcg/actuation nasal spray 2 sprays (100 mcg total) by Each Nostril route once daily. 48 g 4    furosemide (LASIX) 40 MG tablet Take 1 tablet (40 mg total) by mouth once daily. 90 tablet 3    hydrOXYzine HCl (ATARAX) 25 MG tablet Take 1 tablet (25 mg total) by mouth 3 (three) times daily as needed. (Patient taking differently: Take 25 mg by mouth once daily.) 90 tablet 1    linaCLOtide (LINZESS) 290 mcg Cap capsule Take 1 capsule by mouth once daily 90 capsule 3    methocarbamoL (ROBAXIN) 750 MG Tab Take 1 tablet (750 mg total) by mouth 4 (four) times daily as needed (muscle spasm). (Patient not taking: Reported on 2023) 90 tablet 1    methylPREDNISolone (MEDROL DOSEPACK) 4 mg tablet Take by mouth.      metoprolol succinate (TOPROL-XL) 50 MG 24 hr tablet Take 1 tablet (50 mg total) by mouth once daily. 90 tablet 3    nabumetone (RELAFEN) 500 MG tablet Take 1 tablet (500 mg total) by mouth 2 (two) times daily as needed for Pain. 30 tablet 0    [] nystatin (MYCOSTATIN) 100,000 unit/mL suspension Take 4 mLs (400,000 Units total) by mouth 4 (four) times daily. for 10 days 160 mL 0    nystatin (MYCOSTATIN) 100,000 unit/mL suspension Take 6 mLs (600,000 Units total) by mouth 4 (four) times daily. Swish and swallow for 10 days 240 mL 0    pantoprazole (PROTONIX) 40 MG tablet Take 1 tablet (40 mg total) by mouth every morning. 90 tablet 3    potassium chloride SA (K-DUR,KLOR-CON) 20 MEQ tablet Take 1 tablet (20 mEq total) by mouth 2 (two) times daily. 180 tablet 1    pregabalin (LYRICA) 75 MG capsule TAKE 1 CAPSULE BY MOUTH EVERY MORNING AND 2 CAPSULES AT BEDTIME 90 capsule 5    promethazine (PHENERGAN) 25 MG tablet Take 25 mg by mouth.      SULFACLEANSE 8-4 8-4 % Susp Apply topically nightly as needed.       traMADoL (ULTRAM) 50 mg tablet Take 1 tablet (50 mg total) by mouth every 8 (eight) hours as needed for  Pain. (Patient not taking: Reported on 7/17/2023) 21 tablet 0    triamcinolone acetonide 0.1% (KENALOG) 0.1 % paste SMARTSIG:Sparingly TO TEETH 3 Times Daily      WEGOVY 2.4 mg/0.75 mL PnIj INJECT 2.4 MG INTO THE SKIN EVERY 7 DAYS 12 mL 0    [DISCONTINUED] ciclopirox (PENLAC) 8 % Soln Apply topically nightly. 6.6 mL 3     Facility-Administered Encounter Medications as of 10/9/2023   Medication Dose Route Frequency Provider Last Rate Last Admin    lactated ringers infusion   Intravenous Continuous Thomas Ivory MD 25 mL/hr at 12/21/21 1240 New Bag at 12/21/21 1240    lactated ringers infusion   Intravenous Continuous Thomas Ivory MD         Review of patient's allergies indicates:   Allergen Reactions    No known drug allergies      Family History   Problem Relation Age of Onset    Heart disease Mother     Stroke Mother     Cancer Father         bone ca, prostate ca     Hypertension Sister     Diabetes Sister     Allergies Sister     Asthma Sister     Hypertension Sister     Allergies Sister     Asthma Sister     Heart disease Brother     Kidney failure Brother     Angioedema Neg Hx     Eczema Neg Hx     Immunodeficiency Neg Hx     Urticaria Neg Hx     Colon cancer Neg Hx     Colon polyps Neg Hx     Esophageal cancer Neg Hx     Stomach cancer Neg Hx        Social History     Socioeconomic History    Marital status:    Tobacco Use    Smoking status: Never    Smokeless tobacco: Never   Substance and Sexual Activity    Alcohol use: Never    Drug use: No    Sexual activity: Yes     Partners: Male     Review of Systems   Constitutional: Negative.    HENT: Negative.     Eyes: Negative.    Respiratory: Negative.     Cardiovascular: Negative.    Gastrointestinal: Negative.    Genitourinary: Negative.    Musculoskeletal: Negative.    Skin: Negative.    Neurological: Negative.    Psychiatric/Behavioral: Negative.       There were no vitals filed for this visit.    Physical Exam  Vitals reviewed.   Constitutional:        Appearance: She is well-developed.   HENT:      Head: Normocephalic and atraumatic.   Eyes:      General: No scleral icterus.     Conjunctiva/sclera: Conjunctivae normal.      Pupils: Pupils are equal, round, and reactive to light.   Neck:      Thyroid: No thyromegaly.   Cardiovascular:      Rate and Rhythm: Normal rate and regular rhythm.      Heart sounds: Normal heart sounds.   Pulmonary:      Effort: Pulmonary effort is normal.      Breath sounds: Normal breath sounds. No rales.   Abdominal:      General: Bowel sounds are normal. There is no distension.      Palpations: Abdomen is soft. There is no mass.      Tenderness: There is no abdominal tenderness.   Musculoskeletal:         General: Normal range of motion.      Cervical back: Normal range of motion and neck supple.   Skin:     General: Skin is warm and dry.      Findings: No rash.   Neurological:      Mental Status: She is alert and oriented to person, place, and time.         Computed MELD 3.0 unavailable. Necessary lab results were not found in the last year.  Computed MELD-Na unavailable. Necessary lab results were not found in the last year.      Lab Results   Component Value Date     (H) 05/30/2023    BUN 11 05/30/2023    CREATININE 1.1 05/30/2023    CALCIUM 9.8 05/30/2023     05/30/2023    K 3.8 05/30/2023     05/30/2023    PROT 7.1 10/19/2022    CO2 23 05/30/2023    ANIONGAP 7 (L) 05/30/2023    WBC 6.65 10/19/2022    RBC 4.55 10/19/2022    HGB 13.5 10/19/2022    HCT 41.6 10/19/2022    MCV 91 10/19/2022    MCH 29.7 10/19/2022    MCHC 32.5 10/19/2022     Lab Results   Component Value Date    RDW 13.6 10/19/2022     10/19/2022    MPV 9.6 10/19/2022    GRAN 2.4 10/19/2022    GRAN 36.3 (L) 10/19/2022    LYMPH 3.6 10/19/2022    LYMPH 54.3 (H) 10/19/2022    MONO 0.4 10/19/2022    MONO 6.0 10/19/2022    EOSINOPHIL 2.6 10/19/2022    BASOPHIL 0.6 10/19/2022    EOS 0.2 10/19/2022    BASO 0.04 10/19/2022    BROCK Negative 05/11/2010     BNP <10 05/04/2010    CHOL 206 (H) 10/19/2022    TRIG 101 10/19/2022    HDL 47 10/19/2022    CHOLHDL 22.8 10/19/2022    TOTALCHOLEST 4.4 10/19/2022    ALBUMIN 3.5 10/19/2022    BILIDIR 0.1 09/01/2017    AST 12 10/19/2022    ALT 10 10/19/2022    ALKPHOS 103 10/19/2022    MG 2.1 09/01/2017       Assessment and Plan:  Lynn Naidu is a 58 y.o. female with multiple liver cysts. The largest is 8.5 cm and appears to contain multiple septations. I am recommending an MRI w wo contrast to better evaluate the cysts and to confirm there are no worrisome features. Her liver tests a year ago were normal and thus she does not appear to have CLD. I will recheck liver tests today to confirm.    Return 4 weeks.

## 2023-10-09 NOTE — TELEPHONE ENCOUNTER
Advised pt not sure who called her as nothing is charted. Pt saw Dr. Donohue this am and she ordered an MRI with a 4 week f/u appt. I schd the MRI but was unable to schd the f/u due to no availability. Message sen to Ntaty to override Dr. Donohue's schd. Pt verbalized understanding. MATHEW FOLRES

## 2023-10-09 NOTE — TELEPHONE ENCOUNTER
----- Message from Ashley Mccarthy sent at 10/9/2023  4:54 PM CDT -----  Type:  Patient Returning Call    Who Called:pt     Who Left Message for Patient:unk?    Does the patient know what this is regarding?:no     Would the patient rather a call back or a response via [facility]? Callback     Best Call Back Number:[phone]      Additional Information:  please advise thank you

## 2023-10-10 ENCOUNTER — TELEPHONE (OUTPATIENT)
Dept: FAMILY MEDICINE | Facility: CLINIC | Age: 58
End: 2023-10-10
Payer: COMMERCIAL

## 2023-10-10 NOTE — TELEPHONE ENCOUNTER
She just had a CBC and CMP yesterday and they were satisfactory.  Her last 2 to 3 lipid panels have all been satisfactory so she really does not have to do a repeat this year.  It is not necessary to do lab in November.

## 2023-10-10 NOTE — TELEPHONE ENCOUNTER
Rescheduled patients appt from 10/20/23 to 11/6/23 at 3:00. She is asking for lab orders if any is due.

## 2023-10-11 ENCOUNTER — TELEPHONE (OUTPATIENT)
Dept: HEPATOLOGY | Facility: CLINIC | Age: 58
End: 2023-10-11
Payer: COMMERCIAL

## 2023-10-11 NOTE — TELEPHONE ENCOUNTER
Spoke with pt to schedule follow up ----- Message from Omar Maagña MA sent at 10/9/2023  5:07 PM CDT -----  Please schd pt a 4 week f/u. I see no avail appts.     Omar

## 2023-10-15 DIAGNOSIS — E66.01 SEVERE OBESITY (BMI 35.0-39.9) WITH COMORBIDITY: ICD-10-CM

## 2023-10-15 NOTE — TELEPHONE ENCOUNTER
Care Due:                  Date            Visit Type   Department     Provider  --------------------------------------------------------------------------------                                EP -                              PRIMARY      [facility]  Last Visit: 10-      CARE ([facility])   PRACTICE       Remy Parson                              EP -                              PRIMARY      [facility]  Next Visit: 11-      CARE ([facility])   PRACTICE       Remy Chun  Test          Frequency    Reason                     Performed    Due Date  --------------------------------------------------------------------------------    HBA1C.......  6 months...  WEGOVY...................  Not Found    Overdue    [employer] Embedded Care Due Messages. Reference number: [id number].   10/15/2023 11:20:30 AM CDT

## 2023-10-16 ENCOUNTER — TELEPHONE (OUTPATIENT)
Dept: FAMILY MEDICINE | Facility: CLINIC | Age: 58
End: 2023-10-16
Payer: COMMERCIAL

## 2023-10-16 NOTE — TELEPHONE ENCOUNTER
----- Message from Sharri Parson sent at 10/16/2023  3:40 PM CDT -----  Contact: Self  Type:  RX Refill Request    Who Called:  Patient  Refill or New Rx:  New Rx  RX Name and Strength:  WEGOVY 2.4 mg/0.75 mL PnIj  How is the patient currently taking it? (ex. 1XDay):  As Directed  Is this a 30 day or 90 day RX:  30  Preferred Pharmacy with phone number:    [address] - [address] [facility]  [address] [facility] [address]  Phone: [phone] Fax: [phone]  Local or Mail Order:  Local  Ordering Provider:  Dr Blank Fenton Call Back Number:  [phone]  Additional Information:  Pt will be out before her next visit. Thank You

## 2023-10-16 NOTE — TELEPHONE ENCOUNTER
Refill Routing Note   Medication(s) are not appropriate for processing by [facility] for the following reason(s):      Required labs outdated    [facility] action(s):  Defer Care Due:  Labs due     Medication Therapy Plan: Labs (A1C) outdated, may require staff scheduling assistance        Appointments  past 12m or future 3m with PCP    Date Provider   Last Visit   10/19/2022 Remy Parson MD   Next Visit   11/6/2023 Remy Parson MD   ED visits in past 90 days: 0        Note composed:3:08 PM 10/16/2023

## 2023-10-17 DIAGNOSIS — E87.6 HYPOKALEMIA: ICD-10-CM

## 2023-10-17 DIAGNOSIS — E55.9 VITAMIN D DEFICIENCY: ICD-10-CM

## 2023-10-17 RX ORDER — SEMAGLUTIDE 2.4 MG/.75ML
2.4 INJECTION, SOLUTION SUBCUTANEOUS
Qty: 12 EACH | Refills: 0 | Status: SHIPPED | OUTPATIENT
Start: 2023-10-17 | End: 2023-11-06

## 2023-10-17 NOTE — TELEPHONE ENCOUNTER
No care due was identified.  [facility] Embedded Care Due Messages. Reference number: [id number].   10/17/2023 12:21:27 AM CDT

## 2023-10-18 RX ORDER — ERGOCALCIFEROL 1.25 MG/1
50000 CAPSULE ORAL
Qty: 24 CAPSULE | Refills: 0 | Status: SHIPPED | OUTPATIENT
Start: 2023-10-19 | End: 2023-11-06

## 2023-10-18 RX ORDER — POTASSIUM CHLORIDE 20 MEQ/1
TABLET, EXTENDED RELEASE ORAL 2 TIMES DAILY
Qty: 180 TABLET | Refills: 0 | Status: SHIPPED | OUTPATIENT
Start: 2023-10-18 | End: 2023-11-06

## 2023-10-19 NOTE — TELEPHONE ENCOUNTER
Refill Routing Note   Medication(s) are not appropriate for processing by [facility] for the following reason(s):      Medication outside of protocol    [facility] action(s):  Defer  Approve Care Due:  None identified          Appointments  past 12m or future 3m with PCP    Date Provider   Last Visit   10/19/2022 Remy Parson MD   Next Visit   11/6/2023 Remy Parson MD   ED visits in past 90 days: 0        Note composed:9:16 PM 10/18/2023

## 2023-10-24 ENCOUNTER — HOSPITAL ENCOUNTER (OUTPATIENT)
Dept: RADIOLOGY | Facility: HOSPITAL | Age: 58
Discharge: HOME | End: 2023-10-24
Attending: INTERNAL MEDICINE
Payer: COMMERCIAL

## 2023-10-24 DIAGNOSIS — K76.89 LIVER CYST: ICD-10-CM

## 2023-10-24 PROCEDURE — A9585 GADOBUTROL INJECTION: HCPCS | Mod: PO | Performed by: INTERNAL MEDICINE

## 2023-10-24 PROCEDURE — 25500020 PHARM REV CODE 255: Mod: PO | Performed by: INTERNAL MEDICINE

## 2023-10-24 PROCEDURE — 74183 MRI ABD W/O CNTR FLWD CNTR: CPT | Mod: TC,PO

## 2023-10-24 RX ORDER — GADOBUTROL 604.72 MG/ML
7.5 INJECTION INTRAVENOUS
Status: COMPLETED | OUTPATIENT
Start: 2023-10-24 | End: 2023-10-24

## 2023-10-24 RX ADMIN — GADOBUTROL 7.5 ML: 604.72 INJECTION INTRAVENOUS at 10:10

## 2023-10-27 ENCOUNTER — OFFICE VISIT (OUTPATIENT)
Dept: GASTROENTEROLOGY | Facility: CLINIC | Age: 58
End: 2023-10-27
Payer: COMMERCIAL

## 2023-10-27 VITALS
SYSTOLIC BLOOD PRESSURE: 110 MMHG | BODY MASS INDEX: 29.69 KG/M2 | HEART RATE: 94 BPM | DIASTOLIC BLOOD PRESSURE: 76 MMHG | HEIGHT: 63 IN | WEIGHT: 167.56 LBS

## 2023-10-27 DIAGNOSIS — K59.09 CHRONIC CONSTIPATION: ICD-10-CM

## 2023-10-27 DIAGNOSIS — R14.0 ABDOMINAL BLOATING: ICD-10-CM

## 2023-10-27 DIAGNOSIS — K21.9 GASTROESOPHAGEAL REFLUX DISEASE WITHOUT ESOPHAGITIS: ICD-10-CM

## 2023-10-27 DIAGNOSIS — Z12.11 SCREENING FOR COLON CANCER: ICD-10-CM

## 2023-10-27 DIAGNOSIS — R10.10 UPPER ABDOMINAL PAIN: ICD-10-CM

## 2023-10-27 DIAGNOSIS — Z86.19 HISTORY OF HELICOBACTER PYLORI INFECTION: ICD-10-CM

## 2023-10-27 DIAGNOSIS — K80.20 GALLSTONES: ICD-10-CM

## 2023-10-27 DIAGNOSIS — Z98.890 HISTORY OF ESOPHAGOGASTRODUODENOSCOPY (EGD): Primary | ICD-10-CM

## 2023-10-27 DIAGNOSIS — K76.89 LIVER CYST: ICD-10-CM

## 2023-10-27 PROCEDURE — 99999 PR PBB SHADOW E&M-EST. PATIENT-LVL V: CPT | Mod: PBBFAC,,,

## 2023-10-27 PROCEDURE — 1159F PR MEDICATION LIST DOCUMENTED IN MEDICAL RECORD: ICD-10-PCS | Mod: CPTII,S$GLB,,

## 2023-10-27 PROCEDURE — 3074F PR MOST RECENT SYSTOLIC BLOOD PRESSURE < 130 MM HG: ICD-10-PCS | Mod: CPTII,S$GLB,,

## 2023-10-27 PROCEDURE — 3008F PR BODY MASS INDEX (BMI) DOCUMENTED: ICD-10-PCS | Mod: CPTII,S$GLB,,

## 2023-10-27 PROCEDURE — 3078F PR MOST RECENT DIASTOLIC BLOOD PRESSURE < 80 MM HG: ICD-10-PCS | Mod: CPTII,S$GLB,,

## 2023-10-27 PROCEDURE — 3074F SYST BP LT 130 MM HG: CPT | Mod: CPTII,S$GLB,,

## 2023-10-27 PROCEDURE — 1159F MED LIST DOCD IN RCRD: CPT | Mod: CPTII,S$GLB,,

## 2023-10-27 PROCEDURE — 1160F RVW MEDS BY RX/DR IN RCRD: CPT | Mod: CPTII,S$GLB,,

## 2023-10-27 PROCEDURE — 99214 OFFICE O/P EST MOD 30 MIN: CPT | Mod: S$GLB,,,

## 2023-10-27 PROCEDURE — 1160F PR REVIEW ALL MEDS BY PRESCRIBER/CLIN PHARMACIST DOCUMENTED: ICD-10-PCS | Mod: CPTII,S$GLB,,

## 2023-10-27 PROCEDURE — 3008F BODY MASS INDEX DOCD: CPT | Mod: CPTII,S$GLB,,

## 2023-10-27 PROCEDURE — 99214 PR OFFICE/OUTPT VISIT, EST, LEVL IV, 30-39 MIN: ICD-10-PCS | Mod: S$GLB,,,

## 2023-10-27 PROCEDURE — 99999 PR PBB SHADOW E&M-EST. PATIENT-LVL V: ICD-10-PCS | Mod: PBBFAC,,,

## 2023-10-27 PROCEDURE — 3078F DIAST BP <80 MM HG: CPT | Mod: CPTII,S$GLB,,

## 2023-10-27 NOTE — PROGRESS NOTES
Subjective:       Patient ID: Lynn Naidu is a 58 y.o. female Body mass index is 29.68 kg/m².    Chief Complaint: Follow-up    Established patient of Dr. Oviedo and myself.  Established with hepatology (Dr. Donohue).    Patient's  present and assisting with history.    GI Problem  The primary symptoms include weight loss (Intentional; patient currently on Wegovy for weight loss) and abdominal pain. Primary symptoms do not include fever, fatigue, nausea, vomiting, diarrhea, melena, hematemesis, jaundice, hematochezia or dysuria. Primary symptoms comment: Patient also reports recurrent thrush; was treated with fluconazole orally and nystatin liquid x2.   The abdominal pain began more than 2 days ago. The abdominal pain has been gradually improving since its onset. The abdominal pain is located in the RUQ, LUQ and epigastric region. The abdominal pain does not radiate. The severity of the abdominal pain is 0/10 (Pain currently; reports intermittent upper abdominal pain that worsens after eating). Relieved by: well-controlled avoiding known triggers including fried foods.   The illness is also significant for bloating (spontaneous) and constipation (Well-controlled taking Linzess 290 mcg once daily; has 1-2 BMs daily rated stool 5-6 on [place] scale). The illness does not include chills, dysphagia or odynophagia. Associated symptoms comments: EGD 08/31/23 - Normal esophagus. Z-line regular, 37 cm from the incisors. Small hiatal hernia. Gastritis. Biopsied. Normal examined duodenum; patho: benign, H. Pylori present. Significant associated medical issues include GERD (well controlled taking Protonix 40 mg once daily) and gallstones. Associated medical issues do not include inflammatory bowel disease, liver disease, alcohol abuse, PUD, gastric bypass, bowel resection, irritable bowel syndrome, hemorrhoids or diverticulitis.     Review of Systems   Constitutional:  Positive for weight loss (Intentional; patient  currently on Wegovy for weight loss). Negative for activity change, appetite change, chills, diaphoresis, fatigue, fever and unexpected weight change.   HENT:  Negative for sore throat and trouble swallowing.    Respiratory:  Negative for cough, choking and shortness of breath.    Cardiovascular:  Negative for chest pain.   Gastrointestinal:  Positive for abdominal pain, bloating (spontaneous) and constipation (Well-controlled taking Linzess 290 mcg once daily; has 1-2 BMs daily rated stool 5-6 on Wilson scale). Negative for anal bleeding, blood in stool, diarrhea, dysphagia, hematemesis, hematochezia, jaundice, melena, nausea, rectal pain and vomiting.   Genitourinary:  Negative for dysuria.       Patient's last menstrual period was 02/11/2009.  Past Medical History:   Diagnosis Date    Allergy     Bronchitis 07/17/2015    Cervical disc displacement     Edema     Hypertension     Insomnia     Liver cyst     Plantar fasciitis      Past Surgical History:   Procedure Laterality Date    COLONOSCOPY  11/01/2017    Dr. Oviedo, normal, ten year recheck    COLONOSCOPY N/A 11/01/2017    Procedure: COLONOSCOPY;  Surgeon: Cameron Oviedo MD;  Location: [facility];  Service: Endoscopy;  Laterality: N/A;    EPIDURAL STEROID INJECTION INTO CERVICAL SPINE N/A 07/22/2019    Procedure: Injection-steroid-epidural-cervical;  Surgeon: Thomas Ivory MD;  Location: [facility] OR;  Service: Pain Management;  Laterality: N/A;  C7-T1    EPIDURAL STEROID INJECTION INTO CERVICAL SPINE N/A 12/21/2021    Procedure: Injection-steroid-epidural-cervical;  Surgeon: Thomas Ivory MD;  Location: [facility] OR;  Service: Pain Management;  Laterality: N/A;  C6-7    ESOPHAGOGASTRODUODENOSCOPY N/A 08/31/2023    Procedure: EGD (ESOPHAGOGASTRODUODENOSCOPY);  Surgeon: Cameron Oviedo MD;  Location: [facility];  Service: Endoscopy;  Laterality: N/A;    INJECTION OF ANESTHETIC AGENT AROUND STELLATE GANGLION Left 08/30/2022    Procedure: BLOCK, GANGLION, STELLATE;   Surgeon: Thomas Ivory MD;  Location: [facility] OR;  Service: Pain Management;  Laterality: Left;    INJECTION OF ANESTHETIC AGENT AROUND STELLATE GANGLION Left 10/21/2022    Procedure: BLOCK, GANGLION, STELLATE Left;  Surgeon: Thomas Ivory MD;  Location: [facility] OR;  Service: Pain Management;  Laterality: Left;    INJECTION OF ANESTHETIC AGENT AROUND STELLATE GANGLION Left 12/27/2022    Procedure: BLOCK, GANGLION, STELLATE;  Surgeon: Tohmas Ivory MD;  Location: [facility] OR;  Service: Pain Management;  Laterality: Left;  left    TONSILLECTOMY, ADENOIDECTOMY Bilateral 07/23/2021    Procedure: TONSILLECTOMY AND ADENOIDECTOMY;  Surgeon: Anselmo Galvan MD;  Location: [facility] OR;  Service: ENT;  Laterality: Bilateral;    UPPER GASTROINTESTINAL ENDOSCOPY      WISDOM TOOTH EXTRACTION       Family History   Problem Relation Age of Onset    Heart disease Mother     Stroke Mother     Cancer Father         bone ca, prostate ca     Hypertension Sister     Diabetes Sister     Allergies Sister     Asthma Sister     Hypertension Sister     Allergies Sister     Asthma Sister     Heart disease Brother     Kidney failure Brother     Angioedema Neg Hx     Eczema Neg Hx     Immunodeficiency Neg Hx     Urticaria Neg Hx     Colon cancer Neg Hx     Colon polyps Neg Hx     Esophageal cancer Neg Hx     Stomach cancer Neg Hx      Social History     Tobacco Use    Smoking status: Never    Smokeless tobacco: Never   Substance Use Topics    Alcohol use: Never    Drug use: No     Wt Readings from Last 10 Encounters:   10/27/23 76 kg (167 lb 8.8 oz)   08/31/23 78.9 kg (174 lb)   07/17/23 82.5 kg (181 lb 14.1 oz)   06/20/23 83.2 kg (183 lb 8 oz)   05/24/23 85.5 kg (188 lb 7.9 oz)   04/24/23 88.7 kg (195 lb 8.8 oz)   01/27/23 (P) 95.4 kg (210 lb 5.1 oz)   01/23/23 95.4 kg (210 lb 5.1 oz)   01/23/23 95.4 kg (210 lb 5.1 oz)   12/21/22 95.3 kg (210 lb)     Lab Results   Component Value Date    WBC 7.84 10/09/2023    HGB 14.8 10/09/2023    HCT 44.9 10/09/2023    MCV  90 10/09/2023     10/09/2023     CMP  Sodium   Date Value Ref Range Status   10/09/2023 139 136 - 145 mmol/L Final     Potassium   Date Value Ref Range Status   10/09/2023 3.5 3.5 - 5.1 mmol/L Final     Chloride   Date Value Ref Range Status   10/09/2023 104 95 - 110 mmol/L Final     CO2   Date Value Ref Range Status   10/09/2023 28 23 - 29 mmol/L Final     Glucose   Date Value Ref Range Status   10/09/2023 90 70 - 110 mg/dL Final     BUN   Date Value Ref Range Status   10/09/2023 11 6 - 20 mg/dL Final     Creatinine   Date Value Ref Range Status   10/09/2023 1.1 0.5 - 1.4 mg/dL Final     Calcium   Date Value Ref Range Status   10/09/2023 9.3 8.7 - 10.5 mg/dL Final     Total Protein   Date Value Ref Range Status   10/09/2023 7.4 6.0 - 8.4 g/dL Final     Albumin   Date Value Ref Range Status   10/09/2023 3.8 3.5 - 5.2 g/dL Final     Total Bilirubin   Date Value Ref Range Status   10/09/2023 0.3 0.1 - 1.0 mg/dL Final     Comment:     For infants and newborns, interpretation of results should be based  on gestational age, weight and in agreement with clinical  observations.    Premature Infant recommended reference ranges:  Up to 24 hours.............<8.0 mg/dL  Up to 48 hours............<12.0 mg/dL  3-5 days..................<15.0 mg/dL  6-29 days.................<15.0 mg/dL       Alkaline Phosphatase   Date Value Ref Range Status   10/09/2023 102 55 - 135 U/L Final     AST   Date Value Ref Range Status   10/09/2023 15 10 - 40 U/L Final     ALT   Date Value Ref Range Status   10/09/2023 16 10 - 44 U/L Final     Anion Gap   Date Value Ref Range Status   10/09/2023 7 (L) 8 - 16 mmol/L Final     eGFR if    Date Value Ref Range Status   10/18/2021 >60.0 >60 mL/min/1.73 m^2 Final     eGFR if non    Date Value Ref Range Status   10/18/2021 >60.0 >60 mL/min/1.73 m^2 Final     Comment:     Calculation used to obtain the estimated glomerular filtration  rate (eGFR) is the CKD-EPI  equation.        Lab Results   Component Value Date    AMYLASE 49 08/31/2016     Lab Results   Component Value Date    TSH 2.884 10/11/2018     Reviewed prior medical records including MRI abdomen 10/24/2023, abdominal ultrasound 08/03/2023, chest x-ray 10/18/2021 & endoscopy history (see surgical history).    Objective:      Physical Exam  Vitals and nursing note reviewed.   Constitutional:       General: She is not in acute distress.     Appearance: Normal appearance. She is not ill-appearing.   HENT:      Mouth/Throat:      Lips: Pink. No lesions.   Cardiovascular:      Rate and Rhythm: Normal rate.      Pulses: Normal pulses.      Heart sounds: Normal heart sounds.   Pulmonary:      Effort: Pulmonary effort is normal. No respiratory distress.      Breath sounds: Normal breath sounds.   Abdominal:      General: Abdomen is protuberant. Bowel sounds are normal. There is no distension or abdominal bruit. There are no signs of injury.      Palpations: Abdomen is soft. There is no shifting dullness, fluid wave, hepatomegaly, splenomegaly or mass.      Tenderness: There is no abdominal tenderness. There is no guarding or rebound. Negative signs include Johnson's sign, Rovsing's sign and McBurney's sign.   Skin:     General: Skin is warm and dry.      Coloration: Skin is not jaundiced or pale.   Neurological:      Mental Status: She is alert and oriented to person, place, and time.   Psychiatric:         Attention and Perception: Attention normal.         Mood and Affect: Mood normal.         Speech: Speech normal.         Behavior: Behavior normal.         Assessment:       1. History of esophagogastroduodenoscopy (EGD)    2. History of Helicobacter pylori infection    3. Abdominal bloating    4. Chronic constipation    5. Gastroesophageal reflux disease without esophagitis    6. Upper abdominal pain    7. Gallstones    8. Screening for colon cancer    9. Liver cyst        Plan:       History of  esophagogastroduodenoscopy (EGD)    - Informed patient of EGD results, patient verbalized understanding    History of Helicobacter pylori infection & Abdominal bloating  advised patient it is most accurate to test when PPI is held for 2 weeks prior to testing, informed patient to restart PPI after stool specimen is collected, patient verbalized understanding  -     H. pylori antigen, stool; Future; Expected date: 10/27/2023    Chronic constipation  -Recommend daily exercise as tolerated, adequate water intake (six 8-oz glasses of water daily), and high fiber diet.   - continue Linzess 290 mcg daily    Gastroesophageal reflux disease without esophagitis   -Avoid large meals, avoid eating within 2-3 hours of bedtime (avoid late night eating & lying down soon after eating), elevate head of bed if nocturnal symptoms are present, smoking cessation (if current smoker), & weight loss (if overweight).   -Avoid known foods which trigger reflux symptoms & to minimize/avoid high-fat foods, chocolate, caffeine, citrus, alcohol, & tomato products.  -Avoid/limit use of NSAID's, since they can cause GI upset, bleeding, and/or ulcers. If needed, take with food.  - continue Protonix 40 mg once daily 30 minutes to an hour before breakfast    Upper abdominal pain & Gallstones  - possible HIDA scan  - recommend low fat diet  - discussed diagnosis & that it can cause symptoms in some patients, while other patients with it can be asymptomatic; recommend continue recommendations as discussed during our visit and if symptoms persist after other testing has been completed, recommend seeing general surgery for further evaluation and management, patient verbalized understanding    Screening for colon cancer  -followup for surveillance colonoscopy 11/2027    Liver cyst     -follow-up with hepatology for continued evaluation and management     Follow up in about 4 weeks (around 11/24/2023), or if symptoms worsen or fail to improve.      If no  improvement in symptoms or symptoms worsen, call/follow-up at clinic or go to ER.        Total time spent on the encounter, which includes face to face time and non-face to face time preparing to see the patient (eg, review of tests), Obtaining and/or reviewing separately obtained history, Documenting clinical information in the electronic or other health record, Independently interpreting results (not separately reported) and communicating results to the patient/family/caregiver, or Care coordination (not separately reported).     A dictation software program was used for this note. Please expect some simple typographical  errors in this note.

## 2023-11-06 ENCOUNTER — OFFICE VISIT (OUTPATIENT)
Dept: FAMILY MEDICINE | Facility: CLINIC | Age: 58
End: 2023-11-06
Attending: FAMILY MEDICINE
Payer: COMMERCIAL

## 2023-11-06 VITALS
HEART RATE: 91 BPM | TEMPERATURE: 98 F | HEIGHT: 63 IN | RESPIRATION RATE: 17 BRPM | SYSTOLIC BLOOD PRESSURE: 113 MMHG | WEIGHT: 166.69 LBS | OXYGEN SATURATION: 99 % | BODY MASS INDEX: 29.54 KG/M2 | DIASTOLIC BLOOD PRESSURE: 78 MMHG

## 2023-11-06 DIAGNOSIS — I10 ESSENTIAL HYPERTENSION: ICD-10-CM

## 2023-11-06 DIAGNOSIS — Z00.00 ENCOUNTER FOR PREVENTIVE HEALTH EXAMINATION: Primary | ICD-10-CM

## 2023-11-06 DIAGNOSIS — R60.9 EDEMA, UNSPECIFIED TYPE: ICD-10-CM

## 2023-11-06 DIAGNOSIS — E55.9 VITAMIN D DEFICIENCY: ICD-10-CM

## 2023-11-06 DIAGNOSIS — M54.12 CERVICAL RADICULITIS: ICD-10-CM

## 2023-11-06 DIAGNOSIS — B37.0 ORAL CANDIDA: ICD-10-CM

## 2023-11-06 DIAGNOSIS — M50.30 DDD (DEGENERATIVE DISC DISEASE), CERVICAL: ICD-10-CM

## 2023-11-06 DIAGNOSIS — J45.20 MILD INTERMITTENT REACTIVE AIRWAY DISEASE WITHOUT COMPLICATION: ICD-10-CM

## 2023-11-06 DIAGNOSIS — K59.00 CONSTIPATION, UNSPECIFIED CONSTIPATION TYPE: ICD-10-CM

## 2023-11-06 DIAGNOSIS — E87.6 HYPOKALEMIA: ICD-10-CM

## 2023-11-06 DIAGNOSIS — E66.01 SEVERE OBESITY (BMI 35.0-39.9) WITH COMORBIDITY: ICD-10-CM

## 2023-11-06 PROCEDURE — 3008F PR BODY MASS INDEX (BMI) DOCUMENTED: ICD-10-PCS | Mod: CPTII,S$GLB,, | Performed by: FAMILY MEDICINE

## 2023-11-06 PROCEDURE — 1160F PR REVIEW ALL MEDS BY PRESCRIBER/CLIN PHARMACIST DOCUMENTED: ICD-10-PCS | Mod: CPTII,S$GLB,, | Performed by: FAMILY MEDICINE

## 2023-11-06 PROCEDURE — 3078F PR MOST RECENT DIASTOLIC BLOOD PRESSURE < 80 MM HG: ICD-10-PCS | Mod: CPTII,S$GLB,, | Performed by: FAMILY MEDICINE

## 2023-11-06 PROCEDURE — 1159F MED LIST DOCD IN RCRD: CPT | Mod: CPTII,S$GLB,, | Performed by: FAMILY MEDICINE

## 2023-11-06 PROCEDURE — 3074F PR MOST RECENT SYSTOLIC BLOOD PRESSURE < 130 MM HG: ICD-10-PCS | Mod: CPTII,S$GLB,, | Performed by: FAMILY MEDICINE

## 2023-11-06 PROCEDURE — 99396 PREV VISIT EST AGE 40-64: CPT | Mod: S$GLB,,, | Performed by: FAMILY MEDICINE

## 2023-11-06 PROCEDURE — 3078F DIAST BP <80 MM HG: CPT | Mod: CPTII,S$GLB,, | Performed by: FAMILY MEDICINE

## 2023-11-06 PROCEDURE — 3008F BODY MASS INDEX DOCD: CPT | Mod: CPTII,S$GLB,, | Performed by: FAMILY MEDICINE

## 2023-11-06 PROCEDURE — 99999 PR PBB SHADOW E&M-EST. PATIENT-LVL V: ICD-10-PCS | Mod: PBBFAC,,, | Performed by: FAMILY MEDICINE

## 2023-11-06 PROCEDURE — 3074F SYST BP LT 130 MM HG: CPT | Mod: CPTII,S$GLB,, | Performed by: FAMILY MEDICINE

## 2023-11-06 PROCEDURE — 99999 PR PBB SHADOW E&M-EST. PATIENT-LVL V: CPT | Mod: PBBFAC,,, | Performed by: FAMILY MEDICINE

## 2023-11-06 PROCEDURE — 99396 PR PREVENTIVE VISIT,EST,40-64: ICD-10-PCS | Mod: S$GLB,,, | Performed by: FAMILY MEDICINE

## 2023-11-06 PROCEDURE — 1160F RVW MEDS BY RX/DR IN RCRD: CPT | Mod: CPTII,S$GLB,, | Performed by: FAMILY MEDICINE

## 2023-11-06 PROCEDURE — 1159F PR MEDICATION LIST DOCUMENTED IN MEDICAL RECORD: ICD-10-PCS | Mod: CPTII,S$GLB,, | Performed by: FAMILY MEDICINE

## 2023-11-06 RX ORDER — FUROSEMIDE 40 MG/1
40 TABLET ORAL DAILY
Qty: 90 TABLET | Refills: 3 | Status: SHIPPED | OUTPATIENT
Start: 2023-11-06

## 2023-11-06 RX ORDER — FLUCONAZOLE 100 MG/1
100 TABLET ORAL
Status: ON HOLD | COMMUNITY
Start: 2023-10-30 | End: 2023-11-28 | Stop reason: HOSPADM

## 2023-11-06 RX ORDER — MONTELUKAST SODIUM 10 MG/1
10 TABLET ORAL NIGHTLY
Qty: 90 TABLET | Refills: 1 | Status: SHIPPED | OUTPATIENT
Start: 2023-11-06 | End: 2024-05-04

## 2023-11-06 RX ORDER — POTASSIUM CHLORIDE 20 MEQ/1
20 TABLET, EXTENDED RELEASE ORAL 2 TIMES DAILY
Qty: 180 TABLET | Refills: 3 | Status: SHIPPED | OUTPATIENT
Start: 2023-11-06

## 2023-11-06 RX ORDER — METOPROLOL SUCCINATE 50 MG/1
50 TABLET, EXTENDED RELEASE ORAL DAILY
Qty: 90 TABLET | Refills: 3 | Status: SHIPPED | OUTPATIENT
Start: 2023-11-06

## 2023-11-06 RX ORDER — HYDROXYZINE PAMOATE 25 MG/1
25 CAPSULE ORAL 2 TIMES DAILY PRN
COMMUNITY
Start: 2023-11-03

## 2023-11-06 RX ORDER — ERGOCALCIFEROL 1.25 MG/1
50000 CAPSULE ORAL
Qty: 24 CAPSULE | Refills: 3 | Status: SHIPPED | OUTPATIENT
Start: 2023-11-06

## 2023-11-06 RX ORDER — SEMAGLUTIDE 2.4 MG/.75ML
2.4 INJECTION, SOLUTION SUBCUTANEOUS
Qty: 12 EACH | Refills: 1 | Status: SHIPPED | OUTPATIENT
Start: 2023-11-06

## 2023-11-07 ENCOUNTER — LAB VISIT (OUTPATIENT)
Dept: LAB | Facility: HOSPITAL | Age: 58
End: 2023-11-07
Attending: FAMILY MEDICINE
Payer: COMMERCIAL

## 2023-11-07 DIAGNOSIS — I10 ESSENTIAL HYPERTENSION: ICD-10-CM

## 2023-11-07 DIAGNOSIS — E55.9 VITAMIN D DEFICIENCY: ICD-10-CM

## 2023-11-07 LAB
25(OH)D3+25(OH)D2 SERPL-MCNC: 59 NG/ML (ref 30–96)
CHOLEST SERPL-MCNC: 180 MG/DL (ref 120–199)
CHOLEST/HDLC SERPL: 4.9 {RATIO} (ref 2–5)
HDLC SERPL-MCNC: 37 MG/DL (ref 40–75)
HDLC SERPL: 20.6 % (ref 20–50)
LDLC SERPL CALC-MCNC: 122.4 MG/DL (ref 63–159)
NONHDLC SERPL-MCNC: 143 MG/DL
TRIGL SERPL-MCNC: 103 MG/DL (ref 30–150)

## 2023-11-07 PROCEDURE — 80061 LIPID PANEL: CPT | Performed by: FAMILY MEDICINE

## 2023-11-07 PROCEDURE — 82306 VITAMIN D 25 HYDROXY: CPT | Performed by: FAMILY MEDICINE

## 2023-11-07 PROCEDURE — 36415 COLL VENOUS BLD VENIPUNCTURE: CPT | Mod: PO | Performed by: FAMILY MEDICINE

## 2023-11-10 ENCOUNTER — PATIENT MESSAGE (OUTPATIENT)
Dept: GASTROENTEROLOGY | Facility: CLINIC | Age: 58
End: 2023-11-10
Payer: COMMERCIAL

## 2023-11-10 ENCOUNTER — TELEPHONE (OUTPATIENT)
Dept: GASTROENTEROLOGY | Facility: CLINIC | Age: 58
End: 2023-11-10
Payer: COMMERCIAL

## 2023-11-10 DIAGNOSIS — R10.10 UPPER ABDOMINAL PAIN: Primary | ICD-10-CM

## 2023-11-10 DIAGNOSIS — R11.2 NAUSEA AND VOMITING, UNSPECIFIED VOMITING TYPE: ICD-10-CM

## 2023-11-10 DIAGNOSIS — K59.09 CHRONIC CONSTIPATION: ICD-10-CM

## 2023-11-10 RX ORDER — LUBIPROSTONE 24 UG/1
24 CAPSULE ORAL 2 TIMES DAILY WITH MEALS
Qty: 60 CAPSULE | Refills: 2 | Status: SHIPPED | OUTPATIENT
Start: 2023-11-10 | End: 2023-11-20 | Stop reason: SINTOL

## 2023-11-10 NOTE — TELEPHONE ENCOUNTER
----- Message from Tonny Parsons sent at 11/10/2023 10:40 AM CST -----  Contact: pt  Type: Needs Medical Advice  Who Called: pt  Best Call Back Number: [phone]    Additional Information: Pt is calling the office trying to see if she can get something called in she has been having really bad stomach pain when she eats or take any meds.Please call back and advise.

## 2023-11-10 NOTE — TELEPHONE ENCOUNTER
Called pt who states she is following Shikha's recommendations made during ov on 10/27/23 but yesterday had vomiting and today is having stomach pain after eating small meal. Notice stomach pain and cramping after taking Linzess as well. Informed pt will send message to MINNIE Griffiths for review.

## 2023-11-11 ENCOUNTER — LAB VISIT (OUTPATIENT)
Dept: LAB | Facility: HOSPITAL | Age: 58
End: 2023-11-11
Payer: COMMERCIAL

## 2023-11-11 DIAGNOSIS — Z86.19 HISTORY OF HELICOBACTER PYLORI INFECTION: ICD-10-CM

## 2023-11-11 PROCEDURE — 87338 HPYLORI STOOL AG IA: CPT

## 2023-11-13 ENCOUNTER — TELEPHONE (OUTPATIENT)
Dept: FAMILY MEDICINE | Facility: CLINIC | Age: 58
End: 2023-11-13
Payer: COMMERCIAL

## 2023-11-13 ENCOUNTER — OFFICE VISIT (OUTPATIENT)
Dept: HEPATOLOGY | Facility: CLINIC | Age: 58
End: 2023-11-13
Payer: COMMERCIAL

## 2023-11-13 VITALS
OXYGEN SATURATION: 99 % | RESPIRATION RATE: 18 BRPM | HEIGHT: 63 IN | SYSTOLIC BLOOD PRESSURE: 144 MMHG | BODY MASS INDEX: 29.18 KG/M2 | HEART RATE: 90 BPM | WEIGHT: 164.69 LBS | DIASTOLIC BLOOD PRESSURE: 68 MMHG

## 2023-11-13 DIAGNOSIS — K76.89 LIVER CYST: Primary | ICD-10-CM

## 2023-11-13 PROCEDURE — 3078F PR MOST RECENT DIASTOLIC BLOOD PRESSURE < 80 MM HG: ICD-10-PCS | Mod: CPTII,S$GLB,, | Performed by: INTERNAL MEDICINE

## 2023-11-13 PROCEDURE — 3078F DIAST BP <80 MM HG: CPT | Mod: CPTII,S$GLB,, | Performed by: INTERNAL MEDICINE

## 2023-11-13 PROCEDURE — 1160F RVW MEDS BY RX/DR IN RCRD: CPT | Mod: CPTII,S$GLB,, | Performed by: INTERNAL MEDICINE

## 2023-11-13 PROCEDURE — 3077F SYST BP >= 140 MM HG: CPT | Mod: CPTII,S$GLB,, | Performed by: INTERNAL MEDICINE

## 2023-11-13 PROCEDURE — 99999 PR PBB SHADOW E&M-EST. PATIENT-LVL V: CPT | Mod: PBBFAC,,, | Performed by: INTERNAL MEDICINE

## 2023-11-13 PROCEDURE — 1160F PR REVIEW ALL MEDS BY PRESCRIBER/CLIN PHARMACIST DOCUMENTED: ICD-10-PCS | Mod: CPTII,S$GLB,, | Performed by: INTERNAL MEDICINE

## 2023-11-13 PROCEDURE — 3008F PR BODY MASS INDEX (BMI) DOCUMENTED: ICD-10-PCS | Mod: CPTII,S$GLB,, | Performed by: INTERNAL MEDICINE

## 2023-11-13 PROCEDURE — 99213 OFFICE O/P EST LOW 20 MIN: CPT | Mod: S$GLB,,, | Performed by: INTERNAL MEDICINE

## 2023-11-13 PROCEDURE — 1159F PR MEDICATION LIST DOCUMENTED IN MEDICAL RECORD: ICD-10-PCS | Mod: CPTII,S$GLB,, | Performed by: INTERNAL MEDICINE

## 2023-11-13 PROCEDURE — 99999 PR PBB SHADOW E&M-EST. PATIENT-LVL V: ICD-10-PCS | Mod: PBBFAC,,, | Performed by: INTERNAL MEDICINE

## 2023-11-13 PROCEDURE — 3008F BODY MASS INDEX DOCD: CPT | Mod: CPTII,S$GLB,, | Performed by: INTERNAL MEDICINE

## 2023-11-13 PROCEDURE — 99213 PR OFFICE/OUTPT VISIT, EST, LEVL III, 20-29 MIN: ICD-10-PCS | Mod: S$GLB,,, | Performed by: INTERNAL MEDICINE

## 2023-11-13 PROCEDURE — 3077F PR MOST RECENT SYSTOLIC BLOOD PRESSURE >= 140 MM HG: ICD-10-PCS | Mod: CPTII,S$GLB,, | Performed by: INTERNAL MEDICINE

## 2023-11-13 PROCEDURE — 1159F MED LIST DOCD IN RCRD: CPT | Mod: CPTII,S$GLB,, | Performed by: INTERNAL MEDICINE

## 2023-11-13 NOTE — TELEPHONE ENCOUNTER
----- Message from Luly Phillips sent at 11/13/2023 11:34 AM CST -----  Name of Who is Calling:Patient           What is the request in detail: Patient calling to speak with office about the white tongue. Patient states she was given mouth wash and antibiotics. Patient states she was also advised to use peroxide but that is not helping as well.           Can the clinic reply by [facility]:no           What Number to Call Back if not in [facility]: [phone]

## 2023-11-13 NOTE — PROGRESS NOTES
HEPATOLOGY FOLLOW UP    Referring Physician:   Current Corresponding Physician:     Lynn Naidu is here for follow up of Liver cysts      HPI  Lynn Naidu is a 58 y.o. female with a hx of htn, elevated BMI and DDD who presented 10/9/23 for evaluation of incidentally found liver cysts on abdo US done to evaluate upper abdominal pain. W/u included an EGD with gastic biopsies +H pylori. Treatment of H pylori resulted in improvement of her abdo pain:     Abdo US 8/3/23: The liver is normal in size and echogenicity. There are numerous hepatic cysts. There is a: Cysts in the left lobe measuring 8.5 x 5.0 x 4.9 cm with multiple septations. There is no biliary duct dilatation. There is hepatopedal flow within the portal vein. No renal cysts     Labs 10/19/22: ALT 10, AST 12, ALKP 103, Tbil 0.4     The patient denied any symptoms of decompensated cirrhosis, including no ascites or edema, cognitive problems that would suggest hepatic encephalopathy, or GI bleeding from varices.     Interval History  Since Lynn Naidu's last visit:      MRI abdo w wo contrast 10/24/23: Cyst cluster versus multilobulated cyst with thin septation occurs in left hepatic lobe. The cyst cluster measures 7.5 x 4.3 x 3.9 cm. Simple cyst posteriorly in superior left hepatic lobe measures 14 mm. Simple cyst posteriorly in lateral segment of left hepatic lobe measures 15 mm. 17 mm cyst lies in lateral segment of left hepatic lobe. Additional smaller left hepatic lobe cysts are also evident. Laterally in right hepatic lobe, 8 mm simple cyst is present. Noncontrast images show no hepatic steatosis.Impression: Multifocal hepatic cysts, including clustered cysts versus multilobulated cyst with thin septation in left hepatic lobe     Outpatient Encounter Medications as of 11/13/2023   Medication Sig Dispense Refill    ciclopirox (PENLAC) 8 % Soln APPLY TOPICALLY NIGHTLY 7 mL 0    diazePAM (VALIUM) 10 MG Tab Take 10 mg by mouth every evening.  1     ergocalciferol (ERGOCALCIFEROL) 50,000 unit Cap Take 1 capsule (50,000 Units total) by mouth twice a week. 24 capsule 3    FLUoxetine 20 MG capsule Take 20 mg by mouth once daily.  2    fluticasone propionate (FLONASE) 50 mcg/actuation nasal spray 2 sprays (100 mcg total) by Each Nostril route once daily. 48 g 4    furosemide (LASIX) 40 MG tablet Take 1 tablet (40 mg total) by mouth once daily. 90 tablet 3    hydrOXYzine HCl (ATARAX) 25 MG tablet Take 1 tablet (25 mg total) by mouth 3 (three) times daily as needed. (Patient taking differently: Take 25 mg by mouth once daily.) 90 tablet 1    hydrOXYzine pamoate (VISTARIL) 25 MG Cap Take 25 mg by mouth 2 (two) times daily as needed.      metoprolol succinate (TOPROL-XL) 50 MG 24 hr tablet Take 1 tablet (50 mg total) by mouth once daily. 90 tablet 3    montelukast (SINGULAIR) 10 mg tablet Take 1 tablet (10 mg total) by mouth every evening. 90 tablet 1    pantoprazole (PROTONIX) 40 MG tablet Take 1 tablet (40 mg total) by mouth every morning. 90 tablet 3    potassium chloride SA (K-DUR,KLOR-CON) 20 MEQ tablet Take 1 tablet (20 mEq total) by mouth 2 (two) times daily. 180 tablet 3    promethazine (PHENERGAN) 25 MG tablet Take 25 mg by mouth.      semaglutide, weight loss, (WEGOVY) 2.4 mg/0.75 mL PnIj Inject 2.4 mg into the skin every 7 days. 12 each 1    SULFACLEANSE 8-4 8-4 % Susp Apply topically nightly as needed.       triamcinolone acetonide 0.1% (KENALOG) 0.1 % paste SMARTSIG:Sparingly TO TEETH 3 Times Daily      fluconazole (DIFLUCAN) 100 MG tablet Take 100 mg by mouth.      lubiprostone (AMITIZA) 24 MCG Cap Take 1 capsule (24 mcg total) by mouth 2 (two) times daily with meals. (Patient not taking: Reported on 11/13/2023) 60 capsule 2    methocarbamoL (ROBAXIN) 750 MG Tab Take 1 tablet (750 mg total) by mouth 4 (four) times daily as needed (muscle spasm). (Patient not taking: Reported on 11/13/2023) 90 tablet 1    methylPREDNISolone (MEDROL DOSEPACK) 4 mg tablet  Take by mouth.      nabumetone (RELAFEN) 500 MG tablet Take 1 tablet (500 mg total) by mouth 2 (two) times daily as needed for Pain. (Patient not taking: Reported on 11/13/2023) 30 tablet 0    pregabalin (LYRICA) 75 MG capsule TAKE 1 CAPSULE BY MOUTH EVERY MORNING AND 2 CAPSULES AT BEDTIME (Patient not taking: Reported on 11/13/2023) 90 capsule 5    traMADoL (ULTRAM) 50 mg tablet Take 1 tablet (50 mg total) by mouth every 8 (eight) hours as needed for Pain. (Patient not taking: Reported on 11/6/2023) 21 tablet 0    [DISCONTINUED] ergocalciferol (ERGOCALCIFEROL) 50,000 unit Cap Take 1 capsule (50,000 Units total) by mouth twice a week. 24 capsule 3    [DISCONTINUED] ergocalciferol (ERGOCALCIFEROL) 50,000 unit Cap TAKE 1 CAPSULE BY MOUTH TWICE WEEKLY 24 capsule 0    [DISCONTINUED] furosemide (LASIX) 40 MG tablet Take 1 tablet (40 mg total) by mouth once daily. 90 tablet 3    [DISCONTINUED] linaCLOtide (LINZESS) 290 mcg Cap capsule Take 1 capsule by mouth once daily 90 capsule 3    [DISCONTINUED] linaCLOtide (LINZESS) 290 mcg Cap capsule Take 1 capsule (290 mcg total) by mouth once daily. 90 capsule 3    [DISCONTINUED] metoprolol succinate (TOPROL-XL) 50 MG 24 hr tablet Take 1 tablet (50 mg total) by mouth once daily. 90 tablet 3    [DISCONTINUED] potassium chloride SA (K-DUR,KLOR-CON) 20 MEQ tablet Take 1 tablet (20 mEq total) by mouth 2 (two) times daily. 180 tablet 1    [DISCONTINUED] potassium chloride SA (K-DUR,KLOR-CON) 20 MEQ tablet TAKE 1  BY MOUTH TWICE DAILY 180 tablet 0    [DISCONTINUED] WEGOVY 2.4 mg/0.75 mL PnIj INJECT 2.4 MG INTO THE SKIN EVERY 7 DAYS 12 mL 0    [DISCONTINUED] WEGOVY 2.4 mg/0.75 mL PnIj INJECT 2.4 MG INTO THE SKIN EVERY 7 DAYS 12 each 0     Facility-Administered Encounter Medications as of 11/13/2023   Medication Dose Route Frequency Provider Last Rate Last Admin    lactated ringers infusion   Intravenous Continuous VuThomas MD 25 mL/hr at 12/21/21 1240 New Bag at 12/21/21 1243     lactated ringers infusion   Intravenous Continuous Thomas Ivory MD         Review of patient's allergies indicates:   Allergen Reactions    Omnicef [cefdinir] Itching     Past Medical History:   Diagnosis Date    Allergy     Bronchitis 07/17/2015    Cervical disc displacement     Edema     Hypertension     Insomnia     Liver cyst     Plantar fasciitis        Review of Systems  Vitals:    11/13/23 1548   BP: (!) 144/68   Pulse: 90   Resp: 18       Physical Exam    MELD 3.0: 8 at 10/9/2023  8:28 AM  MELD-Na: 7 at 10/9/2023  8:28 AM  Calculated from:  Serum Creatinine: 1.1 mg/dL at 10/9/2023  8:28 AM  Serum Sodium: 139 mmol/L (Using max of 137 mmol/L) at 10/9/2023  8:28 AM  Total Bilirubin: 0.3 mg/dL (Using min of 1 mg/dL) at 10/9/2023  8:28 AM  Serum Albumin: 3.8 g/dL (Using max of 3.5 g/dL) at 10/9/2023  8:28 AM  INR(ratio): 1.0 at 10/9/2023  8:28 AM  Age at listing (hypothetical): 58 years  Sex: Female at 10/9/2023  8:28 AM      Lab Results   Component Value Date    GLU 90 10/09/2023    BUN 11 10/09/2023    CREATININE 1.1 10/09/2023    CALCIUM 9.3 10/09/2023     10/09/2023    K 3.5 10/09/2023     10/09/2023    PROT 7.4 10/09/2023    CO2 28 10/09/2023    ANIONGAP 7 (L) 10/09/2023    WBC 7.84 10/09/2023    RBC 5.01 10/09/2023    HGB 14.8 10/09/2023    HCT 44.9 10/09/2023    MCV 90 10/09/2023    MCH 29.5 10/09/2023    MCHC 33.0 10/09/2023     Lab Results   Component Value Date    RDW 12.9 10/09/2023     10/09/2023    MPV 10.5 10/09/2023    GRAN 2.2 10/09/2023    GRAN 27.9 (L) 10/09/2023    LYMPH 5.1 (H) 10/09/2023    LYMPH 65.1 (H) 10/09/2023    MONO 0.4 10/09/2023    MONO 4.5 10/09/2023    EOSINOPHIL 1.9 10/09/2023    BASOPHIL 0.5 10/09/2023    EOS 0.2 10/09/2023    BASO 0.04 10/09/2023    BROCK Negative 05/11/2010    BNP <10 05/04/2010    CHOL 180 11/07/2023    TRIG 103 11/07/2023    HDL 37 (L) 11/07/2023    CHOLHDL 20.6 11/07/2023    TOTALCHOLEST 4.9 11/07/2023    ALBUMIN 3.8 10/09/2023    BILIDIR  0.1 10/09/2023    AST 15 10/09/2023    ALT 16 10/09/2023    ALKPHOS 102 10/09/2023    MG 2.1 09/01/2017    LABPROT 10.7 10/09/2023    INR 1.0 10/09/2023       Assessment and Plan:  Lynn Naidu is a 58 y.o. female withLiver cysts

## 2023-11-13 NOTE — TELEPHONE ENCOUNTER
Please see office visit note of 22/6/2023   She is c/o white patches on tongue.   Finished medications Symptoms are not resolved Please advise

## 2023-11-15 ENCOUNTER — PATIENT MESSAGE (OUTPATIENT)
Dept: GASTROENTEROLOGY | Facility: CLINIC | Age: 58
End: 2023-11-15
Payer: COMMERCIAL

## 2023-11-15 ENCOUNTER — TELEPHONE (OUTPATIENT)
Dept: GASTROENTEROLOGY | Facility: CLINIC | Age: 58
End: 2023-11-15
Payer: COMMERCIAL

## 2023-11-15 NOTE — TELEPHONE ENCOUNTER
----- Message from Samantha Michelle sent at 11/15/2023 12:19 PM CST -----  Contact: Pt  Type:  Patient Returning Call    Who Called:Pt   Who Left Message for Patient:Bisi  Does the patient know what this is regarding?:yes  Would the patient rather a call back or a response via [facility]? call  Best Call Back Number:[phone]    Additional Information: Pt is returning Bisi's call. Please call pt back to advise.     .

## 2023-11-15 NOTE — TELEPHONE ENCOUNTER
Called pt back; she states she read message on patient portal. Pt states she saw ENT yesterday and he was able to help by ordering vit B12 injections and recommending multiple vitamin supplements. Pt had no further questions at this time.

## 2023-11-15 NOTE — TELEPHONE ENCOUNTER
Attempted to contact pt; however, had to leave [facility] requesting pt call back. Will send MINNIE Griffiths's message to pt via patient portal as well.

## 2023-11-17 ENCOUNTER — HOSPITAL ENCOUNTER (OUTPATIENT)
Dept: RADIOLOGY | Facility: HOSPITAL | Age: 58
Discharge: HOME | End: 2023-11-17
Payer: COMMERCIAL

## 2023-11-17 ENCOUNTER — TELEPHONE (OUTPATIENT)
Dept: GASTROENTEROLOGY | Facility: CLINIC | Age: 58
End: 2023-11-17
Payer: COMMERCIAL

## 2023-11-17 DIAGNOSIS — R11.2 NAUSEA AND VOMITING, UNSPECIFIED VOMITING TYPE: ICD-10-CM

## 2023-11-17 DIAGNOSIS — R10.10 UPPER ABDOMINAL PAIN: ICD-10-CM

## 2023-11-17 PROCEDURE — 78264 NM GASTRIC EMPTYING: ICD-10-PCS | Mod: 26,,, | Performed by: RADIOLOGY

## 2023-11-17 PROCEDURE — 78264 GASTRIC EMPTYING IMG STUDY: CPT | Mod: 26,,, | Performed by: RADIOLOGY

## 2023-11-17 PROCEDURE — A9541 TC99M SULFUR COLLOID: HCPCS

## 2023-11-17 NOTE — TELEPHONE ENCOUNTER
----- Message from Ellen Wilkes sent at 11/17/2023  3:57 PM CST -----  Type: Needs Medical Advice  Who Called:  pt     Best Call Back Number: [phone]    Additional Information: pt calling in regards to her medications that were switched , says it makes her bloated wants to switch back please advise

## 2023-11-17 NOTE — TELEPHONE ENCOUNTER
Contacted pt who stated the Amitza causes her to feel bloating and she doesn't like that feeling; pt is requesting that NP Shikha Griffiths switches her med back to Linzess instead. Pt states that PCP, Dr. Parson was previously prescribing a 90 day supply for 1 year. I explained that I will forward message to NP to review and determination. Pt verbalized understanding.

## 2023-11-19 LAB — H PYLORI AG STL QL IA: NOT DETECTED

## 2023-11-20 ENCOUNTER — PATIENT MESSAGE (OUTPATIENT)
Dept: GASTROENTEROLOGY | Facility: CLINIC | Age: 58
End: 2023-11-20
Payer: COMMERCIAL

## 2023-11-20 DIAGNOSIS — K59.04 CHRONIC IDIOPATHIC CONSTIPATION: Primary | ICD-10-CM

## 2023-11-24 ENCOUNTER — HOSPITAL ENCOUNTER (OUTPATIENT)
Dept: RADIOLOGY | Facility: HOSPITAL | Age: 58
Discharge: HOME | End: 2023-11-24
Payer: COMMERCIAL

## 2023-11-24 DIAGNOSIS — R11.2 NAUSEA AND VOMITING, UNSPECIFIED VOMITING TYPE: ICD-10-CM

## 2023-11-24 PROCEDURE — 78227 NM HEPATOBILIARY(HIDA) WITH PHARM AND EF WHEN PERFORMED: ICD-10-PCS | Mod: 26,,, | Performed by: RADIOLOGY

## 2023-11-24 PROCEDURE — 78227 HEPATOBIL SYST IMAGE W/DRUG: CPT | Mod: 26,,, | Performed by: RADIOLOGY

## 2023-11-24 PROCEDURE — A9537 TC99M MEBROFENIN: HCPCS

## 2023-11-25 ENCOUNTER — HOSPITAL ENCOUNTER (OUTPATIENT)
Facility: HOSPITAL | Age: 58
Discharge: HOME | End: 2023-11-28
Attending: EMERGENCY MEDICINE | Admitting: INTERNAL MEDICINE
Payer: COMMERCIAL

## 2023-11-25 DIAGNOSIS — K83.09 CHOLANGITIS CONCURRENT WITH AND DUE TO CALCULUS OF GALLBLADDER: ICD-10-CM

## 2023-11-25 DIAGNOSIS — K80.50 CHOLEDOCHOLITHIASIS: Primary | ICD-10-CM

## 2023-11-25 DIAGNOSIS — R94.8 ABNORMAL BILIARY HIDA SCAN: Primary | ICD-10-CM

## 2023-11-25 DIAGNOSIS — K80.20 CHOLANGITIS CONCURRENT WITH AND DUE TO CALCULUS OF GALLBLADDER: ICD-10-CM

## 2023-11-25 DIAGNOSIS — Z01.810 PREOP CARDIOVASCULAR EXAM: ICD-10-CM

## 2023-11-25 DIAGNOSIS — K80.70 CHOLELITHIASIS WITH CHOLEDOCHOLITHIASIS: ICD-10-CM

## 2023-11-25 LAB
ALBUMIN SERPL BCP-MCNC: 3.8 G/DL (ref 3.5–5.2)
ALLENS TEST: NORMAL
ALP SERPL-CCNC: 145 U/L (ref 55–135)
ALT SERPL W/O P-5'-P-CCNC: 274 U/L (ref 10–44)
ANION GAP SERPL CALC-SCNC: 12 MMOL/L (ref 8–16)
AST SERPL-CCNC: 378 U/L (ref 10–40)
BASOPHILS NFR BLD: 0 % (ref 0–1.9)
BILIRUB SERPL-MCNC: 1.1 MG/DL (ref 0.1–1)
BUN SERPL-MCNC: 12 MG/DL (ref 6–20)
BURR CELLS BLD QL SMEAR: ABNORMAL
CALCIUM SERPL-MCNC: 9.3 MG/DL (ref 8.7–10.5)
CHLORIDE SERPL-SCNC: 106 MMOL/L (ref 95–110)
CO2 SERPL-SCNC: 24 MMOL/L (ref 23–29)
CREAT SERPL-MCNC: 1 MG/DL (ref 0.5–1.4)
DACRYOCYTES BLD QL SMEAR: ABNORMAL
DELSYS: NORMAL
DIFFERENTIAL METHOD: ABNORMAL
EOSINOPHIL NFR BLD: 0 % (ref 0–8)
ERYTHROCYTE [DISTWIDTH] IN BLOOD BY AUTOMATED COUNT: 13.2 % (ref 11.5–14.5)
EST. GFR  (NO RACE VARIABLE): >60 ML/MIN/1.73 M^2
GLUCOSE SERPL-MCNC: 138 MG/DL (ref 70–110)
HCT VFR BLD AUTO: 41.5 % (ref 37–48.5)
HGB BLD-MCNC: 14.1 G/DL (ref 12–16)
IMM GRANULOCYTES # BLD AUTO: ABNORMAL K/UL (ref 0–0.04)
IMM GRANULOCYTES NFR BLD AUTO: ABNORMAL % (ref 0–0.5)
LACTATE SERPL-SCNC: 1.8 MMOL/L (ref 0.5–2.2)
LDH SERPL L TO P-CCNC: 1.24 MMOL/L (ref 0.5–2.2)
LIPASE SERPL-CCNC: 34 U/L (ref 4–60)
LYMPHOCYTES NFR BLD: 44 % (ref 18–48)
MCH RBC QN AUTO: 30.1 PG (ref 27–31)
MCHC RBC AUTO-ENTMCNC: 34 G/DL (ref 32–36)
MCV RBC AUTO: 89 FL (ref 82–98)
MONOCYTES NFR BLD: 7 % (ref 4–15)
NEUTROPHILS NFR BLD: 47 % (ref 38–73)
NRBC BLD-RTO: 0 /100 WBC
PLATELET # BLD AUTO: 306 K/UL (ref 150–450)
PLATELET BLD QL SMEAR: ABNORMAL
PMV BLD AUTO: 10 FL (ref 9.2–12.9)
POTASSIUM SERPL-SCNC: 3.3 MMOL/L (ref 3.5–5.1)
PROMYELOCYTES NFR BLD MANUAL: 2 %
PROT SERPL-MCNC: 7.4 G/DL (ref 6–8.4)
RBC # BLD AUTO: 4.69 M/UL (ref 4–5.4)
SAMPLE: NORMAL
SITE: NORMAL
SODIUM SERPL-SCNC: 142 MMOL/L (ref 136–145)
WBC # BLD AUTO: 14.2 K/UL (ref 3.9–12.7)

## 2023-11-25 PROCEDURE — G0378 HOSPITAL OBSERVATION PER HR: HCPCS

## 2023-11-25 PROCEDURE — 85027 COMPLETE CBC AUTOMATED: CPT | Performed by: EMERGENCY MEDICINE

## 2023-11-25 PROCEDURE — 96361 HYDRATE IV INFUSION ADD-ON: CPT

## 2023-11-25 PROCEDURE — 25500020 PHARM REV CODE 255

## 2023-11-25 PROCEDURE — 96375 TX/PRO/DX INJ NEW DRUG ADDON: CPT

## 2023-11-25 PROCEDURE — 63600175 PHARM REV CODE 636 W HCPCS

## 2023-11-25 PROCEDURE — 25000003 PHARM REV CODE 250

## 2023-11-25 PROCEDURE — 63600175 PHARM REV CODE 636 W HCPCS: Performed by: EMERGENCY MEDICINE

## 2023-11-25 PROCEDURE — 25000003 PHARM REV CODE 250: Performed by: EMERGENCY MEDICINE

## 2023-11-25 PROCEDURE — 99900035 HC TECH TIME PER 15 MIN (STAT)

## 2023-11-25 PROCEDURE — 96376 TX/PRO/DX INJ SAME DRUG ADON: CPT

## 2023-11-25 PROCEDURE — 83605 ASSAY OF LACTIC ACID: CPT | Performed by: EMERGENCY MEDICINE

## 2023-11-25 PROCEDURE — 85007 BL SMEAR W/DIFF WBC COUNT: CPT | Performed by: EMERGENCY MEDICINE

## 2023-11-25 PROCEDURE — 87040 BLOOD CULTURE FOR BACTERIA: CPT | Performed by: EMERGENCY MEDICINE

## 2023-11-25 PROCEDURE — 96365 THER/PROPH/DIAG IV INF INIT: CPT

## 2023-11-25 PROCEDURE — 83690 ASSAY OF LIPASE: CPT | Performed by: EMERGENCY MEDICINE

## 2023-11-25 PROCEDURE — 94760 N-INVAS EAR/PLS OXIMETRY 1: CPT

## 2023-11-25 PROCEDURE — 99285 EMERGENCY DEPT VISIT HI MDM: CPT | Mod: 25

## 2023-11-25 PROCEDURE — 83605 ASSAY OF LACTIC ACID: CPT

## 2023-11-25 PROCEDURE — 80053 COMPREHEN METABOLIC PANEL: CPT | Performed by: EMERGENCY MEDICINE

## 2023-11-25 PROCEDURE — 36415 COLL VENOUS BLD VENIPUNCTURE: CPT | Performed by: EMERGENCY MEDICINE

## 2023-11-25 RX ORDER — NALOXONE HCL 0.4 MG/ML
0.02 VIAL (ML) INJECTION
Status: DISCONTINUED | OUTPATIENT
Start: 2023-11-25 | End: 2023-11-28 | Stop reason: HOSPADM

## 2023-11-25 RX ORDER — AMOXICILLIN 250 MG
1 CAPSULE ORAL 2 TIMES DAILY PRN
Status: DISCONTINUED | OUTPATIENT
Start: 2023-11-25 | End: 2023-11-26

## 2023-11-25 RX ORDER — PANTOPRAZOLE SODIUM 40 MG/1
40 TABLET, DELAYED RELEASE ORAL EVERY MORNING
Status: DISCONTINUED | OUTPATIENT
Start: 2023-11-26 | End: 2023-11-26

## 2023-11-25 RX ORDER — ONDANSETRON 2 MG/ML
4 INJECTION INTRAMUSCULAR; INTRAVENOUS EVERY 6 HOURS PRN
Status: DISCONTINUED | OUTPATIENT
Start: 2023-11-25 | End: 2023-11-28 | Stop reason: HOSPADM

## 2023-11-25 RX ORDER — LANOLIN ALCOHOL/MO/W.PET/CERES
800 CREAM (GRAM) TOPICAL
Status: DISCONTINUED | OUTPATIENT
Start: 2023-11-25 | End: 2023-11-26

## 2023-11-25 RX ORDER — SODIUM CHLORIDE 0.9 % (FLUSH) 0.9 %
10 SYRINGE (ML) INJECTION EVERY 8 HOURS
Status: DISCONTINUED | OUTPATIENT
Start: 2023-11-26 | End: 2023-11-28 | Stop reason: HOSPADM

## 2023-11-25 RX ORDER — MORPHINE SULFATE 4 MG/ML
4 INJECTION, SOLUTION INTRAMUSCULAR; INTRAVENOUS EVERY 4 HOURS PRN
Status: DISCONTINUED | OUTPATIENT
Start: 2023-11-25 | End: 2023-11-28 | Stop reason: HOSPADM

## 2023-11-25 RX ORDER — ACETAMINOPHEN 325 MG/1
650 TABLET ORAL EVERY 4 HOURS PRN
Status: DISCONTINUED | OUTPATIENT
Start: 2023-11-25 | End: 2023-11-28 | Stop reason: HOSPADM

## 2023-11-25 RX ORDER — FLUOXETINE HYDROCHLORIDE 20 MG/1
20 CAPSULE ORAL DAILY
Status: DISCONTINUED | OUTPATIENT
Start: 2023-11-26 | End: 2023-11-28 | Stop reason: HOSPADM

## 2023-11-25 RX ORDER — MONTELUKAST SODIUM 10 MG/1
10 TABLET ORAL NIGHTLY
Status: DISCONTINUED | OUTPATIENT
Start: 2023-11-25 | End: 2023-11-26

## 2023-11-25 RX ORDER — SODIUM,POTASSIUM PHOSPHATES 280-250MG
2 POWDER IN PACKET (EA) ORAL
Status: DISCONTINUED | OUTPATIENT
Start: 2023-11-25 | End: 2023-11-26

## 2023-11-25 RX ORDER — MAG HYDROX/ALUMINUM HYD/SIMETH 200-200-20
30 SUSPENSION, ORAL (FINAL DOSE FORM) ORAL 4 TIMES DAILY PRN
Status: DISCONTINUED | OUTPATIENT
Start: 2023-11-25 | End: 2023-11-26

## 2023-11-25 RX ORDER — ONDANSETRON 2 MG/ML
4 INJECTION INTRAMUSCULAR; INTRAVENOUS
Status: COMPLETED | OUTPATIENT
Start: 2023-11-25 | End: 2023-11-25

## 2023-11-25 RX ORDER — HYDROMORPHONE HYDROCHLORIDE 1 MG/ML
1 INJECTION, SOLUTION INTRAMUSCULAR; INTRAVENOUS; SUBCUTANEOUS
Status: COMPLETED | OUTPATIENT
Start: 2023-11-25 | End: 2023-11-25

## 2023-11-25 RX ORDER — IBUPROFEN 200 MG
16 TABLET ORAL
Status: DISCONTINUED | OUTPATIENT
Start: 2023-11-25 | End: 2023-11-26

## 2023-11-25 RX ORDER — IBUPROFEN 200 MG
24 TABLET ORAL
Status: DISCONTINUED | OUTPATIENT
Start: 2023-11-25 | End: 2023-11-26

## 2023-11-25 RX ORDER — TALC
9 POWDER (GRAM) TOPICAL NIGHTLY PRN
Status: DISCONTINUED | OUTPATIENT
Start: 2023-11-25 | End: 2023-11-28 | Stop reason: HOSPADM

## 2023-11-25 RX ORDER — PROCHLORPERAZINE EDISYLATE 5 MG/ML
5 INJECTION INTRAMUSCULAR; INTRAVENOUS EVERY 6 HOURS PRN
Status: DISCONTINUED | OUTPATIENT
Start: 2023-11-25 | End: 2023-11-28 | Stop reason: HOSPADM

## 2023-11-25 RX ORDER — IPRATROPIUM BROMIDE AND ALBUTEROL SULFATE 2.5; .5 MG/3ML; MG/3ML
3 SOLUTION RESPIRATORY (INHALATION) EVERY 4 HOURS PRN
Status: DISCONTINUED | OUTPATIENT
Start: 2023-11-25 | End: 2023-11-26

## 2023-11-25 RX ORDER — HYDROXYZINE PAMOATE 25 MG/1
25 CAPSULE ORAL 2 TIMES DAILY PRN
Status: DISCONTINUED | OUTPATIENT
Start: 2023-11-25 | End: 2023-11-26

## 2023-11-25 RX ORDER — METOPROLOL SUCCINATE 50 MG/1
50 TABLET, EXTENDED RELEASE ORAL DAILY
Status: DISCONTINUED | OUTPATIENT
Start: 2023-11-26 | End: 2023-11-28 | Stop reason: HOSPADM

## 2023-11-25 RX ORDER — GLUCAGON 1 MG
1 KIT INJECTION
Status: DISCONTINUED | OUTPATIENT
Start: 2023-11-25 | End: 2023-11-26

## 2023-11-25 RX ORDER — HYDROCODONE BITARTRATE AND ACETAMINOPHEN 5; 325 MG/1; MG/1
1 TABLET ORAL EVERY 6 HOURS PRN
Status: DISCONTINUED | OUTPATIENT
Start: 2023-11-25 | End: 2023-11-26

## 2023-11-25 RX ADMIN — ONDANSETRON 4 MG: 2 INJECTION INTRAMUSCULAR; INTRAVENOUS at 11:11

## 2023-11-25 RX ADMIN — ONDANSETRON 4 MG: 2 INJECTION INTRAMUSCULAR; INTRAVENOUS at 01:11

## 2023-11-25 RX ADMIN — SODIUM CHLORIDE, POTASSIUM CHLORIDE, SODIUM LACTATE AND CALCIUM CHLORIDE 125 ML: 600; 310; 30; 20 INJECTION, SOLUTION INTRAVENOUS at 11:11

## 2023-11-25 RX ADMIN — HYDROCODONE BITARTRATE AND ACETAMINOPHEN 1 TABLET: 5; 325 TABLET ORAL at 11:11

## 2023-11-25 RX ADMIN — SODIUM CHLORIDE 1000 ML: 9 INJECTION, SOLUTION INTRAVENOUS at 11:11

## 2023-11-25 RX ADMIN — MONTELUKAST SODIUM 10 MG: 10 TABLET, FILM COATED ORAL at 11:11

## 2023-11-25 RX ADMIN — HYDROMORPHONE HYDROCHLORIDE 1 MG: 1 INJECTION, SOLUTION INTRAMUSCULAR; INTRAVENOUS; SUBCUTANEOUS at 11:11

## 2023-11-25 RX ADMIN — PIPERACILLIN AND TAZOBACTAM 4.5 G: 4; .5 INJECTION, POWDER, LYOPHILIZED, FOR SOLUTION INTRAVENOUS; PARENTERAL at 01:11

## 2023-11-25 RX ADMIN — IOHEXOL 75 ML: 350 INJECTION, SOLUTION INTRAVENOUS at 12:11

## 2023-11-25 NOTE — ED PROVIDER NOTES
Encounter Date: 11/25/2023       History     Chief Complaint   Patient presents with    Vomiting     Started yesterday with abdominal pain     58-year-old female with several months of vomiting presents to the ER with right upper quadrant pain right lower quadrant pain and vomiting that worsened yesterday.  HIDA scan yesterday, patient was told it was abnormal.  She also has liver cysts.  She is established with Gastroenterology and hepatology. Hx limited by vomiting.    The history is provided by the patient.     Review of patient's allergies indicates:   Allergen Reactions    Omnicef [cefdinir] Itching     Past Medical History:   Diagnosis Date    Allergy     Bronchitis 07/17/2015    Cervical disc displacement     Edema     Hypertension     Insomnia     Liver cyst     Plantar fasciitis      Past Surgical History:   Procedure Laterality Date    COLONOSCOPY  11/01/2017    Dr. Oviedo, normal, ten year recheck    COLONOSCOPY N/A 11/01/2017    Procedure: COLONOSCOPY;  Surgeon: Cameron Oviedo MD;  Location: [facility];  Service: Endoscopy;  Laterality: N/A;    EPIDURAL STEROID INJECTION INTO CERVICAL SPINE N/A 07/22/2019    Procedure: Injection-steroid-epidural-cervical;  Surgeon: Thomas Ivory MD;  Location: [facility] OR;  Service: Pain Management;  Laterality: N/A;  C7-T1    EPIDURAL STEROID INJECTION INTO CERVICAL SPINE N/A 12/21/2021    Procedure: Injection-steroid-epidural-cervical;  Surgeon: Thomas Ivory MD;  Location: [facility] OR;  Service: Pain Management;  Laterality: N/A;  C6-7    ESOPHAGOGASTRODUODENOSCOPY N/A 08/31/2023    Procedure: EGD (ESOPHAGOGASTRODUODENOSCOPY);  Surgeon: Cameron Oviedo MD;  Location: [facility];  Service: Endoscopy;  Laterality: N/A;    INJECTION OF ANESTHETIC AGENT AROUND STELLATE GANGLION Left 08/30/2022    Procedure: BLOCK, GANGLION, STELLATE;  Surgeon: Thomas Ivory MD;  Location: [facility] OR;  Service: Pain Management;  Laterality: Left;    INJECTION OF ANESTHETIC AGENT AROUND STELLATE  GANGLION Left 10/21/2022    Procedure: BLOCK, GANGLION, STELLATE Left;  Surgeon: Thomas Ivory MD;  Location: [facility] OR;  Service: Pain Management;  Laterality: Left;    INJECTION OF ANESTHETIC AGENT AROUND STELLATE GANGLION Left 12/27/2022    Procedure: BLOCK, GANGLION, STELLATE;  Surgeon: Thomas Ivory MD;  Location: [facility] OR;  Service: Pain Management;  Laterality: Left;  left    TONSILLECTOMY, ADENOIDECTOMY Bilateral 07/23/2021    Procedure: TONSILLECTOMY AND ADENOIDECTOMY;  Surgeon: Anselmo Galvan MD;  Location: [facility] OR;  Service: ENT;  Laterality: Bilateral;    UPPER GASTROINTESTINAL ENDOSCOPY      WISDOM TOOTH EXTRACTION       Family History   Problem Relation Age of Onset    Heart disease Mother     Stroke Mother     Cancer Father         bone ca, prostate ca     Hypertension Sister     Diabetes Sister     Allergies Sister     Asthma Sister     Hypertension Sister     Allergies Sister     Asthma Sister     Heart disease Brother     Kidney failure Brother     Angioedema Neg Hx     Eczema Neg Hx     Immunodeficiency Neg Hx     Urticaria Neg Hx     Colon cancer Neg Hx     Colon polyps Neg Hx     Esophageal cancer Neg Hx     Stomach cancer Neg Hx      Social History     Tobacco Use    Smoking status: Never    Smokeless tobacco: Never   Substance Use Topics    Alcohol use: Never    Drug use: No     Review of Systems   Gastrointestinal:  Positive for abdominal pain, nausea and vomiting.   Genitourinary: Negative.        Physical Exam     Initial Vitals [11/25/23 1058]   BP Pulse Resp Temp SpO2   (!) 144/74 93 18 97.8 °F (36.6 °C) 99 %      MAP       --         Physical Exam    Constitutional: She appears well-developed and well-nourished. She is not diaphoretic. She is cooperative.  Non-toxic appearance. She does not have a sickly appearance. She does not appear ill. She appears distressed (vomiting).   HENT:   Head: Normocephalic and atraumatic.   Eyes: Conjunctivae and EOM are normal. No scleral icterus.   Neck:  Neck supple.   Normal range of motion.   Full passive range of motion without pain.     Cardiovascular:  Normal rate, regular rhythm, S1 normal, S2 normal and normal heart sounds.           No murmur heard.  Pulmonary/Chest: Breath sounds normal. No respiratory distress. She has no wheezes. She has no rales.   Abdominal: Abdomen is soft. She exhibits no distension. There is abdominal tenderness in the right lower quadrant. There is no rebound and no guarding.   Musculoskeletal:         General: Normal range of motion.      Cervical back: Full passive range of motion without pain, normal range of motion and neck supple. No rigidity. Normal range of motion.     Neurological: She is alert and oriented to person, place, and time. She has normal strength.   Skin: Skin is warm and dry.   Psychiatric: She has a normal mood and affect. Her speech is normal and behavior is normal. Judgment and thought content normal. Cognition and memory are normal.         ED Course   Critical Care    Date/Time: 11/25/2023 10:53 AM    Performed by: Vinod Warren MD  Authorized by: Donaldo Chen MD  Direct patient critical care time: 120 minutes  Additional history critical care time: 5 minutes  Ordering / reviewing critical care time: 15 minutes  Documentation critical care time: 10 minutes  Consulting other physicians critical care time: 10 (gen surgery, gi here, gi [facility], hospital medicine [facility]) minutes  Consult with family critical care time: 3 minutes  Total critical care time (exclusive of procedural time) : 163 minutes  Critical care was necessary to treat or prevent imminent or life-threatening deterioration of the following conditions: sepsis.  Critical care was time spent personally by me on the following activities: discussions with consultants, evaluation of patient's response to treatment, examination of patient, obtaining history from patient or surrogate, ordering and performing treatments and interventions, ordering and  review of laboratory studies, ordering and review of radiographic studies, pulse oximetry, re-evaluation of patient's condition and review of old charts.        Labs Reviewed   CBC W/ AUTO DIFFERENTIAL - Abnormal; Notable for the following components:       Result Value    WBC 14.20 (*)     All other components within normal limits   COMPREHENSIVE METABOLIC PANEL - Abnormal; Notable for the following components:    Potassium 3.3 (*)     Glucose 138 (*)     Total Bilirubin 1.1 (*)     Alkaline Phosphatase 145 (*)      (*)      (*)     All other components within normal limits   COMPREHENSIVE METABOLIC PANEL - Abnormal; Notable for the following components:    Potassium 3.0 (*)     Total Protein 5.9 (*)     Albumin 3.1 (*)     Total Bilirubin 1.6 (*)     Alkaline Phosphatase 150 (*)      (*)      (*)     All other components within normal limits   CBC W/ AUTO DIFFERENTIAL - Abnormal; Notable for the following components:    RBC 3.85 (*)     Hemoglobin 11.7 (*)     Hematocrit 34.3 (*)     Lymph % 50.5 (*)     All other components within normal limits   LIPASE   LACTIC ACID, PLASMA   MAGNESIUM   LIPASE   ISTAT LACTATE          Imaging Results              MRI MRCP Without Contrast (Final result)  Result time 11/25/23 16:03:39      Final result by Dayana Powell MD (11/25/23 16:03:39)                   Impression:      Cholelithiasis and findings of acute cholecystitis and choledocholithiasis with pericholecystic fluid, mild biliary duct dilatation and with a filling defect in the distal common duct.    Multiple cysts in the liver.  Small right renal cyst.      Electronically signed by: Dayana Powell MD  Date:    11/25/2023  Time:    16:03               Narrative:    EXAMINATION:  MRI ABDOMEN WITHOUT CONTRAST MRCP    CLINICAL HISTORY:  Cholelithiasis;Choledocholithiasis;    TECHNIQUE:  Multiplanar, multisequence images are performed through the liver and upper abdomen.  Additionally 2D  and 3D MRCP sequences are performed as well as MIP images.    COMPARISON:  10/24/2023    FINDINGS:  Multiple cysts in the liver including cluster of cysts spanning a distance of 6 6.6 cm in the left lobe.    Spleen not enlarged    Adrenal glands unremarkable appearance    Pancreas unremarkable appearance    Abdominal aorta no aneurysm    Kidney; a couple right renal cysts larger of which measures 8 mm.  No hydronephrosis    Multiple filling defects in the gallbladder consistent with stones.  Pericholecystic fluid.  Common duct appears just mildly dilated measuring 78 mm.  There is intraluminal filling defect in the distal common duct for example coronal series 4, image 13 measures approximately 3 mm.  Also see coronal series 10, image 47.  Also well seen series 11, image 1.                                       CT Abdomen Pelvis With IV Contrast NO Oral Contrast (Final result)  Result time 11/25/23 13:09:29      Final result by Dayana Powell MD (11/25/23 13:09:29)                   Impression:      Findings suggesting acute cholecystitis with distended gallbladder with wall thickening and/or pericholecystic fluid.  No calcified stones evident in the gallbladder but note is made that cholelithiasis is typically better evaluate by ultrasound and ultrasound 08/03/2023 did describe cholelithiasis.    Mild dilatation the common duct, cause of which is not identified but appearing new compared to prior study and concerning for choledocholithiasis.    Hepatic and renal cysts.      Electronically signed by: Dayana Powell MD  Date:    11/25/2023  Time:    13:09               Narrative:    EXAMINATION:  CT ABDOMEN PELVIS WITH IV CONTRAST    CLINICAL HISTORY:  RLQ abdominal pain (Age >= 14y);    TECHNIQUE:  Low dose axial images, sagittal and coronal reformations were obtained from the lung bases to the pubic symphysis following the IV administration of 75 mL of Omnipaque 350 no p.o.  contrast    COMPARISON:  None.    FINDINGS:  Mild dependent hypoventilatory changes in visualized lung bases.    Liver; multiple cysts with cysts most numerous and largest in the left lobe in corresponding as seen on MRI of the abdomen of 10/24/2023, more completely characterized on that exam.  Appearance on CT ever does also suggest simple cysts    Spleen not enlarged    Adrenal glands unremarkable appearance    Pancreas unremarkable appearance    Abdominal aorta no aneurysm    Gallbladder and bile ducts; distended gallbladder with small amount of pericholecystic fluid and/or wall thickening.  No calcified stones seen within the gallbladder with cholelithiasis typically better evaluated by ultrasound.  Common duct appears mildly dilated measuring up to 9 mm on coronal images.  Difficult to compared to the prior MRCP for technical differences but not seen dilated on that study.    Renal enhancement is symmetrical.  No hydronephrosis.  11 mm right renal cyst.  Urinary bladder moderately distended at time of the exam and as visualized is unremarkable appearance    Reproductive organs unremarkable appearance for the patient's age    Diverticulosis without CT findings of acute diverticulitis.  Normal appearance of the appendix.  No free intraperitoneal air or fluid.                                       Medications   FLUoxetine capsule 20 mg (0 mg Oral Hold 11/26/23 0900)   metoprolol succinate (TOPROL-XL) 24 hr tablet 50 mg (0 mg Oral Hold 11/26/23 0900)   sodium chloride 0.9% flush 10 mL (10 mLs Intravenous Given 11/26/23 1400)   melatonin tablet 9 mg (9 mg Oral Given 11/26/23 0024)   ondansetron injection 4 mg (4 mg Intravenous Given 11/26/23 0721)   naloxone 0.4 mg/mL injection 0.02 mg (has no administration in time range)   acetaminophen tablet 650 mg (has no administration in time range)   prochlorperazine injection Soln 5 mg (has no administration in time range)   morphine injection 4 mg (4 mg Intravenous Given  11/26/23 0721)   piperacillin-tazobactam 4.5 g in dextrose 5 % 100 mL IVPB (ready to mix) (4.5 g Intravenous New Bag 11/26/23 1730)   dextrose 50% injection 12.5 g (has no administration in time range)   dextrose 50% injection 25 g (has no administration in time range)   sodium chloride 0.9% flush 10 mL (has no administration in time range)   dextrose 5 % and 0.45 % NaCl with KCl 20 mEq infusion (0 mL/hr Intravenous Paused 11/26/23 1744)   pantoprazole injection 40 mg (has no administration in time range)   sodium chloride 0.9% bolus 1,000 mL 1,000 mL (0 mLs Intravenous Stopped 11/25/23 1248)   ondansetron injection 4 mg (4 mg Intravenous Given 11/25/23 1144)   HYDROmorphone injection 1 mg (1 mg Intravenous Given 11/25/23 1144)   piperacillin-tazobactam (ZOSYN) 4.5 g in dextrose 5 % in water (D5W) 100 mL IVPB (MB+) (0 g Intravenous Stopped 11/25/23 1346)   iohexoL (OMNIPAQUE 350) 350 mg iodine/mL injection (75 mLs Intravenous Given 11/25/23 1250)   ondansetron injection 4 mg (4 mg Intravenous Given 11/25/23 1341)     Medical Decision Making  58-year-old female currently established with the [facility] presents with intermittent nausea vomiting abdominal pain off and on for several weeks.  Pain worsening since yesterday.  Had a HIDA scan yesterday.  On arrival in the ER patient was distress and actively vomiting.  Significant right-sided abdominal tenderness.  Elevated LFTs and leukocytosis.  CT concerning for choledocholithiasis, MRCP confirms this.  Case discussed with [facility] GI on-call, patient will be admitted at their facility.  Symptoms greatly improved at this time.  Sepsis pathway followed.    Amount and/or Complexity of Data Reviewed  External Data Reviewed: notes.     Details: Recent GI notes reviewed  Labs: ordered. Decision-making details documented in ED Course.  Radiology: ordered. Decision-making details documented in ED Course.    Risk  Prescription drug  management.  Decision regarding hospitalization.      Additional MDM:   Sepsis:   This patient does have evidence of infective focus  My overall impression is sepsis.  Source: Abdominal  Antibiotics given- Antibiotics     Patient Encounter Information Not Found      Latest lactate reviewed- y  Organ dysfunction indicated by none    Fluid challenge Not needed - patient is not hypotensive      Post- resuscitation assessment No - Post resuscitation assessment not needed       Will Not start Pressors- Levophed for MAP of 65  Source control achieved by: iv abx in ER                ED Course as of 11/26/23 2026   Sat Nov 25, 2023   1115 BP(!): 144/74 [EF]   1115 Temp: 97.8 °F (36.6 °C) [EF]   1115 Temp Source: Oral [EF]   1115 Pulse: 93 [EF]   1115 Resp: 18 [EF]   1115 SpO2: 99 % [EF]   1218 WBC(!): 14.20 [EF]   1218 Hemoglobin: 14.1 [EF]   1218 Platelet Count: 306 [EF]   1221 BILIRUBIN TOTAL(!): 1.1 [EF]   1221 ALP(!): 145 [EF]   1221 AST(!): 378 [EF]   1221 ALT(!): 274 [EF]   1224 Leukocytosis with elevated LFTs, concern for cholecystitis or choledocholithiasis.  Sepsis orders initiated.  Sepsis considered at this time. [EF]   1228 Says never had any rash or hives with abx, inc omincef [EF]   1230 Zosyn ordered [EF]   1230 Sx better [EF]   1312 CT Abdomen Pelvis With IV Contrast NO Oral Contrast [EF]   1354 Msg left gi [EF]   1359 Case d/w dr betancourt, recommends mrcp [EF]   1612 MRI MRCP Without Contrast [EF]   1619 Paged [facility] GI doc on call [EF]   1622 Case d/w dr puri, admit to IM at [facility], npo after midnight [EF]   1647 [facility] MD will admit [EF]   Sun Nov 26, 2023   0720 Pt care transferred from Dr. Payan.  Pt awaiting transfer to [facility] for ERCP w/ GI.  Pt has recurrent pain but no fevers.  Liver function tests increasing.  Patient needs to be transferred for ERCP. [JS]   1036 Patient is still awaiting transfer to [facility].  She has been here for 24 hours.  I called the transfer center to see if there were any other  facilities available who can do ERCP.  Awaiting return call.  I will add on a lipase given that she has recurrent midepigastric abdominal pain and this was last drawn yesterday.  LFTs increasing.  No fevers.  No current leukocytosis.  She is getting antibiotics.  Hospital Medicine has been consulted.  I spoke to the house supervisor here who was discussing with house supervisor at [facility].  Bed will be available today for transfer. [JS]      ED Course User Index  [EF] Vinod Warren MD  [JS] Thomas Jolly MD                        Clinical Impression:  Final diagnoses:  [K83.09, K80.20] Cholangitis concurrent with and due to calculus of gallbladder  [K80.50] Choledocholithiasis (Primary)          ED Disposition Condition    Observation                 Vinod Warren MD  11/25/23 1745       Vinod Warren MD  11/26/23 2026

## 2023-11-26 PROBLEM — K80.70 CHOLELITHIASIS WITH CHOLEDOCHOLITHIASIS: Status: ACTIVE | Noted: 2023-11-26

## 2023-11-26 PROBLEM — K21.9 CHRONIC GERD: Status: ACTIVE | Noted: 2023-11-26

## 2023-11-26 PROBLEM — R11.10 ABDOMINAL PAIN WITH VOMITING: Status: ACTIVE | Noted: 2023-11-26

## 2023-11-26 PROBLEM — E87.6 HYPOKALEMIA: Status: ACTIVE | Noted: 2023-11-26

## 2023-11-26 PROBLEM — R10.9 ABDOMINAL PAIN WITH VOMITING: Status: ACTIVE | Noted: 2023-11-26

## 2023-11-26 LAB
ALBUMIN SERPL BCP-MCNC: 3.1 G/DL (ref 3.5–5.2)
ALP SERPL-CCNC: 150 U/L (ref 55–135)
ALT SERPL W/O P-5'-P-CCNC: 742 U/L (ref 10–44)
ANION GAP SERPL CALC-SCNC: 8 MMOL/L (ref 8–16)
AST SERPL-CCNC: 553 U/L (ref 10–40)
BASOPHILS # BLD AUTO: 0.03 K/UL (ref 0–0.2)
BASOPHILS NFR BLD: 0.4 % (ref 0–1.9)
BILIRUB SERPL-MCNC: 1.6 MG/DL (ref 0.1–1)
BUN SERPL-MCNC: 8 MG/DL (ref 6–20)
CALCIUM SERPL-MCNC: 8.7 MG/DL (ref 8.7–10.5)
CHLORIDE SERPL-SCNC: 108 MMOL/L (ref 95–110)
CO2 SERPL-SCNC: 26 MMOL/L (ref 23–29)
CREAT SERPL-MCNC: 1 MG/DL (ref 0.5–1.4)
DIFFERENTIAL METHOD: ABNORMAL
EOSINOPHIL # BLD AUTO: 0.1 K/UL (ref 0–0.5)
EOSINOPHIL NFR BLD: 1.3 % (ref 0–8)
ERYTHROCYTE [DISTWIDTH] IN BLOOD BY AUTOMATED COUNT: 13.2 % (ref 11.5–14.5)
EST. GFR  (NO RACE VARIABLE): >60 ML/MIN/1.73 M^2
GLUCOSE SERPL-MCNC: 83 MG/DL (ref 70–110)
HCT VFR BLD AUTO: 34.3 % (ref 37–48.5)
HGB BLD-MCNC: 11.7 G/DL (ref 12–16)
IMM GRANULOCYTES # BLD AUTO: 0.01 K/UL (ref 0–0.04)
IMM GRANULOCYTES NFR BLD AUTO: 0.1 % (ref 0–0.5)
LIPASE SERPL-CCNC: 20 U/L (ref 4–60)
LYMPHOCYTES # BLD AUTO: 3.6 K/UL (ref 1–4.8)
LYMPHOCYTES NFR BLD: 50.5 % (ref 18–48)
MAGNESIUM SERPL-MCNC: 1.7 MG/DL (ref 1.6–2.6)
MCH RBC QN AUTO: 30.4 PG (ref 27–31)
MCHC RBC AUTO-ENTMCNC: 34.1 G/DL (ref 32–36)
MCV RBC AUTO: 89 FL (ref 82–98)
MONOCYTES # BLD AUTO: 0.4 K/UL (ref 0.3–1)
MONOCYTES NFR BLD: 5.4 % (ref 4–15)
NEUTROPHILS # BLD AUTO: 3 K/UL (ref 1.8–7.7)
NEUTROPHILS NFR BLD: 42.3 % (ref 38–73)
NRBC BLD-RTO: 0 /100 WBC
PLATELET # BLD AUTO: 242 K/UL (ref 150–450)
PMV BLD AUTO: 10 FL (ref 9.2–12.9)
POTASSIUM SERPL-SCNC: 3 MMOL/L (ref 3.5–5.1)
PROT SERPL-MCNC: 5.9 G/DL (ref 6–8.4)
RBC # BLD AUTO: 3.85 M/UL (ref 4–5.4)
SODIUM SERPL-SCNC: 142 MMOL/L (ref 136–145)
WBC # BLD AUTO: 7.19 K/UL (ref 3.9–12.7)

## 2023-11-26 PROCEDURE — 96375 TX/PRO/DX INJ NEW DRUG ADDON: CPT

## 2023-11-26 PROCEDURE — 36415 COLL VENOUS BLD VENIPUNCTURE: CPT

## 2023-11-26 PROCEDURE — 25000003 PHARM REV CODE 250: Performed by: INTERNAL MEDICINE

## 2023-11-26 PROCEDURE — 96365 THER/PROPH/DIAG IV INF INIT: CPT

## 2023-11-26 PROCEDURE — G0378 HOSPITAL OBSERVATION PER HR: HCPCS

## 2023-11-26 PROCEDURE — 80053 COMPREHEN METABOLIC PANEL: CPT

## 2023-11-26 PROCEDURE — 83735 ASSAY OF MAGNESIUM: CPT

## 2023-11-26 PROCEDURE — 85025 COMPLETE CBC W/AUTO DIFF WBC: CPT

## 2023-11-26 PROCEDURE — A4216 STERILE WATER/SALINE, 10 ML: HCPCS | Performed by: INTERNAL MEDICINE

## 2023-11-26 PROCEDURE — 83690 ASSAY OF LIPASE: CPT | Performed by: EMERGENCY MEDICINE

## 2023-11-26 PROCEDURE — 36415 COLL VENOUS BLD VENIPUNCTURE: CPT | Performed by: EMERGENCY MEDICINE

## 2023-11-26 PROCEDURE — 63600175 PHARM REV CODE 636 W HCPCS: Performed by: INTERNAL MEDICINE

## 2023-11-26 PROCEDURE — 96376 TX/PRO/DX INJ SAME DRUG ADON: CPT

## 2023-11-26 PROCEDURE — 25000003 PHARM REV CODE 250

## 2023-11-26 PROCEDURE — 63600175 PHARM REV CODE 636 W HCPCS

## 2023-11-26 PROCEDURE — 96366 THER/PROPH/DIAG IV INF ADDON: CPT

## 2023-11-26 RX ORDER — SODIUM CHLORIDE 0.9 % (FLUSH) 0.9 %
10 SYRINGE (ML) INJECTION
Status: DISCONTINUED | OUTPATIENT
Start: 2023-11-26 | End: 2023-11-28 | Stop reason: HOSPADM

## 2023-11-26 RX ORDER — PANTOPRAZOLE SODIUM 40 MG/10ML
40 INJECTION, POWDER, LYOPHILIZED, FOR SOLUTION INTRAVENOUS DAILY
Status: DISCONTINUED | OUTPATIENT
Start: 2023-11-27 | End: 2023-11-28 | Stop reason: HOSPADM

## 2023-11-26 RX ORDER — PANTOPRAZOLE SODIUM 40 MG/1
40 TABLET, DELAYED RELEASE ORAL EVERY MORNING
Status: DISCONTINUED | OUTPATIENT
Start: 2023-11-27 | End: 2023-11-26

## 2023-11-26 RX ORDER — DEXTROSE MONOHYDRATE, SODIUM CHLORIDE, AND POTASSIUM CHLORIDE 50; 1.49; 4.5 G/1000ML; G/1000ML; G/1000ML
INJECTION, SOLUTION INTRAVENOUS CONTINUOUS
Status: DISCONTINUED | OUTPATIENT
Start: 2023-11-26 | End: 2023-11-28 | Stop reason: HOSPADM

## 2023-11-26 RX ADMIN — Medication 800 MG: at 06:11

## 2023-11-26 RX ADMIN — PIPERACILLIN SODIUM AND TAZOBACTAM SODIUM 4.5 G: 4; .5 INJECTION, POWDER, LYOPHILIZED, FOR SOLUTION INTRAVENOUS at 05:11

## 2023-11-26 RX ADMIN — PANTOPRAZOLE SODIUM 40 MG: 40 TABLET, DELAYED RELEASE ORAL at 06:11

## 2023-11-26 RX ADMIN — POTASSIUM BICARBONATE 60 MEQ: 391 TABLET, EFFERVESCENT ORAL at 06:11

## 2023-11-26 RX ADMIN — MORPHINE SULFATE 4 MG: 4 INJECTION, SOLUTION INTRAMUSCULAR; INTRAVENOUS at 09:11

## 2023-11-26 RX ADMIN — ONDANSETRON 4 MG: 2 INJECTION INTRAMUSCULAR; INTRAVENOUS at 07:11

## 2023-11-26 RX ADMIN — PIPERACILLIN AND TAZOBACTAM 4.5 G: 4; .5 INJECTION, POWDER, LYOPHILIZED, FOR SOLUTION INTRAVENOUS; PARENTERAL at 08:11

## 2023-11-26 RX ADMIN — SODIUM CHLORIDE, PRESERVATIVE FREE 10 ML: 5 INJECTION INTRAVENOUS at 02:11

## 2023-11-26 RX ADMIN — MORPHINE SULFATE 4 MG: 4 INJECTION, SOLUTION INTRAMUSCULAR; INTRAVENOUS at 07:11

## 2023-11-26 RX ADMIN — Medication 9 MG: at 12:11

## 2023-11-26 RX ADMIN — PIPERACILLIN AND TAZOBACTAM 4.5 G: 4; .5 INJECTION, POWDER, LYOPHILIZED, FOR SOLUTION INTRAVENOUS; PARENTERAL at 12:11

## 2023-11-26 RX ADMIN — SODIUM CHLORIDE, POTASSIUM CHLORIDE, SODIUM LACTATE AND CALCIUM CHLORIDE 125 ML: 600; 310; 30; 20 INJECTION, SOLUTION INTRAVENOUS at 08:11

## 2023-11-26 RX ADMIN — ONDANSETRON 4 MG: 2 INJECTION INTRAMUSCULAR; INTRAVENOUS at 09:11

## 2023-11-26 RX ADMIN — SODIUM CHLORIDE, PRESERVATIVE FREE 10 ML: 5 INJECTION INTRAVENOUS at 09:11

## 2023-11-26 NOTE — PROGRESS NOTES
[facility] Medicine  Progress Note    Patient Name: Lynn Naidu  MRN: [id number]  Patient Class: OP- Observation   Admission Date: 11/25/2023  Length of Stay: 0 days  Attending Physician: Sabine Fatima MD  Primary Care Provider: Remy Parson MD        Subjective:     Principal Problem:Cholelithiasis with choledocholithiasis        HPI:  Lynn Naidu is a 58-year-old female presents to the ER c/o severe, non-radiating RUQ, RLQ, and epigastric area abdominal pain with nausea and vomiting for several months that worsened yesterday following an abnormal HIDA scan. Past medical history includes hypertension, GERD, hepatic cysts, bronchitis, appendectomy, peripheral edema, insomnia, and cervical disc displacement. She reports having intentional weight loss from taking Wegovy (Semaglutide) injections since 01/2023. She reports decrease appetite, nausea, vomiting, headache, diaphoresis without fever with non-radiating abdominal pain. Denies chest pain, shortness of breath, fatigue, diarrhea, melena, hematemesis, hematochezia, dysuria, or hematuria. A HIDA scan was performed 11/25 which showed a reduced gallbladder ejection fraction. 8/31 EGD positive H. Pylori, small hiatal hernia, GERD, and gastritis. She is established with Gastroenterology and hepatology outpatient. In the ED her abdominal CT demonstrated cholecystitis with distended gallbladder; choledocholithiasis. MRI revealed right renal cysts, gall stones, pericholecystic fluid, and mildly dilated common bile duct. She received IV zosyn and analgesics in ED.  Her labs reflect a cholestatic pattern without elevated pancreatic enzymes. Her , , Bili 1.1, Lipase 34, WBC 14.2. She remains afebrile with stable vitals. She has been accepted by [facility] gastroenterology for possible ERCP.  She will be admitted to hospital medicine for further treatment and management.                Overview/Hospital Course:  No notes on  file    Interval History:   Patient seen and examined.  No acute events since admission.  Reports her pain is currently 5/10 scale to her RUQ.  She  reports nausea today which has been responsive to antiemetics.  She has not had any vomiting.      Review of Systems   Constitutional:  Positive for appetite change and chills.   Gastrointestinal:  Positive for abdominal pain, nausea and vomiting.   All other systems reviewed and are negative.    Objective:     Vital Signs (Most Recent):  Temp: 98.1 °F (36.7 °C) (11/26/23 0716)  Pulse: 67 (11/26/23 0602)  Resp: 18 (11/26/23 0721)  BP: 117/73 (11/26/23 1127)  SpO2: 95 % (11/26/23 0602) Vital Signs (24h Range):  Temp:  [98.1 °F (36.7 °C)] 98.1 °F (36.7 °C)  Pulse:  [67-92] 67  Resp:  [16-20] 18  SpO2:  [95 %-100 %] 95 %  BP: (100-154)/(61-80) 117/73     Weight: 74.8 kg (164 lb 14.5 oz)  Body mass index is 29.22 kg/m².    Intake/Output Summary (Last 24 hours) at 11/26/2023 1256  Last data filed at 11/26/2023 0421  Gross per 24 hour   Intake 200 ml   Output --   Net 200 ml         Physical Exam  Constitutional:       General: She is not in acute distress.     Appearance: She is not ill-appearing, toxic-appearing or diaphoretic.   HENT:      Head: Normocephalic and atraumatic.   Eyes:      General: No scleral icterus.     Extraocular Movements: Extraocular movements intact.      Conjunctiva/sclera: Conjunctivae normal.      Pupils: Pupils are equal, round, and reactive to light.   Cardiovascular:      Rate and Rhythm: Normal rate and regular rhythm.      Pulses: Normal pulses.      Heart sounds: Normal heart sounds.   Pulmonary:      Effort: Pulmonary effort is normal. No respiratory distress.      Breath sounds: Normal breath sounds.   Abdominal:      General: Bowel sounds are normal.      Palpations: Abdomen is soft.      Tenderness: There is abdominal tenderness.   Skin:     General: Skin is warm and dry.      Coloration: Skin is not jaundiced or pale.   Neurological:       Mental Status: She is alert.             Significant Labs: All pertinent labs within the past 24 hours have been reviewed.  CBC:   Recent Labs   Lab 11/25/23  1136 11/26/23  0521   WBC 14.20* 7.19   HGB 14.1 11.7*   HCT 41.5 34.3*    242     CMP:   Recent Labs   Lab 11/25/23  1136 11/26/23  0521    142   K 3.3* 3.0*    108   CO2 24 26   * 83   BUN 12 8   CREATININE 1.0 1.0   CALCIUM 9.3 8.7   PROT 7.4 5.9*   ALBUMIN 3.8 3.1*   BILITOT 1.1* 1.6*   ALKPHOS 145* 150*   * 553*   * 742*   ANIONGAP 12 8       Significant Imaging: I have reviewed all pertinent imaging results/findings within the past 24 hours.    MRI MRCP Without Contrast [[id number]] Resulted: 11/25/23 1603   Order Status: Completed Updated: 11/25/23 1606   Narrative:     EXAMINATION:  MRI ABDOMEN WITHOUT CONTRAST MRCP    CLINICAL HISTORY:  Cholelithiasis;Choledocholithiasis;    TECHNIQUE:  Multiplanar, multisequence images are performed through the liver and upper abdomen.  Additionally 2D and 3D MRCP sequences are performed as well as MIP images.    COMPARISON:  10/24/2023    FINDINGS:  Multiple cysts in the liver including cluster of cysts spanning a distance of 6 6.6 cm in the left lobe.    Spleen not enlarged    Adrenal glands unremarkable appearance    Pancreas unremarkable appearance    Abdominal aorta no aneurysm    Kidney; a couple right renal cysts larger of which measures 8 mm.  No hydronephrosis    Multiple filling defects in the gallbladder consistent with stones.  Pericholecystic fluid.  Common duct appears just mildly dilated measuring 78 mm.  There is intraluminal filling defect in the distal common duct for example coronal series 4, image 13 measures approximately 3 mm.  Also see coronal series 10, image 47.  Also well seen series 11, image 1.   Impression:       Cholelithiasis and findings of acute cholecystitis and choledocholithiasis with pericholecystic fluid, mild biliary duct dilatation and  with a filling defect in the distal common duct.    Multiple cysts in the liver.  Small right renal cyst.      Electronically signed by: Dayana Powell MD  Date: 11/25/2023  Time: 16:03   CT Abdomen Pelvis With IV Contrast NO Oral Contrast [[id number]] Resulted: 11/25/23 1309   Order Status: Completed Updated: 11/25/23 1311   Narrative:     EXAMINATION:  CT ABDOMEN PELVIS WITH IV CONTRAST    CLINICAL HISTORY:  RLQ abdominal pain (Age >= 14y);    TECHNIQUE:  Low dose axial images, sagittal and coronal reformations were obtained from the lung bases to the pubic symphysis following the IV administration of 75 mL of Omnipaque 350 no p.o. contrast    COMPARISON:  None.    FINDINGS:  Mild dependent hypoventilatory changes in visualized lung bases.    Liver; multiple cysts with cysts most numerous and largest in the left lobe in corresponding as seen on MRI of the abdomen of 10/24/2023, more completely characterized on that exam.  Appearance on CT ever does also suggest simple cysts    Spleen not enlarged    Adrenal glands unremarkable appearance    Pancreas unremarkable appearance    Abdominal aorta no aneurysm    Gallbladder and bile ducts; distended gallbladder with small amount of pericholecystic fluid and/or wall thickening.  No calcified stones seen within the gallbladder with cholelithiasis typically better evaluated by ultrasound.  Common duct appears mildly dilated measuring up to 9 mm on coronal images.  Difficult to compared to the prior MRCP for technical differences but not seen dilated on that study.    Renal enhancement is symmetrical.  No hydronephrosis.  11 mm right renal cyst.  Urinary bladder moderately distended at time of the exam and as visualized is unremarkable appearance    Reproductive organs unremarkable appearance for the patient's age    Diverticulosis without CT findings of acute diverticulitis.  Normal appearance of the appendix.  No free intraperitoneal air or fluid.   Impression:        Findings suggesting acute cholecystitis with distended gallbladder with wall thickening and/or pericholecystic fluid.  No calcified stones evident in the gallbladder but note is made that cholelithiasis is typically better evaluate by ultrasound and ultrasound 08/03/2023 did describe cholelithiasis.    Mild dilatation the common duct, cause of which is not identified but appearing new compared to prior study and concerning for choledocholithiasis.    Hepatic and renal cysts.      Electronically signed by: Dayana Powell MD  Date: 11/25/2023  Time: 13:09          Assessment/Plan:      * Cholelithiasis with choledocholithiasis  Uncomplicated Choledocholithiasis secondary to cholelithiasis  -CT abdomen completed - cholecystitis with distended gall bladder  -MRI MRCP demonstrated right renal cysts, gall stones, pericholecystic fluid, and mildly dilated common duct  -Afebrile with unremarkable CBC and normal pancreatic enzyme levels  -Cholestatic pattern of elevated Bili, AST, ALT  -Started on IV Zosyn, IVFs  -GI consulted  -NPO  -analgesic and antiemetics  -Continue home PPI  -Mechanical DVT prophylaxis w/SCDs; hold Lovenox for possible ERCP  -Accepted at [facility]; Awaiting bed    Chronic GERD    Chronic condition stable  Continue home PPI    Hypokalemia  Patient has hypokalemia which is Acute and currently uncontrolled. Most recent potassium levels reviewed-   Lab Results   Component Value Date    K 3.3 (L) 11/25/2023   . Will continue potassium replacement per protocol and recheck repeat levels after replacement completed.     Prn electrolyte replacement    Abdominal pain with vomiting  -NPO  -analgesic and antiemetics   -IVFs  -GI consult   -PPI         Liver cyst  Chronic stable condition        Essential hypertension  Chronic, controlled. Latest blood pressure and vitals reviewed-     Temp:  [97.8 °F (36.6 °C)]   Pulse:  [75-93]   Resp:  [16-20]   BP: (118-154)/(64-80)   SpO2:  [96 %-100 %] .   Home meds for  hypertension were reviewed and noted below.   Hypertension Medications               furosemide (LASIX) 40 MG tablet Take 1 tablet (40 mg total) by mouth once daily.    metoprolol succinate (TOPROL-XL) 50 MG 24 hr tablet Take 1 tablet (50 mg total) by mouth once daily.            While in the hospital, will manage blood pressure as follows; Continue home antihypertensive regimen    Will utilize p.r.n. blood pressure medication only if patient's blood pressure greater than 180/110 and she develops symptoms such as worsening chest pain or shortness of breath.    Hold home medication lasix due hydration needed for procedure      VTE Risk Mitigation (From admission, onward)           Ordered     IP VTE LOW RISK PATIENT  Once         11/25/23 2303     Place sequential compression device  Until discontinued         11/25/23 2303                    Discharge Planning   ORION:      Code Status: Full Code   Is the patient medically ready for discharge?:     Reason for patient still in hospital (select all that apply): Patient trending condition, Laboratory test, Treatment, and Consult recommendations  Discharge Plan A: Home with family                  Francisca Larry NP  Department of Hospital Medicine   [facility]

## 2023-11-26 NOTE — SUBJECTIVE & OBJECTIVE
Past Medical History:   Diagnosis Date    Allergy     Bronchitis 07/17/2015    Cervical disc displacement     Edema     Hypertension     Insomnia     Liver cyst     Plantar fasciitis        Past Surgical History:   Procedure Laterality Date    COLONOSCOPY  11/01/2017    Dr. Dibuono, normal, ten year recheck    COLONOSCOPY N/A 11/01/2017    Procedure: COLONOSCOPY;  Surgeon: Cameron Oviedo MD;  Location: [facility];  Service: Endoscopy;  Laterality: N/A;    EPIDURAL STEROID INJECTION INTO CERVICAL SPINE N/A 07/22/2019    Procedure: Injection-steroid-epidural-cervical;  Surgeon: Thomas Ivory MD;  Location: [facility];  Service: Pain Management;  Laterality: N/A;  C7-T1    EPIDURAL STEROID INJECTION INTO CERVICAL SPINE N/A 12/21/2021    Procedure: Injection-steroid-epidural-cervical;  Surgeon: hTomas Ivory MD;  Location: [facility];  Service: Pain Management;  Laterality: N/A;  C6-7    ESOPHAGOGASTRODUODENOSCOPY N/A 08/31/2023    Procedure: EGD (ESOPHAGOGASTRODUODENOSCOPY);  Surgeon: Cameron Oviedo MD;  Location: [facility];  Service: Endoscopy;  Laterality: N/A;    INJECTION OF ANESTHETIC AGENT AROUND STELLATE GANGLION Left 08/30/2022    Procedure: BLOCK, GANGLION, STELLATE;  Surgeon: Thomas Ivory MD;  Location: [facility];  Service: Pain Management;  Laterality: Left;    INJECTION OF ANESTHETIC AGENT AROUND STELLATE GANGLION Left 10/21/2022    Procedure: BLOCK, GANGLION, STELLATE Left;  Surgeon: Thomas Ivory MD;  [facility];  Service: Pain Management;  Laterality: Left;    INJECTION OF ANESTHETIC AGENT AROUND STELLATE GANGLION Left 12/27/2022    Procedure: BLOCK, GANGLION, STELLATE;  Surgeon: Thomas Ivory MD;  Location: [facility];  Service: Pain Management;  Laterality: Left;  left    TONSILLECTOMY, ADENOIDECTOMY Bilateral 07/23/2021    Procedure: TONSILLECTOMY AND ADENOIDECTOMY;  Surgeon: Anselmo Galvan MD;  Location: [facility];  Service: ENT;  Laterality: Bilateral;    UPPER GASTROINTESTINAL ENDOSCOPY      MICHELE  TOOTH EXTRACTION         Review of patient's allergies indicates:   Allergen Reactions    Omnicef [cefdinir] Itching       Current Facility-Administered Medications on File Prior to Encounter   Medication    lactated ringers infusion    lactated ringers infusion     Current Outpatient Medications on File Prior to Encounter   Medication Sig    diazePAM (VALIUM) 10 MG Tab Take 10 mg by mouth every evening.    FLUoxetine 20 MG capsule Take 20 mg by mouth once daily.    furosemide (LASIX) 40 MG tablet Take 1 tablet (40 mg total) by mouth once daily.    hydrOXYzine HCl (ATARAX) 25 MG tablet Take 1 tablet (25 mg total) by mouth 3 (three) times daily as needed. (Patient taking differently: Take 25 mg by mouth once daily.)    hydrOXYzine pamoate (VISTARIL) 25 MG Cap Take 25 mg by mouth 2 (two) times daily as needed.    linaCLOtide (LINZESS) 290 mcg Cap capsule Take 1 capsule (290 mcg total) by mouth before breakfast.    metoprolol succinate (TOPROL-XL) 50 MG 24 hr tablet Take 1 tablet (50 mg total) by mouth once daily.    montelukast (SINGULAIR) 10 mg tablet Take 1 tablet (10 mg total) by mouth every evening.    pantoprazole (PROTONIX) 40 MG tablet Take 1 tablet (40 mg total) by mouth every morning.    potassium chloride SA (K-DUR,KLOR-CON) 20 MEQ tablet Take 1 tablet (20 mEq total) by mouth 2 (two) times daily.    semaglutide, weight loss, (WEGOVY) 2.4 mg/0.75 mL PnIj Inject 2.4 mg into the skin every 7 days.    ciclopirox (PENLAC) 8 % Soln APPLY TOPICALLY NIGHTLY    ergocalciferol (ERGOCALCIFEROL) 50,000 unit Cap Take 1 capsule (50,000 Units total) by mouth twice a week.    fluconazole (DIFLUCAN) 100 MG tablet Take 100 mg by mouth.    fluticasone propionate (FLONASE) 50 mcg/actuation nasal spray 2 sprays (100 mcg total) by Each Nostril route once daily.    methocarbamoL (ROBAXIN) 750 MG Tab Take 1 tablet (750 mg total) by mouth 4 (four) times daily as needed (muscle spasm). (Patient not taking: Reported on 11/13/2023)     methylPREDNISolone (MEDROL DOSEPACK) 4 mg tablet Take by mouth.    nabumetone (RELAFEN) 500 MG tablet Take 1 tablet (500 mg total) by mouth 2 (two) times daily as needed for Pain. (Patient not taking: Reported on 11/13/2023)    pregabalin (LYRICA) 75 MG capsule TAKE 1 CAPSULE BY MOUTH EVERY MORNING AND 2 CAPSULES AT BEDTIME (Patient not taking: Reported on 11/13/2023)    promethazine (PHENERGAN) 25 MG tablet Take 25 mg by mouth.    SULFACLEANSE 8-4 8-4 % Susp Apply topically nightly as needed.     traMADoL (ULTRAM) 50 mg tablet Take 1 tablet (50 mg total) by mouth every 8 (eight) hours as needed for Pain. (Patient not taking: Reported on 11/6/2023)    triamcinolone acetonide 0.1% (KENALOG) 0.1 % paste SMARTSIG:Sparingly TO TEETH 3 Times Daily     Family History       Problem Relation (Age of Onset)    Allergies Sister, Sister    Asthma Sister, Sister    Cancer Father    Diabetes Sister    Heart disease Mother, Brother    Hypertension Sister, Sister    Kidney failure Brother    Stroke Mother          Tobacco Use    Smoking status: Never    Smokeless tobacco: Never   Substance and Sexual Activity    Alcohol use: Never    Drug use: No    Sexual activity: Yes     Partners: Male     Review of Systems   Constitutional:  Negative for activity change and chills.   HENT:  Negative for congestion and sore throat.    Eyes:  Negative for visual disturbance.   Respiratory:  Negative for chest tightness and shortness of breath.    Cardiovascular:  Negative for chest pain and leg swelling.   Genitourinary:  Negative for dysuria.   Musculoskeletal:  Negative for arthralgias.   Neurological:  Negative for dizziness and light-headedness.   Psychiatric/Behavioral:  The patient is nervous/anxious (midly nervous about the procedure.).      Objective:     Vital Signs (Most Recent):  Temp: 98.3 °F (36.8 °C) (11/26/23 1334)  Pulse: 66 (11/26/23 1334)  Resp: 16 (11/26/23 1334)  BP: (!) 143/66 (11/26/23 1334)  SpO2: 99 % (11/26/23 1334)  Vital Signs (24h Range):  Temp:  [98.1 °F (36.7 °C)-98.3 °F (36.8 °C)] 98.3 °F (36.8 °C)  Pulse:  [66-88] 66  Resp:  [16-20] 16  SpO2:  [95 %-99 %] 99 %  BP: (100-154)/(61-80) 143/66     Weight: 74.8 kg (164 lb 14.5 oz)  Body mass index is 29.22 kg/m².     Physical Exam  Vitals reviewed.   Constitutional:       Appearance: Normal appearance. She is not ill-appearing.   HENT:      Head: Normocephalic and atraumatic.      Nose: No congestion.      Mouth/Throat:      Mouth: Mucous membranes are moist.      Pharynx: Oropharynx is clear.   Eyes:      Extraocular Movements: Extraocular movements intact.      Conjunctiva/sclera: Conjunctivae normal.      Pupils: Pupils are equal, round, and reactive to light.   Cardiovascular:      Rate and Rhythm: Normal rate and regular rhythm.      Pulses: Normal pulses.   Pulmonary:      Effort: Pulmonary effort is normal.      Breath sounds: Normal breath sounds.   Abdominal:      General: Bowel sounds are normal. There is no distension.      Palpations: Abdomen is soft.      Tenderness: There is abdominal tenderness (mild RUQ to moderate palpation, remainder of abdomen is soft). There is no guarding or rebound.   Musculoskeletal:         General: No swelling or tenderness. Normal range of motion.      Cervical back: Normal range of motion and neck supple.   Skin:     General: Skin is warm and dry.      Findings: No bruising or rash.   Neurological:      General: No focal deficit present.      Mental Status: She is alert and oriented to person, place, and time. Mental status is at baseline.   Psychiatric:         Mood and Affect: Mood normal.              CRANIAL NERVES     CN III, IV, VI   Pupils are equal, round, and reactive to light.       Significant Labs: All pertinent labs within the past 24 hours have been reviewed.  CBC:   Recent Labs   Lab 11/25/23  1136 11/26/23  0521   WBC 14.20* 7.19   HGB 14.1 11.7*   HCT 41.5 34.3*    242     CMP:   Recent Labs   Lab  11/25/23  1136 11/26/23  0521    142   K 3.3* 3.0*    108   CO2 24 26   * 83   BUN 12 8   CREATININE 1.0 1.0   CALCIUM 9.3 8.7   PROT 7.4 5.9*   ALBUMIN 3.8 3.1*   BILITOT 1.1* 1.6*   ALKPHOS 145* 150*   * 553*   * 742*   ANIONGAP 12 8       Significant Imaging: I have reviewed all pertinent imaging results/findings within the past 24 hours.

## 2023-11-26 NOTE — ASSESSMENT & PLAN NOTE
Uncomplicated Choledocholithiasis secondary to cholelithiasis  -CT abdomen completed - cholecystitis with distended gall bladder  -MRI MRCP demonstrated right renal cysts, gall stones, pericholecystic fluid, and mildly dilated common duct  -Afebrile with unremarkable CBC and normal pancreatic enzyme levels  -Transaminases are more elevated today but WBC is improved.  -Started on IV Zosyn, IVFs  -GI Dr. Overton consulted  -NPO  -analgesic and antiemetics  -Continue home PPI  -Mechanical DVT prophylaxis w/SCDs  Arrived at [facility]. Anticipate ERCP in am.

## 2023-11-26 NOTE — PLAN OF CARE
[facility]  Initial Discharge Assessment       Primary Care Provider: Remy Parson MD    Admission Diagnosis: Cholangitis concurrent with and due to calculus of gallbladder [K83.09, K80.20]    Admission Date: 11/25/2023  Expected Discharge Date:     Transition of Care Barriers: None    Payor: [employer] / Plan: [facility] CHOICE PLUS / Product Type: Commercial /     Extended Emergency Contact Information  Primary Emergency Contact: Thomas Angelo  Address: [address] [place] [address] [place] [address] [place]  Mobile Phone: [phone]  Relation: Spouse  Preferred language: English   needed? No    Discharge Plan A: Home with family  Discharge Plan B: Home with family      [facility] [place] [facility] [id number]  [address] [facility] [place] [address]  Phone: [phone] Fax: [phone]    CM completed discharge assessment with patient. Pt AAOx4s. Pt lives spouse. Demographics, PCP, and insurance verified. No home health or DME.  No dialysis. Pt completes ADLs without assistance. No LW or POA.  Preferred pharmacy listed above. Pt to discharge home via family transport. Pt has no other needs to be addressed at this time.    Initial Assessment (most recent)       Adult Discharge Assessment - 11/26/23 1446          Discharge Assessment    Assessment Type Discharge Planning Assessment     Confirmed/corrected address, phone number and insurance Yes     Confirmed Demographics Correct on Facesheet     Source of Information patient     When was your last doctors appointment? --   few weeks ago    Does patient/caregiver understand observation status Yes     Communicated ORION with patient/caregiver Date not available/Unable to determine     Reason For Admission Cholelithiasis with choledocholithiasis     People in Home spouse     Do you expect to return to your current living situation? Yes     Do you have help at home or someone to help you manage your care at  home? Yes     Who are your caregiver(s) and their phone number(s)? Thomas Angelo (spouse) [phone]     Prior to hospitilization cognitive status: Alert/Oriented     Current cognitive status: Alert/Oriented     Home Layout Bedroom on 2nd floor;Bathroom on 2nd floor     Equipment Currently Used at Home none     Readmission within 30 days? No     Patient currently being followed by outpatient case management? No     Do you currently have service(s) that help you manage your care at home? No     Do you take prescription medications? Yes     Do you have prescription coverage? Yes     Coverage [facility] Choice Plus     Do you have any problems affording any of your prescribed medications? No     Is the patient taking medications as prescribed? yes     Who is going to help you get home at discharge? Spouse     How do you get to doctors appointments? car, drives self     Are you on dialysis? No     Do you take coumadin? No     DME Needed Upon Discharge  none     Discharge Plan discussed with: Patient     Transition of Care Barriers None     Discharge Plan A Home with family     Discharge Plan B Home with family

## 2023-11-26 NOTE — PROGRESS NOTES
Pharmacist Renal Dose Adjustment Note    Lynn Naidu is a 58 y.o. female being treated with the medication Zosyn    Patient Data:    Vital Signs (Most Recent):  Temp: 97.8 °F (36.6 °C) (11/25/23 1058)  Pulse: 83 (11/25/23 2202)  Resp: 18 (11/25/23 2335)  BP: 139/80 (11/25/23 2202)  SpO2: 99 % (11/25/23 2202) Vital Signs (72h Range):  Temp:  [97.8 °F (36.6 °C)]   Pulse:  [76-93]   Resp:  [16-20]   BP: (118-154)/(64-80)   SpO2:  [97 %-100 %]      Recent Labs   Lab 11/25/23  1136   CREATININE 1.0     Serum creatinine: 1 mg/dL 11/25/23 1136  Estimated creatinine clearance: 58.7 mL/min    Medication:Zosyn dose: 4.5g frequency q12h will be changed to medication:Zosyn dose:4.5g frequency:q8h    Pharmacist's Name: Steve Licona  Pharmacist's Extension: [phone]

## 2023-11-26 NOTE — SUBJECTIVE & OBJECTIVE
Past Medical History:   Diagnosis Date    Allergy     Bronchitis 07/17/2015    Cervical disc displacement     Edema     Hypertension     Insomnia     Liver cyst     Plantar fasciitis        Past Surgical History:   Procedure Laterality Date    COLONOSCOPY  11/01/2017    Dr. Dibuono, normal, ten year recheck    COLONOSCOPY N/A 11/01/2017    Procedure: COLONOSCOPY;  Surgeon: Cameron Oviedo MD;  Location: [facility];  Service: Endoscopy;  Laterality: N/A;    EPIDURAL STEROID INJECTION INTO CERVICAL SPINE N/A 07/22/2019    Procedure: Injection-steroid-epidural-cervical;  Surgeon: Thomas Ivory MD;  Location: [facility];  Service: Pain Management;  Laterality: N/A;  C7-T1    EPIDURAL STEROID INJECTION INTO CERVICAL SPINE N/A 12/21/2021    Procedure: Injection-steroid-epidural-cervical;  Surgeon: Thomas Ivory MD;  Location: [facility];  Service: Pain Management;  Laterality: N/A;  C6-7    ESOPHAGOGASTRODUODENOSCOPY N/A 08/31/2023    Procedure: EGD (ESOPHAGOGASTRODUODENOSCOPY);  Surgeon: Cameron Oviedo MD;  Location: [facility];  Service: Endoscopy;  Laterality: N/A;    INJECTION OF ANESTHETIC AGENT AROUND STELLATE GANGLION Left 08/30/2022    Procedure: BLOCK, GANGLION, STELLATE;  Surgeon: Thomas Ivory MD;  Location: [facility];  Service: Pain Management;  Laterality: Left;    INJECTION OF ANESTHETIC AGENT AROUND STELLATE GANGLION Left 10/21/2022    Procedure: BLOCK, GANGLION, STELLATE Left;  Surgeon: Thomas Ivory MD;  Location: [facility];  Service: Pain Management;  Laterality: Left;    INJECTION OF ANESTHETIC AGENT AROUND STELLATE GANGLION Left 12/27/2022    Procedure: BLOCK, GANGLION, STELLATE;  Surgeon: Thomas Ivory MD;  Location: [facility];  Service: Pain Management;  Laterality: Left;  left    TONSILLECTOMY, ADENOIDECTOMY Bilateral 07/23/2021    Procedure: TONSILLECTOMY AND ADENOIDECTOMY;  Surgeon: Anselmo Galvan MD;  Location: [facility] OR;  Service: ENT;  Laterality: Bilateral;    UPPER GASTROINTESTINAL ENDOSCOPY      MICHELE  TOOTH EXTRACTION         Review of patient's allergies indicates:   Allergen Reactions    Omnicef [cefdinir] Itching       Current Facility-Administered Medications on File Prior to Encounter   Medication    lactated ringers infusion    lactated ringers infusion     Current Outpatient Medications on File Prior to Encounter   Medication Sig    ciclopirox (PENLAC) 8 % Soln APPLY TOPICALLY NIGHTLY    diazePAM (VALIUM) 10 MG Tab Take 10 mg by mouth every evening.    ergocalciferol (ERGOCALCIFEROL) 50,000 unit Cap Take 1 capsule (50,000 Units total) by mouth twice a week.    fluconazole (DIFLUCAN) 100 MG tablet Take 100 mg by mouth.    FLUoxetine 20 MG capsule Take 20 mg by mouth once daily.    fluticasone propionate (FLONASE) 50 mcg/actuation nasal spray 2 sprays (100 mcg total) by Each Nostril route once daily.    furosemide (LASIX) 40 MG tablet Take 1 tablet (40 mg total) by mouth once daily.    hydrOXYzine HCl (ATARAX) 25 MG tablet Take 1 tablet (25 mg total) by mouth 3 (three) times daily as needed. (Patient taking differently: Take 25 mg by mouth once daily.)    hydrOXYzine pamoate (VISTARIL) 25 MG Cap Take 25 mg by mouth 2 (two) times daily as needed.    linaCLOtide (LINZESS) 290 mcg Cap capsule Take 1 capsule (290 mcg total) by mouth before breakfast.    methocarbamoL (ROBAXIN) 750 MG Tab Take 1 tablet (750 mg total) by mouth 4 (four) times daily as needed (muscle spasm). (Patient not taking: Reported on 11/13/2023)    methylPREDNISolone (MEDROL DOSEPACK) 4 mg tablet Take by mouth.    metoprolol succinate (TOPROL-XL) 50 MG 24 hr tablet Take 1 tablet (50 mg total) by mouth once daily.    montelukast (SINGULAIR) 10 mg tablet Take 1 tablet (10 mg total) by mouth every evening.    nabumetone (RELAFEN) 500 MG tablet Take 1 tablet (500 mg total) by mouth 2 (two) times daily as needed for Pain. (Patient not taking: Reported on 11/13/2023)    pantoprazole (PROTONIX) 40 MG tablet Take 1 tablet (40 mg total) by mouth every  morning.    potassium chloride SA (K-DUR,KLOR-CON) 20 MEQ tablet Take 1 tablet (20 mEq total) by mouth 2 (two) times daily.    pregabalin (LYRICA) 75 MG capsule TAKE 1 CAPSULE BY MOUTH EVERY MORNING AND 2 CAPSULES AT BEDTIME (Patient not taking: Reported on 11/13/2023)    promethazine (PHENERGAN) 25 MG tablet Take 25 mg by mouth.    semaglutide, weight loss, (WEGOVY) 2.4 mg/0.75 mL PnIj Inject 2.4 mg into the skin every 7 days.    SULFACLEANSE 8-4 8-4 % Susp Apply topically nightly as needed.     traMADoL (ULTRAM) 50 mg tablet Take 1 tablet (50 mg total) by mouth every 8 (eight) hours as needed for Pain. (Patient not taking: Reported on 11/6/2023)    triamcinolone acetonide 0.1% (KENALOG) 0.1 % paste SMARTSIG:Sparingly TO TEETH 3 Times Daily     Family History       Problem Relation (Age of Onset)    Allergies Sister, Sister    Asthma Sister, Sister    Cancer Father    Diabetes Sister    Heart disease Mother, Brother    Hypertension Sister, Sister    Kidney failure Brother    Stroke Mother          Tobacco Use    Smoking status: Never    Smokeless tobacco: Never   Substance and Sexual Activity    Alcohol use: Never    Drug use: No    Sexual activity: Yes     Partners: Male     Review of Systems   Constitutional:  Positive for appetite change and diaphoresis. Negative for activity change, chills and fever.   Respiratory:  Negative for cough, shortness of breath and wheezing.    Cardiovascular:  Negative for chest pain and leg swelling.   Gastrointestinal:  Positive for abdominal pain, nausea and vomiting. Negative for abdominal distention, blood in stool, constipation and diarrhea.   Genitourinary:  Negative for difficulty urinating and dysuria.   Musculoskeletal:  Negative for arthralgias, back pain and myalgias.   Neurological:  Positive for headaches. Negative for dizziness and syncope.     Objective:     Vital Signs (Most Recent):  Temp: 97.8 °F (36.6 °C) (11/25/23 1058)  Pulse: 75 (11/26/23 0002)  Resp: 18  (11/26/23 0002)  BP: 137/65 (11/26/23 0002)  SpO2: 96 % (11/26/23 0002) Vital Signs (24h Range):  Temp:  [97.8 °F (36.6 °C)] 97.8 °F (36.6 °C)  Pulse:  [75-93] 75  Resp:  [16-20] 18  SpO2:  [96 %-100 %] 96 %  BP: (118-154)/(64-80) 137/65     Weight: 74.8 kg (164 lb 14.5 oz)  Body mass index is 29.21 kg/m².     Physical Exam  Vitals and nursing note reviewed.   Constitutional:       General: She is not in acute distress.     Appearance: She is not toxic-appearing or diaphoretic.   Cardiovascular:      Rate and Rhythm: Regular rhythm.   Pulmonary:      Effort: Pulmonary effort is normal. No respiratory distress.      Breath sounds: Normal breath sounds. No wheezing.   Abdominal:      General: Bowel sounds are normal. There is no distension.      Palpations: Abdomen is soft.      Tenderness: There is abdominal tenderness in the right upper quadrant, right lower quadrant and epigastric area. There is no guarding or rebound.   Skin:     General: Skin is warm and dry.      Capillary Refill: Capillary refill takes less than 2 seconds.   Neurological:      Mental Status: She is alert and oriented to person, place, and time. Mental status is at baseline.                Significant Labs: All pertinent labs within the past 24 hours have been reviewed.  BMP:   Recent Labs   Lab 11/25/23  1136   *      K 3.3*      CO2 24   BUN 12   CREATININE 1.0   CALCIUM 9.3     CBC:   Recent Labs   Lab 11/25/23  1136   WBC 14.20*   HGB 14.1   HCT 41.5        Lactic Acid:   Recent Labs   Lab 11/25/23  1631   LACTATE 1.8     Lipase:   Recent Labs   Lab 11/25/23  1136   LIPASE 34       Significant Imaging: I have reviewed all pertinent imaging results/findings within the past 24 hours.    CT Abdomen Pelvis with IV Contrast No Oral Contrast:    Narrative & Impression  EXAMINATION:  CT ABDOMEN PELVIS WITH IV CONTRAST     CLINICAL HISTORY:  RLQ abdominal pain (Age >= 14y);     TECHNIQUE:  Low dose axial images, sagittal  and coronal reformations were obtained from the lung bases to the pubic symphysis following the IV administration of 75 mL of Omnipaque 350 no p.o. contrast     COMPARISON:  None.     FINDINGS:  Mild dependent hypoventilatory changes in visualized lung bases.     Liver; multiple cysts with cysts most numerous and largest in the left lobe in corresponding as seen on MRI of the abdomen of 10/24/2023, more completely characterized on that exam.  Appearance on CT ever does also suggest simple cysts     Spleen not enlarged     Adrenal glands unremarkable appearance     Pancreas unremarkable appearance     Abdominal aorta no aneurysm     Gallbladder and bile ducts; distended gallbladder with small amount of pericholecystic fluid and/or wall thickening.  No calcified stones seen within the gallbladder with cholelithiasis typically better evaluated by ultrasound.  Common duct appears mildly dilated measuring up to 9 mm on coronal images.  Difficult to compared to the prior MRCP for technical differences but not seen dilated on that study.     Renal enhancement is symmetrical.  No hydronephrosis.  11 mm right renal cyst.  Urinary bladder moderately distended at time of the exam and as visualized is unremarkable appearance     Reproductive organs unremarkable appearance for the patient's age     Diverticulosis without CT findings of acute diverticulitis.  Normal appearance of the appendix.  No free intraperitoneal air or fluid.     Impression:     Findings suggesting acute cholecystitis with distended gallbladder with wall thickening and/or pericholecystic fluid.  No calcified stones evident in the gallbladder but note is made that cholelithiasis is typically better evaluate by ultrasound and ultrasound 08/03/2023 did describe cholelithiasis.     Mild dilatation the common duct, cause of which is not identified but appearing new compared to prior study and concerning for choledocholithiasis.     Hepatic and renal cysts.         Electronically signed by: Dayana Powell MD  Date:                                            11/25/2023  Time:                                           13:09           Exam Ended: 11/25/23 12:48 Last Resulted: 11/25/23 13:09           MRI MRCP Without Contrast:    EXAMINATION:  MRI ABDOMEN WITHOUT CONTRAST MRCP     CLINICAL HISTORY:  Cholelithiasis;Choledocholithiasis;     TECHNIQUE:  Multiplanar, multisequence images are performed through the liver and upper abdomen.  Additionally 2D and 3D MRCP sequences are performed as well as MIP images.     COMPARISON:  10/24/2023     FINDINGS:  Multiple cysts in the liver including cluster of cysts spanning a distance of 6 6.6 cm in the left lobe.     Spleen not enlarged     Adrenal glands unremarkable appearance     Pancreas unremarkable appearance     Abdominal aorta no aneurysm     Kidney; a couple right renal cysts larger of which measures 8 mm.  No hydronephrosis     Multiple filling defects in the gallbladder consistent with stones.  Pericholecystic fluid.  Common duct appears just mildly dilated measuring 78 mm.  There is intraluminal filling defect in the distal common duct for example coronal series 4, image 13 measures approximately 3 mm.  Also see coronal series 10, image 47.  Also well seen series 11, image 1.     Impression:     Cholelithiasis and findings of acute cholecystitis and choledocholithiasis with pericholecystic fluid, mild biliary duct dilatation and with a filling defect in the distal common duct.     Multiple cysts in the liver.  Small right renal cyst.        Electronically signed by: Dayana Powell MD  Date:                                            11/25/2023  Time:                                           16:03

## 2023-11-26 NOTE — ASSESSMENT & PLAN NOTE
Patient has hypokalemia which is Acute and currently uncontrolled. Most recent potassium levels reviewed-   Lab Results   Component Value Date    K 3.3 (L) 11/25/2023   . Will continue potassium replacement per protocol and recheck repeat levels after replacement completed.     Prn electrolyte replacement

## 2023-11-26 NOTE — PLAN OF CARE
[facility] - ED  Initial Discharge Assessment       Primary Care Provider: Remy Parson MD    Admission Diagnosis: Cholangitis concurrent with and due to calculus of gallbladder [K83.09, K80.20]    Admission Date: 11/25/2023  Expected Discharge Date: TBD    Met with pt at bedside to complete discharge assessment, verified PCP, pharmacy and information on facesheet.  No HH, DME or dialysis.  Spouse will drive pt home.  No needs identified at this time.    Transition of Care Barriers: None    Payor: [employer] / Plan: [facility] CHOICE PLUS / Product Type: Commercial /     Extended Emergency Contact Information  Primary Emergency Contact: Thomas Angelo  Address: [address] [place] [address] [place] [address] [place]  Mobile Phone: [phone]  Relation: Spouse  Preferred language: English   needed? No    Discharge Plan A: Home with family  Discharge Plan B: Home with family    [id number] - [place] - [address] [address]  [id number] [address]  [place] [address]  Phone: [phone] Fax: [phone]      Initial Assessment (most recent)       Adult Discharge Assessment - 11/26/23 1038          Discharge Assessment    Assessment Type Discharge Planning Assessment     Confirmed/corrected address, phone number and insurance Yes     Confirmed Demographics Correct on Facesheet     Source of Information patient     Communicated ORION with patient/caregiver No     People in Home spouse     Do you expect to return to your current living situation? Yes     Prior to hospitilization cognitive status: Alert/Oriented     Current cognitive status: Alert/Oriented     Home Accessibility not wheelchair accessible     Home Layout Bathroom on 2nd floor;Bedroom on 2nd floor     Equipment Currently Used at Home none     Readmission within 30 days? No     Patient currently being followed by outpatient case management? No     Do you currently have service(s) that help you manage your care  at home? No     Do you take prescription medications? Yes     Do you have prescription coverage? Yes     Coverage [facility]     Do you have any problems affording any of your prescribed medications? No     Is the patient taking medications as prescribed? yes     Who is going to help you get home at discharge? spouse     How do you get to doctors appointments? family or friend will provide;car, drives self     Are you on dialysis? No     Do you take coumadin? No     DME Needed Upon Discharge  none     Discharge Plan discussed with: Patient     Transition of Care Barriers None     Discharge Plan A Home with family     Discharge Plan B Home with family

## 2023-11-26 NOTE — ASSESSMENT & PLAN NOTE
Chronic stable condition - will need ongoing OP GI follow up. This is not I think related to her acute condition.

## 2023-11-26 NOTE — H&P
[facility]  History & Physical    Patient Name: Lynn Naidu  MRN: [id number]  Patient Class: OP- Observation  Admission Date: 11/25/2023  Attending Physician: Donaldo Chen MD   Primary Care Provider: Remy Parson MD         Patient information was obtained from patient, past medical records, and ER records.     Subjective:     Principal Problem:Cholelithiasis with choledocholithiasis    Chief Complaint:   Chief Complaint   Patient presents with    Vomiting     Started yesterday         HPI: Lynn Naidu is a 58-year-old female presents to the ER c/o severe, non-radiating RUQ, RLQ, and epigastric area abdominal pain with nausea and vomiting for several months that worsened yesterday following an abnormal HIDA scan. Past medical history includes hypertension, GERD, hepatic cysts, bronchitis, appendectomy, peripheral edema, insomnia, and cervical disc displacement. She reports having intentional weight loss from taking Wegovy (Semaglutide) injections since 01/2023. She reports decrease appetite, nausea, vomiting, headache, diaphoresis without fever with non-radiating abdominal pain. Denies chest pain, shortness of breath, fatigue, diarrhea, melena, hematemesis, hematochezia, dysuria, or hematuria. A HIDA scan was performed 11/25 which showed a reduced gallbladder ejection fraction. 8/31 EGD positive H. Pylori, small hiatal hernia, GERD, and gastritis. She is established with Gastroenterology and hepatology outpatient. In the ED her abdominal CT demonstrated cholecystitis with distended gallbladder; choledocholithiasis. MRI revealed right renal cysts, gall stones, pericholecystic fluid, and mildly dilated common bile duct. She received IV zosyn and analgesics in ED.  Her labs reflect a cholestatic pattern without elevated pancreatic enzymes. Her , , Bili 1.1, Lipase 34, WBC 14.2. She remains afebrile with stable vitals. She has been accepted by [facility]  gastroenterology for possible ERCP.  She will be admitted to hospital medicine for further treatment and management.                Past Medical History:   Diagnosis Date    Allergy     Bronchitis 07/17/2015    Cervical disc displacement     Edema     Hypertension     Insomnia     Liver cyst     Plantar fasciitis        Past Surgical History:   Procedure Laterality Date    COLONOSCOPY  11/01/2017    Dr. Dibuono, normal, ten year recheck    COLONOSCOPY N/A 11/01/2017    Procedure: COLONOSCOPY;  Surgeon: Cameron Oviedo MD;  Location: [facility];  Service: Endoscopy;  Laterality: N/A;    EPIDURAL STEROID INJECTION INTO CERVICAL SPINE N/A 07/22/2019    Procedure: Injection-steroid-epidural-cervical;  Surgeon: Thomas Ivory MD;  Location: [facility];  Service: Pain Management;  Laterality: N/A;  C7-T1    EPIDURAL STEROID INJECTION INTO CERVICAL SPINE N/A 12/21/2021    Procedure: Injection-steroid-epidural-cervical;  Surgeon: Thomas Ivory MD;  Location: [facility];  Service: Pain Management;  Laterality: N/A;  C6-7    ESOPHAGOGASTRODUODENOSCOPY N/A 08/31/2023    Procedure: EGD (ESOPHAGOGASTRODUODENOSCOPY);  Surgeon: Cameron Oviedo MD;  Location: [facility];  Service: Endoscopy;  Laterality: N/A;    INJECTION OF ANESTHETIC AGENT AROUND STELLATE GANGLION Left 08/30/2022    Procedure: BLOCK, GANGLION, STELLATE;  Surgeon: Thomas Ivory MD;  Location: [facility];  Service: Pain Management;  Laterality: Left;    INJECTION OF ANESTHETIC AGENT AROUND STELLATE GANGLION Left 10/21/2022    Procedure: BLOCK, GANGLION, STELLATE Left;  Surgeon: Thomas Ivory MD;  Location: [facility] OR;  Service: Pain Management;  Laterality: Left;    INJECTION OF ANESTHETIC AGENT AROUND STELLATE GANGLION Left 12/27/2022    Procedure: BLOCK, GANGLION, STELLATE;  Surgeon: Thomas Ivory MD;  Location: [facility] OR;  Service: Pain Management;  Laterality: Left;  left    TONSILLECTOMY, ADENOIDECTOMY Bilateral 07/23/2021    Procedure: TONSILLECTOMY AND ADENOIDECTOMY;  Surgeon:  Anselmo Galvan MD;  Location: [facility] OR;  Service: ENT;  Laterality: Bilateral;    UPPER GASTROINTESTINAL ENDOSCOPY      WISDOM TOOTH EXTRACTION         Review of patient's allergies indicates:   Allergen Reactions    Omnicef [cefdinir] Itching       Current Facility-Administered Medications on File Prior to Encounter   Medication    lactated ringers infusion    lactated ringers infusion     Current Outpatient Medications on File Prior to Encounter   Medication Sig    ciclopirox (PENLAC) 8 % Soln APPLY TOPICALLY NIGHTLY    diazePAM (VALIUM) 10 MG Tab Take 10 mg by mouth every evening.    ergocalciferol (ERGOCALCIFEROL) 50,000 unit Cap Take 1 capsule (50,000 Units total) by mouth twice a week.    fluconazole (DIFLUCAN) 100 MG tablet Take 100 mg by mouth.    FLUoxetine 20 MG capsule Take 20 mg by mouth once daily.    fluticasone propionate (FLONASE) 50 mcg/actuation nasal spray 2 sprays (100 mcg total) by Each Nostril route once daily.    furosemide (LASIX) 40 MG tablet Take 1 tablet (40 mg total) by mouth once daily.    hydrOXYzine HCl (ATARAX) 25 MG tablet Take 1 tablet (25 mg total) by mouth 3 (three) times daily as needed. (Patient taking differently: Take 25 mg by mouth once daily.)    hydrOXYzine pamoate (VISTARIL) 25 MG Cap Take 25 mg by mouth 2 (two) times daily as needed.    linaCLOtide (LINZESS) 290 mcg Cap capsule Take 1 capsule (290 mcg total) by mouth before breakfast.    methocarbamoL (ROBAXIN) 750 MG Tab Take 1 tablet (750 mg total) by mouth 4 (four) times daily as needed (muscle spasm). (Patient not taking: Reported on 11/13/2023)    methylPREDNISolone (MEDROL DOSEPACK) 4 mg tablet Take by mouth.    metoprolol succinate (TOPROL-XL) 50 MG 24 hr tablet Take 1 tablet (50 mg total) by mouth once daily.    montelukast (SINGULAIR) 10 mg tablet Take 1 tablet (10 mg total) by mouth every evening.    nabumetone (RELAFEN) 500 MG tablet Take 1 tablet (500 mg total) by mouth 2 (two) times daily as needed for  Pain. (Patient not taking: Reported on 11/13/2023)    pantoprazole (PROTONIX) 40 MG tablet Take 1 tablet (40 mg total) by mouth every morning.    potassium chloride SA (K-DUR,KLOR-CON) 20 MEQ tablet Take 1 tablet (20 mEq total) by mouth 2 (two) times daily.    pregabalin (LYRICA) 75 MG capsule TAKE 1 CAPSULE BY MOUTH EVERY MORNING AND 2 CAPSULES AT BEDTIME (Patient not taking: Reported on 11/13/2023)    promethazine (PHENERGAN) 25 MG tablet Take 25 mg by mouth.    semaglutide, weight loss, (WEGOVY) 2.4 mg/0.75 mL PnIj Inject 2.4 mg into the skin every 7 days.    SULFACLEANSE 8-4 8-4 % Susp Apply topically nightly as needed.     traMADoL (ULTRAM) 50 mg tablet Take 1 tablet (50 mg total) by mouth every 8 (eight) hours as needed for Pain. (Patient not taking: Reported on 11/6/2023)    triamcinolone acetonide 0.1% (KENALOG) 0.1 % paste SMARTSIG:Sparingly TO TEETH 3 Times Daily     Family History       Problem Relation (Age of Onset)    Allergies Sister, Sister    Asthma Sister, Sister    Cancer Father    Diabetes Sister    Heart disease Mother, Brother    Hypertension Sister, Sister    Kidney failure Brother    Stroke Mother          Tobacco Use    Smoking status: Never    Smokeless tobacco: Never   Substance and Sexual Activity    Alcohol use: Never    Drug use: No    Sexual activity: Yes     Partners: Male     Review of Systems   Constitutional:  Positive for appetite change and diaphoresis. Negative for activity change, chills and fever.   Respiratory:  Negative for cough, shortness of breath and wheezing.    Cardiovascular:  Negative for chest pain and leg swelling.   Gastrointestinal:  Positive for abdominal pain, nausea and vomiting. Negative for abdominal distention, blood in stool, constipation and diarrhea.   Genitourinary:  Negative for difficulty urinating and dysuria.   Musculoskeletal:  Negative for arthralgias, back pain and myalgias.   Neurological:  Positive for headaches. Negative for dizziness and  syncope.     Objective:     Vital Signs (Most Recent):  Temp: 97.8 °F (36.6 °C) (11/25/23 1058)  Pulse: 75 (11/26/23 0002)  Resp: 18 (11/26/23 0002)  BP: 137/65 (11/26/23 0002)  SpO2: 96 % (11/26/23 0002) Vital Signs (24h Range):  Temp:  [97.8 °F (36.6 °C)] 97.8 °F (36.6 °C)  Pulse:  [75-93] 75  Resp:  [16-20] 18  SpO2:  [96 %-100 %] 96 %  BP: (118-154)/(64-80) 137/65     Weight: 74.8 kg (164 lb 14.5 oz)  Body mass index is 29.21 kg/m².     Physical Exam  Vitals and nursing note reviewed.   Constitutional:       General: She is not in acute distress.     Appearance: She is not toxic-appearing or diaphoretic.   Cardiovascular:      Rate and Rhythm: Regular rhythm.   Pulmonary:      Effort: Pulmonary effort is normal. No respiratory distress.      Breath sounds: Normal breath sounds. No wheezing.   Abdominal:      General: Bowel sounds are normal. There is no distension.      Palpations: Abdomen is soft.      Tenderness: There is abdominal tenderness in the right upper quadrant, right lower quadrant and epigastric area. There is no guarding or rebound.   Skin:     General: Skin is warm and dry.      Capillary Refill: Capillary refill takes less than 2 seconds.   Neurological:      Mental Status: She is alert and oriented to person, place, and time. Mental status is at baseline.                Significant Labs: All pertinent labs within the past 24 hours have been reviewed.  BMP:   Recent Labs   Lab 11/25/23  1136   *      K 3.3*      CO2 24   BUN 12   CREATININE 1.0   CALCIUM 9.3     CBC:   Recent Labs   Lab 11/25/23  1136   WBC 14.20*   HGB 14.1   HCT 41.5        Lactic Acid:   Recent Labs   Lab 11/25/23  1631   LACTATE 1.8     Lipase:   Recent Labs   Lab 11/25/23  1136   LIPASE 34       Significant Imaging: I have reviewed all pertinent imaging results/findings within the past 24 hours.    CT Abdomen Pelvis with IV Contrast No Oral Contrast:    Narrative & Impression  EXAMINATION:  CT  ABDOMEN PELVIS WITH IV CONTRAST     CLINICAL HISTORY:  RLQ abdominal pain (Age >= 14y);     TECHNIQUE:  Low dose axial images, sagittal and coronal reformations were obtained from the lung bases to the pubic symphysis following the IV administration of 75 mL of Omnipaque 350 no p.o. contrast     COMPARISON:  None.     FINDINGS:  Mild dependent hypoventilatory changes in visualized lung bases.     Liver; multiple cysts with cysts most numerous and largest in the left lobe in corresponding as seen on MRI of the abdomen of 10/24/2023, more completely characterized on that exam.  Appearance on CT ever does also suggest simple cysts     Spleen not enlarged     Adrenal glands unremarkable appearance     Pancreas unremarkable appearance     Abdominal aorta no aneurysm     Gallbladder and bile ducts; distended gallbladder with small amount of pericholecystic fluid and/or wall thickening.  No calcified stones seen within the gallbladder with cholelithiasis typically better evaluated by ultrasound.  Common duct appears mildly dilated measuring up to 9 mm on coronal images.  Difficult to compared to the prior MRCP for technical differences but not seen dilated on that study.     Renal enhancement is symmetrical.  No hydronephrosis.  11 mm right renal cyst.  Urinary bladder moderately distended at time of the exam and as visualized is unremarkable appearance     Reproductive organs unremarkable appearance for the patient's age     Diverticulosis without CT findings of acute diverticulitis.  Normal appearance of the appendix.  No free intraperitoneal air or fluid.     Impression:     Findings suggesting acute cholecystitis with distended gallbladder with wall thickening and/or pericholecystic fluid.  No calcified stones evident in the gallbladder but note is made that cholelithiasis is typically better evaluate by ultrasound and ultrasound 08/03/2023 did describe cholelithiasis.     Mild dilatation the common duct, cause of  which is not identified but appearing new compared to prior study and concerning for choledocholithiasis.     Hepatic and renal cysts.        Electronically signed by: Dayana Powell MD  Date:                                            11/25/2023  Time:                                           13:09           Exam Ended: 11/25/23 12:48 Last Resulted: 11/25/23 13:09           MRI MRCP Without Contrast:    EXAMINATION:  MRI ABDOMEN WITHOUT CONTRAST MRCP     CLINICAL HISTORY:  Cholelithiasis;Choledocholithiasis;     TECHNIQUE:  Multiplanar, multisequence images are performed through the liver and upper abdomen.  Additionally 2D and 3D MRCP sequences are performed as well as MIP images.     COMPARISON:  10/24/2023     FINDINGS:  Multiple cysts in the liver including cluster of cysts spanning a distance of 6 6.6 cm in the left lobe.     Spleen not enlarged     Adrenal glands unremarkable appearance     Pancreas unremarkable appearance     Abdominal aorta no aneurysm     Kidney; a couple right renal cysts larger of which measures 8 mm.  No hydronephrosis     Multiple filling defects in the gallbladder consistent with stones.  Pericholecystic fluid.  Common duct appears just mildly dilated measuring 78 mm.  There is intraluminal filling defect in the distal common duct for example coronal series 4, image 13 measures approximately 3 mm.  Also see coronal series 10, image 47.  Also well seen series 11, image 1.     Impression:     Cholelithiasis and findings of acute cholecystitis and choledocholithiasis with pericholecystic fluid, mild biliary duct dilatation and with a filling defect in the distal common duct.     Multiple cysts in the liver.  Small right renal cyst.        Electronically signed by: Dayana Powell MD  Date:                                            11/25/2023  Time:                                           16:03  Assessment/Plan:     * Cholelithiasis with choledocholithiasis  Uncomplicated  Choledocholithiasis secondary to cholelithiasis  -CT abdomen completed - cholecystitis with distended gall bladder  -MRI MRCP demonstrated right renal cysts, gall stones, pericholecystic fluid, and mildly dilated common duct  -Afebrile with unremarkable CBC and normal pancreatic enzyme levels  -Cholestatic pattern of elevated Bili, AST, ALT  -Started on IV Zosyn, IVFs  -GI consulted  -NPO  -analgesic and antiemetics  -Continue home PPI  -Mechanical DVT prophylaxis w/SCDs; hold Lovenox for possible ERCP  -Accepted at [facility]; Awaiting bed    Chronic GERD    Chronic condition stable  Continue home PPI    Hypokalemia  Patient has hypokalemia which is Acute and currently uncontrolled. Most recent potassium levels reviewed-   Lab Results   Component Value Date    K 3.3 (L) 11/25/2023   . Will continue potassium replacement per protocol and recheck repeat levels after replacement completed.     Prn electrolyte replacement    Abdominal pain with vomiting  -NPO  -analgesic and antiemetics   -IVFs  -GI consult   -PPI         Liver cyst  Chronic stable condition        Essential hypertension  Chronic, controlled. Latest blood pressure and vitals reviewed-     Temp:  [97.8 °F (36.6 °C)]   Pulse:  [75-93]   Resp:  [16-20]   BP: (118-154)/(64-80)   SpO2:  [96 %-100 %] .   Home meds for hypertension were reviewed and noted below.   Hypertension Medications               furosemide (LASIX) 40 MG tablet Take 1 tablet (40 mg total) by mouth once daily.    metoprolol succinate (TOPROL-XL) 50 MG 24 hr tablet Take 1 tablet (50 mg total) by mouth once daily.            While in the hospital, will manage blood pressure as follows; Continue home antihypertensive regimen    Will utilize p.r.n. blood pressure medication only if patient's blood pressure greater than 180/110 and she develops symptoms such as worsening chest pain or shortness of breath.    Hold home medication lasix due hydration needed for procedure      VTE Risk Mitigation (From  admission, onward)           Ordered     IP VTE LOW RISK PATIENT  Once         11/25/23 2303     Place sequential compression device  Until discontinued         11/25/23 2303                         On 11/26/2023, patient should be placed in hospital observation services under my care in collaboration with Katharina Henley NP.      Pharmacist Renal Dose Adjustment Note    Lynn Naidu is a 58 y.o. female being treated with the medication Zosyn    Patient Data:    Vital Signs (Most Recent):  Temp: 97.8 °F (36.6 °C) (11/25/23 1058)  Pulse: 83 (11/25/23 2202)  Resp: 18 (11/25/23 2335)  BP: 139/80 (11/25/23 2202)  SpO2: 99 % (11/25/23 2202) Vital Signs (72h Range):  Temp:  [97.8 °F (36.6 °C)]   Pulse:  [76-93]   Resp:  [16-20]   BP: (118-154)/(64-80)   SpO2:  [97 %-100 %]      Recent Labs   Lab 11/25/23  1136   CREATININE 1.0     Serum creatinine: 1 mg/dL 11/25/23 1136  Estimated creatinine clearance: 58.7 mL/min    Medication:Zosyn dose: 4.5g frequency q12h will be changed to medication:Zosyn dose:4.5g frequency:q8h    Pharmacist's Name: Steve Licona  Pharmacist's Extension: 1951      Katharina Henley, RAY  Department of [facility]  Formerly [facility]

## 2023-11-26 NOTE — ASSESSMENT & PLAN NOTE
Uncomplicated Choledocholithiasis secondary to cholelithiasis  -CT abdomen completed - cholecystitis with distended gall bladder  -MRI MRCP demonstrated right renal cysts, gall stones, pericholecystic fluid, and mildly dilated common duct  -Afebrile with unremarkable CBC and normal pancreatic enzyme levels  -Cholestatic pattern of elevated Bili, AST, ALT  -Started on IV Zosyn, IVFs  -GI consulted  -NPO  -analgesic and antiemetics  -Continue home PPI  -Mechanical DVT prophylaxis w/SCDs; hold Lovenox for possible ERCP  -Accepted at [facility]; Awaiting bed

## 2023-11-26 NOTE — ASSESSMENT & PLAN NOTE
Chronic, controlled. Latest blood pressure and vitals reviewed-     Temp:  [97.8 °F (36.6 °C)]   Pulse:  [75-93]   Resp:  [16-20]   BP: (118-154)/(64-80)   SpO2:  [96 %-100 %] .   Home meds for hypertension were reviewed and noted below.   Hypertension Medications               furosemide (LASIX) 40 MG tablet Take 1 tablet (40 mg total) by mouth once daily.    metoprolol succinate (TOPROL-XL) 50 MG 24 hr tablet Take 1 tablet (50 mg total) by mouth once daily.            While in the hospital, will manage blood pressure as follows; Continue home antihypertensive regimen    Will utilize p.r.n. blood pressure medication only if patient's blood pressure greater than 180/110 and she develops symptoms such as worsening chest pain or shortness of breath.    Hold home medication lasix due hydration needed for procedure

## 2023-11-26 NOTE — NURSING
Nurses Note -- 4 Eyes      11/26/2023   3:10 PM      Skin assessed during: Admit      [x] No Altered Skin Integrity Present    []Prevention Measures Documented      [] Yes- Altered Skin Integrity Present or Discovered   [] LDA Added if Not in Epic (Describe Wound)   [] New Altered Skin Integrity was Present on Admit and Documented in LDA   [] Wound Image Taken    Wound Care Consulted? No    Attending Nurse:  Ashley Humphries RN/Staff Member:   Jackie LEPE

## 2023-11-26 NOTE — ASSESSMENT & PLAN NOTE
Patient has hypokalemia which is Acute and currently uncontrolled. Most recent potassium levels reviewed-   Lab Results   Component Value Date    K 3.0 (L) 11/26/2023   Will place patient on continuous IV fluids with potassium. Oral potassium this am made her sick again. Will try to avoid oral replacement in the short term.

## 2023-11-26 NOTE — ASSESSMENT & PLAN NOTE
Uncomplicated Choledocholithiasis secondary to cholelithiasis  -CT abdomen completed - cholecystitis with distended gall bladder  -MRI MRCP demonstrated right renal cysts, gall stones, pericholecystic fluid, and mildly dilated common duct  -Afebrile with unremarkable CBC and normal pancreatic enzyme levels  -Cholestatic pattern of elevated Bili, AST, ALT  -Started on IV Zosyn, IVFs  -GI consulted  -NPO  -analgesic and antiemetics  -Continue home PPI  -Mechanical DVT prophylaxis w/SCDs; hold Lovenox for possible ERCP  -Accepted at [facility]; Awaiting bed

## 2023-11-26 NOTE — SUBJECTIVE & OBJECTIVE
Interval History:   Patient seen and examined.  No acute events since admission.  Reports her pain is currently 5/10 scale to her RUQ.  She  reports nausea today which has been responsive to antiemetics.  She has not had any vomiting.      Review of Systems   Constitutional:  Positive for appetite change and chills.   Gastrointestinal:  Positive for abdominal pain, nausea and vomiting.   All other systems reviewed and are negative.    Objective:     Vital Signs (Most Recent):  Temp: 98.1 °F (36.7 °C) (11/26/23 0716)  Pulse: 67 (11/26/23 0602)  Resp: 18 (11/26/23 0721)  BP: 117/73 (11/26/23 1127)  SpO2: 95 % (11/26/23 0602) Vital Signs (24h Range):  Temp:  [98.1 °F (36.7 °C)] 98.1 °F (36.7 °C)  Pulse:  [67-92] 67  Resp:  [16-20] 18  SpO2:  [95 %-100 %] 95 %  BP: (100-154)/(61-80) 117/73     Weight: 74.8 kg (164 lb 14.5 oz)  Body mass index is 29.22 kg/m².    Intake/Output Summary (Last 24 hours) at 11/26/2023 1256  Last data filed at 11/26/2023 0421  Gross per 24 hour   Intake 200 ml   Output --   Net 200 ml         Physical Exam  Constitutional:       General: She is not in acute distress.     Appearance: She is not ill-appearing, toxic-appearing or diaphoretic.   HENT:      Head: Normocephalic and atraumatic.   Eyes:      General: No scleral icterus.     Extraocular Movements: Extraocular movements intact.      Conjunctiva/sclera: Conjunctivae normal.      Pupils: Pupils are equal, round, and reactive to light.   Cardiovascular:      Rate and Rhythm: Normal rate and regular rhythm.      Pulses: Normal pulses.      Heart sounds: Normal heart sounds.   Pulmonary:      Effort: Pulmonary effort is normal. No respiratory distress.      Breath sounds: Normal breath sounds.   Abdominal:      General: Bowel sounds are normal.      Palpations: Abdomen is soft.      Tenderness: There is abdominal tenderness.   Skin:     General: Skin is warm and dry.      Coloration: Skin is not jaundiced or pale.   Neurological:      Mental  Status: She is alert.             Significant Labs: All pertinent labs within the past 24 hours have been reviewed.  CBC:   Recent Labs   Lab 11/25/23  1136 11/26/23  0521   WBC 14.20* 7.19   HGB 14.1 11.7*   HCT 41.5 34.3*    242     CMP:   Recent Labs   Lab 11/25/23  1136 11/26/23  0521    142   K 3.3* 3.0*    108   CO2 24 26   * 83   BUN 12 8   CREATININE 1.0 1.0   CALCIUM 9.3 8.7   PROT 7.4 5.9*   ALBUMIN 3.8 3.1*   BILITOT 1.1* 1.6*   ALKPHOS 145* 150*   * 553*   * 742*   ANIONGAP 12 8       Significant Imaging: I have reviewed all pertinent imaging results/findings within the past 24 hours.    MRI MRCP Without Contrast [[id number]] Resulted: 11/25/23 1603   Order Status: Completed Updated: 11/25/23 1606   Narrative:     EXAMINATION:  MRI ABDOMEN WITHOUT CONTRAST MRCP    CLINICAL HISTORY:  Cholelithiasis;Choledocholithiasis;    TECHNIQUE:  Multiplanar, multisequence images are performed through the liver and upper abdomen.  Additionally 2D and 3D MRCP sequences are performed as well as MIP images.    COMPARISON:  10/24/2023    FINDINGS:  Multiple cysts in the liver including cluster of cysts spanning a distance of 6 6.6 cm in the left lobe.    Spleen not enlarged    Adrenal glands unremarkable appearance    Pancreas unremarkable appearance    Abdominal aorta no aneurysm    Kidney; a couple right renal cysts larger of which measures 8 mm.  No hydronephrosis    Multiple filling defects in the gallbladder consistent with stones.  Pericholecystic fluid.  Common duct appears just mildly dilated measuring 78 mm.  There is intraluminal filling defect in the distal common duct for example coronal series 4, image 13 measures approximately 3 mm.  Also see coronal series 10, image 47.  Also well seen series 11, image 1.   Impression:       Cholelithiasis and findings of acute cholecystitis and choledocholithiasis with pericholecystic fluid, mild biliary duct dilatation and with a  filling defect in the distal common duct.    Multiple cysts in the liver.  Small right renal cyst.      Electronically signed by: Dayana Powell MD  Date: 11/25/2023  Time: 16:03   CT Abdomen Pelvis With IV Contrast NO Oral Contrast [[id number]] Resulted: 11/25/23 1309   Order Status: Completed Updated: 11/25/23 1311   Narrative:     EXAMINATION:  CT ABDOMEN PELVIS WITH IV CONTRAST    CLINICAL HISTORY:  RLQ abdominal pain (Age >= 14y);    TECHNIQUE:  Low dose axial images, sagittal and coronal reformations were obtained from the lung bases to the pubic symphysis following the IV administration of 75 mL of Omnipaque 350 no p.o. contrast    COMPARISON:  None.    FINDINGS:  Mild dependent hypoventilatory changes in visualized lung bases.    Liver; multiple cysts with cysts most numerous and largest in the left lobe in corresponding as seen on MRI of the abdomen of 10/24/2023, more completely characterized on that exam.  Appearance on CT ever does also suggest simple cysts    Spleen not enlarged    Adrenal glands unremarkable appearance    Pancreas unremarkable appearance    Abdominal aorta no aneurysm    Gallbladder and bile ducts; distended gallbladder with small amount of pericholecystic fluid and/or wall thickening.  No calcified stones seen within the gallbladder with cholelithiasis typically better evaluated by ultrasound.  Common duct appears mildly dilated measuring up to 9 mm on coronal images.  Difficult to compared to the prior MRCP for technical differences but not seen dilated on that study.    Renal enhancement is symmetrical.  No hydronephrosis.  11 mm right renal cyst.  Urinary bladder moderately distended at time of the exam and as visualized is unremarkable appearance    Reproductive organs unremarkable appearance for the patient's age    Diverticulosis without CT findings of acute diverticulitis.  Normal appearance of the appendix.  No free intraperitoneal air or fluid.   Impression:       Findings  suggesting acute cholecystitis with distended gallbladder with wall thickening and/or pericholecystic fluid.  No calcified stones evident in the gallbladder but note is made that cholelithiasis is typically better evaluate by ultrasound and ultrasound 08/03/2023 did describe cholelithiasis.    Mild dilatation the common duct, cause of which is not identified but appearing new compared to prior study and concerning for choledocholithiasis.    Hepatic and renal cysts.      Electronically signed by: Dayana Powell MD  Date: 11/25/2023  Time: 13:09

## 2023-11-26 NOTE — HPI
Per H&P:  Lynn Naidu is a 58-year-old female presents to the ER c/o severe, non-radiating RUQ, RLQ, and epigastric area abdominal pain with nausea and vomiting for several months that worsened yesterday following an abnormal HIDA scan. Past medical history includes hypertension, GERD, hepatic cysts, bronchitis, appendectomy, peripheral edema, insomnia, and cervical disc displacement. She reports having intentional weight loss from taking Wegovy (Semaglutide) injections since 01/2023. She reports decrease appetite, nausea, vomiting, headache, diaphoresis without fever with non-radiating abdominal pain. Denies chest pain, shortness of breath, fatigue, diarrhea, melena, hematemesis, hematochezia, dysuria, or hematuria. A HIDA scan was performed 11/25 which showed a reduced gallbladder ejection fraction. 8/31 EGD positive H. Pylori, small hiatal hernia, GERD, and gastritis. She is established with Gastroenterology and hepatology outpatient.     In the ED her abdominal CT demonstrated cholecystitis with distended gallbladder; choledocholithiasis, a filling defect in the distal common bile duct. MRI revealed right renal cysts, gall stones, pericholecystic fluid, and mildly dilated common bile duct. She received IV zosyn and analgesics in ED.      Today she is accompanied by her sister in the room. She has been having pain intermittent but it is improved with IV morphine. She has been having intermittent nausea but no more vomiting and this has been improved with zofran.     I explained the nature of the problem to her and the procedure that would be done and the likelihood that she would need her gallbladder removed eventually

## 2023-11-26 NOTE — H&P
[facility] Medicine  History & Physical    Patient Name: Lynn Naidu  MRN: [id number]  Patient Class: OP- Observation  Admission Date: 11/25/2023  Attending Physician: Donaldo Chen MD  Primary Care Provider: Remy Parson MD         Patient information was obtained from patient, relative(s), and ER records.     Subjective:     Principal Problem:Cholelithiasis with choledocholithiasis    Chief Complaint:   Chief Complaint   Patient presents with    Vomiting     Started yesterday with abdominal pain        HPI: Lynn Naidu is a 58-year-old female presents to the ER c/o severe, non-radiating RUQ, RLQ, and epigastric area abdominal pain with nausea and vomiting for several months that worsened yesterday following an abnormal HIDA scan. Past medical history includes hypertension, GERD, hepatic cysts, bronchitis, appendectomy, peripheral edema, insomnia, and cervical disc displacement. She reports having intentional weight loss from taking Wegovy (Semaglutide) injections since 01/2023. She reports decrease appetite, nausea, vomiting, headache, diaphoresis without fever with non-radiating abdominal pain. Denies chest pain, shortness of breath, fatigue, diarrhea, melena, hematemesis, hematochezia, dysuria, or hematuria. A HIDA scan was performed 11/25 which showed a reduced gallbladder ejection fraction. 8/31 EGD positive H. Pylori, small hiatal hernia, GERD, and gastritis. She is established with Gastroenterology and hepatology outpatient.     In the ED her abdominal CT demonstrated cholecystitis with distended gallbladder; choledocholithiasis, a filling defect in the distal common bile duct. MRI revealed right renal cysts, gall stones, pericholecystic fluid, and mildly dilated common bile duct. She received IV zosyn and analgesics in ED.      Today she is accompanied by her sister in the room. She has been having pain intermittent but it is improved with IV morphine. She has been having  intermittent nausea but no more vomiting and this has been improved with zofran.     I explained the nature of the problem to her and the procedure that would be done and the likelihood that she would need her gallbladder removed evnetually            Past Medical History:   Diagnosis Date    Allergy     Bronchitis 07/17/2015    Cervical disc displacement     Edema     Hypertension     Insomnia     Liver cyst     Plantar fasciitis        Past Surgical History:   Procedure Laterality Date    COLONOSCOPY  11/01/2017    Dr. Oviedo, normal, ten year recheck    COLONOSCOPY N/A 11/01/2017    Procedure: COLONOSCOPY;  Surgeon: Cameron Oviedo MD;  Location: [facility];  Service: Endoscopy;  Laterality: N/A;    EPIDURAL STEROID INJECTION INTO CERVICAL SPINE N/A 07/22/2019    Procedure: Injection-steroid-epidural-cervical;  Surgeon: Thomas Ivory MD;  Location: [facility] OR;  Service: Pain Management;  Laterality: N/A;  C7-T1    EPIDURAL STEROID INJECTION INTO CERVICAL SPINE N/A 12/21/2021    Procedure: Injection-steroid-epidural-cervical;  Surgeon: Thomas Ivory MD;  Location: [facility];  Service: Pain Management;  Laterality: N/A;  C6-7    ESOPHAGOGASTRODUODENOSCOPY N/A 08/31/2023    Procedure: EGD (ESOPHAGOGASTRODUODENOSCOPY);  Surgeon: Cameron Oviedo MD;  Location: [facility];  Service: Endoscopy;  Laterality: N/A;    INJECTION OF ANESTHETIC AGENT AROUND STELLATE GANGLION Left 08/30/2022    Procedure: BLOCK, GANGLION, STELLATE;  Surgeon: Thomas Ivory MD;  Location: [facility] OR;  Service: Pain Management;  Laterality: Left;    INJECTION OF ANESTHETIC AGENT AROUND STELLATE GANGLION Left 10/21/2022    Procedure: BLOCK, GANGLION, STELLATE Left;  Surgeon: Thomas Ivory MD;  Location: [facility] OR;  Service: Pain Management;  Laterality: Left;    INJECTION OF ANESTHETIC AGENT AROUND STELLATE GANGLION Left 12/27/2022    Procedure: BLOCK, GANGLION, STELLATE;  Surgeon: Thomas Ivory MD;  Location: [facility] OR;  Service: Pain Management;  Laterality:  Left;  left    TONSILLECTOMY, ADENOIDECTOMY Bilateral 07/23/2021    Procedure: TONSILLECTOMY AND ADENOIDECTOMY;  Surgeon: Anselmo Galvan MD;  Location: [facility] OR;  Service: ENT;  Laterality: Bilateral;    UPPER GASTROINTESTINAL ENDOSCOPY      WISDOM TOOTH EXTRACTION         Review of patient's allergies indicates:   Allergen Reactions    Omnicef [cefdinir] Itching       Current Facility-Administered Medications on File Prior to Encounter   Medication    lactated ringers infusion    lactated ringers infusion     Current Outpatient Medications on File Prior to Encounter   Medication Sig    diazePAM (VALIUM) 10 MG Tab Take 10 mg by mouth every evening.    FLUoxetine 20 MG capsule Take 20 mg by mouth once daily.    furosemide (LASIX) 40 MG tablet Take 1 tablet (40 mg total) by mouth once daily.    hydrOXYzine HCl (ATARAX) 25 MG tablet Take 1 tablet (25 mg total) by mouth 3 (three) times daily as needed. (Patient taking differently: Take 25 mg by mouth once daily.)    hydrOXYzine pamoate (VISTARIL) 25 MG Cap Take 25 mg by mouth 2 (two) times daily as needed.    linaCLOtide (LINZESS) 290 mcg Cap capsule Take 1 capsule (290 mcg total) by mouth before breakfast.    metoprolol succinate (TOPROL-XL) 50 MG 24 hr tablet Take 1 tablet (50 mg total) by mouth once daily.    montelukast (SINGULAIR) 10 mg tablet Take 1 tablet (10 mg total) by mouth every evening.    pantoprazole (PROTONIX) 40 MG tablet Take 1 tablet (40 mg total) by mouth every morning.    potassium chloride SA (K-DUR,KLOR-CON) 20 MEQ tablet Take 1 tablet (20 mEq total) by mouth 2 (two) times daily.    semaglutide, weight loss, (WEGOVY) 2.4 mg/0.75 mL PnIj Inject 2.4 mg into the skin every 7 days.    ciclopirox (PENLAC) 8 % Soln APPLY TOPICALLY NIGHTLY    ergocalciferol (ERGOCALCIFEROL) 50,000 unit Cap Take 1 capsule (50,000 Units total) by mouth twice a week.    fluconazole (DIFLUCAN) 100 MG tablet Take 100 mg by mouth.    fluticasone propionate (FLONASE) 50  mcg/actuation nasal spray 2 sprays (100 mcg total) by Each Nostril route once daily.    methocarbamoL (ROBAXIN) 750 MG Tab Take 1 tablet (750 mg total) by mouth 4 (four) times daily as needed (muscle spasm). (Patient not taking: Reported on 11/13/2023)    methylPREDNISolone (MEDROL DOSEPACK) 4 mg tablet Take by mouth.    nabumetone (RELAFEN) 500 MG tablet Take 1 tablet (500 mg total) by mouth 2 (two) times daily as needed for Pain. (Patient not taking: Reported on 11/13/2023)    pregabalin (LYRICA) 75 MG capsule TAKE 1 CAPSULE BY MOUTH EVERY MORNING AND 2 CAPSULES AT BEDTIME (Patient not taking: Reported on 11/13/2023)    promethazine (PHENERGAN) 25 MG tablet Take 25 mg by mouth.    SULFACLEANSE 8-4 8-4 % Susp Apply topically nightly as needed.     traMADoL (ULTRAM) 50 mg tablet Take 1 tablet (50 mg total) by mouth every 8 (eight) hours as needed for Pain. (Patient not taking: Reported on 11/6/2023)    triamcinolone acetonide 0.1% (KENALOG) 0.1 % paste SMARTSIG:Sparingly TO TEETH 3 Times Daily     Family History       Problem Relation (Age of Onset)    Allergies Sister, Sister    Asthma Sister, Sister    Cancer Father    Diabetes Sister    Heart disease Mother, Brother    Hypertension Sister, Sister    Kidney failure Brother    Stroke Mother          Tobacco Use    Smoking status: Never    Smokeless tobacco: Never   Substance and Sexual Activity    Alcohol use: Never    Drug use: No    Sexual activity: Yes     Partners: Male     Review of Systems   Constitutional:  Negative for activity change and chills.   HENT:  Negative for congestion and sore throat.    Eyes:  Negative for visual disturbance.   Respiratory:  Negative for chest tightness and shortness of breath.    Cardiovascular:  Negative for chest pain and leg swelling.   Genitourinary:  Negative for dysuria.   Musculoskeletal:  Negative for arthralgias.   Neurological:  Negative for dizziness and light-headedness.   Psychiatric/Behavioral:  The patient is  nervous/anxious (midly nervous about the procedure.).      Objective:     Vital Signs (Most Recent):  Temp: 98.3 °F (36.8 °C) (11/26/23 1334)  Pulse: 66 (11/26/23 1334)  Resp: 16 (11/26/23 1334)  BP: (!) 143/66 (11/26/23 1334)  SpO2: 99 % (11/26/23 1334) Vital Signs (24h Range):  Temp:  [98.1 °F (36.7 °C)-98.3 °F (36.8 °C)] 98.3 °F (36.8 °C)  Pulse:  [66-88] 66  Resp:  [16-20] 16  SpO2:  [95 %-99 %] 99 %  BP: (100-154)/(61-80) 143/66     Weight: 74.8 kg (164 lb 14.5 oz)  Body mass index is 29.22 kg/m².     Physical Exam  Vitals reviewed.   Constitutional:       Appearance: Normal appearance. She is not ill-appearing.   HENT:      Head: Normocephalic and atraumatic.      Nose: No congestion.      Mouth/Throat:      Mouth: Mucous membranes are moist.      Pharynx: Oropharynx is clear.   Eyes:      Extraocular Movements: Extraocular movements intact.      Conjunctiva/sclera: Conjunctivae normal.      Pupils: Pupils are equal, round, and reactive to light.   Cardiovascular:      Rate and Rhythm: Normal rate and regular rhythm.      Pulses: Normal pulses.   Pulmonary:      Effort: Pulmonary effort is normal.      Breath sounds: Normal breath sounds.   Abdominal:      General: Bowel sounds are normal. There is no distension.      Palpations: Abdomen is soft.      Tenderness: There is abdominal tenderness (mild RUQ to moderate palpation, remainder of abdomen is soft). There is no guarding or rebound.   Musculoskeletal:         General: No swelling or tenderness. Normal range of motion.      Cervical back: Normal range of motion and neck supple.   Skin:     General: Skin is warm and dry.      Findings: No bruising or rash.   Neurological:      General: No focal deficit present.      Mental Status: She is alert and oriented to person, place, and time. Mental status is at baseline.   Psychiatric:         Mood and Affect: Mood normal.              CRANIAL NERVES     CN III, IV, VI   Pupils are equal, round, and reactive to  light.       Significant Labs: All pertinent labs within the past 24 hours have been reviewed.  CBC:   Recent Labs   Lab 11/25/23  1136 11/26/23  0521   WBC 14.20* 7.19   HGB 14.1 11.7*   HCT 41.5 34.3*    242     CMP:   Recent Labs   Lab 11/25/23  1136 11/26/23  0521    142   K 3.3* 3.0*    108   CO2 24 26   * 83   BUN 12 8   CREATININE 1.0 1.0   CALCIUM 9.3 8.7   PROT 7.4 5.9*   ALBUMIN 3.8 3.1*   BILITOT 1.1* 1.6*   ALKPHOS 145* 150*   * 553*   * 742*   ANIONGAP 12 8       Significant Imaging: I have reviewed all pertinent imaging results/findings within the past 24 hours.  Assessment/Plan:     * Cholelithiasis with choledocholithiasis  Uncomplicated Choledocholithiasis secondary to cholelithiasis  -CT abdomen completed - cholecystitis with distended gall bladder  -MRI MRCP demonstrated right renal cysts, gall stones, pericholecystic fluid, and mildly dilated common duct  -Afebrile with unremarkable CBC and normal pancreatic enzyme levels  -Transaminases are more elevated today but WBC is improved.  -Started on IV Zosyn, IVFs  -GI Dr. Overton consulted  -NPO  -analgesic and antiemetics  -Continue home PPI  -Mechanical DVT prophylaxis w/SCDs  Arrived at [facility]. Anticipate ERCP in am.     Chronic GERD    Chronic condition stable  Continue home PPI but will make IV.    Hypokalemia  Patient has hypokalemia which is Acute and currently uncontrolled. Most recent potassium levels reviewed-   Lab Results   Component Value Date    K 3.0 (L) 11/26/2023   Will place patient on continuous IV fluids with potassium. Oral potassium this am made her sick again. Will try to avoid oral replacement in the short term.    Abdominal pain with vomiting  -NPO  -analgesic and antiemetics   -IVFs  -GI consult   -PPI         Liver cyst  Chronic stable condition - will need ongoing OP GI follow up. This is not I think related to her acute condition.        Essential hypertension  Blood pressure is mildly  elevated. Will continue toprol xl      VTE Risk Mitigation (From admission, onward)           Ordered     IP VTE LOW RISK PATIENT  Once         11/25/23 2303     Place sequential compression device  Until discontinued         11/25/23 2303                       On 11/26/2023, patient should be placed in hospital observation services under my care.        [facility] Medicine  Progress Note    Patient Name: Lynn Naidu  MRN: [id number]  Patient Class: OP- Observation   Admission Date: 11/25/2023  Length of Stay: 0 days  Attending Physician: Sabine Fatima MD  Primary Care Provider: Remy Parson MD        Subjective:     Principal Problem:Cholelithiasis with choledocholithiasis        HPI:  Lynn Naidu is a 58-year-old female presents to the ER c/o severe, non-radiating RUQ, RLQ, and epigastric area abdominal pain with nausea and vomiting for several months that worsened yesterday following an abnormal HIDA scan. Past medical history includes hypertension, GERD, hepatic cysts, bronchitis, appendectomy, peripheral edema, insomnia, and cervical disc displacement. She reports having intentional weight loss from taking Wegovy (Semaglutide) injections since 01/2023. She reports decrease appetite, nausea, vomiting, headache, diaphoresis without fever with non-radiating abdominal pain. Denies chest pain, shortness of breath, fatigue, diarrhea, melena, hematemesis, hematochezia, dysuria, or hematuria. A HIDA scan was performed 11/25 which showed a reduced gallbladder ejection fraction. 8/31 EGD positive H. Pylori, small hiatal hernia, GERD, and gastritis. She is established with Gastroenterology and hepatology outpatient. In the ED her abdominal CT demonstrated cholecystitis with distended gallbladder; choledocholithiasis. MRI revealed right renal cysts, gall stones, pericholecystic fluid, and mildly dilated common bile duct. She received IV zosyn and analgesics in ED.  Her labs reflect a  cholestatic pattern without elevated pancreatic enzymes. Her , , Bili 1.1, Lipase 34, WBC 14.2. She remains afebrile with stable vitals. She has been accepted by [facility] gastroenterology for possible ERCP.  She will be admitted to hospital medicine for further treatment and management.                Overview/Hospital Course:  No notes on file    Interval History:   Patient seen and examined.  No acute events since admission.  Reports her pain is currently 5/10 scale to her RUQ.  She  reports nausea today which has been responsive to antiemetics.  She has not had any vomiting.      Review of Systems   Constitutional:  Positive for appetite change and chills.   Gastrointestinal:  Positive for abdominal pain, nausea and vomiting.   All other systems reviewed and are negative.    Objective:     Vital Signs (Most Recent):  Temp: 98.1 °F (36.7 °C) (11/26/23 0716)  Pulse: 67 (11/26/23 0602)  Resp: 18 (11/26/23 0721)  BP: 117/73 (11/26/23 1127)  SpO2: 95 % (11/26/23 0602) Vital Signs (24h Range):  Temp:  [98.1 °F (36.7 °C)] 98.1 °F (36.7 °C)  Pulse:  [67-92] 67  Resp:  [16-20] 18  SpO2:  [95 %-100 %] 95 %  BP: (100-154)/(61-80) 117/73     Weight: 74.8 kg (164 lb 14.5 oz)  Body mass index is 29.22 kg/m².    Intake/Output Summary (Last 24 hours) at 11/26/2023 1256  Last data filed at 11/26/2023 0421  Gross per 24 hour   Intake 200 ml   Output --   Net 200 ml         Physical Exam  Constitutional:       General: She is not in acute distress.     Appearance: She is not ill-appearing, toxic-appearing or diaphoretic.   HENT:      Head: Normocephalic and atraumatic.   Eyes:      General: No scleral icterus.     Extraocular Movements: Extraocular movements intact.      Conjunctiva/sclera: Conjunctivae normal.      Pupils: Pupils are equal, round, and reactive to light.   Cardiovascular:      Rate and Rhythm: Normal rate and regular rhythm.      Pulses: Normal pulses.      Heart sounds: Normal heart sounds.   Pulmonary:       Effort: Pulmonary effort is normal. No respiratory distress.      Breath sounds: Normal breath sounds.   Abdominal:      General: Bowel sounds are normal.      Palpations: Abdomen is soft.      Tenderness: There is abdominal tenderness.   Skin:     General: Skin is warm and dry.      Coloration: Skin is not jaundiced or pale.   Neurological:      Mental Status: She is alert.             Significant Labs: All pertinent labs within the past 24 hours have been reviewed.  CBC:   Recent Labs   Lab 11/25/23  1136 11/26/23  0521   WBC 14.20* 7.19   HGB 14.1 11.7*   HCT 41.5 34.3*    242     CMP:   Recent Labs   Lab 11/25/23  1136 11/26/23  0521    142   K 3.3* 3.0*    108   CO2 24 26   * 83   BUN 12 8   CREATININE 1.0 1.0   CALCIUM 9.3 8.7   PROT 7.4 5.9*   ALBUMIN 3.8 3.1*   BILITOT 1.1* 1.6*   ALKPHOS 145* 150*   * 553*   * 742*   ANIONGAP 12 8       Significant Imaging: I have reviewed all pertinent imaging results/findings within the past 24 hours.    MRI MRCP Without Contrast [[id number]] Resulted: 11/25/23 1603   Order Status: Completed Updated: 11/25/23 1606   Narrative:     EXAMINATION:  MRI ABDOMEN WITHOUT CONTRAST MRCP    CLINICAL HISTORY:  Cholelithiasis;Choledocholithiasis;    TECHNIQUE:  Multiplanar, multisequence images are performed through the liver and upper abdomen.  Additionally 2D and 3D MRCP sequences are performed as well as MIP images.    COMPARISON:  10/24/2023    FINDINGS:  Multiple cysts in the liver including cluster of cysts spanning a distance of 6 6.6 cm in the left lobe.    Spleen not enlarged    Adrenal glands unremarkable appearance    Pancreas unremarkable appearance    Abdominal aorta no aneurysm    Kidney; a couple right renal cysts larger of which measures 8 mm.  No hydronephrosis    Multiple filling defects in the gallbladder consistent with stones.  Pericholecystic fluid.  Common duct appears just mildly dilated measuring 78 mm.  There is  intraluminal filling defect in the distal common duct for example coronal series 4, image 13 measures approximately 3 mm.  Also see coronal series 10, image 47.  Also well seen series 11, image 1.   Impression:       Cholelithiasis and findings of acute cholecystitis and choledocholithiasis with pericholecystic fluid, mild biliary duct dilatation and with a filling defect in the distal common duct.    Multiple cysts in the liver.  Small right renal cyst.      Electronically signed by: Dayana Powell MD  Date: 11/25/2023  Time: 16:03   CT Abdomen Pelvis With IV Contrast NO Oral Contrast [[id number]] Resulted: 11/25/23 1309   Order Status: Completed Updated: 11/25/23 1311   Narrative:     EXAMINATION:  CT ABDOMEN PELVIS WITH IV CONTRAST    CLINICAL HISTORY:  RLQ abdominal pain (Age >= 14y);    TECHNIQUE:  Low dose axial images, sagittal and coronal reformations were obtained from the lung bases to the pubic symphysis following the IV administration of 75 mL of Omnipaque 350 no p.o. contrast    COMPARISON:  None.    FINDINGS:  Mild dependent hypoventilatory changes in visualized lung bases.    Liver; multiple cysts with cysts most numerous and largest in the left lobe in corresponding as seen on MRI of the abdomen of 10/24/2023, more completely characterized on that exam.  Appearance on CT ever does also suggest simple cysts    Spleen not enlarged    Adrenal glands unremarkable appearance    Pancreas unremarkable appearance    Abdominal aorta no aneurysm    Gallbladder and bile ducts; distended gallbladder with small amount of pericholecystic fluid and/or wall thickening.  No calcified stones seen within the gallbladder with cholelithiasis typically better evaluated by ultrasound.  Common duct appears mildly dilated measuring up to 9 mm on coronal images.  Difficult to compared to the prior MRCP for technical differences but not seen dilated on that study.    Renal enhancement is symmetrical.  No hydronephrosis.  11 mm  right renal cyst.  Urinary bladder moderately distended at time of the exam and as visualized is unremarkable appearance    Reproductive organs unremarkable appearance for the patient's age    Diverticulosis without CT findings of acute diverticulitis.  Normal appearance of the appendix.  No free intraperitoneal air or fluid.   Impression:       Findings suggesting acute cholecystitis with distended gallbladder with wall thickening and/or pericholecystic fluid.  No calcified stones evident in the gallbladder but note is made that cholelithiasis is typically better evaluate by ultrasound and ultrasound 08/03/2023 did describe cholelithiasis.    Mild dilatation the common duct, cause of which is not identified but appearing new compared to prior study and concerning for choledocholithiasis.    Hepatic and renal cysts.      Electronically signed by: Dayana Powell MD  Date: 11/25/2023  Time: 13:09          Assessment/Plan:      * Cholelithiasis with choledocholithiasis  Uncomplicated Choledocholithiasis secondary to cholelithiasis  -CT abdomen completed - cholecystitis with distended gall bladder  -MRI MRCP demonstrated right renal cysts, gall stones, pericholecystic fluid, and mildly dilated common duct  -Afebrile with unremarkable CBC and normal pancreatic enzyme levels  -Cholestatic pattern of elevated Bili, AST, ALT  -Started on IV Zosyn, IVFs  -GI consulted  -NPO  -analgesic and antiemetics  -Continue home PPI  -Mechanical DVT prophylaxis w/SCDs; hold Lovenox for possible ERCP  -Accepted at [facility]; Awaiting bed    Chronic GERD    Chronic condition stable  Continue home PPI    Hypokalemia  Patient has hypokalemia which is Acute and currently uncontrolled. Most recent potassium levels reviewed-   Lab Results   Component Value Date    K 3.3 (L) 11/25/2023   . Will continue potassium replacement per protocol and recheck repeat levels after replacement completed.     Prn electrolyte replacement    Abdominal pain with  vomiting  -NPO  -analgesic and antiemetics   -IVFs  -GI consult   -PPI         Liver cyst  Chronic stable condition        Essential hypertension  Chronic, controlled. Latest blood pressure and vitals reviewed-     Temp:  [97.8 °F (36.6 °C)]   Pulse:  [75-93]   Resp:  [16-20]   BP: (118-154)/(64-80)   SpO2:  [96 %-100 %] .   Home meds for hypertension were reviewed and noted below.   Hypertension Medications               furosemide (LASIX) 40 MG tablet Take 1 tablet (40 mg total) by mouth once daily.    metoprolol succinate (TOPROL-XL) 50 MG 24 hr tablet Take 1 tablet (50 mg total) by mouth once daily.            While in the hospital, will manage blood pressure as follows; Continue home antihypertensive regimen    Will utilize p.r.n. blood pressure medication only if patient's blood pressure greater than 180/110 and she develops symptoms such as worsening chest pain or shortness of breath.    Hold home medication lasix due hydration needed for procedure      VTE Risk Mitigation (From admission, onward)           Ordered     IP VTE LOW RISK PATIENT  Once         11/25/23 2303     Place sequential compression device  Until discontinued         11/25/23 2303                    Discharge Planning   ORION:      Code Status: Full Code   Is the patient medically ready for discharge?:     Reason for patient still in hospital (select all that apply): Patient trending condition, Laboratory test, Treatment, and Consult recommendations  Discharge Plan A: Home with family                  Francisca Larry NP  Department of Hospital Medicine   [facility] -          Donaldo Chen MD  Department of Hospital Medicine  Critical access hospital

## 2023-11-27 ENCOUNTER — ANESTHESIA EVENT (OUTPATIENT)
Dept: SURGERY | Facility: HOSPITAL | Age: 58
End: 2023-11-27
Payer: COMMERCIAL

## 2023-11-27 ENCOUNTER — ANESTHESIA (OUTPATIENT)
Dept: SURGERY | Facility: HOSPITAL | Age: 58
End: 2023-11-27
Payer: COMMERCIAL

## 2023-11-27 ENCOUNTER — TELEPHONE (OUTPATIENT)
Dept: GASTROENTEROLOGY | Facility: CLINIC | Age: 58
End: 2023-11-27
Payer: COMMERCIAL

## 2023-11-27 LAB
ALBUMIN SERPL BCP-MCNC: 3.3 G/DL (ref 3.5–5.2)
ALP SERPL-CCNC: 107 U/L (ref 55–135)
ALT SERPL W/O P-5'-P-CCNC: 401 U/L (ref 10–44)
ANION GAP SERPL CALC-SCNC: 7 MMOL/L (ref 8–16)
AST SERPL-CCNC: 175 U/L (ref 10–40)
BASOPHILS # BLD AUTO: 0.03 K/UL (ref 0–0.2)
BASOPHILS NFR BLD: 0.5 % (ref 0–1.9)
BILIRUB SERPL-MCNC: 0.9 MG/DL (ref 0.1–1)
BUN SERPL-MCNC: 7 MG/DL (ref 6–20)
CALCIUM SERPL-MCNC: 8.6 MG/DL (ref 8.7–10.5)
CHLORIDE SERPL-SCNC: 106 MMOL/L (ref 95–110)
CO2 SERPL-SCNC: 27 MMOL/L (ref 23–29)
CREAT SERPL-MCNC: 1.1 MG/DL (ref 0.5–1.4)
DIFFERENTIAL METHOD: ABNORMAL
EOSINOPHIL # BLD AUTO: 0.2 K/UL (ref 0–0.5)
EOSINOPHIL NFR BLD: 2.9 % (ref 0–8)
ERYTHROCYTE [DISTWIDTH] IN BLOOD BY AUTOMATED COUNT: 13.7 % (ref 11.5–14.5)
EST. GFR  (NO RACE VARIABLE): 57.9 ML/MIN/1.73 M^2
GLUCOSE SERPL-MCNC: 93 MG/DL (ref 70–110)
HCT VFR BLD AUTO: 33.8 % (ref 37–48.5)
HGB BLD-MCNC: 11.4 G/DL (ref 12–16)
IMM GRANULOCYTES # BLD AUTO: 0.01 K/UL (ref 0–0.04)
IMM GRANULOCYTES NFR BLD AUTO: 0.2 % (ref 0–0.5)
LYMPHOCYTES # BLD AUTO: 3.5 K/UL (ref 1–4.8)
LYMPHOCYTES NFR BLD: 60.1 % (ref 18–48)
MAGNESIUM SERPL-MCNC: 2 MG/DL (ref 1.6–2.6)
MCH RBC QN AUTO: 30.4 PG (ref 27–31)
MCHC RBC AUTO-ENTMCNC: 33.7 G/DL (ref 32–36)
MCV RBC AUTO: 90 FL (ref 82–98)
MONOCYTES # BLD AUTO: 0.4 K/UL (ref 0.3–1)
MONOCYTES NFR BLD: 6.8 % (ref 4–15)
NEUTROPHILS # BLD AUTO: 1.7 K/UL (ref 1.8–7.7)
NEUTROPHILS NFR BLD: 29.5 % (ref 38–73)
NRBC BLD-RTO: 0 /100 WBC
PLATELET # BLD AUTO: 231 K/UL (ref 150–450)
PMV BLD AUTO: 10.1 FL (ref 9.2–12.9)
POTASSIUM SERPL-SCNC: 3.3 MMOL/L (ref 3.5–5.1)
PROT SERPL-MCNC: 5.6 G/DL (ref 6–8.4)
RBC # BLD AUTO: 3.75 M/UL (ref 4–5.4)
SODIUM SERPL-SCNC: 140 MMOL/L (ref 136–145)
WBC # BLD AUTO: 5.86 K/UL (ref 3.9–12.7)

## 2023-11-27 PROCEDURE — 43262 ENDO CHOLANGIOPANCREATOGRAPH: CPT | Performed by: INTERNAL MEDICINE

## 2023-11-27 PROCEDURE — 27200043 HC FORCEPS, BIOPSY: Performed by: INTERNAL MEDICINE

## 2023-11-27 PROCEDURE — A4216 STERILE WATER/SALINE, 10 ML: HCPCS | Performed by: SURGERY

## 2023-11-27 PROCEDURE — 96376 TX/PRO/DX INJ SAME DRUG ADON: CPT | Mod: 59

## 2023-11-27 PROCEDURE — 27000080 OPTIME MED/SURG SUP & DEVICES GENERAL CLASSIFICATION: Performed by: SURGERY

## 2023-11-27 PROCEDURE — D9220A PRA ANESTHESIA: ICD-10-PCS | Mod: CRNA,,, | Performed by: NURSE ANESTHETIST, CERTIFIED REGISTERED

## 2023-11-27 PROCEDURE — D9220A PRA ANESTHESIA: Mod: CRNA,,, | Performed by: NURSE ANESTHETIST, CERTIFIED REGISTERED

## 2023-11-27 PROCEDURE — 99214 OFFICE O/P EST MOD 30 MIN: CPT | Mod: 57,,, | Performed by: SURGERY

## 2023-11-27 PROCEDURE — 25500020 PHARM REV CODE 255: Performed by: INTERNAL MEDICINE

## 2023-11-27 PROCEDURE — 63600175 PHARM REV CODE 636 W HCPCS: Performed by: SURGERY

## 2023-11-27 PROCEDURE — D9220A PRA ANESTHESIA: Mod: ANES,,, | Performed by: ANESTHESIOLOGY

## 2023-11-27 PROCEDURE — 25000003 PHARM REV CODE 250: Performed by: SURGERY

## 2023-11-27 PROCEDURE — 27201107 HC STYLET, STANDARD: Performed by: STUDENT IN AN ORGANIZED HEALTH CARE EDUCATION/TRAINING PROGRAM

## 2023-11-27 PROCEDURE — 94761 N-INVAS EAR/PLS OXIMETRY MLT: CPT | Mod: XB

## 2023-11-27 PROCEDURE — 93010 ELECTROCARDIOGRAM REPORT: CPT | Mod: ,,, | Performed by: INTERNAL MEDICINE

## 2023-11-27 PROCEDURE — 63600175 PHARM REV CODE 636 W HCPCS: Performed by: NURSE ANESTHETIST, CERTIFIED REGISTERED

## 2023-11-27 PROCEDURE — 96366 THER/PROPH/DIAG IV INF ADDON: CPT | Mod: 59

## 2023-11-27 PROCEDURE — 43264 ERCP REMOVE DUCT CALCULI: CPT | Performed by: INTERNAL MEDICINE

## 2023-11-27 PROCEDURE — 96375 TX/PRO/DX INJ NEW DRUG ADDON: CPT | Mod: 59

## 2023-11-27 PROCEDURE — D9220A PRA ANESTHESIA: ICD-10-PCS | Mod: ANES,,, | Performed by: STUDENT IN AN ORGANIZED HEALTH CARE EDUCATION/TRAINING PROGRAM

## 2023-11-27 PROCEDURE — 71000033 HC RECOVERY, INTIAL HOUR: Performed by: SURGERY

## 2023-11-27 PROCEDURE — 85025 COMPLETE CBC W/AUTO DIFF WBC: CPT | Performed by: INTERNAL MEDICINE

## 2023-11-27 PROCEDURE — 47562 PR LAP,CHOLECYSTECTOMY: ICD-10-PCS | Mod: ,,, | Performed by: SURGERY

## 2023-11-27 PROCEDURE — 27000673 HC TUBING BLOOD Y: Performed by: STUDENT IN AN ORGANIZED HEALTH CARE EDUCATION/TRAINING PROGRAM

## 2023-11-27 PROCEDURE — 36000709 HC OR TIME LEV III EA ADD 15 MIN: Performed by: SURGERY

## 2023-11-27 PROCEDURE — 63600175 PHARM REV CODE 636 W HCPCS: Performed by: ANESTHESIOLOGY

## 2023-11-27 PROCEDURE — 83735 ASSAY OF MAGNESIUM: CPT | Performed by: INTERNAL MEDICINE

## 2023-11-27 PROCEDURE — D9220A PRA ANESTHESIA: ICD-10-PCS | Mod: ANES,,, | Performed by: ANESTHESIOLOGY

## 2023-11-27 PROCEDURE — 93005 ELECTROCARDIOGRAM TRACING: CPT | Performed by: INTERNAL MEDICINE

## 2023-11-27 PROCEDURE — 99214 PR OFFICE/OUTPT VISIT, EST, LEVL IV, 30-39 MIN: ICD-10-PCS | Mod: 57,,, | Performed by: SURGERY

## 2023-11-27 PROCEDURE — 37000008 HC ANESTHESIA 1ST 15 MINUTES: Performed by: INTERNAL MEDICINE

## 2023-11-27 PROCEDURE — 63600175 PHARM REV CODE 636 W HCPCS: Performed by: INTERNAL MEDICINE

## 2023-11-27 PROCEDURE — 47562 LAPAROSCOPIC CHOLECYSTECTOMY: CPT | Mod: ,,, | Performed by: SURGERY

## 2023-11-27 PROCEDURE — 37000009 HC ANESTHESIA EA ADD 15 MINS: Performed by: INTERNAL MEDICINE

## 2023-11-27 PROCEDURE — 80053 COMPREHEN METABOLIC PANEL: CPT | Performed by: INTERNAL MEDICINE

## 2023-11-27 PROCEDURE — 25000003 PHARM REV CODE 250: Performed by: NURSE ANESTHETIST, CERTIFIED REGISTERED

## 2023-11-27 PROCEDURE — 99900035 HC TECH TIME PER 15 MIN (STAT)

## 2023-11-27 PROCEDURE — 25000003 PHARM REV CODE 250: Performed by: ANESTHESIOLOGY

## 2023-11-27 PROCEDURE — 71000039 HC RECOVERY, EACH ADD'L HOUR: Performed by: SURGERY

## 2023-11-27 PROCEDURE — 37000008 HC ANESTHESIA 1ST 15 MINUTES: Performed by: SURGERY

## 2023-11-27 PROCEDURE — 25000003 PHARM REV CODE 250: Performed by: INTERNAL MEDICINE

## 2023-11-27 PROCEDURE — G0378 HOSPITAL OBSERVATION PER HR: HCPCS

## 2023-11-27 PROCEDURE — 27202125 HC BALLOON, EXTRACTION (ANY): Performed by: INTERNAL MEDICINE

## 2023-11-27 PROCEDURE — D9220A PRA ANESTHESIA: Mod: ANES,,, | Performed by: STUDENT IN AN ORGANIZED HEALTH CARE EDUCATION/TRAINING PROGRAM

## 2023-11-27 PROCEDURE — 27202107 HC XP QUATRO SENSOR: Performed by: STUDENT IN AN ORGANIZED HEALTH CARE EDUCATION/TRAINING PROGRAM

## 2023-11-27 PROCEDURE — C1769 GUIDE WIRE: HCPCS | Performed by: INTERNAL MEDICINE

## 2023-11-27 PROCEDURE — 27201423 OPTIME MED/SURG SUP & DEVICES STERILE SUPPLY: Performed by: SURGERY

## 2023-11-27 PROCEDURE — 93010 EKG 12-LEAD: ICD-10-PCS | Mod: ,,, | Performed by: INTERNAL MEDICINE

## 2023-11-27 PROCEDURE — 43239 EGD BIOPSY SINGLE/MULTIPLE: CPT | Performed by: INTERNAL MEDICINE

## 2023-11-27 PROCEDURE — 36415 COLL VENOUS BLD VENIPUNCTURE: CPT | Performed by: INTERNAL MEDICINE

## 2023-11-27 PROCEDURE — 36000708 HC OR TIME LEV III 1ST 15 MIN: Performed by: SURGERY

## 2023-11-27 PROCEDURE — 37000009 HC ANESTHESIA EA ADD 15 MINS: Performed by: SURGERY

## 2023-11-27 RX ORDER — ONDANSETRON 2 MG/ML
INJECTION INTRAMUSCULAR; INTRAVENOUS
Status: DISCONTINUED | OUTPATIENT
Start: 2023-11-27 | End: 2023-11-27

## 2023-11-27 RX ORDER — OXYCODONE HYDROCHLORIDE 5 MG/1
5 TABLET ORAL
Status: DISCONTINUED | OUTPATIENT
Start: 2023-11-27 | End: 2023-11-27

## 2023-11-27 RX ORDER — PROPOFOL 10 MG/ML
VIAL (ML) INTRAVENOUS
Status: DISCONTINUED | OUTPATIENT
Start: 2023-11-27 | End: 2023-11-27

## 2023-11-27 RX ORDER — BUPIVACAINE HYDROCHLORIDE AND EPINEPHRINE 5; 5 MG/ML; UG/ML
INJECTION, SOLUTION EPIDURAL; INTRACAUDAL; PERINEURAL
Status: DISCONTINUED | OUTPATIENT
Start: 2023-11-27 | End: 2023-11-27 | Stop reason: HOSPADM

## 2023-11-27 RX ORDER — FENTANYL CITRATE 50 UG/ML
INJECTION, SOLUTION INTRAMUSCULAR; INTRAVENOUS
Status: DISCONTINUED | OUTPATIENT
Start: 2023-11-27 | End: 2023-11-27

## 2023-11-27 RX ORDER — ROCURONIUM BROMIDE 10 MG/ML
INJECTION, SOLUTION INTRAVENOUS
Status: DISCONTINUED | OUTPATIENT
Start: 2023-11-27 | End: 2023-11-27

## 2023-11-27 RX ORDER — HYDROMORPHONE HYDROCHLORIDE 1 MG/ML
0.2 INJECTION, SOLUTION INTRAMUSCULAR; INTRAVENOUS; SUBCUTANEOUS EVERY 5 MIN PRN
Status: DISCONTINUED | OUTPATIENT
Start: 2023-11-27 | End: 2023-11-27

## 2023-11-27 RX ORDER — ONDANSETRON 2 MG/ML
4 INJECTION INTRAMUSCULAR; INTRAVENOUS DAILY PRN
Status: DISCONTINUED | OUTPATIENT
Start: 2023-11-27 | End: 2023-11-27

## 2023-11-27 RX ORDER — INDOMETHACIN 50 MG/1
SUPPOSITORY RECTAL
Status: DISCONTINUED | OUTPATIENT
Start: 2023-11-27 | End: 2023-11-27 | Stop reason: HOSPADM

## 2023-11-27 RX ORDER — DEXAMETHASONE SODIUM PHOSPHATE 4 MG/ML
INJECTION, SOLUTION INTRA-ARTICULAR; INTRALESIONAL; INTRAMUSCULAR; INTRAVENOUS; SOFT TISSUE
Status: DISCONTINUED | OUTPATIENT
Start: 2023-11-27 | End: 2023-11-27

## 2023-11-27 RX ORDER — LIDOCAINE HYDROCHLORIDE 20 MG/ML
JELLY TOPICAL
Status: DISCONTINUED | OUTPATIENT
Start: 2023-11-27 | End: 2023-11-27

## 2023-11-27 RX ORDER — MIDAZOLAM HYDROCHLORIDE 1 MG/ML
INJECTION INTRAMUSCULAR; INTRAVENOUS
Status: DISCONTINUED | OUTPATIENT
Start: 2023-11-27 | End: 2023-11-27

## 2023-11-27 RX ORDER — LIDOCAINE HYDROCHLORIDE 20 MG/ML
INJECTION, SOLUTION EPIDURAL; INFILTRATION; INTRACAUDAL; PERINEURAL
Status: DISCONTINUED | OUTPATIENT
Start: 2023-11-27 | End: 2023-11-27

## 2023-11-27 RX ORDER — SUCCINYLCHOLINE CHLORIDE 20 MG/ML
INJECTION INTRAMUSCULAR; INTRAVENOUS
Status: DISCONTINUED | OUTPATIENT
Start: 2023-11-27 | End: 2023-11-27

## 2023-11-27 RX ORDER — DIPHENHYDRAMINE HYDROCHLORIDE 50 MG/ML
12.5 INJECTION INTRAMUSCULAR; INTRAVENOUS ONCE AS NEEDED
Status: DISCONTINUED | OUTPATIENT
Start: 2023-11-27 | End: 2023-11-27

## 2023-11-27 RX ORDER — FAMOTIDINE 10 MG/ML
INJECTION INTRAVENOUS
Status: DISCONTINUED | OUTPATIENT
Start: 2023-11-27 | End: 2023-11-27

## 2023-11-27 RX ORDER — SCOLOPAMINE TRANSDERMAL SYSTEM 1 MG/1
1 PATCH, EXTENDED RELEASE TRANSDERMAL ONCE
Status: COMPLETED | OUTPATIENT
Start: 2023-11-27 | End: 2023-11-27

## 2023-11-27 RX ADMIN — SODIUM CHLORIDE, SODIUM LACTATE, POTASSIUM CHLORIDE, AND CALCIUM CHLORIDE: .6; .31; .03; .02 INJECTION, SOLUTION INTRAVENOUS at 04:11

## 2023-11-27 RX ADMIN — FENTANYL CITRATE 50 MCG: 50 INJECTION, SOLUTION INTRAMUSCULAR; INTRAVENOUS at 08:11

## 2023-11-27 RX ADMIN — HYDROMORPHONE HYDROCHLORIDE 0.2 MG: 1 INJECTION, SOLUTION INTRAMUSCULAR; INTRAVENOUS; SUBCUTANEOUS at 05:11

## 2023-11-27 RX ADMIN — HYDROMORPHONE HYDROCHLORIDE 0.2 MG: 1 INJECTION, SOLUTION INTRAMUSCULAR; INTRAVENOUS; SUBCUTANEOUS at 06:11

## 2023-11-27 RX ADMIN — LIDOCAINE HYDROCHLORIDE 60 MG: 20 INJECTION, SOLUTION INTRAVENOUS at 08:11

## 2023-11-27 RX ADMIN — SUGAMMADEX 200 MG: 100 INJECTION, SOLUTION INTRAVENOUS at 04:11

## 2023-11-27 RX ADMIN — OXYCODONE HYDROCHLORIDE 5 MG: 5 TABLET ORAL at 05:11

## 2023-11-27 RX ADMIN — FENTANYL CITRATE 50 MCG: 50 INJECTION INTRAMUSCULAR; INTRAVENOUS at 04:11

## 2023-11-27 RX ADMIN — Medication 130 MG: at 08:11

## 2023-11-27 RX ADMIN — PIPERACILLIN SODIUM AND TAZOBACTAM SODIUM 4.5 G: 4; .5 INJECTION, POWDER, LYOPHILIZED, FOR SOLUTION INTRAVENOUS at 02:11

## 2023-11-27 RX ADMIN — SODIUM CHLORIDE, SODIUM LACTATE, POTASSIUM CHLORIDE, AND CALCIUM CHLORIDE: .6; .31; .03; .02 INJECTION, SOLUTION INTRAVENOUS at 08:11

## 2023-11-27 RX ADMIN — PIPERACILLIN SODIUM AND TAZOBACTAM SODIUM 4.5 G: 4; .5 INJECTION, POWDER, LYOPHILIZED, FOR SOLUTION INTRAVENOUS at 04:11

## 2023-11-27 RX ADMIN — DEXTROSE, SODIUM CHLORIDE, AND POTASSIUM CHLORIDE: 5; .45; .15 INJECTION INTRAVENOUS at 11:11

## 2023-11-27 RX ADMIN — ROCURONIUM BROMIDE 5 MG: 10 INJECTION, SOLUTION INTRAVENOUS at 08:11

## 2023-11-27 RX ADMIN — ONDANSETRON 4 MG: 2 INJECTION INTRAMUSCULAR; INTRAVENOUS at 05:11

## 2023-11-27 RX ADMIN — LIDOCAINE HYDROCHLORIDE 1 EACH: 20 JELLY TOPICAL at 08:11

## 2023-11-27 RX ADMIN — MIDAZOLAM HYDROCHLORIDE 2 MG: 1 INJECTION, SOLUTION INTRAMUSCULAR; INTRAVENOUS at 04:11

## 2023-11-27 RX ADMIN — ROCURONIUM BROMIDE 5 MG: 10 INJECTION, SOLUTION INTRAVENOUS at 04:11

## 2023-11-27 RX ADMIN — MIDAZOLAM HYDROCHLORIDE 2 MG: 1 INJECTION, SOLUTION INTRAMUSCULAR; INTRAVENOUS at 08:11

## 2023-11-27 RX ADMIN — LIDOCAINE HYDROCHLORIDE 50 MG: 20 INJECTION, SOLUTION INTRAVENOUS at 04:11

## 2023-11-27 RX ADMIN — MORPHINE SULFATE 4 MG: 4 INJECTION, SOLUTION INTRAMUSCULAR; INTRAVENOUS at 09:11

## 2023-11-27 RX ADMIN — ONDANSETRON 4 MG: 2 INJECTION INTRAMUSCULAR; INTRAVENOUS at 08:11

## 2023-11-27 RX ADMIN — SODIUM CHLORIDE, SODIUM LACTATE, POTASSIUM CHLORIDE, AND CALCIUM CHLORIDE 1000 ML: .6; .31; .03; .02 INJECTION, SOLUTION INTRAVENOUS at 10:11

## 2023-11-27 RX ADMIN — SODIUM CHLORIDE, PRESERVATIVE FREE 10 ML: 5 INJECTION INTRAVENOUS at 10:11

## 2023-11-27 RX ADMIN — ONDANSETRON 4 MG: 2 INJECTION INTRAMUSCULAR; INTRAVENOUS at 09:11

## 2023-11-27 RX ADMIN — ROCURONIUM BROMIDE 15 MG: 10 INJECTION, SOLUTION INTRAVENOUS at 09:11

## 2023-11-27 RX ADMIN — DEXAMETHASONE SODIUM PHOSPHATE 4 MG: 4 INJECTION, SOLUTION INTRAMUSCULAR; INTRAVENOUS at 08:11

## 2023-11-27 RX ADMIN — PROPOFOL 150 MG: 10 INJECTION, EMULSION INTRAVENOUS at 04:11

## 2023-11-27 RX ADMIN — Medication 120 MG: at 04:11

## 2023-11-27 RX ADMIN — SUGAMMADEX 200 MG: 100 INJECTION, SOLUTION INTRAVENOUS at 09:11

## 2023-11-27 RX ADMIN — FAMOTIDINE 20 MG: 10 INJECTION, SOLUTION INTRAVENOUS at 08:11

## 2023-11-27 RX ADMIN — ROCURONIUM BROMIDE 20 MG: 10 INJECTION, SOLUTION INTRAVENOUS at 04:11

## 2023-11-27 RX ADMIN — PROPOFOL 160 MG: 10 INJECTION, EMULSION INTRAVENOUS at 08:11

## 2023-11-27 NOTE — TRANSFER OF CARE
"Anesthesia Transfer of Care Note    Patient: Lynn Naidu    Procedure(s) Performed: Procedure(s) (LRB):  CHOLECYSTECTOMY, LAPAROSCOPIC (N/A)    Patient location: PACU    Anesthesia Type: general    Transport from OR: Transported from OR on room air with adequate spontaneous ventilation    Post pain: adequate analgesia    Post assessment: no apparent anesthetic complications and tolerated procedure well    Post vital signs: stable    Level of consciousness: awake    Nausea/Vomiting: no nausea/vomiting    Complications: none    Transfer of care protocol was followed      Last vitals: Visit Vitals  BP (!) 155/66   Pulse 92   Temp 36.7 °C (98.1 °F) (Oral)   Resp 16   Ht 5' 2.99" (1.6 m)   Wt 74.8 kg (164 lb 14.5 oz)   LMP 02/11/2009   SpO2 97%   Breastfeeding No   BMI 29.22 kg/m²     "

## 2023-11-27 NOTE — ASSESSMENT & PLAN NOTE
Uncomplicated Choledocholithiasis secondary to cholelithiasis  -CT abdomen completed - cholecystitis with distended gall bladder  -MRI MRCP demonstrated right renal cysts, gall stones, pericholecystic fluid, and mildly dilated common duct  -Afebrile with unremarkable CBC and normal pancreatic enzyme levels  -Transaminases are more elevated today but WBC is improved.  -Started on IV Zosyn, IVFs  -GI Dr. Overton consulted  -NPO  -analgesic and antiemetics  -Continue home PPI  -Mechanical DVT prophylaxis w/SCDs  Arrived at [facility].

## 2023-11-27 NOTE — ASSESSMENT & PLAN NOTE
Patient has hypokalemia which is Acute and currently uncontrolled. Most recent potassium levels reviewed-   Lab Results   Component Value Date    K 3.3 (L) 11/27/2023   Will place patient on continuous IV fluids with potassium. Oral potassium this am made her sick again. Will try to avoid oral replacement in the short term.

## 2023-11-27 NOTE — BRIEF OP NOTE
[facility]  Brief Operative Note    SUMMARY     Surgery Date: 11/27/2023     Surgeon(s) and Role:     * Al Cyr MD - Primary    Assisting Surgeon: None    Pre-op Diagnosis:  Cholangitis concurrent with and due to calculus of gallbladder [K83.09, K80.20]    Post-op Diagnosis:  Post-Op Diagnosis Codes:     * Cholangitis concurrent with and due to calculus of gallbladder [K83.09, K80.20]    Procedure(s) (LRB):  CHOLECYSTECTOMY, LAPAROSCOPIC (N/A)    Anesthesia: General    Implants:      Operative Findings: Distended gallbladder    Estimated Blood Loss: 10 cc's*    Estimated Blood Loss has been documented.         Specimens:   Specimen (24h ago, onward)       Start     Ordered    11/27/23 1653  Specimen to Pathology - Surgery  Once        Comments: Pre-op Diagnosis: Cholangitis concurrent with and due to calculus of gallbladder [K83.09, K80.20]Procedure(s):CHOLECYSTECTOMY, LAPAROSCOPIC Number of specimens: 1Name of specimens: gallbladder     Question:  Release to patient  Answer:  Immediate    11/27/23 1653 11/27/23 0917  Specimen to Pathology - Surgery  Once        Comments: Pre-op Diagnosis: Choledocholithiasis [K80.50]Post-op Diagnosis: Hx of H-PyloriProcedure(s):ERCP (ENDOSCOPIC RETROGRADE CHOLANGIOPANCREATOGRAPHY) Number of specimens: 1Name of specimens: 1. Stomach bx r/o H-Pylori     Question:  Release to patient  Answer:  Immediate    11/27/23 0917                    [id number]

## 2023-11-27 NOTE — SUBJECTIVE & OBJECTIVE
Current Facility-Administered Medications on File Prior to Encounter   Medication    lactated ringers infusion    lactated ringers infusion     Current Outpatient Medications on File Prior to Encounter   Medication Sig    diazePAM (VALIUM) 10 MG Tab Take 10 mg by mouth every evening.    FLUoxetine 20 MG capsule Take 20 mg by mouth once daily.    furosemide (LASIX) 40 MG tablet Take 1 tablet (40 mg total) by mouth once daily.    hydrOXYzine HCl (ATARAX) 25 MG tablet Take 1 tablet (25 mg total) by mouth 3 (three) times daily as needed. (Patient taking differently: Take 25 mg by mouth once daily.)    hydrOXYzine pamoate (VISTARIL) 25 MG Cap Take 25 mg by mouth 2 (two) times daily as needed.    linaCLOtide (LINZESS) 290 mcg Cap capsule Take 1 capsule (290 mcg total) by mouth before breakfast.    metoprolol succinate (TOPROL-XL) 50 MG 24 hr tablet Take 1 tablet (50 mg total) by mouth once daily.    montelukast (SINGULAIR) 10 mg tablet Take 1 tablet (10 mg total) by mouth every evening.    pantoprazole (PROTONIX) 40 MG tablet Take 1 tablet (40 mg total) by mouth every morning.    potassium chloride SA (K-DUR,KLOR-CON) 20 MEQ tablet Take 1 tablet (20 mEq total) by mouth 2 (two) times daily.    semaglutide, weight loss, (WEGOVY) 2.4 mg/0.75 mL PnIj Inject 2.4 mg into the skin every 7 days.    ciclopirox (PENLAC) 8 % Soln APPLY TOPICALLY NIGHTLY    ergocalciferol (ERGOCALCIFEROL) 50,000 unit Cap Take 1 capsule (50,000 Units total) by mouth twice a week.    fluconazole (DIFLUCAN) 100 MG tablet Take 100 mg by mouth.    fluticasone propionate (FLONASE) 50 mcg/actuation nasal spray 2 sprays (100 mcg total) by Each Nostril route once daily.    methocarbamoL (ROBAXIN) 750 MG Tab Take 1 tablet (750 mg total) by mouth 4 (four) times daily as needed (muscle spasm). (Patient not taking: Reported on 11/13/2023)    methylPREDNISolone (MEDROL DOSEPACK) 4 mg tablet Take by mouth.    nabumetone (RELAFEN) 500 MG tablet Take 1 tablet (500 mg  total) by mouth 2 (two) times daily as needed for Pain. (Patient not taking: Reported on 11/13/2023)    pregabalin (LYRICA) 75 MG capsule TAKE 1 CAPSULE BY MOUTH EVERY MORNING AND 2 CAPSULES AT BEDTIME (Patient not taking: Reported on 11/13/2023)    promethazine (PHENERGAN) 25 MG tablet Take 25 mg by mouth.    SULFACLEANSE 8-4 8-4 % Susp Apply topically nightly as needed.     traMADoL (ULTRAM) 50 mg tablet Take 1 tablet (50 mg total) by mouth every 8 (eight) hours as needed for Pain. (Patient not taking: Reported on 11/6/2023)    triamcinolone acetonide 0.1% (KENALOG) 0.1 % paste SMARTSIG:Sparingly TO TEETH 3 Times Daily       Review of patient's allergies indicates:   Allergen Reactions    Omnicef [cefdinir] Itching       Past Medical History:   Diagnosis Date    Allergy     Bronchitis 07/17/2015    Cervical disc displacement     Edema     Hypertension     Insomnia     Liver cyst     Plantar fasciitis      Past Surgical History:   Procedure Laterality Date    COLONOSCOPY  11/01/2017    Dr. Oviedo, normal, ten year recheck    COLONOSCOPY N/A 11/01/2017    Procedure: COLONOSCOPY;  Surgeon: Cameron Oviedo MD;  Location: [facility];  Service: Endoscopy;  Laterality: N/A;    EPIDURAL STEROID INJECTION INTO CERVICAL SPINE N/A 07/22/2019    Procedure: Injection-steroid-epidural-cervical;  Surgeon: Thomas Ivory MD;  Location: [facility] OR;  Service: Pain Management;  Laterality: N/A;  C7-T1    EPIDURAL STEROID INJECTION INTO CERVICAL SPINE N/A 12/21/2021    Procedure: Injection-steroid-epidural-cervical;  Surgeon: Thomas Ivory MD;  Location: [facility] OR;  Service: Pain Management;  Laterality: N/A;  C6-7    ESOPHAGOGASTRODUODENOSCOPY N/A 08/31/2023    Procedure: EGD (ESOPHAGOGASTRODUODENOSCOPY);  Surgeon: Cameron Oviedo MD;  Location: [facility];  Service: Endoscopy;  Laterality: N/A;    INJECTION OF ANESTHETIC AGENT AROUND STELLATE GANGLION Left 08/30/2022    Procedure: BLOCK, GANGLION, STELLATE;  Surgeon: Thomas Ivory MD;   Location: [facility] OR;  Service: Pain Management;  Laterality: Left;    INJECTION OF ANESTHETIC AGENT AROUND STELLATE GANGLION Left 10/21/2022    Procedure: BLOCK, GANGLION, STELLATE Left;  Surgeon: Thomas Ivory MD;  Location: [facility] OR;  Service: Pain Management;  Laterality: Left;    INJECTION OF ANESTHETIC AGENT AROUND STELLATE GANGLION Left 12/27/2022    Procedure: BLOCK, GANGLION, STELLATE;  Surgeon: Thmoas Ivory MD;  Location: [facility] OR;  Service: Pain Management;  Laterality: Left;  left    TONSILLECTOMY, ADENOIDECTOMY Bilateral 07/23/2021    Procedure: TONSILLECTOMY AND ADENOIDECTOMY;  Surgeon: Anselmo Galvan MD;  Location: [facility] OR;  Service: ENT;  Laterality: Bilateral;    UPPER GASTROINTESTINAL ENDOSCOPY      WISDOM TOOTH EXTRACTION       Family History       Problem Relation (Age of Onset)    Allergies Sister, Sister    Asthma Sister, Sister    Cancer Father    Diabetes Sister    Heart disease Mother, Brother    Hypertension Sister, Sister    Kidney failure Brother    Stroke Mother          Tobacco Use    Smoking status: Never    Smokeless tobacco: Never   Substance and Sexual Activity    Alcohol use: Never    Drug use: No    Sexual activity: Yes     Partners: Male     Review of Systems   Constitutional:  Negative for activity change and appetite change.   Gastrointestinal:  Positive for abdominal pain. Negative for nausea and vomiting.   Skin:  Negative for color change.     Objective:     Vital Signs (Most Recent):  Temp: 98.1 °F (36.7 °C) (11/27/23 1246)  Pulse: 92 (11/27/23 1246)  Resp: 16 (11/27/23 1246)  BP: (!) 155/66 (11/27/23 1246)  SpO2: 97 % (11/27/23 1246) Vital Signs (24h Range):  Temp:  [97.4 °F (36.3 °C)-98.3 °F (36.8 °C)] 98.1 °F (36.7 °C)  Pulse:  [] 92  Resp:  [13-20] 16  SpO2:  [97 %-100 %] 97 %  BP: ()/(53-87) 155/66     Weight: 74.8 kg (164 lb 14.5 oz)  Body mass index is 29.22 kg/m².     Physical Exam  Vitals reviewed.   Cardiovascular:      Rate and Rhythm: Normal rate.    Pulmonary:      Effort: Pulmonary effort is normal.   Abdominal:      General: Abdomen is flat. Bowel sounds are normal. There is no distension.      Tenderness: There is no abdominal tenderness.      Hernia: No hernia is present.   Neurological:      Mental Status: She is alert.   Psychiatric:         Mood and Affect: Mood normal.            I have reviewed all pertinent lab results within the past 24 hours.  CBC:   Recent Labs   Lab 11/27/23 0421   WBC 5.86   RBC 3.75*   HGB 11.4*   HCT 33.8*      MCV 90   MCH 30.4   MCHC 33.7     CMP:   Recent Labs   Lab 11/27/23  0421   GLU 93   CALCIUM 8.6*   ALBUMIN 3.3*   PROT 5.6*      K 3.3*   CO2 27      BUN 7   CREATININE 1.1   ALKPHOS 107   *   *   BILITOT 0.9       Significant Diagnostics:  ERCP performed today

## 2023-11-27 NOTE — CONSULTS
[facility]  General Surgery  Consult Note    Patient Name: Lynn Naidu  MRN: [id number]  Code Status: Full Code  Admission Date: 11/25/2023  Hospital Length of Stay: 0 days  Attending Physician: Ori Cooley DO  Primary Care Provider: Remy Parson MD    Patient information was obtained from patient and ER records.     Consults  Subjective:     Principal Problem: Cholelithiasis with choledocholithiasis    History of Present Illness: Pleasant 59 yo F who presented to outside facility with abdominal pain and discomfort on Sat.  She had findings consistent with choledocholithiasis and was transferred to this facility.  She ahd ERCP today and surgery was consulted for cholecystitis.  She notes her pain has improved.  She has no significant PShx or PMhx.        Current Facility-Administered Medications on File Prior to Encounter   Medication    lactated ringers infusion    lactated ringers infusion     Current Outpatient Medications on File Prior to Encounter   Medication Sig    diazePAM (VALIUM) 10 MG Tab Take 10 mg by mouth every evening.    FLUoxetine 20 MG capsule Take 20 mg by mouth once daily.    furosemide (LASIX) 40 MG tablet Take 1 tablet (40 mg total) by mouth once daily.    hydrOXYzine HCl (ATARAX) 25 MG tablet Take 1 tablet (25 mg total) by mouth 3 (three) times daily as needed. (Patient taking differently: Take 25 mg by mouth once daily.)    hydrOXYzine pamoate (VISTARIL) 25 MG Cap Take 25 mg by mouth 2 (two) times daily as needed.    linaCLOtide (LINZESS) 290 mcg Cap capsule Take 1 capsule (290 mcg total) by mouth before breakfast.    metoprolol succinate (TOPROL-XL) 50 MG 24 hr tablet Take 1 tablet (50 mg total) by mouth once daily.    montelukast (SINGULAIR) 10 mg tablet Take 1 tablet (10 mg total) by mouth every evening.    pantoprazole (PROTONIX) 40 MG tablet Take 1 tablet (40 mg total) by mouth every morning.    potassium chloride SA (K-DUR,KLOR-CON) 20 MEQ tablet Take 1  tablet (20 mEq total) by mouth 2 (two) times daily.    semaglutide, weight loss, (WEGOVY) 2.4 mg/0.75 mL PnIj Inject 2.4 mg into the skin every 7 days.    ciclopirox (PENLAC) 8 % Soln APPLY TOPICALLY NIGHTLY    ergocalciferol (ERGOCALCIFEROL) 50,000 unit Cap Take 1 capsule (50,000 Units total) by mouth twice a week.    fluconazole (DIFLUCAN) 100 MG tablet Take 100 mg by mouth.    fluticasone propionate (FLONASE) 50 mcg/actuation nasal spray 2 sprays (100 mcg total) by Each Nostril route once daily.    methocarbamoL (ROBAXIN) 750 MG Tab Take 1 tablet (750 mg total) by mouth 4 (four) times daily as needed (muscle spasm). (Patient not taking: Reported on 11/13/2023)    methylPREDNISolone (MEDROL DOSEPACK) 4 mg tablet Take by mouth.    nabumetone (RELAFEN) 500 MG tablet Take 1 tablet (500 mg total) by mouth 2 (two) times daily as needed for Pain. (Patient not taking: Reported on 11/13/2023)    pregabalin (LYRICA) 75 MG capsule TAKE 1 CAPSULE BY MOUTH EVERY MORNING AND 2 CAPSULES AT BEDTIME (Patient not taking: Reported on 11/13/2023)    promethazine (PHENERGAN) 25 MG tablet Take 25 mg by mouth.    SULFACLEANSE 8-4 8-4 % Susp Apply topically nightly as needed.     traMADoL (ULTRAM) 50 mg tablet Take 1 tablet (50 mg total) by mouth every 8 (eight) hours as needed for Pain. (Patient not taking: Reported on 11/6/2023)    triamcinolone acetonide 0.1% (KENALOG) 0.1 % paste SMARTSIG:Sparingly TO TEETH 3 Times Daily       Review of patient's allergies indicates:   Allergen Reactions    Omnicef [cefdinir] Itching       Past Medical History:   Diagnosis Date    Allergy     Bronchitis 07/17/2015    Cervical disc displacement     Edema     Hypertension     Insomnia     Liver cyst     Plantar fasciitis      Past Surgical History:   Procedure Laterality Date    COLONOSCOPY  11/01/2017    Dr. Oviedo, normal, ten year recheck    COLONOSCOPY N/A 11/01/2017    Procedure: COLONOSCOPY;  Surgeon: Cameron Oviedo MD;  [facility]: [facility];   Service: Endoscopy;  Laterality: N/A;    EPIDURAL STEROID INJECTION INTO CERVICAL SPINE N/A 07/22/2019    Procedure: Injection-steroid-epidural-cervical;  Surgeon: Thomas Ivory MD;  Location: [facility] OR;  Service: Pain Management;  Laterality: N/A;  C7-T1    EPIDURAL STEROID INJECTION INTO CERVICAL SPINE N/A 12/21/2021    Procedure: Injection-steroid-epidural-cervical;  Surgeon: Thomas Ivory MD;  Location: [facility];  Service: Pain Management;  Laterality: N/A;  C6-7    ESOPHAGOGASTRODUODENOSCOPY N/A 08/31/2023    Procedure: EGD (ESOPHAGOGASTRODUODENOSCOPY);  Surgeon: Cameron Oviedo MD;  Location: [facility];  Service: Endoscopy;  Laterality: N/A;    INJECTION OF ANESTHETIC AGENT AROUND STELLATE GANGLION Left 08/30/2022    Procedure: BLOCK, GANGLION, STELLATE;  Surgeon: Thomas Ivory MD;  Location: [facility];  Service: Pain Management;  Laterality: Left;    INJECTION OF ANESTHETIC AGENT AROUND STELLATE GANGLION Left 10/21/2022    Procedure: BLOCK, GANGLION, STELLATE Left;  Surgeon: Thomas Ivory MD;  Location: [facility] OR;  Service: Pain Management;  Laterality: Left;    INJECTION OF ANESTHETIC AGENT AROUND STELLATE GANGLION Left 12/27/2022    Procedure: BLOCK, GANGLION, STELLATE;  Surgeon: Thomas Ivory MD;  Location: [facility] OR;  Service: Pain Management;  Laterality: Left;  left    TONSILLECTOMY, ADENOIDECTOMY Bilateral 07/23/2021    Procedure: TONSILLECTOMY AND ADENOIDECTOMY;  Surgeon: Anslemo Galvan MD;  Location: [facility] OR;  Service: ENT;  Laterality: Bilateral;    UPPER GASTROINTESTINAL ENDOSCOPY      WISDOM TOOTH EXTRACTION       Family History       Problem Relation (Age of Onset)    Allergies Sister, Sister    Asthma Sister, Sister    Cancer Father    Diabetes Sister    Heart disease Mother, Brother    Hypertension Sister, Sister    Kidney failure Brother    Stroke Mother          Tobacco Use    Smoking status: Never    Smokeless tobacco: Never   Substance and Sexual Activity    Alcohol use: Never    Drug use: No     Sexual activity: Yes     Partners: Male     Review of Systems   Constitutional:  Negative for activity change and appetite change.   Gastrointestinal:  Positive for abdominal pain. Negative for nausea and vomiting.   Skin:  Negative for color change.     Objective:     Vital Signs (Most Recent):  Temp: 98.1 °F (36.7 °C) (11/27/23 1246)  Pulse: 92 (11/27/23 1246)  Resp: 16 (11/27/23 1246)  BP: (!) 155/66 (11/27/23 1246)  SpO2: 97 % (11/27/23 1246) Vital Signs (24h Range):  Temp:  [97.4 °F (36.3 °C)-98.3 °F (36.8 °C)] 98.1 °F (36.7 °C)  Pulse:  [] 92  Resp:  [13-20] 16  SpO2:  [97 %-100 %] 97 %  BP: ()/(53-87) 155/66     Weight: 74.8 kg (164 lb 14.5 oz)  Body mass index is 29.22 kg/m².     Physical Exam  Vitals reviewed.   Cardiovascular:      Rate and Rhythm: Normal rate.   Pulmonary:      Effort: Pulmonary effort is normal.   Abdominal:      General: Abdomen is flat. Bowel sounds are normal. There is no distension.      Tenderness: There is no abdominal tenderness.      Hernia: No hernia is present.   Neurological:      Mental Status: She is alert.   Psychiatric:         Mood and Affect: Mood normal.            I have reviewed all pertinent lab results within the past 24 hours.  CBC:   Recent Labs   Lab 11/27/23 0421   WBC 5.86   RBC 3.75*   HGB 11.4*   HCT 33.8*      MCV 90   MCH 30.4   MCHC 33.7     CMP:   Recent Labs   Lab 11/27/23  0421   GLU 93   CALCIUM 8.6*   ALBUMIN 3.3*   PROT 5.6*      K 3.3*   CO2 27      BUN 7   CREATININE 1.1   ALKPHOS 107   *   *   BILITOT 0.9       Significant Diagnostics:  ERCP performed today    Assessment/Plan:     * Cholelithiasis with choledocholithiasis  Pt s/p ERCP.  Will proceed with lap isela today.  If surgery uneventful can be d/c home in AM.        VTE Risk Mitigation (From admission, onward)           Ordered     IP VTE LOW RISK PATIENT  Once         11/25/23 2303     Place sequential compression device  Until discontinued          11/25/23 3213                    Thank you for your consult. I will follow-up with patient. Please contact us if you have any additional questions.    Al Cyr MD  General Surgery  [facility]

## 2023-11-27 NOTE — [employer] PATIENT INSTRUCTIONS
Discharge Summary/Instructions after an Endoscopic Procedure  Patient Name: Lynn Naidu  Patient MRN: [id number]  Patient YOB: 1965 Monday, November 27, 2023  Anselmo Winter III, MD  RESTRICTIONS:  During your procedure today, you received medications for sedation.  These   medications may affect your judgment, balance and coordination.  Therefore,   for 24 hours, you have the following restrictions:   - DO NOT drive a car, operate machinery, make legal/financial decisions,   sign important papers or drink alcohol.    ACTIVITY:  Today: no heavy lifting, straining or running due to procedural   sedation/anesthesia.  The following day: return to full activity including work.  DIET:  Eat and drink normally unless instructed otherwise.     TREATMENT FOR COMMON SIDE EFFECTS:  - Mild abdominal pain, nausea, belching, bloating or excessive gas:  rest,   eat lightly and use a heating pad.  - Sore Throat: treat with throat lozenges and/or gargle with warm salt   water.  - Because air was used during the procedure, expelling large amounts of air   from your rectum or belching is normal.  - If a bowel prep was taken, you may not have a bowel movement for 1-3 days.    This is normal.  SYMPTOMS TO WATCH FOR AND REPORT TO YOUR PHYSICIAN:  1. Abdominal pain or bloating, other than gas cramps.  2. Chest pain.  3. Back pain.  4. Signs of infection such as: chills or fever occurring within 24 hours   after the procedure.  5. Rectal bleeding, which would show as bright red, maroon, or black stools.   (A tablespoon of blood from the rectum is not serious, especially if   hemorrhoids are present.)  6. Vomiting.  7. Weakness or dizziness.  GO DIRECTLY TO THE NEAREST EMERGENCY ROOM IF YOU HAVE ANY OF THE FOLLOWING:      Difficulty breathing              Chills and/or fever over 101 F   Persistent vomiting and/or vomiting blood   Severe abdominal pain   Severe chest pain   Black, tarry stools   Bleeding- more than one  tablespoon   Any other symptom or condition that you feel may need urgent attention  Your doctor recommends these additional instructions:  If any biopsies were taken, your doctors clinic will contact you in 1 to 2   weeks with any results.  - Clear liquid diet.   - Continue present medications.   - Patient has a contact number available for emergencies.  The signs and   symptoms of potential delayed complications were discussed with the   patient.  Return to normal activities tomorrow.  Written discharge   instructions were provided to the patient.   - [facility].   - Lap isela per surgery  For questions, problems or results please call your physician - Anselmo Winter III, MD at Work:  [phone].  Critical access hospital, EMERGENCY ROOM PHONE NUMBER: [phone]  IF A COMPLICATION OR EMERGENCY SITUATION ARISES AND YOU ARE UNABLE TO REACH   YOUR PHYSICIAN - GO DIRECTLY TO THE EMERGENCY ROOM.  Anselmo Winter III, MD  11/27/2023 9:32:20 AM  This report has been verified and signed electronically.  Dear patient,  As a result of recent federal legislation (The Federal Cures Act), you may   receive lab or pathology results from your procedure in your [facility]   account before your physician is able to contact you. Your physician or   their representative will relay the results to you with their   recommendations at their soonest availability.  Thank you,  PROVATION

## 2023-11-27 NOTE — ANESTHESIA PREPROCEDURE EVALUATION
11/27/2023  Lynn Naidu is a 58 y.o., female.      Pre-op Assessment    I have reviewed the Patient Summary Reports.     I have reviewed the Nursing Notes. I have reviewed the NPO Status.   I have reviewed the Medications.     Review of Systems  Anesthesia Hx:  No problems with previous Anesthesia             Denies Family Hx of Anesthesia complications.    Denies Personal Hx of Anesthesia complications.                    Social:  No Alcohol Use, Non-Smoker       Hematology/Oncology:  Hematology Normal   Oncology Normal                                   EENT/Dental:  EENT/Dental Normal    Ears General/Symptom(s)  Ear Symptoms:  of tinnitus / ringing          Cardiovascular:     Hypertension, well controlled              ECG has been reviewed.                    Hypertension         Pulmonary:  Pulmonary Normal        Hx of bronchitis               Renal/:  Renal/ Normal    Hypokalemia with potassium replacement in progress             Hepatic/GI:     GERD Liver Disease,  Liver cyst    Wegovy Therapy - last dose 3 days ago    Gerd      Biliary Disease, Choledocholithiasis Cholelithiasis Liver Disease        Musculoskeletal:  Arthritis   Cervical disc displacement     Arthritis     Spine Disorders: cervical Disc disease and Degenerative disease           Neurological:    Neuromuscular Disease,       Left arm pain  Left hand weakness        Arthritis                         Neuromuscular Disease   Endocrine:  Endocrine Normal          Obesity / BMI > 30  Dermatological:  Skin Normal    Psych:  Psychiatric Normal     Sleep Disorder and Insomnia.       Sleep Disorder and Insomnia.        Physical Exam  General: Well nourished, Cooperative, Oriented and Alert    Airway:  Mallampati: III   Mouth Opening: Normal  TM Distance: > 6 cm  Tongue: Normal  Neck ROM: Normal  ROM    Dental:  Intact    Chest/Lungs:  Normal Respiratory Rate, Clear to auscultation    Heart:  Rate: Normal  Rhythm: Regular Rhythm        Anesthesia Plan  Type of Anesthesia, risks & benefits discussed:    Anesthesia Type: Gen ETT  Intra-op Monitoring Plan: Standard ASA Monitors  Post Op Pain Control Plan: multimodal analgesia and IV/PO Opioids PRN  Induction:  IV and rapid sequence  Airway Plan: Direct and Video, Post-Induction  Informed Consent: Informed consent signed with the Patient and all parties understand the risks and agree with anesthesia plan.  All questions answered.   ASA Score: 2  Anesthesia Plan Notes:           GETA  RSI ( N/V & Wegovy )  No Ofirmev  Benadryl 6.25 mg iv, Decadron 8 mg, iv Zofran 4 mg iv, Pepcid 20 mg iv, Scopolamine Patch  Sugammadex        Ready For Surgery From Anesthesia Perspective.     .

## 2023-11-27 NOTE — TRANSFER OF CARE
"Anesthesia Transfer of Care Note    Patient: Lynn Naidu    Procedure(s) Performed: Procedure(s) (LRB):  ERCP (ENDOSCOPIC RETROGRADE CHOLANGIOPANCREATOGRAPHY) (N/A)    Patient location: PACU    Anesthesia Type: general    Transport from OR: Transported from OR on room air with adequate spontaneous ventilation    Post pain: adequate analgesia    Post assessment: no apparent anesthetic complications and tolerated procedure well    Post vital signs: stable    Level of consciousness: responds to stimulation    Nausea/Vomiting: no nausea/vomiting    Complications: none    Transfer of care protocol was followedComments: SV, exchanging well.  To PACU, VSS upon arrival. Report to RN       Last vitals: Visit Vitals  /77 (BP Location: Left arm)   Pulse 76   Temp 36.8 °C (98.2 °F) (Temporal)   Resp 16   Ht 5' 2.99" (1.6 m)   Wt 74.8 kg (164 lb 14.5 oz)   LMP 02/11/2009   SpO2 99%   Breastfeeding No   BMI 29.22 kg/m²     "

## 2023-11-27 NOTE — ANESTHESIA POSTPROCEDURE EVALUATION
Anesthesia Post Evaluation    Patient: Lynn Naidu    Procedure(s) Performed: Procedure(s) (LRB):  ERCP (ENDOSCOPIC RETROGRADE CHOLANGIOPANCREATOGRAPHY) (N/A)    Final Anesthesia Type: general      Patient location during evaluation: PACU  Patient participation: Yes- Able to Participate  Level of consciousness: awake and alert  Post-procedure vital signs: reviewed and stable  Pain management: adequate  Airway patency: patent    PONV status at discharge: No PONV  Anesthetic complications: no      Cardiovascular status: blood pressure returned to baseline and stable  Respiratory status: unassisted and room air  Hydration status: euvolemic  Follow-up not needed.          Vitals Value Taken Time   /85 11/27/23 1015   Temp 36.3 °C (97.4 °F) 11/27/23 1015   Pulse 95 11/27/23 1024   Resp 18 11/27/23 1023   SpO2 98 % 11/27/23 1024   Vitals shown include unvalidated device data.      Event Time   Out of Recovery 11/27/2023 10:26:37         Pain/Dana Score: Pain Rating Prior to Med Admin: 8 (11/26/2023  9:30 PM)  Pain Rating Post Med Admin: 0 (11/26/2023 10:00 PM)  Dana Score: 9 (11/27/2023  9:40 AM)

## 2023-11-27 NOTE — CONSULTS
GASTROENTEROLOGY INPATIENT CONSULT NOTE  Patient Name: Lynn Naidu  Patient MRN: [id number]  Patient : 1965    Admit Date: 2023  Service date: 2023    Reason for Consult: Choledocholithiasis    PCP: Remy Parson MD    Chief Complaint   Patient presents with    Vomiting     Started yesterday with abdominal pain       HPI: Patient is a 58 y.o. female with PMHx diverticulosis, HTN, GERD/HH, appendectomy, h/o H. Pylori, neck/back issues presents for evaluation of epig pain. Acute, intermittent, progressive on admission. Mild radiation laterally R>L. Patient followed by [facility] for various issues over past months. 40lb intentional wt loss over past year since starting Mounjaro. No NSAID allergy.     CHART REVIEW:   MRCP  - 8mm CBD w/ stone; gallstones; liver cysts  CT ABd  - cholecystitis / cholelithiasis; dilated biliary; tics; liver cysts; nml panc  HIDA  - decreased EF  GES  - mild delayed (on Mounjaro)  EGD  - small HH; min gastritis    Past Medical History:  Past Medical History:   Diagnosis Date    Allergy     Bronchitis 2015    Cervical disc displacement     Edema     Hypertension     Insomnia     Liver cyst     Plantar fasciitis         Past Surgical History:  Past Surgical History:   Procedure Laterality Date    COLONOSCOPY  2017    Dr. Oviedo, normal, ten year recheck    COLONOSCOPY N/A 2017    Procedure: COLONOSCOPY;  Surgeon: Cameron Ovideo MD;  Location: [facility];  Service: Endoscopy;  Laterality: N/A;    EPIDURAL STEROID INJECTION INTO CERVICAL SPINE N/A 2019    Procedure: Injection-steroid-epidural-cervical;  Surgeon: Thomas Ivory MD;  Location: [facility] OR;  Service: Pain Management;  Laterality: N/A;  C7-T1    EPIDURAL STEROID INJECTION INTO CERVICAL SPINE N/A 2021    Procedure: Injection-steroid-epidural-cervical;  Surgeon: Thomas Ivory MD;  Location: [facility] OR;  Service: Pain Management;  Laterality: N/A;  C6-7     ESOPHAGOGASTRODUODENOSCOPY N/A 08/31/2023    Procedure: EGD (ESOPHAGOGASTRODUODENOSCOPY);  Surgeon: Cameron Oviedo MD;  Location: [facility];  Service: Endoscopy;  Laterality: N/A;    INJECTION OF ANESTHETIC AGENT AROUND STELLATE GANGLION Left 08/30/2022    Procedure: BLOCK, GANGLION, STELLATE;  Surgeon: Thomas Ivory MD;  Location: [facility] OR;  Service: Pain Management;  Laterality: Left;    INJECTION OF ANESTHETIC AGENT AROUND STELLATE GANGLION Left 10/21/2022    Procedure: BLOCK, GANGLION, STELLATE Left;  Surgeon: Thomas Ivory MD;  Location: [facility] OR;  Service: Pain Management;  Laterality: Left;    INJECTION OF ANESTHETIC AGENT AROUND STELLATE GANGLION Left 12/27/2022    Procedure: BLOCK, GANGLION, STELLATE;  Surgeon: Thomas Ivory MD;  Location: [facility] OR;  Service: Pain Management;  Laterality: Left;  left    TONSILLECTOMY, ADENOIDECTOMY Bilateral 07/23/2021    Procedure: TONSILLECTOMY AND ADENOIDECTOMY;  Surgeon: Anselmo Galvan MD;  Location: [facility] OR;  Service: ENT;  Laterality: Bilateral;    UPPER GASTROINTESTINAL ENDOSCOPY      WISDOM TOOTH EXTRACTION          Home Medications:  Medications Prior to Admission   Medication Sig Dispense Refill Last Dose    diazePAM (VALIUM) 10 MG Tab Take 10 mg by mouth every evening.  1 Past Week    FLUoxetine 20 MG capsule Take 20 mg by mouth once daily.  2 11/25/2023    furosemide (LASIX) 40 MG tablet Take 1 tablet (40 mg total) by mouth once daily. 90 tablet 3 11/25/2023    hydrOXYzine HCl (ATARAX) 25 MG tablet Take 1 tablet (25 mg total) by mouth 3 (three) times daily as needed. (Patient taking differently: Take 25 mg by mouth once daily.) 90 tablet 1 Past Month    hydrOXYzine pamoate (VISTARIL) 25 MG Cap Take 25 mg by mouth 2 (two) times daily as needed.   Past Month    linaCLOtide (LINZESS) 290 mcg Cap capsule Take 1 capsule (290 mcg total) by mouth before breakfast. 90 capsule 3 11/25/2023    metoprolol succinate (TOPROL-XL) 50 MG 24 hr tablet Take 1 tablet (50 mg total)  by mouth once daily. 90 tablet 3 11/25/2023    montelukast (SINGULAIR) 10 mg tablet Take 1 tablet (10 mg total) by mouth every evening. 90 tablet 1 11/25/2023    pantoprazole (PROTONIX) 40 MG tablet Take 1 tablet (40 mg total) by mouth every morning. 90 tablet 3 11/25/2023    potassium chloride SA (K-DUR,KLOR-CON) 20 MEQ tablet Take 1 tablet (20 mEq total) by mouth 2 (two) times daily. 180 tablet 3 11/25/2023    semaglutide, weight loss, (WEGOVY) 2.4 mg/0.75 mL PnIj Inject 2.4 mg into the skin every 7 days. 12 each 1 Past Week    ciclopirox (PENLAC) 8 % Soln APPLY TOPICALLY NIGHTLY 7 mL 0 Unknown    ergocalciferol (ERGOCALCIFEROL) 50,000 unit Cap Take 1 capsule (50,000 Units total) by mouth twice a week. 24 capsule 3 Unknown    fluconazole (DIFLUCAN) 100 MG tablet Take 100 mg by mouth.   Unknown    fluticasone propionate (FLONASE) 50 mcg/actuation nasal spray 2 sprays (100 mcg total) by Each Nostril route once daily. 48 g 4 Unknown    methocarbamoL (ROBAXIN) 750 MG Tab Take 1 tablet (750 mg total) by mouth 4 (four) times daily as needed (muscle spasm). (Patient not taking: Reported on 11/13/2023) 90 tablet 1 Unknown    methylPREDNISolone (MEDROL DOSEPACK) 4 mg tablet Take by mouth.   Unknown    nabumetone (RELAFEN) 500 MG tablet Take 1 tablet (500 mg total) by mouth 2 (two) times daily as needed for Pain. (Patient not taking: Reported on 11/13/2023) 30 tablet 0 Unknown    pregabalin (LYRICA) 75 MG capsule TAKE 1 CAPSULE BY MOUTH EVERY MORNING AND 2 CAPSULES AT BEDTIME (Patient not taking: Reported on 11/13/2023) 90 capsule 5 Unknown    promethazine (PHENERGAN) 25 MG tablet Take 25 mg by mouth.   Unknown    SULFACLEANSE 8-4 8-4 % Susp Apply topically nightly as needed.    Unknown    traMADoL (ULTRAM) 50 mg tablet Take 1 tablet (50 mg total) by mouth every 8 (eight) hours as needed for Pain. (Patient not taking: Reported on 11/6/2023) 21 tablet 0 Unknown    triamcinolone acetonide 0.1% (KENALOG) 0.1 % paste  SMARTSIG:Sparingly TO TEETH 3 Times Daily   Unknown       Inpatient Medications:   FLUoxetine  20 mg Oral Daily    metoprolol succinate  50 mg Oral Daily    pantoprazole  40 mg Intravenous Daily    piperacillin-tazobactam (Zosyn) IV (PEDS and ADULTS) (extended infusion is not appropriate)  4.5 g Intravenous Q8H    sodium chloride 0.9%  10 mL Intravenous Q8H     acetaminophen, dextrose 50%, dextrose 50%, melatonin, morphine, naloxone, ondansetron, prochlorperazine, sodium chloride 0.9%    Review of patient's allergies indicates:   Allergen Reactions    Omnicef [cefdinir] Itching       Social History:   Social History     Occupational History    Not on file   Tobacco Use    Smoking status: Never    Smokeless tobacco: Never   Substance and Sexual Activity    Alcohol use: Never    Drug use: No    Sexual activity: Yes     Partners: Male       Family History:   Family History   Problem Relation Age of Onset    Heart disease Mother     Stroke Mother     Cancer Father         bone ca, prostate ca     Hypertension Sister     Diabetes Sister     Allergies Sister     Asthma Sister     Hypertension Sister     Allergies Sister     Asthma Sister     Heart disease Brother     Kidney failure Brother     Angioedema Neg Hx     Eczema Neg Hx     Immunodeficiency Neg Hx     Urticaria Neg Hx     Colon cancer Neg Hx     Colon polyps Neg Hx     Esophageal cancer Neg Hx     Stomach cancer Neg Hx        Review of Systems:  A 10 point review of systems was performed and was normal, except as mentioned in the HPI, including constitutional, HEENT, heme, lymph, cardiovascular, respiratory, gastrointestinal, genitourinary, neurologic, endocrine, psychiatric and musculoskeletal.      OBJECTIVE:    Physical Exam:  24 Hour Vital Sign Ranges: Temp:  [97.8 °F (36.6 °C)-98.3 °F (36.8 °C)] 98.2 °F (36.8 °C)  Pulse:  [66-85] 76  Resp:  [16-19] 18  SpO2:  [97 %-99 %] 99 %  BP: (107-143)/(59-87) 107/59  Most recent vitals: BP (!) 107/59   Pulse 76    "Temp 98.2 °F (36.8 °C)   Resp 18   Ht 5' 2.99" (1.6 m)   Wt 74.8 kg (164 lb 14.5 oz)   LMP 02/11/2009   SpO2 99%   Breastfeeding No   BMI 29.22 kg/m²    GEN: well-developed, well-nourished, awake and alert, non-toxic appearing adult  HEENT: PERRL, sclera anicteric, oral mucosa pink and moist without lesion  NECK: trachea midline; Good ROM  CV: regular rate and rhythm, no murmurs or gallops  RESP: clear to auscultation bilaterally, no wheezes, rhonci or rales  ABD: soft, non-tender, non-distended, normal bowel sounds  EXT: no swelling or edema, 2+ pulses distally  SKIN: no rashes or jaundice  PSYCH: normal affect    Labs:   Recent Labs     11/25/23 1136 11/26/23  0521 11/27/23  0421   WBC 14.20* 7.19 5.86   MCV 89 89 90    242 231     Recent Labs     11/25/23 1136 11/26/23  0521 11/27/23  0421    142 140   K 3.3* 3.0* 3.3*    108 106   CO2 24 26 27   BUN 12 8 7   * 83 93     No results for input(s): "ALB" in the last 72 hours.    Invalid input(s): "ALKP", "SGOT", "SGPT", "TBIL", "DBIL", "TPRO"  No results for input(s): "PT", "INR", "PTT" in the last 72 hours.      Radiology Review:  MRI MRCP Without Contrast   Final Result      Cholelithiasis and findings of acute cholecystitis and choledocholithiasis with pericholecystic fluid, mild biliary duct dilatation and with a filling defect in the distal common duct.      Multiple cysts in the liver.  Small right renal cyst.         Electronically signed by: Dayana Powell MD   Date:    11/25/2023   Time:    16:03      CT Abdomen Pelvis With IV Contrast NO Oral Contrast   Final Result      Findings suggesting acute cholecystitis with distended gallbladder with wall thickening and/or pericholecystic fluid.  No calcified stones evident in the gallbladder but note is made that cholelithiasis is typically better evaluate by ultrasound and ultrasound 08/03/2023 did describe cholelithiasis.      Mild dilatation the common duct, cause of which is " not identified but appearing new compared to prior study and concerning for choledocholithiasis.      Hepatic and renal cysts.         Electronically signed by: Dayana Powell MD   Date:    11/25/2023   Time:    13:09      FL ERCP Biliary And Pancreatic By Rad Tech    (Results Pending)         IMPRESSION / RECOMMENDATIONS:  58 y.o. female with PMHx diverticulosis, HTN, GERD/HH, appendectomy, h/o H. Pylori, neck/back issues presents for evaluation of epig pain w/ elevated LFTs w/ imaging w/ choledocholithiasis and cholecystitis/cholelithiasis. RIsks, benefits, alternatives discussed in detail regarding upcoming procedures and sedation and possible complications. Some of the more common endoscopic complications include but not limited to immediate or delayed perforation, bleeding, infections, pain, inadvertent injury to surrounding tissue / organs janet pancreas and possible need for surgical evaluation. All questions answered and will proceed with procedure as planned.     -ERCP today w/ sphincterotomy / extraction  -Will also take biopsies to confirm H. Pylori eradication   -Surgical evaluation for lap isela    Thank you for this consult.    Anselmo Winter III  11/27/2023  8:20 AM

## 2023-11-27 NOTE — CONSULTS
[facility]  General Surgery  Consult Note    Patient Name: Lynn Naidu  MRN: [id number]  Code Status: Full Code  Admission Date: 11/25/2023  Hospital Length of Stay: 0 days  Attending Physician: Ori Cooley DO  Primary Care Provider: Remy Parson MD    Patient information was obtained from patient and ER records.     Inpatient consult to General Surgery  Consult performed by: Al Cyr MD  Consult ordered by: Anselmo Winter III, MD        Subjective:     Principal Problem: Cholelithiasis with choledocholithiasis    History of Present Illness: Pleasant 59 yo F who presented to outside facility with abdominal pain and discomfort on Sat.  She had findings consistent with choledocholithiasis and was transferred to this facility.  She ahd ERCP today and surgery was consulted for cholecystitis.  She notes her pain has improved.  She has no significant PShx or PMhx.        No new subjective & objective note has been filed under this hospital service since the last note was generated.    Assessment/Plan:     * Cholelithiasis with choledocholithiasis  Pt s/p ERCP.  Will proceed with lap isela today.  If surgery uneventful can be d/c home in AM.        VTE Risk Mitigation (From admission, onward)           Ordered     IP VTE LOW RISK PATIENT  Once         11/25/23 2303     Place sequential compression device  Until discontinued         11/25/23 2303                    Thank you for your consult. I will follow-up with patient. Please contact us if you have any additional questions.    Al Cyr MD  General Surgery  [facility]   CHEST PAIN

## 2023-11-27 NOTE — SUBJECTIVE & OBJECTIVE
Interval History:   Patient has just completed 1st procedure.  Unsure of results.  Patient has a cough and abdominal pain.  She states she had nausea and vomiting since Saturday.  Last bowel movement is Friday.    Review of Systems  Constitutional: Negative For: Fatigue, Chills, Fever  Respiratory: Negative For:  Shortness of breath.  Positive for cough  Cardiovascular: Negative For: Chest pain, Palpitations, Swelling in leg or feet.  Gastrointestinal: Negative For: Acid reflux, Blood in stool,  Diarrhea, Positive for abdominal pains, nausea, vomiting, constipation  : Negative For: Dysuria, hematuria, or incontinence,  Endocrine: Negative For: cold or heat intolerance  Hematologic: Negative For: Easy bleeding/bruising    Objective:     Vital Signs (Most Recent):  Temp: 98.1 °F (36.7 °C) (11/27/23 1246)  Pulse: 92 (11/27/23 1246)  Resp: 16 (11/27/23 1246)  BP: (!) 155/66 (11/27/23 1246)  SpO2: 97 % (11/27/23 1246) Vital Signs (24h Range):  Temp:  [97.4 °F (36.3 °C)-98.3 °F (36.8 °C)] 98.1 °F (36.7 °C)  Pulse:  [] 92  Resp:  [13-20] 16  SpO2:  [97 %-100 %] 97 %  BP: ()/(53-87) 155/66     Weight: 74.8 kg (164 lb 14.5 oz)  Body mass index is 29.22 kg/m².    Intake/Output Summary (Last 24 hours) at 11/27/2023 1501  Last data filed at 11/27/2023 1020  Gross per 24 hour   Intake 1540 ml   Output 1100 ml   Net 440 ml         Physical Exam    General:     Resting comfortably, Appears stated age.  Eyes:    Anicteric sclera. Conjunctiva non-injected  HENT:    Head normocephalic, atraumatic. Mucous membranes moist.   Neck:   Trachea midline, no thyromegaly.  No JVD or adenopathy.   Heart:    S1, S2 auscultated. No murmurs rubs or gallops. Regular rhythm and rate.   Lungs:    Bilaterally clear in all lobes with equal chest rise.  No wheezes, rales, or rhonchi.   Abdomen:    Soft, tenderness in all quadrants, nondistended.  Positive bowel sounds. No palpable masses.  No rebound or guarding.   Extremities:    Without  clubbing or cyanosis.  No edema.  Peripheral pulses II/IV.  Neuro:    Alert, awake and oriented x3.  Cranial nerves appear grossly intact on limited neurological exam.  No focal motor or sensory deficit.  Skin:    No rashes, exudates, or nodules on limited skin exam  Psych:    Normal mood, affect and behavior with intact judgement and insight.    Significant Labs: All pertinent labs within the past 24 hours have been reviewed.  CBC:   Recent Labs   Lab 11/26/23  0521 11/27/23 0421   WBC 7.19 5.86   HGB 11.7* 11.4*   HCT 34.3* 33.8*    231     CMP:   Recent Labs   Lab 11/26/23  0521 11/27/23 0421    140   K 3.0* 3.3*    106   CO2 26 27   GLU 83 93   BUN 8 7   CREATININE 1.0 1.1   CALCIUM 8.7 8.6*   PROT 5.9* 5.6*   ALBUMIN 3.1* 3.3*   BILITOT 1.6* 0.9   ALKPHOS 150* 107   * 175*   * 401*   ANIONGAP 8 7*       Significant Imaging: I have reviewed all pertinent imaging results/findings within the past 24 hours.  FL ERCP Biliary And Pancreatic By [facility] MANDATED QUALITY DATA-FLUOROSCOPY - 145    Fluoroscopy Time:  1 minute and 29 seconds  Number of Images:   4  Fluoroscopy Dose:  Unavailable    ERCP    HISTORY: Choledocholithiasis.    Fluoroscopic images performed during performance of ERCP and sphincterotomy by Dr. Winter are provided.    Initial images demonstrate a wire and catheter extending along the biliary tree which is partially opacified. Subsequent images demonstrate apparent intraluminal filling defect within the common bile duct near the junction with the cystic duct. Subsequent images demonstrate a balloon tipped catheter extending along the common bile duct. No localized stricture is identified. There is partial opacification of the cystic duct. No extravasation of contrast material observed.    IMPRESSION:    See above.    Electronically signed by:  Kanwal Vasquez MD  11/27/2023 09:59 AM [facility] Workstation: [id number]

## 2023-11-27 NOTE — OP NOTE
Date of surgery:  11/27/23    Staff surgeon:  Dr. Al Cyr    Preoperative diagnosis:  Acute cholecystitis    Postoperative diagnosis:  The same    Procedure:  Laparoscopic cholecystectomy    Anesthesia:  General endotracheal anesthesia    Indication for procedure:  Pleasant 58-year-old female presented to the ER with choledocholithiasis.  Pt had an ERCp and is scheduled for Lap isela.    She is scheduled for laparoscopic cholecystectomy the above-mentioned date.    Description of procedure:  Following signing informed consent patient in the operating room placed supine position.  General endotracheal anesthesia was administered without event.  The abdomen is prepped and draped in standard fashion and appropriate time-out procedure was then performed.  Next a  small transverse incision above the umbilicus is made and carried down to  the deep dermal tissue.  The abdominal cavity is entered with a  Veress needle and pneumoperitoneum to 15 mmHg is established.  Next the abdomen is entered with an Optiview trocar under direct visualization.  Patient placed in reverse Trendelenburg and tilted towards the left side.  A 5 mm is placed in the epigastric trocar and 2 in the right subcostal margin.  All trocars were placed under direct visualization and after injection of quarter percent Marcaine with epi.  The fundus was grasped and held over the patient's liver.  This exposed the infundibulum which was grasped and held towards the patient's feet.  Next the triangle of Calot is dissected identifying the cystic duct and cystic artery in their usual anatomic locations. Two clips are placed distally on the cystic duct 1 clip proximally.  Two clips are placed on the cystic artery. The duct and artery are cut between clips.  Next cautery is used to remove the gallbladder from the hepatic fossa.  The gallbladder is then removed from the abdominal cavity with assistance of an Endo-Catch bag.  Having removed the gallbladder the  hepatic fossa is evaluated  once again to ensure adequate hemostasis.  Next all trocars are removed under direct visualization.  I closed the umbilical fascia with 0 Vicryl suture.  Skin incision was closed with 4-0 Monocryl.  Patient is extubated taken recovery room stable condition.  Blood loss was 10 cc's

## 2023-11-27 NOTE — ANESTHESIA PREPROCEDURE EVALUATION
11/27/2023  Lynn Naidu is a 58 y.o., female.        Tobacco Use:  The patient  reports that she has never smoked. She has never used smokeless tobacco.     Results for orders placed or performed during the hospital encounter of 11/25/23   EKG 12-lead    Collection Time: 11/27/23  7:42 AM    Narrative    Test Reason : Z01.810,    Vent. Rate : 072 BPM     Atrial Rate : 072 BPM     P-R Int : 164 ms          QRS Dur : 078 ms      QT Int : 388 ms       P-R-T Axes : 044 015 027 degrees     QTc Int : 424 ms    Normal sinus rhythm  Nonspecific T wave abnormality  Abnormal ECG  When compared with ECG of 21-JUL-2021 13:07,  Nonspecific T wave abnormality now evident in Lateral leads    Referred By: [email or web address]   SELF           Confirmed By:         Imaging Results              MRI MRCP Without Contrast (Final result)  Result time 11/25/23 16:03:39      Final result by Dayana Powell MD (11/25/23 16:03:39)                   Impression:      Cholelithiasis and findings of acute cholecystitis and choledocholithiasis with pericholecystic fluid, mild biliary duct dilatation and with a filling defect in the distal common duct.    Multiple cysts in the liver.  Small right renal cyst.      Electronically signed by: Dayana Powell MD  Date:    11/25/2023  Time:    16:03               Narrative:    EXAMINATION:  MRI ABDOMEN WITHOUT CONTRAST MRCP    CLINICAL HISTORY:  Cholelithiasis;Choledocholithiasis;    TECHNIQUE:  Multiplanar, multisequence images are performed through the liver and upper abdomen.  Additionally 2D and 3D MRCP sequences are performed as well as MIP images.    COMPARISON:  10/24/2023    FINDINGS:  Multiple cysts in the liver including cluster of cysts spanning a distance of 6 6.6 cm in the left lobe.    Spleen not enlarged    Adrenal glands unremarkable appearance    Pancreas unremarkable  appearance    Abdominal aorta no aneurysm    Kidney; a couple right renal cysts larger of which measures 8 mm.  No hydronephrosis    Multiple filling defects in the gallbladder consistent with stones.  Pericholecystic fluid.  Common duct appears just mildly dilated measuring 78 mm.  There is intraluminal filling defect in the distal common duct for example coronal series 4, image 13 measures approximately 3 mm.  Also see coronal series 10, image 47.  Also well seen series 11, image 1.                                       CT Abdomen Pelvis With IV Contrast NO Oral Contrast (Final result)  Result time 11/25/23 13:09:29      Final result by Dayana Powell MD (11/25/23 13:09:29)                   Impression:      Findings suggesting acute cholecystitis with distended gallbladder with wall thickening and/or pericholecystic fluid.  No calcified stones evident in the gallbladder but note is made that cholelithiasis is typically better evaluate by ultrasound and ultrasound 08/03/2023 did describe cholelithiasis.    Mild dilatation the common duct, cause of which is not identified but appearing new compared to prior study and concerning for choledocholithiasis.    Hepatic and renal cysts.      Electronically signed by: Dayana Powell MD  Date:    11/25/2023  Time:    13:09               Narrative:    EXAMINATION:  CT ABDOMEN PELVIS WITH IV CONTRAST    CLINICAL HISTORY:  RLQ abdominal pain (Age >= 14y);    TECHNIQUE:  Low dose axial images, sagittal and coronal reformations were obtained from the lung bases to the pubic symphysis following the IV administration of 75 mL of Omnipaque 350 no p.o. contrast    COMPARISON:  None.    FINDINGS:  Mild dependent hypoventilatory changes in visualized lung bases.    Liver; multiple cysts with cysts most numerous and largest in the left lobe in corresponding as seen on MRI of the abdomen of 10/24/2023, more completely characterized on that exam.  Appearance on CT ever does also  suggest simple cysts    Spleen not enlarged    Adrenal glands unremarkable appearance    Pancreas unremarkable appearance    Abdominal aorta no aneurysm    Gallbladder and bile ducts; distended gallbladder with small amount of pericholecystic fluid and/or wall thickening.  No calcified stones seen within the gallbladder with cholelithiasis typically better evaluated by ultrasound.  Common duct appears mildly dilated measuring up to 9 mm on coronal images.  Difficult to compared to the prior MRCP for technical differences but not seen dilated on that study.    Renal enhancement is symmetrical.  No hydronephrosis.  11 mm right renal cyst.  Urinary bladder moderately distended at time of the exam and as visualized is unremarkable appearance    Reproductive organs unremarkable appearance for the patient's age    Diverticulosis without CT findings of acute diverticulitis.  Normal appearance of the appendix.  No free intraperitoneal air or fluid.                                       Lab Results   Component Value Date    WBC 5.86 11/27/2023    HGB 11.4 (L) 11/27/2023    HCT 33.8 (L) 11/27/2023    MCV 90 11/27/2023     11/27/2023     BMP  Lab Results   Component Value Date     11/27/2023    K 3.3 (L) 11/27/2023     11/27/2023    CO2 27 11/27/2023    BUN 7 11/27/2023    CREATININE 1.1 11/27/2023    CALCIUM 8.6 (L) 11/27/2023    ANIONGAP 7 (L) 11/27/2023    GLU 93 11/27/2023    GLU 83 11/26/2023     (H) 11/25/2023       No results found for this or any previous visit.         Pre-op Assessment    I have reviewed the Patient Summary Reports.     I have reviewed the Nursing Notes. I have reviewed the NPO Status.   I have reviewed the Medications.     Review of Systems  Anesthesia Hx:  No problems with previous Anesthesia             Denies Family Hx of Anesthesia complications.    Denies Personal Hx of Anesthesia complications.                    Social:  No Alcohol Use, Non-Smoker        Hematology/Oncology:  Hematology Normal   Oncology Normal                                   EENT/Dental:  EENT/Dental Normal    Ears General/Symptom(s)  Ear Symptoms:  of tinnitus / ringing          Cardiovascular:     Hypertension, well controlled              ECG has been reviewed.                    Hypertension         Pulmonary:  Pulmonary Normal        Hx of bronchitis               Renal/:  Renal/ Normal    Hypokalemia with potassium replacement in progress             Hepatic/GI:     GERD Liver Disease,  Liver cyst    Wegovy Therapy - last dose 3 days ago    Gerd      Biliary Disease, Choledocholithiasis Cholelithiasis Liver Disease        Musculoskeletal:  Arthritis   Cervical disc displacement     Arthritis     Spine Disorders: cervical Disc disease and Degenerative disease           Neurological:    Neuromuscular Disease,       Left arm pain  Left hand weakness        Arthritis                         Neuromuscular Disease   Endocrine:  Endocrine Normal          Obesity / BMI > 30  Dermatological:  Skin Normal    Psych:  Psychiatric Normal     Sleep Disorder and Insomnia.       Sleep Disorder and Insomnia.        Physical Exam  General: Well nourished, Cooperative, Oriented and Alert    Airway:  Mallampati: III   Mouth Opening: Normal  TM Distance: > 6 cm  Tongue: Normal  Neck ROM: Normal ROM    Dental:  Intact    Chest/Lungs:  Normal Respiratory Rate, Clear to auscultation    Heart:  Rate: Normal  Rhythm: Regular Rhythm        Anesthesia Plan  Type of Anesthesia, risks & benefits discussed:    Anesthesia Type: Gen ETT  Intra-op Monitoring Plan: Standard ASA Monitors  Post Op Pain Control Plan: multimodal analgesia and IV/PO Opioids PRN  Induction:  IV and rapid sequence  Airway Plan: Direct and Video, Post-Induction  Informed Consent: Informed consent signed with the Patient and all parties understand the risks and agree with anesthesia plan.  All questions answered.   ASA Score: 2  Anesthesia Plan  Notes: GETA  RSI ( N/V & Wegovy )  No Ofirmev  Benadryl 6.25 mg iv, Zofran 4 mg iv (Note: Patient received Benadryl, Decadron & Pepcid for ERCP this morning with Scopolamine Patch placed prior to surgery at that time)  Sugammadex        Ready For Surgery From Anesthesia Perspective.     .

## 2023-11-27 NOTE — HPI
Loreto 59 yo F who presented to outside facility with abdominal pain and discomfort on Sat.  She had findings consistent with choledocholithiasis and was transferred to this facility.  She ahd ERCP today and surgery was consulted for cholecystitis.  She notes her pain has improved.  She has no significant PShx or PMhx.

## 2023-11-27 NOTE — HOSPITAL COURSE
Patient is a 58-year-old female with a history of diverticulosis, hypertension, GERD, hiatal hernia, appendectomy, history of H pylori, who presented to the [facility] emergency department for evaluation of epigastric pain.  Gastroenterology and General surgery were consulted.  Imaging study results below.  Laparoscopic cholecystectomy was performed on 11/27/2023.  Patient's diet was advanced.  Pain was controlled.  Vitals remained normal.  General surgery cleared patient for discharge home.  Follow up with primary care physician and General surgery.    Laparoscopic cholecystectomy on 11/27/2023  MRCP 11/'23 - 8mm CBD w/ stone; gallstones; liver cysts  CT ABd 11/'23 - cholecystitis / cholelithiasis; dilated biliary; tics; liver cysts; nml panc  HIDA 11/'23 - decreased EF  GES 11/'23 - mild delayed (on Mounjaro)  EGD 8/'23 - small HH; min gastritis    General:     Resting comfortably, Appears stated age.  Eyes:    Anicteric sclera. Conjunctiva non-injected  HENT:    Head normocephalic, atraumatic. Mucous membranes moist.   Neck:   Trachea midline, no thyromegaly.  No JVD or adenopathy.   Heart:    S1, S2 auscultated. No murmurs rubs or gallops. Regular rhythm and rate.   Lungs:    Bilaterally clear in all lobes with equal chest rise.  No wheezes, rales, or rhonchi.   Abdomen:    Soft, mild tenderness.  Well closed for surgical incisions, nondistended.  Positive bowel sounds. No palpable masses.  No rebound or guarding.   Extremities:    Without clubbing or cyanosis.  No edema.  Peripheral pulses II/IV.  Neuro:    Alert, awake and oriented x3.  Cranial nerves appear grossly intact on limited neurological exam.  No focal motor or sensory deficit.  Skin:    No rashes, exudates, or nodules on limited skin exam  Psych:    Normal mood, affect and behavior with intact judgement and insight.    Imaging Results              MRI MRCP Without Contrast (Final result)  Result time 11/25/23 16:03:39      Final result by  Dayana Powell MD (11/25/23 16:03:39)                   Impression:      Cholelithiasis and findings of acute cholecystitis and choledocholithiasis with pericholecystic fluid, mild biliary duct dilatation and with a filling defect in the distal common duct.    Multiple cysts in the liver.  Small right renal cyst.      Electronically signed by: Dayana Powell MD  Date:    11/25/2023  Time:    16:03               Narrative:    EXAMINATION:  MRI ABDOMEN WITHOUT CONTRAST MRCP    CLINICAL HISTORY:  Cholelithiasis;Choledocholithiasis;    TECHNIQUE:  Multiplanar, multisequence images are performed through the liver and upper abdomen.  Additionally 2D and 3D MRCP sequences are performed as well as MIP images.    COMPARISON:  10/24/2023    FINDINGS:  Multiple cysts in the liver including cluster of cysts spanning a distance of 6 6.6 cm in the left lobe.    Spleen not enlarged    Adrenal glands unremarkable appearance    Pancreas unremarkable appearance    Abdominal aorta no aneurysm    Kidney; a couple right renal cysts larger of which measures 8 mm.  No hydronephrosis    Multiple filling defects in the gallbladder consistent with stones.  Pericholecystic fluid.  Common duct appears just mildly dilated measuring 78 mm.  There is intraluminal filling defect in the distal common duct for example coronal series 4, image 13 measures approximately 3 mm.  Also see coronal series 10, image 47.  Also well seen series 11, image 1.                                       CT Abdomen Pelvis With IV Contrast NO Oral Contrast (Final result)  Result time 11/25/23 13:09:29      Final result by Daayna oPwell MD (11/25/23 13:09:29)                   Impression:      Findings suggesting acute cholecystitis with distended gallbladder with wall thickening and/or pericholecystic fluid.  No calcified stones evident in the gallbladder but note is made that cholelithiasis is typically better evaluate by ultrasound and ultrasound 08/03/2023  did describe cholelithiasis.    Mild dilatation the common duct, cause of which is not identified but appearing new compared to prior study and concerning for choledocholithiasis.    Hepatic and renal cysts.      Electronically signed by: Dyaana Powell MD  Date:    11/25/2023  Time:    13:09               Narrative:    EXAMINATION:  CT ABDOMEN PELVIS WITH IV CONTRAST    CLINICAL HISTORY:  RLQ abdominal pain (Age >= 14y);    TECHNIQUE:  Low dose axial images, sagittal and coronal reformations were obtained from the lung bases to the pubic symphysis following the IV administration of 75 mL of Omnipaque 350 no p.o. contrast    COMPARISON:  None.    FINDINGS:  Mild dependent hypoventilatory changes in visualized lung bases.    Liver; multiple cysts with cysts most numerous and largest in the left lobe in corresponding as seen on MRI of the abdomen of 10/24/2023, more completely characterized on that exam.  Appearance on CT ever does also suggest simple cysts    Spleen not enlarged    Adrenal glands unremarkable appearance    Pancreas unremarkable appearance    Abdominal aorta no aneurysm    Gallbladder and bile ducts; distended gallbladder with small amount of pericholecystic fluid and/or wall thickening.  No calcified stones seen within the gallbladder with cholelithiasis typically better evaluated by ultrasound.  Common duct appears mildly dilated measuring up to 9 mm on coronal images.  Difficult to compared to the prior MRCP for technical differences but not seen dilated on that study.    Renal enhancement is symmetrical.  No hydronephrosis.  11 mm right renal cyst.  Urinary bladder moderately distended at time of the exam and as visualized is unremarkable appearance    Reproductive organs unremarkable appearance for the patient's age    Diverticulosis without CT findings of acute diverticulitis.  Normal appearance of the appendix.  No free intraperitoneal air or fluid.

## 2023-11-27 NOTE — PROGRESS NOTES
[facility]  Progress Note    Patient Name: Lynn Naidu  MRN: [id number]  Patient Class: OP- Observation   Admission Date: 11/25/2023  Length of Stay: 0 days  Attending Physician: Ori Cooley DO  Primary Care Provider: Remy Parson MD        Subjective:     Principal Problem:Cholelithiasis with choledocholithiasis        HPI:  Per H&P:  Lynn Naidu is a 58-year-old female presents to the ER c/o severe, non-radiating RUQ, RLQ, and epigastric area abdominal pain with nausea and vomiting for several months that worsened yesterday following an abnormal HIDA scan. Past medical history includes hypertension, GERD, hepatic cysts, bronchitis, appendectomy, peripheral edema, insomnia, and cervical disc displacement. She reports having intentional weight loss from taking Wegovy (Semaglutide) injections since 01/2023. She reports decrease appetite, nausea, vomiting, headache, diaphoresis without fever with non-radiating abdominal pain. Denies chest pain, shortness of breath, fatigue, diarrhea, melena, hematemesis, hematochezia, dysuria, or hematuria. A HIDA scan was performed 11/25 which showed a reduced gallbladder ejection fraction. 8/31 EGD positive H. Pylori, small hiatal hernia, GERD, and gastritis. She is established with Gastroenterology and hepatology outpatient.     In the ED her abdominal CT demonstrated cholecystitis with distended gallbladder; choledocholithiasis, a filling defect in the distal common bile duct. MRI revealed right renal cysts, gall stones, pericholecystic fluid, and mildly dilated common bile duct. She received IV zosyn and analgesics in ED.      Today she is accompanied by her sister in the room. She has been having pain intermittent but it is improved with IV morphine. She has been having intermittent nausea but no more vomiting and this has been improved with zofran.     I explained the nature of the problem to her and the procedure that would be  done and the likelihood that she would need her gallbladder removed eventually            Overview/Hospital Course:  Patient is a 58-year-old female with a history of diverticulosis, hypertension, GERD, hiatal hernia, appendectomy, history of H pylori, who presented to the [facility] emergency department for evaluation of epigastric pain.  Gastroenterology was consulted.  Imaging study results below.    MRCP 11/'23 - 8mm CBD w/ stone; gallstones; liver cysts  CT ABd 11/'23 - cholecystitis / cholelithiasis; dilated biliary; tics; liver cysts; nml panc  HIDA 11/'23 - decreased EF  GES 11/'23 - mild delayed (on Mounjaro)  EGD 8/'23 - small HH; min gastritis    Interval History:   Patient has just completed 1st procedure.  Unsure of results.  Patient has a cough and abdominal pain.  She states she had nausea and vomiting since Saturday.  Last bowel movement is Friday.    Review of Systems  Constitutional: Negative For: Fatigue, Chills, Fever  Respiratory: Negative For:  Shortness of breath.  Positive for cough  Cardiovascular: Negative For: Chest pain, Palpitations, Swelling in leg or feet.  Gastrointestinal: Negative For: Acid reflux, Blood in stool,  Diarrhea, Positive for abdominal pains, nausea, vomiting, constipation  : Negative For: Dysuria, hematuria, or incontinence,  Endocrine: Negative For: cold or heat intolerance  Hematologic: Negative For: Easy bleeding/bruising    Objective:     Vital Signs (Most Recent):  Temp: 98.1 °F (36.7 °C) (11/27/23 1246)  Pulse: 92 (11/27/23 1246)  Resp: 16 (11/27/23 1246)  BP: (!) 155/66 (11/27/23 1246)  SpO2: 97 % (11/27/23 1246) Vital Signs (24h Range):  Temp:  [97.4 °F (36.3 °C)-98.3 °F (36.8 °C)] 98.1 °F (36.7 °C)  Pulse:  [] 92  Resp:  [13-20] 16  SpO2:  [97 %-100 %] 97 %  BP: ()/(53-87) 155/66     Weight: 74.8 kg (164 lb 14.5 oz)  Body mass index is 29.22 kg/m².    Intake/Output Summary (Last 24 hours) at 11/27/2023 1501  Last data filed at  11/27/2023 1020  Gross per 24 hour   Intake 1540 ml   Output 1100 ml   Net 440 ml         Physical Exam    General:     Resting comfortably, Appears stated age.  Eyes:    Anicteric sclera. Conjunctiva non-injected  HENT:    Head normocephalic, atraumatic. Mucous membranes moist.   Neck:   Trachea midline, no thyromegaly.  No JVD or adenopathy.   Heart:    S1, S2 auscultated. No murmurs rubs or gallops. Regular rhythm and rate.   Lungs:    Bilaterally clear in all lobes with equal chest rise.  No wheezes, rales, or rhonchi.   Abdomen:    Soft, tenderness in all quadrants, nondistended.  Positive bowel sounds. No palpable masses.  No rebound or guarding.   Extremities:    Without clubbing or cyanosis.  No edema.  Peripheral pulses II/IV.  Neuro:    Alert, awake and oriented x3.  Cranial nerves appear grossly intact on limited neurological exam.  No focal motor or sensory deficit.  Skin:    No rashes, exudates, or nodules on limited skin exam  Psych:    Normal mood, affect and behavior with intact judgement and insight.    Significant Labs: All pertinent labs within the past 24 hours have been reviewed.  CBC:   Recent Labs   Lab 11/26/23 0521 11/27/23 0421   WBC 7.19 5.86   HGB 11.7* 11.4*   HCT 34.3* 33.8*    231     CMP:   Recent Labs   Lab 11/26/23 0521 11/27/23 0421    140   K 3.0* 3.3*    106   CO2 26 27   GLU 83 93   BUN 8 7   CREATININE 1.0 1.1   CALCIUM 8.7 8.6*   PROT 5.9* 5.6*   ALBUMIN 3.1* 3.3*   BILITOT 1.6* 0.9   ALKPHOS 150* 107   * 175*   * 401*   ANIONGAP 8 7*       Significant Imaging: I have reviewed all pertinent imaging results/findings within the past 24 hours.  FL ERCP Biliary And Pancreatic By [employer] [facility] MANDATED QUALITY DATA-FLUOROSCOPY - 145    Fluoroscopy Time:  1 minute and 29 seconds  Number of Images:   4  Fluoroscopy Dose:  Unavailable    ERCP    HISTORY: Choledocholithiasis.    Fluoroscopic images performed during performance of ERCP and  sphincterotomy by Dr. Winter are provided.    Initial images demonstrate a wire and catheter extending along the biliary tree which is partially opacified. Subsequent images demonstrate apparent intraluminal filling defect within the common bile duct near the junction with the cystic duct. Subsequent images demonstrate a balloon tipped catheter extending along the common bile duct. No localized stricture is identified. There is partial opacification of the cystic duct. No extravasation of contrast material observed.    IMPRESSION:    See above.    Electronically signed by:  Kanwal Vasquez MD  11/27/2023 09:59 AM CST Workstation: [id number]      Assessment/Plan:      * Cholelithiasis with choledocholithiasis  Uncomplicated Choledocholithiasis secondary to cholelithiasis  -CT abdomen completed - cholecystitis with distended gall bladder  -MRI MRCP demonstrated right renal cysts, gall stones, pericholecystic fluid, and mildly dilated common duct  -Afebrile with unremarkable CBC and normal pancreatic enzyme levels  -Transaminases are more elevated today but WBC is improved.  -Started on IV Zosyn, IVFs  -GI Dr. Overton consulted  -NPO  -analgesic and antiemetics  -Continue home PPI  -Mechanical DVT prophylaxis w/SCDs  [facility].        Chronic GERD  Chronic condition stable  Continue home PPI but will make IV.    Hypokalemia  Patient has hypokalemia which is Acute and currently uncontrolled. Most recent potassium levels reviewed-   Lab Results   Component Value Date    K 3.3 (L) 11/27/2023   Will place patient on continuous IV fluids with potassium. Oral potassium this am made her sick again. Will try to avoid oral replacement in the short term.    Abdominal pain with vomiting  -NPO  -analgesic and antiemetics   -IVFs  -GI consult   -PPI         Liver cyst  Chronic stable condition - will need ongoing OP GI follow up.           Essential hypertension  Continue metoprolol succinate 50 mg daily      VTE Risk  Mitigation (From admission, onward)           Ordered     IP VTE LOW RISK PATIENT  Once         11/25/23 2303     Place sequential compression device  Until discontinued         11/25/23 2303                    Discharge Planning   ORION: 11/28/2023     Code Status: Full Code   Is the patient medically ready for discharge?:     Reason for patient still in hospital (select all that apply): Treatment  Discharge Plan A: Home with family                  Ori Cooley DO  Department of Hospital Medicine   [facility]

## 2023-11-27 NOTE — ANESTHESIA PROCEDURE NOTES
Intubation    Date/Time: 11/27/2023 4:26 PM    Performed by: Man Trujillo CRNA  Authorized by: Nick Feliciano MD    Intubation:     Induction:  Intravenous    Intubated:  Postinduction    Mask Ventilation:  Easy mask    Attempts:  1    Attempted By:  CRNA    Method of Intubation:  Video laryngoscopy    Blade:  Moreno 3    Laryngeal View Grade: Grade I - full view of cords      Difficult Airway Encountered?: No      Complications:  None    Airway Device:  Oral endotracheal tube    Airway Device Size:  7.5    Style/Cuff Inflation:  Cuffed    Inflation Amount (mL):  7    Tube secured:  21    Secured at:  The lips    Placement Verified By:  Capnometry    Complicating Factors:  None    Findings Post-Intubation:  BS equal bilateral and atraumatic/condition of teeth unchanged

## 2023-11-28 ENCOUNTER — TELEPHONE (OUTPATIENT)
Dept: FAMILY MEDICINE | Facility: CLINIC | Age: 58
End: 2023-11-28
Payer: COMMERCIAL

## 2023-11-28 VITALS
BODY MASS INDEX: 29.21 KG/M2 | HEART RATE: 75 BPM | WEIGHT: 164.88 LBS | SYSTOLIC BLOOD PRESSURE: 116 MMHG | TEMPERATURE: 98 F | HEIGHT: 63 IN | RESPIRATION RATE: 18 BRPM | OXYGEN SATURATION: 98 % | DIASTOLIC BLOOD PRESSURE: 74 MMHG

## 2023-11-28 PROBLEM — K83.09 CHOLANGITIS: Status: RESOLVED | Noted: 2023-11-28 | Resolved: 2023-11-28

## 2023-11-28 PROBLEM — K83.09 CHOLANGITIS: Status: ACTIVE | Noted: 2023-11-28

## 2023-11-28 PROBLEM — K80.70 CHOLELITHIASIS WITH CHOLEDOCHOLITHIASIS: Status: RESOLVED | Noted: 2023-11-26 | Resolved: 2023-11-28

## 2023-11-28 LAB
ALBUMIN SERPL BCP-MCNC: 3.7 G/DL (ref 3.5–5.2)
ALP SERPL-CCNC: 146 U/L (ref 55–135)
ALT SERPL W/O P-5'-P-CCNC: 359 U/L (ref 10–44)
ANION GAP SERPL CALC-SCNC: 6 MMOL/L (ref 8–16)
AST SERPL-CCNC: 134 U/L (ref 10–40)
BASOPHILS # BLD AUTO: 0.01 K/UL (ref 0–0.2)
BASOPHILS NFR BLD: 0.1 % (ref 0–1.9)
BILIRUB SERPL-MCNC: 0.9 MG/DL (ref 0.1–1)
BUN SERPL-MCNC: 5 MG/DL (ref 6–20)
CALCIUM SERPL-MCNC: 8.9 MG/DL (ref 8.7–10.5)
CHLORIDE SERPL-SCNC: 105 MMOL/L (ref 95–110)
CO2 SERPL-SCNC: 31 MMOL/L (ref 23–29)
CREAT SERPL-MCNC: 1.1 MG/DL (ref 0.5–1.4)
DIFFERENTIAL METHOD: ABNORMAL
EOSINOPHIL # BLD AUTO: 0 K/UL (ref 0–0.5)
EOSINOPHIL NFR BLD: 0.1 % (ref 0–8)
ERYTHROCYTE [DISTWIDTH] IN BLOOD BY AUTOMATED COUNT: 13.7 % (ref 11.5–14.5)
EST. GFR  (NO RACE VARIABLE): 57.9 ML/MIN/1.73 M^2
GLUCOSE SERPL-MCNC: 113 MG/DL (ref 70–110)
HCT VFR BLD AUTO: 36.9 % (ref 37–48.5)
HGB BLD-MCNC: 12.5 G/DL (ref 12–16)
IMM GRANULOCYTES # BLD AUTO: 0.03 K/UL (ref 0–0.04)
IMM GRANULOCYTES NFR BLD AUTO: 0.3 % (ref 0–0.5)
LYMPHOCYTES # BLD AUTO: 3.3 K/UL (ref 1–4.8)
LYMPHOCYTES NFR BLD: 34.3 % (ref 18–48)
MAGNESIUM SERPL-MCNC: 1.9 MG/DL (ref 1.6–2.6)
MCH RBC QN AUTO: 30.9 PG (ref 27–31)
MCHC RBC AUTO-ENTMCNC: 33.9 G/DL (ref 32–36)
MCV RBC AUTO: 91 FL (ref 82–98)
MONOCYTES # BLD AUTO: 0.5 K/UL (ref 0.3–1)
MONOCYTES NFR BLD: 4.9 % (ref 4–15)
NEUTROPHILS # BLD AUTO: 5.7 K/UL (ref 1.8–7.7)
NEUTROPHILS NFR BLD: 60.3 % (ref 38–73)
NRBC BLD-RTO: 0 /100 WBC
PLATELET # BLD AUTO: 240 K/UL (ref 150–450)
PMV BLD AUTO: 10.1 FL (ref 9.2–12.9)
POTASSIUM SERPL-SCNC: 3.4 MMOL/L (ref 3.5–5.1)
PROT SERPL-MCNC: 6.6 G/DL (ref 6–8.4)
RBC # BLD AUTO: 4.04 M/UL (ref 4–5.4)
SODIUM SERPL-SCNC: 142 MMOL/L (ref 136–145)
WBC # BLD AUTO: 9.47 K/UL (ref 3.9–12.7)

## 2023-11-28 PROCEDURE — 25000003 PHARM REV CODE 250: Performed by: SURGERY

## 2023-11-28 PROCEDURE — A4216 STERILE WATER/SALINE, 10 ML: HCPCS | Performed by: SURGERY

## 2023-11-28 PROCEDURE — 85025 COMPLETE CBC W/AUTO DIFF WBC: CPT | Performed by: SURGERY

## 2023-11-28 PROCEDURE — 96366 THER/PROPH/DIAG IV INF ADDON: CPT

## 2023-11-28 PROCEDURE — 36415 COLL VENOUS BLD VENIPUNCTURE: CPT | Performed by: SURGERY

## 2023-11-28 PROCEDURE — 83735 ASSAY OF MAGNESIUM: CPT | Performed by: SURGERY

## 2023-11-28 PROCEDURE — C9113 INJ PANTOPRAZOLE SODIUM, VIA: HCPCS | Performed by: SURGERY

## 2023-11-28 PROCEDURE — 80053 COMPREHEN METABOLIC PANEL: CPT | Performed by: SURGERY

## 2023-11-28 PROCEDURE — 63600175 PHARM REV CODE 636 W HCPCS: Performed by: SURGERY

## 2023-11-28 PROCEDURE — 96375 TX/PRO/DX INJ NEW DRUG ADDON: CPT

## 2023-11-28 PROCEDURE — 94761 N-INVAS EAR/PLS OXIMETRY MLT: CPT

## 2023-11-28 PROCEDURE — 99900035 HC TECH TIME PER 15 MIN (STAT)

## 2023-11-28 PROCEDURE — 96376 TX/PRO/DX INJ SAME DRUG ADON: CPT

## 2023-11-28 PROCEDURE — G0378 HOSPITAL OBSERVATION PER HR: HCPCS

## 2023-11-28 PROCEDURE — 25000003 PHARM REV CODE 250: Performed by: INTERNAL MEDICINE

## 2023-11-28 RX ORDER — NYSTATIN 100000 [USP'U]/ML
4 SUSPENSION ORAL 4 TIMES DAILY
Qty: 48 EACH | Refills: 0 | Status: SHIPPED | OUTPATIENT
Start: 2023-11-28 | End: 2023-12-01

## 2023-11-28 RX ORDER — LIDOCAINE HYDROCHLORIDE 20 MG/ML
15 SOLUTION OROPHARYNGEAL ONCE
Status: DISCONTINUED | OUTPATIENT
Start: 2023-11-28 | End: 2023-11-28 | Stop reason: HOSPADM

## 2023-11-28 RX ORDER — HYDROCODONE BITARTRATE AND ACETAMINOPHEN 5; 325 MG/1; MG/1
1 TABLET ORAL EVERY 6 HOURS PRN
Status: DISCONTINUED | OUTPATIENT
Start: 2023-11-28 | End: 2023-11-28 | Stop reason: HOSPADM

## 2023-11-28 RX ORDER — ONDANSETRON 4 MG/1
4 TABLET, ORALLY DISINTEGRATING ORAL 2 TIMES DAILY
Qty: 20 TABLET | Refills: 0 | Status: SHIPPED | OUTPATIENT
Start: 2023-11-28 | End: 2023-12-08

## 2023-11-28 RX ORDER — MAG HYDROX/ALUMINUM HYD/SIMETH 200-200-20
30 SUSPENSION, ORAL (FINAL DOSE FORM) ORAL ONCE AS NEEDED
Status: DISCONTINUED | OUTPATIENT
Start: 2023-11-28 | End: 2023-11-28 | Stop reason: HOSPADM

## 2023-11-28 RX ORDER — FLUCONAZOLE 150 MG/1
150 TABLET ORAL DAILY
Qty: 1 TABLET | Refills: 0 | Status: SHIPPED | OUTPATIENT
Start: 2023-11-28 | End: 2023-11-29

## 2023-11-28 RX ORDER — HYDROCODONE BITARTRATE AND ACETAMINOPHEN 5; 325 MG/1; MG/1
1 TABLET ORAL EVERY 6 HOURS PRN
Qty: 20 TABLET | Refills: 0 | Status: SHIPPED | OUTPATIENT
Start: 2023-11-28 | End: 2023-12-03

## 2023-11-28 RX ADMIN — PIPERACILLIN SODIUM AND TAZOBACTAM SODIUM 4.5 G: 4; .5 INJECTION, POWDER, LYOPHILIZED, FOR SOLUTION INTRAVENOUS at 12:11

## 2023-11-28 RX ADMIN — SODIUM CHLORIDE, PRESERVATIVE FREE 10 ML: 5 INJECTION INTRAVENOUS at 06:11

## 2023-11-28 RX ADMIN — FLUOXETINE 20 MG: 20 CAPSULE ORAL at 08:11

## 2023-11-28 RX ADMIN — PIPERACILLIN SODIUM AND TAZOBACTAM SODIUM 4.5 G: 4; .5 INJECTION, POWDER, LYOPHILIZED, FOR SOLUTION INTRAVENOUS at 08:11

## 2023-11-28 RX ADMIN — PANTOPRAZOLE SODIUM 40 MG: 40 INJECTION, POWDER, LYOPHILIZED, FOR SOLUTION INTRAVENOUS at 09:11

## 2023-11-28 RX ADMIN — HYDROCODONE BITARTRATE AND ACETAMINOPHEN 1 TABLET: 5; 325 TABLET ORAL at 08:11

## 2023-11-28 RX ADMIN — DEXTROSE, SODIUM CHLORIDE, AND POTASSIUM CHLORIDE: 5; .45; .15 INJECTION INTRAVENOUS at 12:11

## 2023-11-28 NOTE — PLAN OF CARE
11/28/23 0821   Patient Assessment/Suction   Level of Consciousness (AVPU) alert   Respiratory Effort Unlabored   PRE-TX-O2   Device (Oxygen Therapy) room air   SpO2 99 %   Pulse Oximetry Type Intermittent   $ Pulse Oximetry - Multiple Charge Pulse Oximetry - Multiple   Pulse 88   Resp 18   Aerosol Therapy   $ Aerosol Therapy Charges PRN treatment not required   Respiratory Evaluation   $ Care Plan Tech Time 15 min

## 2023-11-28 NOTE — ANESTHESIA POSTPROCEDURE EVALUATION
Anesthesia Post Evaluation    Patient: Lynn Naidu    Procedure(s) Performed: Procedure(s) (LRB):  CHOLECYSTECTOMY, LAPAROSCOPIC (N/A)    Final Anesthesia Type: general      Patient location during evaluation: PACU  Patient participation: Yes- Able to Participate  Level of consciousness: awake and alert  Post-procedure vital signs: reviewed and stable  Pain management: adequate  Airway patency: patent    PONV status at discharge: No PONV  Anesthetic complications: no      Cardiovascular status: hemodynamically stable  Respiratory status: unassisted, spontaneous ventilation and room air  Hydration status: euvolemic  Follow-up not needed.          Vitals Value Taken Time   /90 11/27/23 1800   Temp 36.6 °C (97.8 °F) 11/27/23 1745   Pulse 92 11/27/23 1808   Resp 9 11/27/23 1808   SpO2 100 % 11/27/23 1808   Vitals shown include unvalidated device data.      No case tracking events are documented in the log.      Pain/Dana Score: Pain Rating Prior to Med Admin: 7 (11/27/2023  5:55 PM)  Pain Rating Post Med Admin: 0 (11/26/2023 10:00 PM)  Dana Score: 10 (11/27/2023  5:45 PM)

## 2023-11-28 NOTE — DISCHARGE SUMMARY
[facility] Medicine  Discharge Summary      Patient Name: Lynn Naidu  MRN: [id number]  GUY: [id number]  Patient Class: OP- Observation  Admission Date: 11/25/2023  Hospital Length of Stay: 2 days  Discharge Date and Time:  11/28/2023 3:10 PM  Attending Physician: Ori Cooley DO   Discharging Provider: Ori Cooley DO  Primary Care Provider: Remy Parson MD    Primary Care Team: Networked reference to record PCT     HPI:   Per H&P:  Lynn Naidu is a 58-year-old female presents to the ER c/o severe, non-radiating RUQ, RLQ, and epigastric area abdominal pain with nausea and vomiting for several months that worsened yesterday following an abnormal HIDA scan. Past medical history includes hypertension, GERD, hepatic cysts, bronchitis, appendectomy, peripheral edema, insomnia, and cervical disc displacement. She reports having intentional weight loss from taking Wegovy (Semaglutide) injections since 01/2023. She reports decrease appetite, nausea, vomiting, headache, diaphoresis without fever with non-radiating abdominal pain. Denies chest pain, shortness of breath, fatigue, diarrhea, melena, hematemesis, hematochezia, dysuria, or hematuria. A HIDA scan was performed 11/25 which showed a reduced gallbladder ejection fraction. 8/31 EGD positive H. Pylori, small hiatal hernia, GERD, and gastritis. She is established with Gastroenterology and hepatology outpatient.     In the ED her abdominal CT demonstrated cholecystitis with distended gallbladder; choledocholithiasis, a filling defect in the distal common bile duct. MRI revealed right renal cysts, gall stones, pericholecystic fluid, and mildly dilated common bile duct. She received IV zosyn and analgesics in ED.      Today she is accompanied by her sister in the room. She has been having pain intermittent but it is improved with IV morphine. She has been having intermittent nausea but no more vomiting and this has been improved  with zofran.     I explained the nature of the problem to her and the procedure that would be done and the likelihood that she would need her gallbladder removed eventually            Procedure(s) (LRB):  CHOLECYSTECTOMY, LAPAROSCOPIC (N/A)      Hospital Course:   Patient is a 58-year-old female with a history of diverticulosis, hypertension, GERD, hiatal hernia, appendectomy, history of H pylori, who presented to the [facility] emergency department for evaluation of epigastric pain.  Gastroenterology and General surgery were consulted.  Imaging study results below.  Laparoscopic cholecystectomy was performed on 11/27/2023.  Patient's diet was advanced.  Pain was controlled.  Vitals remained normal.  General surgery cleared patient for discharge home.  Follow up with primary care physician and General surgery.    Laparoscopic cholecystectomy on 11/27/2023  MRCP 11/'23 - 8mm CBD w/ stone; gallstones; liver cysts  CT ABd 11/'23 - cholecystitis / cholelithiasis; dilated biliary; tics; liver cysts; nml panc  HIDA 11/'23 - decreased EF  GES 11/'23 - mild delayed (on Mounjaro)  EGD 8/'23 - small HH; min gastritis    General:     Resting comfortably, Appears stated age.  Eyes:    Anicteric sclera. Conjunctiva non-injected  HENT:    Head normocephalic, atraumatic. Mucous membranes moist.   Neck:   Trachea midline, no thyromegaly.  No JVD or adenopathy.   Heart:    S1, S2 auscultated. No murmurs rubs or gallops. Regular rhythm and rate.   Lungs:    Bilaterally clear in all lobes with equal chest rise.  No wheezes, rales, or rhonchi.   Abdomen:    Soft, mild tenderness.  Well closed for surgical incisions, nondistended.  Positive bowel sounds. No palpable masses.  No rebound or guarding.   Extremities:    Without clubbing or cyanosis.  No edema.  Peripheral pulses II/IV.  Neuro:    Alert, awake and oriented x3.  Cranial nerves appear grossly intact on limited neurological exam.  No focal motor or sensory  deficit.  Skin:    No rashes, exudates, or nodules on limited skin exam  Psych:    Normal mood, affect and behavior with intact judgement and insight.    Imaging Results              MRI MRCP Without Contrast (Final result)  Result time 11/25/23 16:03:39      Final result by Dayana Powell MD (11/25/23 16:03:39)                   Impression:      Cholelithiasis and findings of acute cholecystitis and choledocholithiasis with pericholecystic fluid, mild biliary duct dilatation and with a filling defect in the distal common duct.    Multiple cysts in the liver.  Small right renal cyst.      Electronically signed by: Dayana Powell MD  Date:    11/25/2023  Time:    16:03               Narrative:    EXAMINATION:  MRI ABDOMEN WITHOUT CONTRAST MRCP    CLINICAL HISTORY:  Cholelithiasis;Choledocholithiasis;    TECHNIQUE:  Multiplanar, multisequence images are performed through the liver and upper abdomen.  Additionally 2D and 3D MRCP sequences are performed as well as MIP images.    COMPARISON:  10/24/2023    FINDINGS:  Multiple cysts in the liver including cluster of cysts spanning a distance of 6 6.6 cm in the left lobe.    Spleen not enlarged    Adrenal glands unremarkable appearance    Pancreas unremarkable appearance    Abdominal aorta no aneurysm    Kidney; a couple right renal cysts larger of which measures 8 mm.  No hydronephrosis    Multiple filling defects in the gallbladder consistent with stones.  Pericholecystic fluid.  Common duct appears just mildly dilated measuring 78 mm.  There is intraluminal filling defect in the distal common duct for example coronal series 4, image 13 measures approximately 3 mm.  Also see coronal series 10, image 47.  Also well seen series 11, image 1.                                       CT Abdomen Pelvis With IV Contrast NO Oral Contrast (Final result)  Result time 11/25/23 13:09:29      Final result by Dayana Powell MD (11/25/23 13:09:29)                   Impression:       Findings suggesting acute cholecystitis with distended gallbladder with wall thickening and/or pericholecystic fluid.  No calcified stones evident in the gallbladder but note is made that cholelithiasis is typically better evaluate by ultrasound and ultrasound 08/03/2023 did describe cholelithiasis.    Mild dilatation the common duct, cause of which is not identified but appearing new compared to prior study and concerning for choledocholithiasis.    Hepatic and renal cysts.      Electronically signed by: Dayana Powell MD  Date:    11/25/2023  Time:    13:09               Narrative:    EXAMINATION:  CT ABDOMEN PELVIS WITH IV CONTRAST    CLINICAL HISTORY:  RLQ abdominal pain (Age >= 14y);    TECHNIQUE:  Low dose axial images, sagittal and coronal reformations were obtained from the lung bases to the pubic symphysis following the IV administration of 75 mL of Omnipaque 350 no p.o. contrast    COMPARISON:  None.    FINDINGS:  Mild dependent hypoventilatory changes in visualized lung bases.    Liver; multiple cysts with cysts most numerous and largest in the left lobe in corresponding as seen on MRI of the abdomen of 10/24/2023, more completely characterized on that exam.  Appearance on CT ever does also suggest simple cysts    Spleen not enlarged    Adrenal glands unremarkable appearance    Pancreas unremarkable appearance    Abdominal aorta no aneurysm    Gallbladder and bile ducts; distended gallbladder with small amount of pericholecystic fluid and/or wall thickening.  No calcified stones seen within the gallbladder with cholelithiasis typically better evaluated by ultrasound.  Common duct appears mildly dilated measuring up to 9 mm on coronal images.  Difficult to compared to the prior MRCP for technical differences but not seen dilated on that study.    Renal enhancement is symmetrical.  No hydronephrosis.  11 mm right renal cyst.  Urinary bladder moderately distended at time of the exam and as visualized is  unremarkable appearance    Reproductive organs unremarkable appearance for the patient's age    Diverticulosis without CT findings of acute diverticulitis.  Normal appearance of the appendix.  No free intraperitoneal air or fluid.                                         Goals of Care Treatment Preferences:  Code Status: Full Code      Consults:   Consults (From admission, onward)          Status Ordering Provider     Inpatient consult to General Surgery  Once        Provider:  Al Cyr MD    Completed NAUN HOLLEY III     Inpatient consult to Gastroenterology  Once        Provider:  Alexandr Overton MD    Acknowledged AL CYR            No new Assessment & Plan notes have been filed under this hospital service since the last note was generated.  Service: Hospital Medicine    Final Active Diagnoses:    Diagnosis Date Noted POA    Hypokalemia [E87.6] 11/26/2023 Yes    Chronic GERD [K21.9] 11/26/2023 Yes    Liver cyst [K76.89] 10/09/2023 Yes    Essential hypertension [I10] 10/15/2019 Yes      Problems Resolved During this Admission:    Diagnosis Date Noted Date Resolved POA    PRINCIPAL PROBLEM:  Cholelithiasis with choledocholithiasis [K80.70] 11/26/2023 11/28/2023 Yes    Cholangitis [K83.09] 11/28/2023 11/28/2023 Yes       Discharged Condition: stable    Disposition: Home or Self Care    Follow Up:   Follow-up Information       Remy Parson MD Follow up in 1 week(s).    Specialty: Family Medicine  Contact information:  [address]  [address]  [place] [address]  [phone]               Al Cyr MD. Call.    Specialties: General Surgery, Colon and Rectal Surgery, Surgery  Contact information:  [address]  [address]  [place] [address]  [phone]                           Patient Instructions:   No discharge procedures on file.    Significant Diagnostic Studies: Labs: CMP   Recent Labs   Lab 11/27/23  0421 11/28/23  0518    142   K 3.3* 3.4*    105   CO2 27  31*   GLU 93 113*   BUN 7 5*   CREATININE 1.1 1.1   CALCIUM 8.6* 8.9   PROT 5.6* 6.6   ALBUMIN 3.3* 3.7   BILITOT 0.9 0.9   ALKPHOS 107 146*   * 134*   * 359*   ANIONGAP 7* 6*    and CBC   Recent Labs   Lab 11/27/23  0421 11/28/23  0518   WBC 5.86 9.47   HGB 11.4* 12.5   HCT 33.8* 36.9*    240       Pending Diagnostic Studies:       Procedure Component Value Units Date/Time    Specimen to Pathology - Surgery [[id number]] Collected: 11/27/23 1654    Order Status: Sent Lab Status: No result     Specimen: Tissue     Specimen to Pathology - Surgery [[id number]] Collected: 11/27/23 0918    Order Status: Sent Lab Status: No result     Specimen: Tissue            Medications:  Reconciled Home Medications:      Medication List        START taking these medications      HYDROcodone-acetaminophen 5-325 mg per tablet  Commonly known as: NORCO  Take 1 tablet by mouth every 6 (six) hours as needed for Pain.     nystatin 100,000 unit/mL suspension  Commonly known as: MYCOSTATIN  Take 4 mLs (400,000 Units total) by mouth 4 (four) times daily. for 3 days     ondansetron 4 MG Tbdl  Commonly known as: ZOFRAN-ODT  Take 1 tablet (4 mg total) by mouth 2 (two) times daily. for 10 days            CHANGE how you take these medications      fluconazole 150 MG Tab  Commonly known as: DIFLUCAN  Take 1 tablet (150 mg total) by mouth once daily. for 1 day  What changed:   medication strength  how much to take  when to take this            CONTINUE taking these medications      ciclopirox 8 % Soln  Commonly known as: PENLAC  APPLY TOPICALLY NIGHTLY     diazePAM 10 MG Tab  Commonly known as: VALIUM  Take 10 mg by mouth every evening.     ergocalciferol 50,000 unit Cap  Commonly known as: ERGOCALCIFEROL  Take 1 capsule (50,000 Units total) by mouth twice a week.     FLUoxetine 20 MG capsule  Take 20 mg by mouth once daily.     fluticasone propionate 50 mcg/actuation nasal spray  Commonly known as: FLONASE  2 sprays (100 mcg  total) by Each Nostril route once daily.     furosemide 40 MG tablet  Commonly known as: LASIX  Take 1 tablet (40 mg total) by mouth once daily.     hydrOXYzine HCL 25 MG tablet  Commonly known as: ATARAX  Take 1 tablet (25 mg total) by mouth 3 (three) times daily as needed.     hydrOXYzine pamoate 25 MG Cap  Commonly known as: VISTARIL  Take 25 mg by mouth 2 (two) times daily as needed.     linaCLOtide 290 mcg Cap capsule  Commonly known as: LINZESS  Take 1 capsule (290 mcg total) by mouth before breakfast.     methylPREDNISolone 4 mg tablet  Commonly known as: MEDROL DOSEPACK  Take by mouth.     metoprolol succinate 50 MG 24 hr tablet  Commonly known as: TOPROL-XL  Take 1 tablet (50 mg total) by mouth once daily.     montelukast 10 mg tablet  Commonly known as: SINGULAIR  Take 1 tablet (10 mg total) by mouth every evening.     pantoprazole 40 MG tablet  Commonly known as: PROTONIX  Take 1 tablet (40 mg total) by mouth every morning.     potassium chloride SA 20 MEQ tablet  Commonly known as: K-DUR,KLOR-CON  Take 1 tablet (20 mEq total) by mouth 2 (two) times daily.     promethazine 25 MG tablet  Commonly known as: PHENERGAN  Take 25 mg by mouth.     SULFACLEANSE 8-4 8-4 % Susp  Generic drug: sulfacetamide sodium-sulfur  Apply topically nightly as needed.     triamcinolone acetonide 0.1% 0.1 % paste  Commonly known as: KENALOG  SMARTSIG:Sparingly TO TEETH 3 Times Daily     WEGOVY 2.4 mg/0.75 mL Pnij  Generic drug: semaglutide (weight loss)  Inject 2.4 mg into the skin every 7 days.            ASK your doctor about these medications      methocarbamoL 750 MG Tab  Commonly known as: ROBAXIN  Take 1 tablet (750 mg total) by mouth 4 (four) times daily as needed (muscle spasm).     nabumetone 500 MG tablet  Commonly known as: RELAFEN  Take 1 tablet (500 mg total) by mouth 2 (two) times daily as needed for Pain.     pregabalin 75 MG capsule  Commonly known as: LYRICA  TAKE 1 CAPSULE BY MOUTH EVERY MORNING AND 2 CAPSULES  AT BEDTIME     traMADoL 50 mg tablet  Commonly known as: ULTRAM  Take 1 tablet (50 mg total) by mouth every 8 (eight) hours as needed for Pain.              Indwelling Lines/Drains at time of discharge:   Lines/Drains/Airways       None                   Time spent on the discharge of patient: 34 minutes         Ori Cooley DO  Department of Hospital Medicine  [facility]

## 2023-11-28 NOTE — TELEPHONE ENCOUNTER
----- Message from Fatoumata Gonzáles sent at 11/28/2023  3:33 PM CST -----  Regarding: appt  Type:  Sooner Apoointment Request      Name of Caller:pt     When is the first available appointment?3/13    Symptoms:hospt fu       Best Call Back Number:[phone]      Additional Information: pt needs 8-14 days fu. please call to discuss.

## 2023-11-28 NOTE — PLAN OF CARE
Patient cleared for discharge from case management standpoint.    Follow up appointments scheduled and added to AVS. CM called Dr Parson's office for an appointment. Office will call pt with appointment details. Surgeon scheduled and on AVS.    Chart and discharge orders reviewed.  Patient discharged home with no further case management needs.       11/28/23 1534   Final Note   Assessment Type Final Discharge Note   Anticipated Discharge Disposition Home   Hospital Resources/Appts/Education Provided Provided patient/caregiver with written discharge plan information;Appointments scheduled and added to AVS   Post-Acute Status   Discharge Delays None known at this time

## 2023-11-28 NOTE — NURSING
Patient in bed with call bell in reach. Complaints of pain in ABD area pain med given with effective results.

## 2023-11-28 NOTE — DISCHARGE INSTRUCTIONS
10 lb weight restriction for 6 weeks.    Please follow up with General surgery.    It is normal for your bowels to take time to return to normal. You may not have a bowel  movement for several days. Drink plenty of fluids and take the stool softener as  prescribed. Milk of Magnesia may be used if you still have not had a bowel movement  and are becoming increasingly uncomfortable.    You may shower normally. Avoid tub baths for 4 weeks. Keep the incision clean and dry.     Lack of appetite is common. Try to eat small, frequent meals and drink plenty of uncaffeinated fluids. If you are unable to eat much, a nutrition supplement such as Boost or  Ensure may be added.

## 2023-11-30 ENCOUNTER — TELEPHONE (OUTPATIENT)
Dept: SURGERY | Facility: CLINIC | Age: 58
End: 2023-11-30
Payer: COMMERCIAL

## 2023-11-30 LAB
BACTERIA BLD CULT: NORMAL
BACTERIA BLD CULT: NORMAL

## 2023-11-30 NOTE — TELEPHONE ENCOUNTER
I called patient to schedule her sooner to get the incision checked since she stated that it's red and raised.  She was scheduled on Tuesday, 12/19/23 @ 1pm in [place] with Dr. Cyr, but I rescheduled her to see him in [place] on Wednesday, 12/6/23 @ 11:30am, but asked her to come in @ 11am.  Patient agreed. Alex

## 2023-12-04 ENCOUNTER — TELEPHONE (OUTPATIENT)
Dept: SURGERY | Facility: CLINIC | Age: 58
End: 2023-12-04
Payer: COMMERCIAL

## 2023-12-04 NOTE — TELEPHONE ENCOUNTER
I called patient to offer her to come in @ 9:45am on Wednesday, 12/6/23 instead of 11:30am with Dr. Cyr.  Patient accepted the 9:45am time.  Alex   No

## 2023-12-06 ENCOUNTER — OFFICE VISIT (OUTPATIENT)
Dept: SURGERY | Facility: CLINIC | Age: 58
End: 2023-12-06
Payer: COMMERCIAL

## 2023-12-06 VITALS
HEIGHT: 63 IN | SYSTOLIC BLOOD PRESSURE: 118 MMHG | BODY MASS INDEX: 29.02 KG/M2 | TEMPERATURE: 97 F | HEART RATE: 89 BPM | WEIGHT: 163.81 LBS | DIASTOLIC BLOOD PRESSURE: 72 MMHG

## 2023-12-06 DIAGNOSIS — Z09 POSTOP CHECK: Primary | ICD-10-CM

## 2023-12-06 DIAGNOSIS — R94.8 ABNORMAL BILIARY HIDA SCAN: ICD-10-CM

## 2023-12-06 PROCEDURE — 3074F SYST BP LT 130 MM HG: CPT | Mod: CPTII,S$GLB,, | Performed by: SURGERY

## 2023-12-06 PROCEDURE — 3078F PR MOST RECENT DIASTOLIC BLOOD PRESSURE < 80 MM HG: ICD-10-PCS | Mod: CPTII,S$GLB,, | Performed by: SURGERY

## 2023-12-06 PROCEDURE — 99024 PR POST-OP FOLLOW-UP VISIT: ICD-10-PCS | Mod: S$GLB,,, | Performed by: SURGERY

## 2023-12-06 PROCEDURE — 3074F PR MOST RECENT SYSTOLIC BLOOD PRESSURE < 130 MM HG: ICD-10-PCS | Mod: CPTII,S$GLB,, | Performed by: SURGERY

## 2023-12-06 PROCEDURE — 3078F DIAST BP <80 MM HG: CPT | Mod: CPTII,S$GLB,, | Performed by: SURGERY

## 2023-12-06 PROCEDURE — 99999 PR PBB SHADOW E&M-EST. PATIENT-LVL III: ICD-10-PCS | Mod: PBBFAC,,, | Performed by: SURGERY

## 2023-12-06 PROCEDURE — 99024 POSTOP FOLLOW-UP VISIT: CPT | Mod: S$GLB,,, | Performed by: SURGERY

## 2023-12-06 PROCEDURE — 99999 PR PBB SHADOW E&M-EST. PATIENT-LVL III: CPT | Mod: PBBFAC,,, | Performed by: SURGERY

## 2023-12-06 NOTE — PROGRESS NOTES
Cc: post op    HPI: 58 y.o.  female  1.5 weeks s/p lap isela.   Pt doing well.        PE: AFVSS    AAOx3  CTA  Soft/NT/nd  Inc: c/d/i        Path:     GALLBLADDER:   - CHRONIC CHOLECYSTITIS.   - CHOLESTEROLOSIS.   - CHOLELITHIASIS.   - NEGATIVE FOR DYSPLASIA OR MALIGNANCY.     A/P:   Pt doing well post surgery.   F/U with me prn

## 2023-12-10 NOTE — TELEPHONE ENCOUNTER
General Surgery Progress Note    12/10/2023 9:17 AM     Patient: Nicholas Chavez   MRN: [id number]   : 1950     SUBJECTIVE  Received 1uPRBC overnight due to hemoglobin drop  Was off Levo gtt but now back on this AM  No episodes of bleeding noted.      PHYSICAL EXAM  Vitals:  Blood pressure 98/43, pulse 72, temperature 97.6 °F (36.4 °C), temperature source Axillary, resp. rate 18, height 5' (1.524 m), weight 100.2 kg (221 lb), SpO2 100 %.    General: no acute distress  CV: regular rate  Resp: non labored breathing on trach collar  GI: abdomen soft, nontender, nondistended  MSK: Moving all 4 extremities  Neuro: Nonresponsive  Skin: Warm, dry    INTAKE/OUTPUT  I/O last 3 completed shifts:  In: 6201.6 [I.V.:803.3; Blood:708; NG/GT:4000; IV Piggyback:690.3]  Out: -   I/O this shift:  In: 35.8 [I.V.:22.9; IV Piggyback:12.9]  Out: -      LABS  WBC (K/mcL)   Date Value   12/10/2023 10.9     RBC (mil/mcL)   Date Value   12/10/2023 2.99 (L)     HCT (%)   Date Value   12/10/2023 25.2 (L)     HGB (g/dL)   Date Value   12/10/2023 9.3 (L)     PLT (K/mcL)   Date Value   12/10/2023 74 (L)       Sodium (mmol/L)   Date Value   12/10/2023 137     Potassium (mmol/L)   Date Value   12/10/2023 2.6 (LL)     Chloride (mmol/L)   Date Value   12/10/2023 105     Glucose (mg/dL)   Date Value   12/10/2023 294 (H)     Calcium (mg/dL)   Date Value   12/10/2023 7.3 (L)     Carbon Dioxide (mmol/L)   Date Value   12/10/2023 19 (L)     BUN (mg/dL)   Date Value   12/10/2023 51 (H)     Creatinine (mg/dL)   Date Value   12/10/2023 2.72 (H)       GOT/AST (Units/L)   Date Value   12/10/2023 29     GPT/ALT (Units/L)   Date Value   12/10/2023 18     No results found for: \"GGTP\"  Alkaline Phosphatase (Units/L)   Date Value   12/10/2023 74     Bilirubin, Total (mg/dL)   Date Value   12/10/2023 0.6       IMAGING  XR GASTROINTESTINAL TUBE CHECK WITH CONTRAST    Result Date: 2023  EXAMINATION: XR GASTROINTESTINAL TUBE CHECK WITH CONTRAST CLINICAL  ----- Message from Gayathri Harvey sent at 11/30/2023  3:08 PM CST -----  Contact: pt [phone]  Type: Needs Medical Advice  Who Called:  Pt     Best Call Back Number: [phone]    Additional Information: Pts pcp will not work her in for a f/u but advised her to f/u with her surgeon. Pt has appt  12/19 but is asking if she can be seen sooner to check up on her incision. Pls call back and advise          HISTORY: Replaced gastrostomy tube TECHNIQUE: Single AP examination of the abdomen was obtained. COMPARISON: 12/8/2023 CT     FINDINGS/IMPRESSION: Contrast injection through a gastrostomy tube demonstrates contrast within the gastric lumen. No evidence of extravasation. Imaged bowel gas pattern is unremarkable. Additional postsurgical changes in the chest. Electronically Signed by: ROSALVA AGUERO MD Signed on: 12/9/2023 7:34 AM Workstation ID: [id number]    CTA ABDOMEN PELVIS    Result Date: 12/8/2023  CTA abdomen and pelvis without and with IV contrast (GI Bleed protocol): History: GI bleed. Findings: Technique: Spiral CT images of the abdomen and pelvis were obtained without and with the administration of IV contrast. Arterial and venous phase imaging was obtained. Volume rendered 3-D images were obtained on the workstation and reviewed. Comparison: 12/8/2023 0011 hours CT exam: Atelectasis at the lung bases greater worse at the left side. No liver lesions. Arterial vascular calcifications seen within the liver. Gallstones are seen previously. Spleen is normal. Pancreas is normal. Adrenal glands are normal. Kidneys with cysts as seen previously. No retroperitoneal lymphadenopathy. PEG tube in place however the retention balloon appears to be within the subcutaneous tissues as noted previously. Query whether this is fluid reservoir/pump for the penile implants. Sacral decubitus ulcer as seen previously. Vascular Exam:  Aorta without evidence of aneurysm or dissection. Aorta is diffusely calcified. Celiac artery is calcified with mild to moderate stenosis at the origin. SMA is also calcified with mild to moderate stenosis near the origin. Both renal arteries are calcified with mild to moderate stenosis as well. Stents within the bilateral common iliac arteries which are patent. Internal iliac arteries are calcified but patent. Bilateral external iliac arteries are calcified with mild stenosis. Right common  femoral artery is calcified with mild to moderate stenosis. Left common femoral artery with severe stenosis. Native left superficial femoral artery is occluded at the origin. There is a left femoropopliteal bypass graft which is occluded. Close attention to the bowel loops show contrast extravasation seen in the area of the rectum best seen on the venous phase imaging.     Contrast extravasation seen within the rectum. Likely hemorrhoidal. Electronically Signed by: JOSY ORTIZ M.D. Signed on: 12/8/2023 4:11 PM Workstation ID: [id number]    XR CHEST AP OR PA    Result Date: 12/8/2023  EXAM: XR CHEST AP OR PA CLINICAL INDICATION: Line placement COMPARISON: Portable chest dated 12/7/2023 FINDINGS: Tracheostomy tube is stable. Left IJ dialysis catheter stable. There is a new right IJ sheath with tip at the level of the clavicles. Cardiac and mediastinal contours are stable. Small left pleural effusion with left atelectasis is seen. No pneumothorax. Osseous structures and upper abdomen are stable.     New right IJ sheath with tip at the level of the clavicles. Electronically Signed by: SONJA MALDONADO MD Signed on: 12/8/2023 3:25 PM Workstation ID: [id number]       Assessment:  Nicholas Chavez is a 73 year old male with pmhx of chronic hypoxic respiratory failure on mechanical ventilation at baseline, CAD s/p CABG, pAfib on Eliquis, dysphagia w/ PEG in place, ESRD on HD, DM, PVD s/p left BKA and right AKA, known sacral ulcer pressure wound, known full thickness perssure injury right buttock, hx of anemia, hx of CVA, hx of anoxic brain injury after cardiac arrest 1/2023, hx of PE, hypothyroidism. Patient presented to [facility] ED for eval of bleeding from dislodged rectal tube. General Surgery consulted for further eval/management for possible sacral decubitus ulcer debridement and dislodged G-tube that was placed surgically at [place] on 2/10/23.     Patient remains on Levo gtt.  Received 1uPRBC overnight.      Plan:  -  GI team planning on bowel prep for repeat endoscopy.  However, if patient continues to require regular transfusions or becomes more unstable, then patient would likely need to go to the OR for further evaluation of bleeding.  - Rest per primary    To be discussed w/ attending.       Carol Guerrero DO  PGY1, Surgery  General Surgery      Further per Attending Attestation if applicable.

## 2023-12-11 ENCOUNTER — TELEPHONE (OUTPATIENT)
Dept: FAMILY MEDICINE | Facility: CLINIC | Age: 58
End: 2023-12-11
Payer: COMMERCIAL

## 2023-12-11 ENCOUNTER — TELEPHONE (OUTPATIENT)
Dept: SURGERY | Facility: CLINIC | Age: 58
End: 2023-12-11
Payer: COMMERCIAL

## 2023-12-11 ENCOUNTER — PATIENT MESSAGE (OUTPATIENT)
Dept: SURGERY | Facility: CLINIC | Age: 58
End: 2023-12-11
Payer: COMMERCIAL

## 2023-12-11 NOTE — TELEPHONE ENCOUNTER
----- Message from Halima Williamson sent at 12/11/2023 12:04 PM CST -----  Regarding: Sooner Appointment Request  Type:  Sooner Appointment Request    Name of Caller:  patient at [phone]    When is the first available appointment?  March, 024  Symptoms:  follow up from procedure    Additional Information:  Please call and advise. Thank you

## 2023-12-11 NOTE — TELEPHONE ENCOUNTER
----- Message from Halima Williamson sent at 12/11/2023 12:02 PM CST -----  Regarding: Needs Medical Advice  Type: Needs Medical Advice  Who Called:  patient at [phone]    Symptoms (please be specific):  stitch sticking out after procedure  Additional Information: patient is wanting to know what she should do about the stitch. Please call and advise. Thank you

## 2023-12-20 ENCOUNTER — OFFICE VISIT (OUTPATIENT)
Dept: SURGERY | Facility: CLINIC | Age: 58
End: 2023-12-20
Payer: COMMERCIAL

## 2023-12-20 VITALS
SYSTOLIC BLOOD PRESSURE: 120 MMHG | HEART RATE: 83 BPM | BODY MASS INDEX: 28.95 KG/M2 | HEIGHT: 63 IN | DIASTOLIC BLOOD PRESSURE: 81 MMHG | WEIGHT: 163.38 LBS | TEMPERATURE: 97 F

## 2023-12-20 DIAGNOSIS — Z09 POSTOP CHECK: Primary | ICD-10-CM

## 2023-12-20 PROCEDURE — 1159F PR MEDICATION LIST DOCUMENTED IN MEDICAL RECORD: ICD-10-PCS | Mod: CPTII,S$GLB,, | Performed by: SURGERY

## 2023-12-20 PROCEDURE — 3079F PR MOST RECENT DIASTOLIC BLOOD PRESSURE 80-89 MM HG: ICD-10-PCS | Mod: CPTII,S$GLB,, | Performed by: SURGERY

## 2023-12-20 PROCEDURE — 99024 PR POST-OP FOLLOW-UP VISIT: ICD-10-PCS | Mod: S$GLB,,, | Performed by: SURGERY

## 2023-12-20 PROCEDURE — 1159F MED LIST DOCD IN RCRD: CPT | Mod: CPTII,S$GLB,, | Performed by: SURGERY

## 2023-12-20 PROCEDURE — 3074F PR MOST RECENT SYSTOLIC BLOOD PRESSURE < 130 MM HG: ICD-10-PCS | Mod: CPTII,S$GLB,, | Performed by: SURGERY

## 2023-12-20 PROCEDURE — 3079F DIAST BP 80-89 MM HG: CPT | Mod: CPTII,S$GLB,, | Performed by: SURGERY

## 2023-12-20 PROCEDURE — 99024 POSTOP FOLLOW-UP VISIT: CPT | Mod: S$GLB,,, | Performed by: SURGERY

## 2023-12-20 PROCEDURE — 3074F SYST BP LT 130 MM HG: CPT | Mod: CPTII,S$GLB,, | Performed by: SURGERY

## 2023-12-20 PROCEDURE — 99999 PR PBB SHADOW E&M-EST. PATIENT-LVL IV: ICD-10-PCS | Mod: PBBFAC,,, | Performed by: SURGERY

## 2023-12-20 PROCEDURE — 99999 PR PBB SHADOW E&M-EST. PATIENT-LVL IV: CPT | Mod: PBBFAC,,, | Performed by: SURGERY

## 2023-12-21 NOTE — PROGRESS NOTES
Cc: post op    HPI: 58 y.o.  female  5 weeks s/p lap isela.   Pt notes protruding stitch which is irritating    PE: AFVSS    AAOx3  CTA  Soft/NT/nd  Inc: c/d/i            A/P:   Pt doing well post surgery.   F/U with me prn  Stitich removed

## 2024-01-02 ENCOUNTER — OFFICE VISIT (OUTPATIENT)
Dept: FAMILY MEDICINE | Facility: CLINIC | Age: 59
End: 2024-01-02
Attending: FAMILY MEDICINE
Payer: COMMERCIAL

## 2024-01-02 ENCOUNTER — PATIENT MESSAGE (OUTPATIENT)
Dept: FAMILY MEDICINE | Facility: CLINIC | Age: 59
End: 2024-01-02

## 2024-01-02 ENCOUNTER — LAB VISIT (OUTPATIENT)
Dept: LAB | Facility: HOSPITAL | Age: 59
End: 2024-01-02
Attending: FAMILY MEDICINE
Payer: COMMERCIAL

## 2024-01-02 VITALS
BODY MASS INDEX: 29.7 KG/M2 | WEIGHT: 161.38 LBS | HEIGHT: 62 IN | SYSTOLIC BLOOD PRESSURE: 120 MMHG | DIASTOLIC BLOOD PRESSURE: 70 MMHG | HEART RATE: 90 BPM | OXYGEN SATURATION: 98 %

## 2024-01-02 DIAGNOSIS — K81.9 CHOLECYSTITIS: ICD-10-CM

## 2024-01-02 DIAGNOSIS — R74.8 ABNORMAL LIVER ENZYMES: ICD-10-CM

## 2024-01-02 DIAGNOSIS — D64.9 ANEMIA, UNSPECIFIED TYPE: ICD-10-CM

## 2024-01-02 DIAGNOSIS — R11.0 NAUSEA: ICD-10-CM

## 2024-01-02 DIAGNOSIS — K81.9 CHOLECYSTITIS: Primary | ICD-10-CM

## 2024-01-02 LAB
ALBUMIN SERPL BCP-MCNC: 3.7 G/DL (ref 3.5–5.2)
ALP SERPL-CCNC: 166 U/L (ref 55–135)
ALT SERPL W/O P-5'-P-CCNC: 22 U/L (ref 10–44)
ANION GAP SERPL CALC-SCNC: 11 MMOL/L (ref 8–16)
AST SERPL-CCNC: 21 U/L (ref 10–40)
BASOPHILS # BLD AUTO: 0.03 K/UL (ref 0–0.2)
BASOPHILS NFR BLD: 0.4 % (ref 0–1.9)
BILIRUB SERPL-MCNC: 0.4 MG/DL (ref 0.1–1)
BUN SERPL-MCNC: 10 MG/DL (ref 6–20)
CALCIUM SERPL-MCNC: 9.3 MG/DL (ref 8.7–10.5)
CHLORIDE SERPL-SCNC: 106 MMOL/L (ref 95–110)
CO2 SERPL-SCNC: 26 MMOL/L (ref 23–29)
CREAT SERPL-MCNC: 1.1 MG/DL (ref 0.5–1.4)
DIFFERENTIAL METHOD BLD: ABNORMAL
EOSINOPHIL # BLD AUTO: 0.2 K/UL (ref 0–0.5)
EOSINOPHIL NFR BLD: 2 % (ref 0–8)
ERYTHROCYTE [DISTWIDTH] IN BLOOD BY AUTOMATED COUNT: 12.9 % (ref 11.5–14.5)
EST. GFR  (NO RACE VARIABLE): 57.9 ML/MIN/1.73 M^2
GLUCOSE SERPL-MCNC: 87 MG/DL (ref 70–110)
HCT VFR BLD AUTO: 41.1 % (ref 37–48.5)
HGB BLD-MCNC: 13.9 G/DL (ref 12–16)
IMM GRANULOCYTES # BLD AUTO: 0.02 K/UL (ref 0–0.04)
IMM GRANULOCYTES NFR BLD AUTO: 0.3 % (ref 0–0.5)
LYMPHOCYTES # BLD AUTO: 4.3 K/UL (ref 1–4.8)
LYMPHOCYTES NFR BLD: 53.9 % (ref 18–48)
MCH RBC QN AUTO: 30.8 PG (ref 27–31)
MCHC RBC AUTO-ENTMCNC: 33.8 G/DL (ref 32–36)
MCV RBC AUTO: 91 FL (ref 82–98)
MONOCYTES # BLD AUTO: 0.3 K/UL (ref 0.3–1)
MONOCYTES NFR BLD: 4.3 % (ref 4–15)
NEUTROPHILS # BLD AUTO: 3.1 K/UL (ref 1.8–7.7)
NEUTROPHILS NFR BLD: 39.1 % (ref 38–73)
NRBC BLD-RTO: 0 /100 WBC
PLATELET # BLD AUTO: 268 K/UL (ref 150–450)
PMV BLD AUTO: 10.8 FL (ref 9.2–12.9)
POTASSIUM SERPL-SCNC: 3.6 MMOL/L (ref 3.5–5.1)
PROT SERPL-MCNC: 7.5 G/DL (ref 6–8.4)
RBC # BLD AUTO: 4.51 M/UL (ref 4–5.4)
SODIUM SERPL-SCNC: 143 MMOL/L (ref 136–145)
WBC # BLD AUTO: 7.98 K/UL (ref 3.9–12.7)

## 2024-01-02 PROCEDURE — 99213 OFFICE O/P EST LOW 20 MIN: CPT | Mod: S$GLB,,, | Performed by: FAMILY MEDICINE

## 2024-01-02 PROCEDURE — 1159F MED LIST DOCD IN RCRD: CPT | Mod: CPTII,S$GLB,, | Performed by: FAMILY MEDICINE

## 2024-01-02 PROCEDURE — 36415 COLL VENOUS BLD VENIPUNCTURE: CPT | Mod: PO | Performed by: FAMILY MEDICINE

## 2024-01-02 PROCEDURE — 3008F BODY MASS INDEX DOCD: CPT | Mod: CPTII,S$GLB,, | Performed by: FAMILY MEDICINE

## 2024-01-02 PROCEDURE — 3078F DIAST BP <80 MM HG: CPT | Mod: CPTII,S$GLB,, | Performed by: FAMILY MEDICINE

## 2024-01-02 PROCEDURE — 80053 COMPREHEN METABOLIC PANEL: CPT | Performed by: FAMILY MEDICINE

## 2024-01-02 PROCEDURE — 1160F RVW MEDS BY RX/DR IN RCRD: CPT | Mod: CPTII,S$GLB,, | Performed by: FAMILY MEDICINE

## 2024-01-02 PROCEDURE — 85025 COMPLETE CBC W/AUTO DIFF WBC: CPT | Performed by: FAMILY MEDICINE

## 2024-01-02 PROCEDURE — 3074F SYST BP LT 130 MM HG: CPT | Mod: CPTII,S$GLB,, | Performed by: FAMILY MEDICINE

## 2024-01-02 PROCEDURE — 99999 PR PBB SHADOW E&M-EST. PATIENT-LVL III: CPT | Mod: PBBFAC,,, | Performed by: FAMILY MEDICINE

## 2024-01-02 RX ORDER — PROMETHAZINE HYDROCHLORIDE 25 MG/1
25 TABLET ORAL EVERY 6 HOURS PRN
Qty: 20 TABLET | Refills: 1 | Status: SHIPPED | OUTPATIENT
Start: 2024-01-02

## 2024-01-02 NOTE — PROGRESS NOTES
"Subjective:       Patient ID: Lynn Naidu is a 58 y.o. female.    Chief Complaint: Follow-up    58-year-old female last seen November 6 for her annual exam at which time she was being evaluated by GI and hepatology for multiple liver cysts.  She was asymptomatic at my visit but developed abdominal pain nausea and vomiting on November 25th and presented to the emergency room where she was found to have cholecystitis and, on MRCP and ERCP a filling defect in the common bile duct compatible with a gallstone.  The ERCP did not result in resolution of her symptoms and she went to surgery with Dr. Cyr also on November 27, 2023 for a laparoscopic cholecystectomy after which she improved.  The last lab work included markedly elevated liver enzymes on a CMP done on the 27th as well as a mildly abnormal CBC.  These have not yet been rechecked since discharge.  Her symptoms have resolved, she has no complaints of diarrhea or nausea and she has been able to eat normally.  She has no fever chills or night sweats.  She asks why she was labeled as "sepsis" in the hospital.  I was able to find the reference and apparently that was the working diagnosis of the emergency room team when they consulted to have her admitted to the hospital for further evaluation.  Blood cultures were done and were, in fact, negative.  The patient does not have any nausea now but she will be going on a cruise in the near future and she is very prone to seasickness.  She actually gets worse with Transderm-Scop patches and is asking for some p.o. Phenergan to take on the cruise with her in case she needs it.    Past Medical History:  No date: Allergy  07/17/2015: Bronchitis  No date: Cervical disc displacement  No date: Edema  No date: Hypertension  No date: Insomnia  No date: Liver cyst  No date: Plantar fasciitis    Past Surgical History:  11/01/2017: COLONOSCOPY      Comment:  Dr. Oviedo, normal, ten year recheck  11/01/2017: COLONOSCOPY; N/A     "  Comment:  Procedure: COLONOSCOPY;  Surgeon: Cameron Oviedo MD;                Location: [facility] END[facility];  Service: Endoscopy;  Laterality:                N/A;  07/22/2019: EPIDURAL STEROID INJECTION INTO CERVICAL SPINE; N/A      Comment:  Procedure: Injection-steroid-epidural-cervical;                 Surgeon: Thomas Ivory MD;  Location: [facility];  Service:                Pain Management;  Laterality: N/A;  C7-T1  12/21/2021: EPIDURAL STEROID INJECTION INTO CERVICAL SPINE; N/A      Comment:  Procedure: Injection-steroid-epidural-cervical;                 Surgeon: Thomas Ivory MD;  Location: [facility];  Service:                Pain Management;  Laterality: N/A;  C6-7  11/27/2023: ERCP; N/A      Comment:  Procedure: ERCP (ENDOSCOPIC RETROGRADE                CHOLANGIOPANCREATOGRAPHY);  Surgeon: Anselmo Winter III, MD;  Location: [facility];  Service: Endoscopy;                 Laterality: N/A;  08/31/2023: ESOPHAGOGASTRODUODENOSCOPY; N/A      Comment:  Procedure: EGD (ESOPHAGOGASTRODUODENOSCOPY);  Surgeon:                Cameron Oviedo MD;  Location: [facility];  Service:                Endoscopy;  Laterality: N/A;  08/30/2022: INJECTION OF ANESTHETIC AGENT AROUND STELLATE GANGLION;   Left      Comment:  Procedure: BLOCK, GANGLION, STELLATE;  Surgeon: Thomas Ivory MD;  Location: [facility];  Service: Pain Management;                 Laterality: Left;  10/21/2022: INJECTION OF ANESTHETIC AGENT AROUND STELLATE GANGLION;   Left      Comment:  Procedure: BLOCK, GANGLION, STELLATE Left;  Surgeon:                Thomas Ivory MD;  Location: [facility];  Service: Pain                Management;  Laterality: Left;  12/27/2022: INJECTION OF ANESTHETIC AGENT AROUND STELLATE GANGLION;   Left      Comment:  Procedure: BLOCK, GANGLION, STELLATE;  Surgeon: Thomas Ivory MD;  Location: [facility] OR;  Service: Pain Management;                 Laterality: Left;  left  11/27/2023: LAPAROSCOPIC  CHOLECYSTECTOMY; N/A      Comment:  Procedure: CHOLECYSTECTOMY, LAPAROSCOPIC;  Surgeon:                Al Cyr MD;  Location: [facility] OR;  Service:                General;  Laterality: N/A;  07/23/2021: TONSILLECTOMY, ADENOIDECTOMY; Bilateral      Comment:  Procedure: TONSILLECTOMY AND ADENOIDECTOMY;  Surgeon:                Anselmo Galvan MD;  Location: [facility] OR;  Service: ENT;               Laterality: Bilateral;  No date: UPPER GASTROINTESTINAL ENDOSCOPY  No date: WISDOM TOOTH EXTRACTION    Current Outpatient Medications on File Prior to Visit:  diazePAM (VALIUM) 10 MG Tab, Take 10 mg by mouth every evening., Disp: , Rfl: 1  ergocalciferol (ERGOCALCIFEROL) 50,000 unit Cap, Take 1 capsule (50,000 Units total) by mouth twice a week., Disp: 24 capsule, Rfl: 3  fluticasone propionate (FLONASE) 50 mcg/actuation nasal spray, 2 sprays (100 mcg total) by Each Nostril route once daily., Disp: 48 g, Rfl: 4  furosemide (LASIX) 40 MG tablet, Take 1 tablet (40 mg total) by mouth once daily., Disp: 90 tablet, Rfl: 3  hydrOXYzine HCl (ATARAX) 25 MG tablet, Take 1 tablet (25 mg total) by mouth 3 (three) times daily as needed., Disp: 90 tablet, Rfl: 1  linaCLOtide (LINZESS) 290 mcg Cap capsule, Take 1 capsule (290 mcg total) by mouth before breakfast., Disp: 90 capsule, Rfl: 3  metoprolol succinate (TOPROL-XL) 50 MG 24 hr tablet, Take 1 tablet (50 mg total) by mouth once daily., Disp: 90 tablet, Rfl: 3  montelukast (SINGULAIR) 10 mg tablet, Take 1 tablet (10 mg total) by mouth every evening., Disp: 90 tablet, Rfl: 1  pantoprazole (PROTONIX) 40 MG tablet, Take 1 tablet (40 mg total) by mouth every morning., Disp: 90 tablet, Rfl: 3  potassium chloride SA (K-DUR,KLOR-CON) 20 MEQ tablet, Take 1 tablet (20 mEq total) by mouth 2 (two) times daily., Disp: 180 tablet, Rfl: 3  semaglutide, weight loss, (WEGOVY) 2.4 mg/0.75 mL PnIj, Inject 2.4 mg into the skin every 7 days., Disp: 12 each, Rfl: 1  triamcinolone acetonide 0.1%  (KENALOG) 0.1 % paste, SMARTSIG:Sparingly TO TEETH 3 Times Daily, Disp: , Rfl:   [DISCONTINUED] promethazine (PHENERGAN) 25 MG tablet, Take 25 mg by mouth., Disp: , Rfl:   hydrOXYzine pamoate (VISTARIL) 25 MG Cap, Take 25 mg by mouth 2 (two) times daily as needed., Disp: , Rfl:   methylPREDNISolone (MEDROL DOSEPACK) 4 mg tablet, Take by mouth., Disp: , Rfl:   [DISCONTINUED] ciclopirox (PENLAC) 8 % Soln, APPLY TOPICALLY NIGHTLY (Patient not taking: Reported on 12/20/2023), Disp: 7 mL, Rfl: 0  [DISCONTINUED] FLUoxetine 20 MG capsule, Take 20 mg by mouth once daily., Disp: , Rfl: 2  [DISCONTINUED] methocarbamoL (ROBAXIN) 750 MG Tab, Take 1 tablet (750 mg total) by mouth 4 (four) times daily as needed (muscle spasm). (Patient not taking: Reported on 12/20/2023), Disp: 90 tablet, Rfl: 1  [DISCONTINUED] nabumetone (RELAFEN) 500 MG tablet, Take 1 tablet (500 mg total) by mouth 2 (two) times daily as needed for Pain. (Patient not taking: Reported on 11/13/2023), Disp: 30 tablet, Rfl: 0  [DISCONTINUED] pregabalin (LYRICA) 75 MG capsule, TAKE 1 CAPSULE BY MOUTH EVERY MORNING AND 2 CAPSULES AT BEDTIME (Patient not taking: Reported on 12/20/2023), Disp: 90 capsule, Rfl: 5  [DISCONTINUED] SULFACLEANSE 8-4 8-4 % Susp, Apply topically nightly as needed. , Disp: , Rfl:   [DISCONTINUED] traMADoL (ULTRAM) 50 mg tablet, Take 1 tablet (50 mg total) by mouth every 8 (eight) hours as needed for Pain. (Patient not taking: Reported on 11/6/2023), Disp: 21 tablet, Rfl: 0    Current Facility-Administered Medications on File Prior to Visit:  lactated ringers infusion, , Intravenous, Continuous, Thomas Ivory MD, Last Rate: 25 mL/hr at 12/21/21 1240, New Bag at 11/27/23 0853  lactated ringers infusion, , Intravenous, Continuous, Thomas Ivory MD            Review of Systems   Constitutional:  Negative for chills, diaphoresis and fever.   Respiratory:  Negative for chest tightness and shortness of breath.    Cardiovascular:  Negative for chest  pain and palpitations.   Gastrointestinal:  Negative for abdominal distention, abdominal pain, constipation, diarrhea, nausea and vomiting.   Genitourinary:  Negative for dysuria, frequency and hematuria.       Objective:      Physical Exam  Vitals and nursing note reviewed.   Constitutional:       General: She is not in acute distress.     Appearance: Normal appearance. She is not ill-appearing, toxic-appearing or diaphoretic.      Comments: Good blood pressure control  Normal pulse with regular rhythm   Overweight with a BMI of 29.5 she is down 5.3 lb from her visit with me at her physical November 6, 2023   Abdominal:      General: Abdomen is flat. A surgical scar is present. There is no distension or abdominal bruit. There are no signs of injury.      Palpations: Abdomen is soft. There is no shifting dullness, fluid wave, hepatomegaly, splenomegaly, mass or pulsatile mass.      Tenderness: There is abdominal tenderness in the right upper quadrant, right lower quadrant and epigastric area. Negative signs include Johnson's sign and McBurney's sign.      Hernia: No hernia is present.          Comments: Mild tenderness to palpation over the right lower quadrant, right upper quadrant, and epigastric areas.  No masses are palpable, no rebound and no guarding is present  Well healed surgical scars are present   Neurological:      Mental Status: She is alert.         Assessment:       1. Cholecystitis    2. Abnormal liver enzymes    3. Anemia, unspecified type    4. Nausea        Plan:       1. Cholecystitis  Status post laparoscopic cholecystectomy with removal of gallbladder and stones intact  - Comprehensive Metabolic Panel; Future  - CBC Auto Differential; Future    2. Abnormal liver enzymes  Await CMP result for indications of resolution  - Comprehensive Metabolic Panel; Future    3. Anemia, unspecified type  Check CBC  - CBC Auto Differential; Future    4. Nausea  Asymptomatic presently.  Patient will be going on a  cruise and can not tolerate Transderm-Scop.  - promethazine (PHENERGAN) 25 MG tablet; Take 1 tablet (25 mg total) by mouth every 6 (six) hours as needed for Nausea (nausea).  Dispense: 20 tablet; Refill: 1

## 2024-02-21 NOTE — TELEPHONE ENCOUNTER
----- Message from Bee Calderon sent at 9/1/2022  1:08 PM CDT -----  Regarding: pt called  Name of Who is Calling: ZIGGY CORONEL [[id number]]      What is the request in detail: pt is requesting a mammogram order be placed. Please advise       Can the clinic reply by [facility]: No      What Number to Call Back if not in [facility]: [phone] or [phone]          HEMATOLOGY ONCOLOGY PROGRESS NOTE    ADMISSION DATE:  2/16/2024  DATE:  2/21/2024  CURRENT HOSPITAL DAY:  Hospital Day: 6  ATTENDING PHYSICIAN:  Reyna Man MD  CODE STATUS:  Full Resuscitation    CHIEF COMPLAINT:  Colon mass, peritoneal carcinomatosis     SUBJECTIVE  Patient seen and examined. Patient states he is feeling weak. Patient states he is feeling more distended and belly is little harder. Patient denies nausea or abdominal pain. Patient states no stools but passing gas. Patient waiting to go to IR for paracentesis.       MEDICATIONS   Current Facility-Administered Medications   Medication    metoCLOPramide (REGLAN) tablet 5 mg    sodium chloride 0.9 % flush bag 25 mL    Potassium Standard Replacement Protocol (Levels 3.5 and lower)    Magnesium Standard Replacement Protocol    triamterene-hydroCHLOROthiazide (MAXZIDE-25) 37.5-25 MG per tablet 1 tablet    atorvastatin (LIPITOR) tablet 20 mg    tamsulosin (FLOMAX) capsule 0.4 mg    latanoprost (XALATAN) 0.005 % ophthalmic solution 1 drop    acetaminophen (TYLENOL) tablet 650 mg    melatonin tablet 6 mg    hydrALAZINE (APRESOLINE) injection 10 mg    ondansetron (ZOFRAN) injection 4 mg    enoxaparin (LOVENOX) injection 40 mg       REVIEW OF SYSTEMS  A 12-point system review is otherwise negative    OBJECTIVE:    VITAL SIGNS:     Vital Last Value 24 Hour Range   Temperature 98.2 °F (36.8 °C) (02/21/24 0437) Temp  Min: 97.3 °F (36.3 °C)  Max: 98.2 °F (36.8 °C)   Pulse 98 (02/21/24 0437) Pulse  Min: 82  Max: 125   Respiratory 18 (02/21/24 0437) Resp  Min: 18  Max: 19   Non-Invasive  Blood Pressure 109/72 (02/21/24 0437) BP  Min: 96/70  Max: 109/72   Pulse Oximetry 94 % (02/21/24 0437) SpO2  Min: 94 %  Max: 97 %     Vital Today Admitted   Weight 74.8 kg (164 lb 15.9 oz) (02/16/24 1800) Weight: 74.8 kg (165 lb) (02/16/24 1230)   Height N/A Height: 5' 7\" (170.2 cm) (02/16/24 1230)   Body Mass Index N/A BMI (Calculated): 25.84 (02/16/24 1230)       PHYSICAL  EXAM:    General:  No acute distress.  Skin:  Skin color, texture, and turgor normal.  No rashor skin lesions.   Head:  Normocephalic, without obvious abnormality,.  Eyes:  Conjunctiva clear, No icterus, EOM's intact both eyes.   Nose:  Nares normal. No drainage or sinus tenderness.  Throat:  Lips, mucosa, and tongue normal; teeth and gums normal. Normal posterior oropharynx.  No stomatitis.  Neck: Supple, symmetrical.  No thyromegaly.  No enlarged cervical, supraclavicular or infraclavicular lymphadenopathy.  Lungs:   Clear bilaterally.  No rhonchi, wheezing or rales.  Symmetrical breath sounds.   Heart:  Regular rate and rhythm. S1 and S2 normal. No murmur, rub or gallop. No peripheral edema  Abdomen:  Distended, non-tender, bowel sounds normal.  No masses. No hepatomegaly.  No splenomegaly.  Extremities:   Normal, No cyanosis or edema.  No petechiae or ecchymosis, No signs of a DVT  Lymph Nodes:  Cervical, supraclavicular oraxillary nodes normal.  Musculoskeletal: Symmetrical strength and tone.  Neurologic:  Alert and oriented x 3.  Normal affect.  No focal motor sensory defects.  Cooperative.    LABORATORY DATA:    {  Recent Results (from the past 24 hour(s))   Prothrombin Time (INR/PT)    Collection Time: 02/20/24 11:40 AM   Result Value Ref Range    Protime- PT 12.4 (H) 9.7 - 11.8 sec    INR 1.2     Anaerobe/Aerobe, Bacterial Culture With Gram Stain    Collection Time: 02/20/24  3:08 PM    Specimen: Abdomen; Peritoneal (Ascites) Fluid    Result Value Ref Range    CULTURE WITH GRAM STAIN, ANAEROBE/AEROBE No Growth <24 hours     Gram Stain Present Polymorphonuclear cells.     Gram Stain No organisms seen.     Gram Stain No epithelial cells seen.     Gram Stain Slide prepared by Cytospin.    Fluid Cell Count and Differential (NON SYNOVIAL/NON CSF) (performable only)    Collection Time: 02/20/24  3:08 PM   Result Value Ref Range    Fluid Color Straw     Fluid Clarity Clear     Total Nucleated Cells 1,015 (H) 0 -  1,000 /mcL   Differential, Fluid    Collection Time: 02/20/24  3:08 PM   Result Value Ref Range    Neutrophils %, Fluid 27 %    Lymphocytes %, Fluid 56 %    Mono/Macrophages %, Fluid 15 %    Eosinophils %, Fluid 1 %    Mesothelial Cells %, Fluid 1 %    Total Cells Counted, Fluid 100                                     ASSESSMENT/PLAN:     1. Colon mass and CT scan findings, peritoneal carcinomatosis: I reviewed with the patient he CT scan findings, which are highly suspicious for metastatic colon cancer companied by peritoneal carcinomatosis.  I also discussed with Dr. Peyton Webb of general surgery.  Given the extensive peritoneal carcinomatosis, a bypass colostomy be technically difficult. Currently being conservatively managed. Will slowly advance diet per Gen surgery. Reviewed CT scan findings, showing progressive peritoneal carcinomatosis and stable 6cm subcarinal mass. I inform the patient that his colon cancer is not curable but potentially can benefit from palliative systemic chemotherapy when he is clinically more stable.  We are currently also waiting for the final pathology findings and molecular studies. I discussed with the patient and her daughter about my concerns.  If he continues to decline but cannot regain his bowel function, his prognosis would be very guarded as I do not think he will be able to benefit from systemic chemotherapy.  I briefly discussed with them about the option for hospice care to focusing maintain the patient's comfort without further aggressive treatments.  They voiced understanding but would like to wait for the next day or 2 to see if the patient would clinically improve.  I think this is very reasonable. Ultrasound-guided paracentesis done on 2/20. 3L removed. Abd distension slightly better. Will advance diet per gen surgery. Increase activity/ambulation.    2. Anemia: Probably due to inflammation and malignancy.  No signs of active bleeding.  Will plan to monitor CBC  and check iron studies.    3. DC plan: May DC home if lori adequate diet and OK by gen surgery. Pt will f/u with me in 1 week.    Jarett Thomas MD.

## 2024-04-24 ENCOUNTER — HOSPITAL ENCOUNTER (OUTPATIENT)
Dept: RADIOLOGY | Facility: HOSPITAL | Age: 59
Discharge: HOME | End: 2024-04-24
Attending: INTERNAL MEDICINE
Payer: COMMERCIAL

## 2024-04-24 DIAGNOSIS — K76.89 LIVER CYST: ICD-10-CM

## 2024-04-24 PROCEDURE — 76705 ECHO EXAM OF ABDOMEN: CPT | Mod: 26,,, | Performed by: RADIOLOGY

## 2024-04-24 PROCEDURE — 76705 ECHO EXAM OF ABDOMEN: CPT | Mod: TC,PO

## 2024-04-25 DIAGNOSIS — J45.20 MILD INTERMITTENT REACTIVE AIRWAY DISEASE WITHOUT COMPLICATION: ICD-10-CM

## 2024-04-25 RX ORDER — MONTELUKAST SODIUM 10 MG/1
10 TABLET ORAL NIGHTLY
Qty: 90 TABLET | Refills: 3 | Status: SHIPPED | OUTPATIENT
Start: 2024-04-25

## 2024-04-25 NOTE — TELEPHONE ENCOUNTER
Care Due:                  Date            Visit Type   Department     Provider  --------------------------------------------------------------------------------                                EP -                              PRIMARY      [facility]  Last Visit: 01-      CARE ([facility])   PRACTICE       Remy Parson                              EP -                              PRIMARY      [facility]  Next Visit: 11-      CARE ([facility])   PRACTICE       Remy Chun  Test          Frequency    Reason                     Performed    Due Date  --------------------------------------------------------------------------------    HBA1C.......  6 months...  semaglutide,.............  10-   04-    Health [facility] Embedded Care Due Messages. Reference number: [id number].   4/25/2024 8:27:48 AM CDT

## 2024-04-25 NOTE — TELEPHONE ENCOUNTER
Provider Staff:  Action required for this patient    Requires labs      Please see care gap opportunities below in Care Due Message.    Thanks!  [facility]     Appointments      Date Provider   Last Visit   1/2/2024 Remy Parson MD   Next Visit   Visit date not found Remy Parson MD     Refill Decision Note   Lynn Naidu  is requesting a refill authorization.  Brief Assessment and Rationale for Refill:  Approve     Medication Therapy Plan:         Comments:     Note composed:10:43 AM 04/25/2024

## 2024-04-29 ENCOUNTER — OFFICE VISIT (OUTPATIENT)
Dept: HEPATOLOGY | Facility: CLINIC | Age: 59
End: 2024-04-29
Payer: COMMERCIAL

## 2024-04-29 VITALS
DIASTOLIC BLOOD PRESSURE: 74 MMHG | HEART RATE: 76 BPM | HEIGHT: 63 IN | BODY MASS INDEX: 28.87 KG/M2 | WEIGHT: 162.94 LBS | OXYGEN SATURATION: 99 % | TEMPERATURE: 98 F | SYSTOLIC BLOOD PRESSURE: 112 MMHG

## 2024-04-29 DIAGNOSIS — K76.89 LIVER CYST: Primary | ICD-10-CM

## 2024-04-29 PROCEDURE — 99999 PR PBB SHADOW E&M-EST. PATIENT-LVL V: CPT | Mod: PBBFAC,,, | Performed by: INTERNAL MEDICINE

## 2024-04-29 PROCEDURE — 3078F DIAST BP <80 MM HG: CPT | Mod: CPTII,S$GLB,, | Performed by: INTERNAL MEDICINE

## 2024-04-29 PROCEDURE — 3008F BODY MASS INDEX DOCD: CPT | Mod: CPTII,S$GLB,, | Performed by: INTERNAL MEDICINE

## 2024-04-29 PROCEDURE — 3074F SYST BP LT 130 MM HG: CPT | Mod: CPTII,S$GLB,, | Performed by: INTERNAL MEDICINE

## 2024-04-29 PROCEDURE — 1160F RVW MEDS BY RX/DR IN RCRD: CPT | Mod: CPTII,S$GLB,, | Performed by: INTERNAL MEDICINE

## 2024-04-29 PROCEDURE — 99214 OFFICE O/P EST MOD 30 MIN: CPT | Mod: S$GLB,,, | Performed by: INTERNAL MEDICINE

## 2024-04-29 PROCEDURE — 1159F MED LIST DOCD IN RCRD: CPT | Mod: CPTII,S$GLB,, | Performed by: INTERNAL MEDICINE

## 2024-04-29 RX ORDER — CLARITHROMYCIN 500 MG/1
500 TABLET, FILM COATED ORAL 2 TIMES DAILY
COMMUNITY
Start: 2024-04-25

## 2024-04-29 NOTE — PROGRESS NOTES
HEPATOLOGY FOLLOW UP    Referring Physician:   Current Corresponding Physician:     Lynn Naidu is here for follow up of Liver cysts      HPI  Lynn Naidu is a 58 y.o. female with a hx of htn, elevated BMI and DDD who presented 10/9/23 for evaluation of incidentally found liver cysts on abdo US done to evaluate upper abdominal pain. W/u included an EGD with gastic biopsies +H pylori. Treatment of H pylori resulted in improvement of her abdo pain:     Abdo US 8/3/23: The liver is normal in size and echogenicity. There are numerous hepatic cysts. There is a: Cysts in the left lobe measuring 8.5 x 5.0 x 4.9 cm with multiple septations. There is no biliary duct dilatation. There is hepatopedal flow within the portal vein. No renal cysts     Labs 10/19/22: ALT 10, AST 12, ALKP 103, Tbil 0.4     The patient denied any symptoms of decompensated cirrhosis, including no ascites or edema, cognitive problems that would suggest hepatic encephalopathy, or GI bleeding from varices.     Interval History  Since Lynn Naidu's last visit:  --had lap isela 11/27/23 for symptomatic cholelithiasis/choledocholithiasis:  --MRCP 11/25/23: Cholelithiasis and findings of acute cholecystitis and choledocholithiasis with pericholecystic fluid, mild biliary duct dilatation and with a filling defect in the distal common duct.   --ERCP 11/27/23: CBD stone- removed with balloon sweep    Labs 1/2/24: ALT 22, aST 21, ALKP 166, Tbil 0.4    Abdo US 4/24/24: The liver measures 11.1 cm in longitudinal dimension. The grouped cysts in the left hepatic lobe are again noted, stable or slightly smaller compared to the prior study (collective transverse diameter of 6.6 cm). Additional smaller cysts are stable. Hepatopetal flow is noted within the portal vein. Liver contour is normal.   MRI abdo w wo contrast 10/24/23: Cyst cluster versus multilobulated cyst with thin septation occurs in left hepatic lobe. The cyst cluster measures 7.5 x 4.3 x 3.9  cm. Simple cyst posteriorly in superior left hepatic lobe measures 14 mm. Simple cyst posteriorly in lateral segment of left hepatic lobe measures 15 mm. 17 mm cyst lies in lateral segment of left hepatic lobe. Additional smaller left hepatic lobe cysts are also evident. Laterally in right hepatic lobe, 8 mm simple cyst is present. Noncontrast images show no hepatic steatosis.Impression: Multifocal hepatic cysts, including clustered cysts versus multilobulated cyst with thin septation in left hepatic lobe     Outpatient Encounter Medications as of 4/29/2024   Medication Sig Dispense Refill    clarithromycin (BIAXIN) 500 MG tablet Take 500 mg by mouth 2 (two) times daily.      diazePAM (VALIUM) 10 MG Tab Take 10 mg by mouth every evening.  1    ergocalciferol (ERGOCALCIFEROL) 50,000 unit Cap Take 1 capsule (50,000 Units total) by mouth twice a week. 24 capsule 3    fluticasone propionate (FLONASE) 50 mcg/actuation nasal spray 2 sprays (100 mcg total) by Each Nostril route once daily. 48 g 4    furosemide (LASIX) 40 MG tablet Take 1 tablet (40 mg total) by mouth once daily. 90 tablet 3    hydrOXYzine HCl (ATARAX) 25 MG tablet Take 1 tablet (25 mg total) by mouth 3 (three) times daily as needed. 90 tablet 1    hydrOXYzine pamoate (VISTARIL) 25 MG Cap Take 25 mg by mouth 2 (two) times daily as needed.      linaCLOtide (LINZESS) 290 mcg Cap capsule Take 1 capsule (290 mcg total) by mouth before breakfast. 90 capsule 3    methylPREDNISolone (MEDROL DOSEPACK) 4 mg tablet Take by mouth.      metoprolol succinate (TOPROL-XL) 50 MG 24 hr tablet Take 1 tablet (50 mg total) by mouth once daily. 90 tablet 3    montelukast (SINGULAIR) 10 mg tablet TAKE 1 TABLET BY MOUTH ONCE DAILY IN THE EVENING 90 tablet 3    pantoprazole (PROTONIX) 40 MG tablet Take 1 tablet (40 mg total) by mouth every morning. 90 tablet 3    potassium chloride SA (K-DUR,KLOR-CON) 20 MEQ tablet Take 1 tablet (20 mEq total) by mouth 2 (two) times daily. 180  tablet 3    promethazine (PHENERGAN) 25 MG tablet Take 1 tablet (25 mg total) by mouth every 6 (six) hours as needed for Nausea (nausea). 20 tablet 1    semaglutide, weight loss, (WEGOVY) 2.4 mg/0.75 mL PnIj Inject 2.4 mg into the skin every 7 days. 12 each 1    triamcinolone acetonide 0.1% (KENALOG) 0.1 % paste SMARTSIG:Sparingly TO TEETH 3 Times Daily      [DISCONTINUED] montelukast (SINGULAIR) 10 mg tablet Take 1 tablet (10 mg total) by mouth every evening. 90 tablet 1     Facility-Administered Encounter Medications as of 4/29/2024   Medication Dose Route Frequency Provider Last Rate Last Admin    lactated ringers infusion   Intravenous Continuous Thomas Ivory MD 25 mL/hr at 12/21/21 1240 New Bag at 11/27/23 0853    lactated ringers infusion   Intravenous Continuous Thomas Ivory MD         Review of patient's allergies indicates:   Allergen Reactions    Omnicef [cefdinir] Itching     Past Medical History:   Diagnosis Date    Allergy     Bronchitis 07/17/2015    Cervical disc displacement     Edema     Hypertension     Insomnia     Liver cyst     Plantar fasciitis        Review of Systems   Constitutional: Negative.    HENT: Negative.     Eyes: Negative.    Respiratory: Negative.     Cardiovascular: Negative.    Gastrointestinal: Negative.    Genitourinary: Negative.    Musculoskeletal: Negative.    Skin: Negative.    Neurological: Negative.    Psychiatric/Behavioral: Negative.       Vitals:    04/29/24 0937   BP: 112/74   Pulse: 76   Temp: 97.9 °F (36.6 °C)       Physical Exam  Vitals reviewed.   Constitutional:       Appearance: She is well-developed.   HENT:      Head: Normocephalic and atraumatic.   Eyes:      General: No scleral icterus.     Conjunctiva/sclera: Conjunctivae normal.      Pupils: Pupils are equal, round, and reactive to light.   Neck:      Thyroid: No thyromegaly.   Cardiovascular:      Rate and Rhythm: Normal rate and regular rhythm.      Heart sounds: Normal heart sounds.   Pulmonary:       Effort: Pulmonary effort is normal.      Breath sounds: Normal breath sounds. No rales.   Abdominal:      General: Bowel sounds are normal. There is no distension.      Palpations: Abdomen is soft. There is no mass.      Tenderness: There is no abdominal tenderness.   Musculoskeletal:         General: Normal range of motion.      Cervical back: Normal range of motion and neck supple.   Skin:     General: Skin is warm and dry.      Findings: No rash.   Neurological:      Mental Status: She is alert and oriented to person, place, and time.         MELD 3.0: 8 at 10/9/2023  8:28 AM  MELD-Na: 7 at 10/9/2023  8:28 AM  Calculated from:  Serum Creatinine: 1.1 mg/dL at 10/9/2023  8:28 AM  Serum Sodium: 139 mmol/L (Using max of 137 mmol/L) at 10/9/2023  8:28 AM  Total Bilirubin: 0.3 mg/dL (Using min of 1 mg/dL) at 10/9/2023  8:28 AM  Serum Albumin: 3.8 g/dL (Using max of 3.5 g/dL) at 10/9/2023  8:28 AM  INR(ratio): 1.0 at 10/9/2023  8:28 AM  Age at listing (hypothetical): 58 years  Sex: Female at 10/9/2023  8:28 AM      Lab Results   Component Value Date    GLU 87 01/02/2024    BUN 10 01/02/2024    CREATININE 1.1 01/02/2024    CALCIUM 9.3 01/02/2024     01/02/2024    K 3.6 01/02/2024     01/02/2024    PROT 7.5 01/02/2024    CO2 26 01/02/2024    ANIONGAP 11 01/02/2024    WBC 7.98 01/02/2024    RBC 4.51 01/02/2024    HGB 13.9 01/02/2024    HCT 41.1 01/02/2024    MCV 91 01/02/2024    MCH 30.8 01/02/2024    MCHC 33.8 01/02/2024     Lab Results   Component Value Date    RDW 12.9 01/02/2024     01/02/2024    MPV 10.8 01/02/2024    GRAN 3.1 01/02/2024    GRAN 39.1 01/02/2024    LYMPH 4.3 01/02/2024    LYMPH 53.9 (H) 01/02/2024    MONO 0.3 01/02/2024    MONO 4.3 01/02/2024    EOSINOPHIL 2.0 01/02/2024    BASOPHIL 0.4 01/02/2024    EOS 0.2 01/02/2024    BASO 0.03 01/02/2024    BROCK Negative 05/11/2010    BNP <10 05/04/2010    CHOL 180 11/07/2023    TRIG 103 11/07/2023    HDL 37 (L) 11/07/2023    CHOLHDL 20.6  11/07/2023    TOTALCHOLEST 4.9 11/07/2023    ALBUMIN 3.7 01/02/2024    BILIDIR 0.1 10/09/2023    AST 21 01/02/2024    ALT 22 01/02/2024    ALKPHOS 166 (H) 01/02/2024    MG 1.9 11/28/2023    LABPROT 10.7 10/09/2023    INR 1.0 10/09/2023       Assessment and Plan:  Lynn Naidu is a 59 y.o. female with Liver cysts  Cysts are either a group of simple cysts vs large complex cyst. They appear stable on abdo US. Recommend MRI in 6 months with labs. Return after MRI. If stable will f/u with subsequent annual imaging    Return 6 months  A total of 35 minutes was spent reviewing the patient's chart, examining the patient, reviewing labs and imaging and coordinating care with the patient's care team.

## 2024-06-17 DIAGNOSIS — E66.01 SEVERE OBESITY (BMI 35.0-39.9) WITH COMORBIDITY: ICD-10-CM

## 2024-06-17 RX ORDER — SEMAGLUTIDE 2.4 MG/.75ML
2.4 INJECTION, SOLUTION SUBCUTANEOUS
Qty: 12 ML | Refills: 0 | Status: SHIPPED | OUTPATIENT
Start: 2024-06-17

## 2024-06-17 NOTE — TELEPHONE ENCOUNTER
No care due was identified.  Health [facility] Embedded Care Due Messages. Reference number: [id number].   6/17/2024 6:30:49 AM CDT

## 2024-06-17 NOTE — TELEPHONE ENCOUNTER
Refill Routing Note   Medication(s) are not appropriate for processing by [facility] for the following reason(s):        Required labs outdated    [facility] action(s):  Defer             Appointments  past 12m or future 3m with PCP    Date Provider   Last Visit   1/2/2024 Remy Parson MD   Next Visit   11/7/2024 Remy Parson MD   ED visits in past 90 days: 0        Note composed:6:34 AM 06/17/2024

## 2024-06-17 NOTE — TELEPHONE ENCOUNTER
Can she wait until her annual in [facility] or do you want to see her before?

## 2024-07-22 DIAGNOSIS — Z12.31 VISIT FOR SCREENING MAMMOGRAM: Primary | ICD-10-CM

## 2024-08-21 DIAGNOSIS — K21.00 GASTROESOPHAGEAL REFLUX DISEASE WITH ESOPHAGITIS WITHOUT HEMORRHAGE: ICD-10-CM

## 2024-08-21 RX ORDER — PANTOPRAZOLE SODIUM 40 MG/1
40 TABLET, DELAYED RELEASE ORAL EVERY MORNING
Qty: 60 TABLET | Refills: 0 | Status: SHIPPED | OUTPATIENT
Start: 2024-08-21

## 2024-08-21 NOTE — TELEPHONE ENCOUNTER
Please let the patient know I sent in a 60 day refill. Schedule patient annual office visit to receive future refills.  Sincerely,  Shikha Griffiths NP

## 2024-09-03 DIAGNOSIS — E66.01 SEVERE OBESITY (BMI 35.0-39.9) WITH COMORBIDITY: ICD-10-CM

## 2024-09-03 NOTE — TELEPHONE ENCOUNTER
Care Due:                  Date            Visit Type   Department     Provider  --------------------------------------------------------------------------------                                EP -                              PRIMARY      [facility]  Last Visit: 01-      CARE ([facility])   PRACTICE       Remy Parson                               -                              PRIMARY      [facility]  Next Visit: 11-      CARE ([facility])   PRACTICE       Remy Chun  Test          Frequency    Reason                     Performed    Due Date  --------------------------------------------------------------------------------    HBA1C.......  6 months...  WEGOVY...................  10-   04-    Vitamin D...  12 months..  ergocalciferol...........  11- 11-    Health [facility] Embedded Care Due Messages. Reference number: [id number].   9/03/2024 9:21:48 AM CDT

## 2024-09-04 RX ORDER — SEMAGLUTIDE 2.4 MG/.75ML
2.4 INJECTION, SOLUTION SUBCUTANEOUS
Qty: 12 ML | Refills: 0 | Status: SHIPPED | OUTPATIENT
Start: 2024-09-04

## 2024-09-05 ENCOUNTER — TELEPHONE (OUTPATIENT)
Dept: FAMILY MEDICINE | Facility: CLINIC | Age: 59
End: 2024-09-05
Payer: COMMERCIAL

## 2024-09-05 NOTE — TELEPHONE ENCOUNTER
Refill Routing Note   Medication(s) are not appropriate for processing by [facility] for the following reason(s):        Required labs outdated    [facility] action(s):  Defer   Requires labs : Yes             Appointments  past 12m or future 3m with PCP    Date Provider   Last Visit   1/2/2024 Remy Parson MD   Next Visit   11/7/2024 Remy Parson MD   ED visits in past 90 days: 0        Note composed:7:15 PM 09/04/2024

## 2024-09-23 ENCOUNTER — HOSPITAL ENCOUNTER (OUTPATIENT)
Dept: RADIOLOGY | Facility: CLINIC | Age: 59
Discharge: HOME | End: 2024-09-23
Attending: FAMILY MEDICINE
Payer: COMMERCIAL

## 2024-09-23 DIAGNOSIS — Z12.31 VISIT FOR SCREENING MAMMOGRAM: ICD-10-CM

## 2024-09-23 PROCEDURE — 77067 SCR MAMMO BI INCL CAD: CPT | Mod: 26,,, | Performed by: RADIOLOGY

## 2024-09-23 PROCEDURE — 77067 SCR MAMMO BI INCL CAD: CPT | Mod: TC,PO

## 2024-09-23 PROCEDURE — 77063 BREAST TOMOSYNTHESIS BI: CPT | Mod: 26,,, | Performed by: RADIOLOGY

## 2024-09-23 PROCEDURE — 77063 BREAST TOMOSYNTHESIS BI: CPT | Mod: TC,PO

## 2024-10-13 DIAGNOSIS — R60.9 EDEMA, UNSPECIFIED TYPE: ICD-10-CM

## 2024-10-13 DIAGNOSIS — I10 ESSENTIAL HYPERTENSION: ICD-10-CM

## 2024-10-13 RX ORDER — FUROSEMIDE 40 MG/1
40 TABLET ORAL
Qty: 90 TABLET | Refills: 0 | Status: SHIPPED | OUTPATIENT
Start: 2024-10-13

## 2024-10-13 RX ORDER — METOPROLOL SUCCINATE 50 MG/1
50 TABLET, EXTENDED RELEASE ORAL
Qty: 90 TABLET | Refills: 0 | Status: SHIPPED | OUTPATIENT
Start: 2024-10-13

## 2024-10-13 NOTE — TELEPHONE ENCOUNTER
Care Due:                  Date            Visit Type   Department     Provider  --------------------------------------------------------------------------------                                EP -                              PRIMARY      [facility]  Last Visit: 01-      CARE ([facility])   PRACTICE       Remy Parson                              EP -                              PRIMARY      [facility]  Next Visit: 11-      CARE ([facility])   PRACTICE       Remy Chun  Test          Frequency    Reason                     Performed    Due Date  --------------------------------------------------------------------------------    CMP.........  12 months..  ergocalciferol,            01- 12-                             furosemide, potassium....    [employer] Embedded Care Due Messages. Reference number: [id number].   10/13/2024 7:32:11 AM CDT

## 2024-10-13 NOTE — TELEPHONE ENCOUNTER
Refill Decision Note   Lynn Naidu  is requesting a refill authorization.  Brief Assessment and Rationale for Refill:  Approve     Medication Therapy Plan:  FLOS    Medication Reconciliation Completed: No   Comments:     No Care Gaps recommended.     Note composed:3:04 PM 10/13/2024

## 2024-10-14 DIAGNOSIS — K21.00 GASTROESOPHAGEAL REFLUX DISEASE WITH ESOPHAGITIS WITHOUT HEMORRHAGE: ICD-10-CM

## 2024-10-14 RX ORDER — PANTOPRAZOLE SODIUM 40 MG/1
40 TABLET, DELAYED RELEASE ORAL EVERY MORNING
Qty: 30 TABLET | Refills: 0 | Status: SHIPPED | OUTPATIENT
Start: 2024-10-14

## 2024-10-14 NOTE — TELEPHONE ENCOUNTER
Please let the patient know I sent in a 30 day refill. Schedule patient annual office visit to receive future refills.  Sincerely,  Shikha Griffiths NP

## 2024-10-21 ENCOUNTER — TELEPHONE (OUTPATIENT)
Dept: GASTROENTEROLOGY | Facility: CLINIC | Age: 59
End: 2024-10-21
Payer: COMMERCIAL

## 2024-10-21 NOTE — TELEPHONE ENCOUNTER
----- Message from Keila sent at 10/21/2024 11:31 AM CDT -----  Contact: Patient  Type:  RX Refill Request    Who Called:   Patient  Refill or New Rx:  Refill    RX Name and Strength: pantoprazole (PROTONIX) 40 MG tablet     Preferred Pharmacy with phone number:     [facility] [address] [place] [address] - [address]  [address]  [facility] [address]  Phone: [phone] Fax: [phone]    Local or Mail Order:  Local  Ordering Provider:  Shikha Griffiths    Would the patient rather a call back or a response via [facility]?   Call back  Best Call Back Number:  [phone]    Additional Information:   States she needs a 90-day supply of this medication called in - states her insurance only pays for 90-day supply - states she is completely out of this medication - please call - thank you

## 2024-10-21 NOTE — PROGRESS NOTES
Subjective:       Patient ID: Lynn Naidu is a 59 y.o. female Body mass index is 28.86 kg/m².    Chief Complaint: Abdominal Pain    This patient is new to me.  Referring Provider: No ref. provider found for abdominal pain and refills.  Established patient of Dr. Oviedo and VICK Griffiths NP.     Has happened before - cramping     Has been on wegovy for about a year.      Review of Systems   Constitutional:  Positive for appetite change. Negative for activity change, fatigue, fever and unexpected weight change.   HENT:  Negative for sore throat and trouble swallowing.    Respiratory:  Negative for cough and shortness of breath.    Cardiovascular:  Negative for chest pain.   Gastrointestinal:  Positive for abdominal pain and constipation. Negative for abdominal distention, anal bleeding, blood in stool, diarrhea, nausea, rectal pain and vomiting.       Patient's last menstrual period was 02/11/2009.  Past Medical History:   Diagnosis Date    Allergy     Bronchitis 07/17/2015    Cervical disc displacement     Edema     Hypertension     Insomnia     Liver cyst     Plantar fasciitis      Past Surgical History:   Procedure Laterality Date    COLONOSCOPY  11/01/2017    Dr. Oviedo, normal, ten year recheck    COLONOSCOPY N/A 11/01/2017    Procedure: COLONOSCOPY;  Surgeon: Cameron Oviedo MD;  Location: [facility];  Service: Endoscopy;  Laterality: N/A;    EPIDURAL STEROID INJECTION INTO CERVICAL SPINE N/A 07/22/2019    Procedure: Injection-steroid-epidural-cervical;  Surgeon: Thomas Ivory MD;  Location: [facility] OR;  Service: Pain Management;  Laterality: N/A;  C7-T1    EPIDURAL STEROID INJECTION INTO CERVICAL SPINE N/A 12/21/2021    Procedure: Injection-steroid-epidural-cervical;  Surgeon: Thomas Ivory MD;  Location: [facility] OR;  Service: Pain Management;  Laterality: N/A;  C6-7    ERCP N/A 11/27/2023    Procedure: ERCP (ENDOSCOPIC RETROGRADE CHOLANGIOPANCREATOGRAPHY);  Surgeon: Anselmo Winter III, MD;  Location: [facility]  ENDO;  Service: Endoscopy;  Laterality: N/A;    ESOPHAGOGASTRODUODENOSCOPY N/A 08/31/2023    Procedure: EGD (ESOPHAGOGASTRODUODENOSCOPY);  Surgeon: Cameron Oviedo MD;  Location: [facility] ENDO;  Service: Endoscopy;  Laterality: N/A;    INJECTION OF ANESTHETIC AGENT AROUND STELLATE GANGLION Left 08/30/2022    Procedure: BLOCK, GANGLION, STELLATE;  Surgeon: Thomas Ivory MD;  Location: [facility] OR;  Service: Pain Management;  Laterality: Left;    INJECTION OF ANESTHETIC AGENT AROUND STELLATE GANGLION Left 10/21/2022    Procedure: BLOCK, GANGLION, STELLATE Left;  Surgeon: Thomas Ivory MD;  Location: [facility] OR;  Service: Pain Management;  Laterality: Left;    INJECTION OF ANESTHETIC AGENT AROUND STELLATE GANGLION Left 12/27/2022    Procedure: BLOCK, GANGLION, STELLATE;  Surgeon: Thomas Ivory MD;  Location: [facility] OR;  Service: Pain Management;  Laterality: Left;  left    LAPAROSCOPIC CHOLECYSTECTOMY N/A 11/27/2023    Procedure: CHOLECYSTECTOMY, LAPAROSCOPIC;  Surgeon: Al Cyr MD;  Location: [facility] OR;  Service: General;  Laterality: N/A;    TONSILLECTOMY, ADENOIDECTOMY Bilateral 07/23/2021    Procedure: TONSILLECTOMY AND ADENOIDECTOMY;  Surgeon: Anselmo Galvan MD;  Location: [facility] OR;  Service: ENT;  Laterality: Bilateral;    UPPER GASTROINTESTINAL ENDOSCOPY      WISDOM TOOTH EXTRACTION       Family History   Problem Relation Name Age of Onset    Heart disease Mother      Stroke Mother      Cancer Father          bone ca, prostate ca     Hypertension Sister R     Diabetes Sister R     Allergies Sister R     Asthma Sister R     Hypertension Sister S     Allergies Sister S     Asthma Sister S     Heart disease Brother      Kidney failure Brother      Angioedema Neg Hx      Eczema Neg Hx      Immunodeficiency Neg Hx      Urticaria Neg Hx      Colon cancer Neg Hx      Colon polyps Neg Hx      Esophageal cancer Neg Hx      Stomach cancer Neg Hx       Social History     Tobacco Use    Smoking status: Never    Smokeless tobacco:  Never   Substance Use Topics    Alcohol use: Never    Drug use: No     Wt Readings from Last 10 Encounters:   04/29/24 73.9 kg (162 lb 14.7 oz)   01/02/24 73.2 kg (161 lb 6 oz)   12/20/23 74.1 kg (163 lb 5.8 oz)   12/06/23 74.3 kg (163 lb 12.8 oz)   11/26/23 74.8 kg (164 lb 14.5 oz)   11/13/23 74.7 kg (164 lb 10.9 oz)   11/06/23 75.6 kg (166 lb 10.7 oz)   10/27/23 76 kg (167 lb 8.8 oz)   08/31/23 78.9 kg (174 lb)   07/17/23 82.5 kg (181 lb 14.1 oz)     Lab Results   Component Value Date    WBC 7.98 01/02/2024    HGB 13.9 01/02/2024    HCT 41.1 01/02/2024    MCV 91 01/02/2024     01/02/2024     CMP  Sodium   Date Value Ref Range Status   01/02/2024 143 136 - 145 mmol/L Final     Potassium   Date Value Ref Range Status   01/02/2024 3.6 3.5 - 5.1 mmol/L Final     Chloride   Date Value Ref Range Status   01/02/2024 106 95 - 110 mmol/L Final     CO2   Date Value Ref Range Status   01/02/2024 26 23 - 29 mmol/L Final     Glucose   Date Value Ref Range Status   01/02/2024 87 70 - 110 mg/dL Final     BUN   Date Value Ref Range Status   01/02/2024 10 6 - 20 mg/dL Final     Creatinine   Date Value Ref Range Status   01/02/2024 1.1 0.5 - 1.4 mg/dL Final     Calcium   Date Value Ref Range Status   01/02/2024 9.3 8.7 - 10.5 mg/dL Final     Total Protein   Date Value Ref Range Status   01/02/2024 7.5 6.0 - 8.4 g/dL Final     Albumin   Date Value Ref Range Status   01/02/2024 3.7 3.5 - 5.2 g/dL Final     Total Bilirubin   Date Value Ref Range Status   01/02/2024 0.4 0.1 - 1.0 mg/dL Final     Comment:     For infants and newborns, interpretation of results should be based  on gestational age, weight and in agreement with clinical  observations.    Premature Infant recommended reference ranges:  Up to 24 hours.............<8.0 mg/dL  Up to 48 hours............<12.0 mg/dL  3-5 days..................<15.0 mg/dL  6-29 days.................<15.0 mg/dL       Alkaline Phosphatase   Date Value Ref Range Status   01/02/2024 166 (H) 55  "- 135 U/L Final     AST   Date Value Ref Range Status   01/02/2024 21 10 - 40 U/L Final     ALT   Date Value Ref Range Status   01/02/2024 22 10 - 44 U/L Final     Anion Gap   Date Value Ref Range Status   01/02/2024 11 8 - 16 mmol/L Final     eGFR if    Date Value Ref Range Status   10/18/2021 >60.0 >60 mL/min/1.73 m^2 Final     eGFR if non    Date Value Ref Range Status   10/18/2021 >60.0 >60 mL/min/1.73 m^2 Final     Comment:     Calculation used to obtain the estimated glomerular filtration  rate (eGFR) is the CKD-EPI equation.        Lab Results   Component Value Date    AMYLASE 49 08/31/2016     Lab Results   Component Value Date    LIPASE 20 11/26/2023     No results found for: "LIPASERES"  Lab Results   Component Value Date    TSH 2.884 10/11/2018       Reviewed prior medical records including radiology report of ERCP 11/2023, MRCP 11/2023, CT A/P 11/2023, U/s abdomen 4/2024, HIDA 11/2023 & endoscopy history (see surgical history).    Objective:      Physical Exam  Vitals and nursing note reviewed.   Constitutional:       General: She is not in acute distress.     Appearance: She is not ill-appearing.   HENT:      Head: Normocephalic and atraumatic.      Mouth/Throat:      Mouth: Mucous membranes are moist.      Pharynx: Oropharynx is clear.   Eyes:      Conjunctiva/sclera: Conjunctivae normal.   Cardiovascular:      Rate and Rhythm: Normal rate.      Pulses: Normal pulses.   Pulmonary:      Effort: Pulmonary effort is normal. No respiratory distress.   Abdominal:      General: Abdomen is flat. There is no distension.      Palpations: Abdomen is soft.      Tenderness: There is abdominal tenderness.   Skin:     General: Skin is warm and dry.      Capillary Refill: Capillary refill takes 2 to 3 seconds.   Neurological:      Mental Status: She is alert and oriented to person, place, and time.         Assessment:       1. Gastroesophageal reflux disease with esophagitis without " hemorrhage    2. Chronic idiopathic constipation        Plan:       Gastroesophageal reflux disease with esophagitis without hemorrhage  -discussed about the different types of medications used to treat reflux and how to use them, antacids can be used PRN for breakthrough heartburn symptoms by reducing stomach acid that is already produced, H2 blockers work by limiting the amount acid production, & PPI's work to block acid production and are taken daily, patient verbalized understanding.  -Educated patient on lifestyle modifications to help control/reduce reflux/abdominal pain including: avoid large meals, avoid eating within 2-3 hours of bedtime (avoid late night eating & lying down soon after eating), elevate head of bed if nocturnal symptoms are present, smoking cessation (if current smoker), & weight loss (if overweight).   -Educated to avoid known foods which trigger reflux symptoms & to minimize/avoid high-fat foods, chocolate, caffeine, citrus, alcohol, & tomato products.  -Advised to avoid/limit use of NSAID's, since they can cause GI upset, bleeding, and/or ulcers. If needed, take with food.     Continue protonix daily    -     pantoprazole (PROTONIX) 40 MG tablet; Take 1 tablet (40 mg total) by mouth every morning.  Dispense: 90 tablet; Refill: 3    Chronic idiopathic constipation  Recommend daily exercise as tolerated, adequate water intake (six 8-oz glasses of water daily), and high fiber diet. OTC fiber supplements are recommended if diet does not reach daily fiber goal (20-30 grams daily), such as Metamucil, Citrucel, or FiberCon (take as directed, separate from other oral medications by >2 hours).  -Recommend taking an OTC stool softener such as Colace as directed to avoid hard stools and straining with bowel movements PRN  -Recommend trying OTC MiraLax once daily (17g PO) as directed  - If no improvement with above recommendations, try intermittently dosed Dulcolax OTC as directed (every 3-4  days)  PRN to facilitate bowel movements  -If still no improvement with these measures, call/follow-up    Continue Linzess 290 mcg daily for constipation!    X-ray to rule out acute constipation causing abdominal pain  -     linaCLOtide (LINZESS) 290 mcg Cap capsule; Take 1 capsule (290 mcg total) by mouth before breakfast.  Dispense: 90 capsule; Refill: 3  -     X-Ray Abdomen AP 1 View; Future; Expected date: 10/22/2024      No follow-ups on file.      If no improvement in symptoms or symptoms worsen, call/follow-up at clinic or go to ER.       OBEY Lamb, JAXP-C    Encounter includes face to face time and non-face to face time preparing to see the patient (eg, review of tests), obtaining and/or reviewing separately obtained history, documenting clinical information in the electronic or other health record, independently interpreting results (not separately reported) and communicating results to the patient/family/caregiver, or care coordination (not separately reported).     A dictation software program was used for this note. Please expect some simple typographical errors in this note.

## 2024-10-21 NOTE — TELEPHONE ENCOUNTER
Spoke with pt. Scheduled appointment for [facility] as this is where pt wanted to be seen. Pt verbalized understanding to all

## 2024-10-21 NOTE — TELEPHONE ENCOUNTER
----- Message from Keila sent at 10/21/2024 11:35 AM CDT -----  Contact: Patient  Type:  Appointment Request    Caller is requesting a sooner appointment.  Caller declined first available appointment listed below.  Caller will not accept being placed on the waitlist and is requesting a message be sent to doctor.    Name of Caller:  Patient  When is the first available appointment?  N/A    Symptoms:  stomach pains / cramps    Would the patient rather a call back or a response via [facility]?   Call back  Best Call Back Number:  [phone]    Additional Information:   States she would like to speak with someone about scheduling an appointment - please call - thank you

## 2024-10-22 ENCOUNTER — HOSPITAL ENCOUNTER (OUTPATIENT)
Dept: RADIOLOGY | Facility: HOSPITAL | Age: 59
Discharge: HOME | End: 2024-10-22
Payer: COMMERCIAL

## 2024-10-22 ENCOUNTER — OFFICE VISIT (OUTPATIENT)
Dept: GASTROENTEROLOGY | Facility: CLINIC | Age: 59
End: 2024-10-22
Payer: COMMERCIAL

## 2024-10-22 VITALS
HEIGHT: 63 IN | HEART RATE: 81 BPM | SYSTOLIC BLOOD PRESSURE: 104 MMHG | DIASTOLIC BLOOD PRESSURE: 72 MMHG | BODY MASS INDEX: 28.86 KG/M2

## 2024-10-22 DIAGNOSIS — K59.04 CHRONIC IDIOPATHIC CONSTIPATION: ICD-10-CM

## 2024-10-22 DIAGNOSIS — K21.00 GASTROESOPHAGEAL REFLUX DISEASE WITH ESOPHAGITIS WITHOUT HEMORRHAGE: ICD-10-CM

## 2024-10-22 PROCEDURE — 99213 OFFICE O/P EST LOW 20 MIN: CPT | Mod: S$GLB,,,

## 2024-10-22 PROCEDURE — 99999 PR PBB SHADOW E&M-EST. PATIENT-LVL III: CPT | Mod: PBBFAC,,,

## 2024-10-22 PROCEDURE — 74018 RADEX ABDOMEN 1 VIEW: CPT | Mod: 26,,, | Performed by: RADIOLOGY

## 2024-10-22 PROCEDURE — 74018 RADEX ABDOMEN 1 VIEW: CPT | Mod: TC

## 2024-10-22 PROCEDURE — 3074F SYST BP LT 130 MM HG: CPT | Mod: CPTII,S$GLB,,

## 2024-10-22 PROCEDURE — 3078F DIAST BP <80 MM HG: CPT | Mod: CPTII,S$GLB,,

## 2024-10-22 PROCEDURE — 3008F BODY MASS INDEX DOCD: CPT | Mod: CPTII,S$GLB,,

## 2024-10-22 RX ORDER — PANTOPRAZOLE SODIUM 40 MG/1
40 TABLET, DELAYED RELEASE ORAL EVERY MORNING
Qty: 90 TABLET | Refills: 3 | Status: SHIPPED | OUTPATIENT
Start: 2024-10-22

## 2024-10-22 NOTE — PATIENT INSTRUCTIONS
Recommend daily exercise as tolerated, adequate water intake (six 8-oz glasses of water daily), and high fiber diet. OTC fiber supplements are recommended if diet does not reach daily fiber goal (20-30 grams daily), such as Metamucil, Citrucel, or FiberCon (take as directed, separate from other oral medications by >2 hours).  -Recommend taking an OTC stool softener such as Colace as directed to avoid hard stools and straining with bowel movements PRN  -Recommend trying OTC MiraLax once daily (17g PO) as directed  - If no improvement with above recommendations, try intermittently dosed Dulcolax OTC as directed (every 3-4 [unfilled]days) PRN to facilitate bowel movements  -If still no improvement with these measures, call/follow-up    Discussed diagnosis and possibly etiologies and that it can be idiopathetic, may also improve over time or can be lifelong, reviewed AVS educational material on diagnosis and given to patient, patient verbalized understanding  Recommend small frequent meals instead of 3 big meals a day, low fat meals & low residue diet.  Avoid: high fiber (insoluble fiber), red meats, dairy, and non-digestible solids (peels, fruit pulp, etc). Avoid NSAIDs and narcotics.  -EGD and gastric emptying studies  -Discussed about possible medical therapy and discussed that this would be managed by one of the GI doctors, lastly possible referral to general surgery for surgical options, patient verbalized understanding

## 2024-10-29 ENCOUNTER — HOSPITAL ENCOUNTER (OUTPATIENT)
Dept: RADIOLOGY | Facility: HOSPITAL | Age: 59
Discharge: HOME | End: 2024-10-29
Attending: INTERNAL MEDICINE
Payer: COMMERCIAL

## 2024-10-29 DIAGNOSIS — K76.89 LIVER CYST: ICD-10-CM

## 2024-10-29 LAB
CREAT SERPL-MCNC: 1.1 MG/DL (ref 0.5–1.4)
SAMPLE: NORMAL

## 2024-10-29 PROCEDURE — 25500020 PHARM REV CODE 255: Mod: PO | Performed by: INTERNAL MEDICINE

## 2024-10-29 PROCEDURE — 74183 MRI ABD W/O CNTR FLWD CNTR: CPT | Mod: TC,PO

## 2024-10-29 PROCEDURE — 74183 MRI ABD W/O CNTR FLWD CNTR: CPT | Mod: 26,,, | Performed by: RADIOLOGY

## 2024-10-29 PROCEDURE — A9585 GADOBUTROL INJECTION: HCPCS | Mod: PO | Performed by: INTERNAL MEDICINE

## 2024-10-29 RX ORDER — GADOBUTROL 604.72 MG/ML
7.5 INJECTION INTRAVENOUS
Status: COMPLETED | OUTPATIENT
Start: 2024-10-29 | End: 2024-10-29

## 2024-10-29 RX ADMIN — GADOBUTROL 7.5 ML: 604.72 INJECTION INTRAVENOUS at 08:10

## 2024-11-04 ENCOUNTER — TELEPHONE (OUTPATIENT)
Dept: HEPATOLOGY | Facility: CLINIC | Age: 59
End: 2024-11-04
Payer: COMMERCIAL

## 2024-11-04 ENCOUNTER — OFFICE VISIT (OUTPATIENT)
Dept: HEPATOLOGY | Facility: CLINIC | Age: 59
End: 2024-11-04
Payer: COMMERCIAL

## 2024-11-04 DIAGNOSIS — K76.89 LIVER CYST: Primary | ICD-10-CM

## 2024-11-04 PROCEDURE — 99999 PR PBB SHADOW E&M-EST. PATIENT-LVL II: CPT | Mod: PBBFAC,,, | Performed by: INTERNAL MEDICINE

## 2024-11-04 PROCEDURE — 1160F RVW MEDS BY RX/DR IN RCRD: CPT | Mod: CPTII,S$GLB,, | Performed by: INTERNAL MEDICINE

## 2024-11-04 PROCEDURE — 1159F MED LIST DOCD IN RCRD: CPT | Mod: CPTII,S$GLB,, | Performed by: INTERNAL MEDICINE

## 2024-11-04 PROCEDURE — 99214 OFFICE O/P EST MOD 30 MIN: CPT | Mod: S$GLB,,, | Performed by: INTERNAL MEDICINE

## 2024-11-04 NOTE — TELEPHONE ENCOUNTER
----- Message from Asiya Donohue MD sent at 11/4/2024 10:22 AM CST -----  Colleen SAMAYOA in 6 months  Return 6 months

## 2024-11-04 NOTE — PROGRESS NOTES
HEPATOLOGY FOLLOW UP    Referring Physician:   Current Corresponding Physician:     Lynn Naidu is here for follow up of Liver cyst      HPI  Lynn Naidu is a 59 y.o. female with a hx of htn, elevated BMI and DDD who presented 10/9/23 for evaluation of incidentally found liver cysts on abdo US done to evaluate upper abdominal pain. W/u included an EGD with gastic biopsies +H pylori. Treatment of H pylori resulted in improvement of her abdo pain:     Abdo US 8/3/23: The liver is normal in size and echogenicity. There are numerous hepatic cysts. There is a: Cysts in the left lobe measuring 8.5 x 5.0 x 4.9 cm with multiple septations. There is no biliary duct dilatation. There is hepatopedal flow within the portal vein. No renal cysts     Labs 10/19/22: ALT 10, AST 12, ALKP 103, Tbil 0.4     The patient denied any symptoms of decompensated cirrhosis, including no ascites or edema, cognitive problems that would suggest hepatic encephalopathy, or GI bleeding from varices.     Interval History  Since Lynn Naidu's last 2 visits:  --had lap isela 11/27/23 for symptomatic cholelithiasis/choledocholithiasis:  --MRCP 11/25/23: Cholelithiasis and findings of acute cholecystitis and choledocholithiasis with pericholecystic fluid, mild biliary duct dilatation and with a filling defect in the distal common duct.   --ERCP 11/27/23: CBD stone- removed with balloon sweep  --currently no RUQ pain    Labs 1/2/24: ALT 22, aST 21, ALKP 166, Tbil 0.4  Labs 10/29/24: ALT 12, AST 15, ALKP 89, Tbil 0.5    Abdo US 4/24/24: The liver measures 11.1 cm in longitudinal dimension. The grouped cysts in the left hepatic lobe are again noted, stable or slightly smaller compared to the prior study (collective transverse diameter of 6.6 cm). Additional smaller cysts are stable. Hepatopetal flow is noted within the portal vein. Liver contour is normal.   MRI abdo w wo contrast 10/24/23: Cyst cluster versus multilobulated cyst with thin  septation occurs in left hepatic lobe. The cyst cluster measures 7.5 x 4.3 x 3.9 cm. Simple cyst posteriorly in superior left hepatic lobe measures 14 mm. Simple cyst posteriorly in lateral segment of left hepatic lobe measures 15 mm. 17 mm cyst lies in lateral segment of left hepatic lobe. Additional smaller left hepatic lobe cysts are also evident. Laterally in right hepatic lobe, 8 mm simple cyst is present. Noncontrast images show no hepatic steatosis.Impression: Multifocal hepatic cysts, including clustered cysts versus multilobulated cyst with thin septation in left hepatic lobe   MRI abdo w wo contrast 10/29/24: Multilocular nonenhancing cyst in the lateral segment of left hepatic lobe has decreased in size compared to the prior study from 7.4 cm to 5.3 cm. Additional smaller hepatic cysts are unchanged. No new mass lesions are identified. Liver contour is normal. The gallbladder is absent. The biliary tree is nondilated.     Outpatient Encounter Medications as of 11/4/2024   Medication Sig Dispense Refill    clarithromycin (BIAXIN) 500 MG tablet Take 500 mg by mouth 2 (two) times daily.      diazePAM (VALIUM) 10 MG Tab Take 10 mg by mouth every evening.  1    ergocalciferol (ERGOCALCIFEROL) 50,000 unit Cap Take 1 capsule (50,000 Units total) by mouth twice a week. 24 capsule 3    fluticasone propionate (FLONASE) 50 mcg/actuation nasal spray 2 sprays (100 mcg total) by Each Nostril route once daily. 48 g 4    furosemide (LASIX) 40 MG tablet Take 1 tablet by mouth once daily 90 tablet 0    hydrOXYzine HCl (ATARAX) 25 MG tablet Take 1 tablet (25 mg total) by mouth 3 (three) times daily as needed. 90 tablet 1    hydrOXYzine pamoate (VISTARIL) 25 MG Cap Take 25 mg by mouth 2 (two) times daily as needed.      linaCLOtide (LINZESS) 290 mcg Cap capsule Take 1 capsule (290 mcg total) by mouth before breakfast. 90 capsule 3    methylPREDNISolone (MEDROL DOSEPACK) 4 mg tablet Take by mouth.      metoprolol succinate  (TOPROL-XL) 50 MG 24 hr tablet Take 1 tablet by mouth once daily 90 tablet 0    montelukast (SINGULAIR) 10 mg tablet TAKE 1 TABLET BY MOUTH ONCE DAILY IN THE EVENING 90 tablet 3    pantoprazole (PROTONIX) 40 MG tablet Take 1 tablet (40 mg total) by mouth every morning. 90 tablet 3    potassium chloride SA (K-DUR,KLOR-CON) 20 MEQ tablet Take 1 tablet (20 mEq total) by mouth 2 (two) times daily. 180 tablet 3    promethazine (PHENERGAN) 25 MG tablet Take 1 tablet (25 mg total) by mouth every 6 (six) hours as needed for Nausea (nausea). 20 tablet 1    triamcinolone acetonide 0.1% (KENALOG) 0.1 % paste SMARTSIG:Sparingly TO TEETH 3 Times Daily      WEGOVY 2.4 mg/0.75 mL PnIj INJECT 2.4 MG INTO THE SKIN EVERY 7 DAYS 12 mL 0    [DISCONTINUED] furosemide (LASIX) 40 MG tablet Take 1 tablet (40 mg total) by mouth once daily. 90 tablet 3    [DISCONTINUED] linaCLOtide (LINZESS) 290 mcg Cap capsule Take 1 capsule (290 mcg total) by mouth before breakfast. 90 capsule 3    [DISCONTINUED] metoprolol succinate (TOPROL-XL) 50 MG 24 hr tablet Take 1 tablet (50 mg total) by mouth once daily. 90 tablet 3    [DISCONTINUED] pantoprazole (PROTONIX) 40 MG tablet TAKE 1 TABLET BY MOUTH ONCE DAILY IN THE MORNING 60 tablet 0    [DISCONTINUED] pantoprazole (PROTONIX) 40 MG tablet TAKE 1 TABLET BY MOUTH ONCE DAILY IN THE MORNING (Patient not taking: Reported on 10/22/2024) 30 tablet 0     Facility-Administered Encounter Medications as of 11/4/2024   Medication Dose Route Frequency Provider Last Rate Last Admin    lactated ringers infusion   Intravenous Continuous Thomas Ivory MD 25 mL/hr at 12/21/21 1240 New Bag at 11/27/23 0853    lactated ringers infusion   Intravenous Continuous Thomas Ivory MD         Review of patient's allergies indicates:   Allergen Reactions    Omnicef [cefdinir] Itching     Past Medical History:   Diagnosis Date    Allergy     Bronchitis 07/17/2015    Cervical disc displacement     Edema     Hypertension     Insomnia      Liver cyst     Plantar fasciitis        Review of Systems   Constitutional: Negative.    HENT: Negative.     Eyes: Negative.    Respiratory: Negative.     Cardiovascular: Negative.    Gastrointestinal: Negative.    Genitourinary: Negative.    Musculoskeletal: Negative.    Skin: Negative.    Neurological: Negative.    Psychiatric/Behavioral: Negative.       There were no vitals filed for this visit.      Physical Exam  Vitals reviewed.   Constitutional:       Appearance: She is well-developed.   HENT:      Head: Normocephalic and atraumatic.   Eyes:      General: No scleral icterus.     Conjunctiva/sclera: Conjunctivae normal.      Pupils: Pupils are equal, round, and reactive to light.   Neck:      Thyroid: No thyromegaly.   Cardiovascular:      Rate and Rhythm: Normal rate and regular rhythm.      Heart sounds: Normal heart sounds.   Pulmonary:      Effort: Pulmonary effort is normal.      Breath sounds: Normal breath sounds. No rales.   Abdominal:      General: Bowel sounds are normal. There is no distension.      Palpations: Abdomen is soft. There is no mass.      Tenderness: There is no abdominal tenderness.   Musculoskeletal:         General: Normal range of motion.      Cervical back: Normal range of motion and neck supple.   Skin:     General: Skin is warm and dry.      Findings: No rash.   Neurological:      Mental Status: She is alert and oriented to person, place, and time.         MELD 3.0: 7 at 10/29/2024  7:20 AM  MELD-Na: 6 at 10/29/2024  7:20 AM  Calculated from:  Serum Creatinine: 1 mg/dL at 10/29/2024  7:20 AM  Serum Sodium: 140 mmol/L (Using max of 137 mmol/L) at 10/29/2024  7:20 AM  Total Bilirubin: 0.5 mg/dL (Using min of 1 mg/dL) at 10/29/2024  7:20 AM  Serum Albumin: 3.7 g/dL (Using max of 3.5 g/dL) at 10/29/2024  7:20 AM  INR(ratio): 0.9 (Using min of 1) at 10/29/2024  7:20 AM  Age at listing (hypothetical): 59 years  Sex: Female at 10/29/2024  7:20 AM      Lab Results   Component Value  Date    GLU 78 10/29/2024    BUN 12 10/29/2024    CREATININE 1.0 10/29/2024    CALCIUM 9.0 10/29/2024     10/29/2024    K 3.4 (L) 10/29/2024     10/29/2024    PROT 6.7 10/29/2024    CO2 31 (H) 10/29/2024    ANIONGAP 5 (L) 10/29/2024    WBC 4.89 10/29/2024    RBC 4.28 10/29/2024    HGB 13.1 10/29/2024    HCT 40.1 10/29/2024    MCV 94 10/29/2024    MCH 30.6 10/29/2024    MCHC 32.7 10/29/2024     Lab Results   Component Value Date    RDW 12.8 10/29/2024     10/29/2024    MPV 10.2 10/29/2024    GRAN 2.1 10/29/2024    GRAN 42.1 10/29/2024    LYMPH 2.3 10/29/2024    LYMPH 47.9 10/29/2024    MONO 0.3 10/29/2024    MONO 6.5 10/29/2024    EOSINOPHIL 2.7 10/29/2024    BASOPHIL 0.6 10/29/2024    EOS 0.1 10/29/2024    BASO 0.03 10/29/2024    BROCK Negative 05/11/2010    BNP <10 05/04/2010    CHOL 180 11/07/2023    TRIG 103 11/07/2023    HDL 37 (L) 11/07/2023    CHOLHDL 20.6 11/07/2023    TOTALCHOLEST 4.9 11/07/2023    ALBUMIN 3.7 10/29/2024    BILIDIR 0.1 10/29/2024    BILIDIR 0.1 10/29/2024    AST 15 10/29/2024    ALT 12 10/29/2024    ALKPHOS 89 10/29/2024    MG 1.9 11/28/2023    LABPROT 10.3 10/29/2024    LABPROT 10.3 10/29/2024    INR 0.9 10/29/2024    INR 0.9 10/29/2024       Assessment and Plan:  Lynn Naidu is a 59 y.o. female with Liver cyst  Cysts appear to be a large complex cyst- stable on abdo US and decreased in size on most recent MRI 10/24.. Recommend abdo US in 6 months and then annually.    Return 6 months  A total of 35 minutes was spent reviewing the patient's chart, examining the patient, reviewing labs and imaging and coordinating care with the patient's care team.

## 2024-11-07 ENCOUNTER — LAB VISIT (OUTPATIENT)
Dept: LAB | Facility: HOSPITAL | Age: 59
End: 2024-11-07
Attending: FAMILY MEDICINE
Payer: COMMERCIAL

## 2024-11-07 ENCOUNTER — OFFICE VISIT (OUTPATIENT)
Dept: FAMILY MEDICINE | Facility: CLINIC | Age: 59
End: 2024-11-07
Attending: FAMILY MEDICINE
Payer: COMMERCIAL

## 2024-11-07 VITALS
DIASTOLIC BLOOD PRESSURE: 68 MMHG | TEMPERATURE: 98 F | HEIGHT: 63 IN | OXYGEN SATURATION: 98 % | WEIGHT: 164.69 LBS | SYSTOLIC BLOOD PRESSURE: 108 MMHG | BODY MASS INDEX: 29.18 KG/M2 | HEART RATE: 72 BPM

## 2024-11-07 DIAGNOSIS — I10 ESSENTIAL HYPERTENSION: ICD-10-CM

## 2024-11-07 DIAGNOSIS — K76.89 LIVER CYST: ICD-10-CM

## 2024-11-07 DIAGNOSIS — R11.0 NAUSEA: ICD-10-CM

## 2024-11-07 DIAGNOSIS — E55.9 VITAMIN D DEFICIENCY: ICD-10-CM

## 2024-11-07 DIAGNOSIS — J45.20 MILD INTERMITTENT REACTIVE AIRWAY DISEASE WITHOUT COMPLICATION: ICD-10-CM

## 2024-11-07 DIAGNOSIS — K21.9 CHRONIC GERD: ICD-10-CM

## 2024-11-07 DIAGNOSIS — E87.6 HYPOKALEMIA: ICD-10-CM

## 2024-11-07 DIAGNOSIS — Z00.00 ENCOUNTER FOR PREVENTIVE HEALTH EXAMINATION: Primary | ICD-10-CM

## 2024-11-07 DIAGNOSIS — R53.83 FATIGUE, UNSPECIFIED TYPE: ICD-10-CM

## 2024-11-07 DIAGNOSIS — E66.01 SEVERE OBESITY (BMI 35.0-39.9) WITH COMORBIDITY: ICD-10-CM

## 2024-11-07 DIAGNOSIS — M50.30 DDD (DEGENERATIVE DISC DISEASE), CERVICAL: ICD-10-CM

## 2024-11-07 DIAGNOSIS — R60.9 EDEMA, UNSPECIFIED TYPE: ICD-10-CM

## 2024-11-07 DIAGNOSIS — M54.12 CERVICAL RADICULITIS: ICD-10-CM

## 2024-11-07 LAB
25(OH)D3+25(OH)D2 SERPL-MCNC: 34 NG/ML (ref 30–96)
ANION GAP SERPL CALC-SCNC: 7 MMOL/L (ref 8–16)
BUN SERPL-MCNC: 11 MG/DL (ref 6–20)
CALCIUM SERPL-MCNC: 9.3 MG/DL (ref 8.7–10.5)
CHLORIDE SERPL-SCNC: 104 MMOL/L (ref 95–110)
CO2 SERPL-SCNC: 27 MMOL/L (ref 23–29)
CREAT SERPL-MCNC: 1 MG/DL (ref 0.5–1.4)
EST. GFR  (NO RACE VARIABLE): >60 ML/MIN/1.73 M^2
GLUCOSE SERPL-MCNC: 86 MG/DL (ref 70–110)
POTASSIUM SERPL-SCNC: 4 MMOL/L (ref 3.5–5.1)
SODIUM SERPL-SCNC: 138 MMOL/L (ref 136–145)
TSH SERPL DL<=0.005 MIU/L-ACNC: 1.58 UIU/ML (ref 0.4–4)

## 2024-11-07 PROCEDURE — 3008F BODY MASS INDEX DOCD: CPT | Mod: CPTII,S$GLB,, | Performed by: FAMILY MEDICINE

## 2024-11-07 PROCEDURE — 99396 PREV VISIT EST AGE 40-64: CPT | Mod: S$GLB,,, | Performed by: FAMILY MEDICINE

## 2024-11-07 PROCEDURE — 1159F MED LIST DOCD IN RCRD: CPT | Mod: CPTII,S$GLB,, | Performed by: FAMILY MEDICINE

## 2024-11-07 PROCEDURE — 3074F SYST BP LT 130 MM HG: CPT | Mod: CPTII,S$GLB,, | Performed by: FAMILY MEDICINE

## 2024-11-07 PROCEDURE — 82306 VITAMIN D 25 HYDROXY: CPT | Performed by: FAMILY MEDICINE

## 2024-11-07 PROCEDURE — 84443 ASSAY THYROID STIM HORMONE: CPT | Performed by: FAMILY MEDICINE

## 2024-11-07 PROCEDURE — 3078F DIAST BP <80 MM HG: CPT | Mod: CPTII,S$GLB,, | Performed by: FAMILY MEDICINE

## 2024-11-07 PROCEDURE — 80048 BASIC METABOLIC PNL TOTAL CA: CPT | Performed by: FAMILY MEDICINE

## 2024-11-07 PROCEDURE — 36415 COLL VENOUS BLD VENIPUNCTURE: CPT | Mod: PO | Performed by: FAMILY MEDICINE

## 2024-11-07 PROCEDURE — 1160F RVW MEDS BY RX/DR IN RCRD: CPT | Mod: CPTII,S$GLB,, | Performed by: FAMILY MEDICINE

## 2024-11-07 PROCEDURE — 99999 PR PBB SHADOW E&M-EST. PATIENT-LVL IV: CPT | Mod: PBBFAC,,, | Performed by: FAMILY MEDICINE

## 2024-11-07 RX ORDER — FUROSEMIDE 40 MG/1
40 TABLET ORAL DAILY
Qty: 90 TABLET | Refills: 1 | Status: SHIPPED | OUTPATIENT
Start: 2024-11-07

## 2024-11-07 RX ORDER — SEMAGLUTIDE 2.4 MG/.75ML
2.4 INJECTION, SOLUTION SUBCUTANEOUS
Qty: 9 ML | Refills: 3 | Status: SHIPPED | OUTPATIENT
Start: 2024-11-07

## 2024-11-07 RX ORDER — MONTELUKAST SODIUM 10 MG/1
10 TABLET ORAL NIGHTLY
Qty: 90 TABLET | Refills: 3 | Status: SHIPPED | OUTPATIENT
Start: 2024-11-07

## 2024-11-07 RX ORDER — PROMETHAZINE HYDROCHLORIDE 25 MG/1
25 TABLET ORAL EVERY 6 HOURS PRN
Qty: 20 TABLET | Refills: 1 | Status: SHIPPED | OUTPATIENT
Start: 2024-11-07

## 2024-11-07 RX ORDER — METOPROLOL SUCCINATE 50 MG/1
50 TABLET, EXTENDED RELEASE ORAL DAILY
Qty: 90 TABLET | Refills: 3 | Status: SHIPPED | OUTPATIENT
Start: 2024-11-07

## 2024-11-07 RX ORDER — FLUTICASONE PROPIONATE 50 MCG
2 SPRAY, SUSPENSION (ML) NASAL DAILY
Qty: 48 G | Refills: 4 | Status: SHIPPED | OUTPATIENT
Start: 2024-11-07

## 2024-11-07 RX ORDER — POTASSIUM CHLORIDE 750 MG/1
10 TABLET, EXTENDED RELEASE ORAL 2 TIMES DAILY
Qty: 180 TABLET | Refills: 3 | Status: SHIPPED | OUTPATIENT
Start: 2024-11-07

## 2024-11-07 RX ORDER — ERGOCALCIFEROL 1.25 MG/1
50000 CAPSULE ORAL
Qty: 24 CAPSULE | Refills: 3 | Status: SHIPPED | OUTPATIENT
Start: 2024-11-07

## 2024-11-07 NOTE — PROGRESS NOTES
Subjective:       Patient ID: Lynn Naidu is a 59 y.o. female.    Chief Complaint: Annual Exam    59-year-old female coming in for annual exam and refill on medications.  She has a history of liver cyst being followed by GI with MRIs every six months, hypertension, degenerative disc disease and cervical radiculitis with some chronic neck pain, chronic tinnitus bilaterally, GERD, vitamin-D deficiency and chronic edema with hypokalemia on Lasix.  She is having difficulty swallowing the potassiums supplement which is a 20 mEq solid tablet and when she dissolves them in water they are so bitter she can not keep them down.  He is requesting a 10 mEq in the hopes that she will be able to swallow it easier.  She is fasting today and needs a follow-up on her recent low potassiums level.  She also needs a vitamin-D follow-up and she is complaining of nonspecific fatigue and has not had a thyroid check in some time.  She has been taking Wegovy for weight loss and her BMI is now down to 29.2 from her previous severe obesity still losing weight slowly.    Past Medical History:  No date: Allergy  07/17/2015: Bronchitis  No date: Cervical disc displacement  No date: Edema  No date: Hypertension  No date: Insomnia  No date: Liver cyst  No date: Plantar fasciitis    Past Surgical History:  11/01/2017: COLONOSCOPY      Comment:  Dr. Oviedo, normal, ten year recheck  11/01/2017: COLONOSCOPY; N/A      Comment:  Procedure: COLONOSCOPY;  Surgeon: Cameron Oviedo MD;                Location: [facility];  Service: Endoscopy;  Laterality:                N/A;  07/22/2019: EPIDURAL STEROID INJECTION INTO CERVICAL SPINE; N/A      Comment:  Procedure: Injection-steroid-epidural-cervical;                 Surgeon: Thomas Ivory MD;  Location: [facility] OR;  Service:                Pain Management;  Laterality: N/A;  C7-T1  12/21/2021: EPIDURAL STEROID INJECTION INTO CERVICAL SPINE; N/A      Comment:  Procedure:  Injection-steroid-epidural-cervical;                 Surgeon: Thomas Ivory MD;  Location: [facility] OR;  Service:                Pain Management;  Laterality: N/A;  C6-7  11/27/2023: ERCP; N/A      Comment:  Procedure: ERCP (ENDOSCOPIC RETROGRADE                CHOLANGIOPANCREATOGRAPHY);  Surgeon: Anselmo Winter III, MD;  Location: [facility];  Service: Endoscopy;                 Laterality: N/A;  08/31/2023: ESOPHAGOGASTRODUODENOSCOPY; N/A      Comment:  Procedure: EGD (ESOPHAGOGASTRODUODENOSCOPY);  Surgeon:                Cameron Oviedo MD;  Location: [facility] ENDO;  Service:                Endoscopy;  Laterality: N/A;  08/30/2022: INJECTION OF ANESTHETIC AGENT AROUND STELLATE GANGLION;   Left      Comment:  Procedure: BLOCK, GANGLION, STELLATE;  Surgeon: Thomas Ivory MD;  Location: [facility] OR;  Service: Pain Management;                 Laterality: Left;  10/21/2022: INJECTION OF ANESTHETIC AGENT AROUND STELLATE GANGLION;   Left      Comment:  Procedure: BLOCK, GANGLION, STELLATE Left;  Surgeon:                Thomas Ivory MD;  Location: [facility] OR;  Service: Pain                Management;  Laterality: Left;  12/27/2022: INJECTION OF ANESTHETIC AGENT AROUND STELLATE GANGLION;   Left      Comment:  Procedure: BLOCK, GANGLION, STELLATE;  Surgeon: Thomas Ivory MD;  Location: [facility] OR;  Service: Pain Management;                 Laterality: Left;  left  11/27/2023: LAPAROSCOPIC CHOLECYSTECTOMY; N/A      Comment:  Procedure: CHOLECYSTECTOMY, LAPAROSCOPIC;  Surgeon:                Al Cyr MD;  Location: [facility] OR;  Service:                General;  Laterality: N/A;  07/23/2021: TONSILLECTOMY, ADENOIDECTOMY; Bilateral      Comment:  Procedure: TONSILLECTOMY AND ADENOIDECTOMY;  Surgeon:                Anselmo Galvan MD;  Location: [facility] OR;  Service: ENT;               Laterality: Bilateral;  No date: UPPER GASTROINTESTINAL ENDOSCOPY  No date: WISDOM TOOTH EXTRACTION    Review  of patient's family history indicates:  Problem: Heart disease      Relation: Mother          Name:               Age of Onset: (Not Specified)  Problem: Stroke      Relation: Mother          Name:               Age of Onset: (Not Specified)  Problem: Cancer      Relation: Father          Name:               Age of Onset: (Not Specified)              Comment: bone ca, prostate ca   Problem: Hypertension      Relation: Sister          Name: R              Age of Onset: (Not Specified)  Problem: Diabetes      Relation: Sister          Name: R              Age of Onset: (Not Specified)  Problem: Allergies      Relation: Sister          Name: R              Age of Onset: (Not Specified)  Problem: Asthma      Relation: Sister          Name: R              Age of Onset: (Not Specified)  Problem: Hypertension      Relation: Sister          Name: S              Age of Onset: (Not Specified)  Problem: Allergies      Relation: Sister          Name: S              Age of Onset: (Not Specified)  Problem: Asthma      Relation: Sister          Name: S              Age of Onset: (Not Specified)  Problem: Heart disease      Relation: Brother          Name:               Age of Onset: (Not Specified)  Problem: Kidney failure      Relation: Brother          Name:               Age of Onset: (Not Specified)  Problem: Angioedema      Relation: Neg Hx          Name:               Age of Onset: (Not Specified)  Problem: Eczema      Relation: Neg Hx          Name:               Age of Onset: (Not Specified)  Problem: Immunodeficiency      Relation: Neg Hx          Name:               Age of Onset: (Not Specified)  Problem: Urticaria      Relation: Neg Hx          Name:               Age of Onset: (Not Specified)  Problem: Colon cancer      Relation: Neg Hx          Name:               Age of Onset: (Not Specified)  Problem: Colon polyps      Relation: Neg Hx          Name:               Age of Onset: (Not Specified)  Problem: Esophageal  cancer      Relation: Neg Hx          Name:               Age of Onset: (Not Specified)  Problem: Stomach cancer      Relation: Neg Hx          Name:               Age of Onset: (Not Specified)    Social History    Tobacco Use      Smoking status: Never      Smokeless tobacco: Never    Alcohol use: Never    Drug use: No    Current Outpatient Medications on File Prior to Visit:  diazePAM (VALIUM) 10 MG Tab, Take 10 mg by mouth every evening., Disp: , Rfl: 1  hydrOXYzine HCl (ATARAX) 25 MG tablet, Take 1 tablet (25 mg total) by mouth 3 (three) times daily as needed., Disp: 90 tablet, Rfl: 1  hydrOXYzine pamoate (VISTARIL) 25 MG Cap, Take 25 mg by mouth 2 (two) times daily as needed., Disp: , Rfl:   linaCLOtide (LINZESS) 290 mcg Cap capsule, Take 1 capsule (290 mcg total) by mouth before breakfast., Disp: 90 capsule, Rfl: 3  pantoprazole (PROTONIX) 40 MG tablet, Take 1 tablet (40 mg total) by mouth every morning., Disp: 90 tablet, Rfl: 3  triamcinolone acetonide 0.1% (KENALOG) 0.1 % paste, SMARTSIG:Sparingly TO TEETH 3 Times Daily, Disp: , Rfl:   [DISCONTINUED] ergocalciferol (ERGOCALCIFEROL) 50,000 unit Cap, Take 1 capsule (50,000 Units total) by mouth twice a week., Disp: 24 capsule, Rfl: 3  [DISCONTINUED] fluticasone propionate (FLONASE) 50 mcg/actuation nasal spray, 2 sprays (100 mcg total) by Each Nostril route once daily., Disp: 48 g, Rfl: 4  [DISCONTINUED] furosemide (LASIX) 40 MG tablet, Take 1 tablet by mouth once daily, Disp: 90 tablet, Rfl: 0  [DISCONTINUED] metoprolol succinate (TOPROL-XL) 50 MG 24 hr tablet, Take 1 tablet by mouth once daily, Disp: 90 tablet, Rfl: 0  [DISCONTINUED] montelukast (SINGULAIR) 10 mg tablet, TAKE 1 TABLET BY MOUTH ONCE DAILY IN THE EVENING, Disp: 90 tablet, Rfl: 3  [DISCONTINUED] potassium chloride SA (K-DUR,KLOR-CON) 20 MEQ tablet, Take 1 tablet (20 mEq total) by mouth 2 (two) times daily., Disp: 180 tablet, Rfl: 3  [DISCONTINUED] promethazine (PHENERGAN) 25 MG tablet, Take 1  tablet (25 mg total) by mouth every 6 (six) hours as needed for Nausea (nausea)., Disp: 20 tablet, Rfl: 1  [DISCONTINUED] WEGOVY 2.4 mg/0.75 mL PnIj, INJECT 2.4 MG INTO THE SKIN EVERY 7 DAYS, Disp: 12 mL, Rfl: 0  clarithromycin (BIAXIN) 500 MG tablet, Take 500 mg by mouth 2 (two) times daily. (Patient not taking: Reported on 11/7/2024), Disp: , Rfl:   methylPREDNISolone (MEDROL DOSEPACK) 4 mg tablet, Take by mouth. (Patient not taking: Reported on 11/7/2024), Disp: , Rfl:     Current Facility-Administered Medications on File Prior to Visit:  lactated ringers infusion, , Intravenous, Continuous, Thomas Ivory MD, Last Rate: 25 mL/hr at 12/21/21 1240, New Bag at 11/27/23 0853  lactated ringers infusion, , Intravenous, Continuous, Thomas Ivory MD            Review of Systems   Constitutional:  Negative for chills, diaphoresis, fatigue, fever and unexpected weight change.   HENT:  Positive for tinnitus and trouble swallowing (Large pills). Negative for congestion, ear pain, hearing loss, postnasal drip and sinus pressure.    Eyes:  Negative for itching and visual disturbance.   Respiratory:  Negative for cough, chest tightness, shortness of breath and wheezing.    Cardiovascular:  Negative for chest pain, palpitations and leg swelling.   Gastrointestinal:  Negative for abdominal pain, blood in stool, constipation (Controlled on Linzess), diarrhea, nausea and vomiting.   Genitourinary:  Negative for dysuria, frequency and hematuria.   Musculoskeletal:  Positive for neck pain. Negative for arthralgias, back pain, joint swelling and myalgias.   Neurological:  Negative for dizziness and headaches.   Hematological:  Negative for adenopathy.   Psychiatric/Behavioral:  Negative for sleep disturbance. The patient is not nervous/anxious.        Objective:      Physical Exam  Vitals and nursing note reviewed.   Constitutional:       General: She is not in acute distress.     Appearance: Normal appearance. She is well-developed.  She is not ill-appearing, toxic-appearing or diaphoretic.      Comments: Good blood pressure control   Normal pulse with regular rhythm   Overweight with a BMI of 29.2 down 2 lb from her last physical November 6, 2023   HENT:      Head: Normocephalic and atraumatic.      Right Ear: Tympanic membrane, ear canal and external ear normal. There is no impacted cerumen.      Left Ear: Tympanic membrane, ear canal and external ear normal. There is no impacted cerumen.      Ears:      Comments: Multiple ear piercings     Nose: Nose normal. No congestion or rhinorrhea.      Mouth/Throat:      Mouth: Mucous membranes are moist.      Pharynx: Oropharynx is clear. No oropharyngeal exudate or posterior oropharyngeal erythema.   Eyes:      General: No scleral icterus.        Right eye: No discharge.         Left eye: No discharge.      Extraocular Movements: Extraocular movements intact.      Conjunctiva/sclera: Conjunctivae normal.      Pupils: Pupils are equal, round, and reactive to light.   Neck:      Thyroid: No thyromegaly.      Vascular: No carotid bruit or JVD.   Cardiovascular:      Rate and Rhythm: Normal rate and regular rhythm.      Pulses: Normal pulses.      Heart sounds: Normal heart sounds. No murmur heard.     No friction rub. No gallop.   Pulmonary:      Effort: Pulmonary effort is normal. No respiratory distress.      Breath sounds: Normal breath sounds. No stridor. No wheezing, rhonchi or rales.   Chest:      Chest wall: No tenderness.   Abdominal:      General: Bowel sounds are normal. There is no distension.      Palpations: Abdomen is soft. There is no mass.      Tenderness: There is no abdominal tenderness. There is no guarding or rebound.      Hernia: No hernia is present.   Musculoskeletal:         General: No tenderness, deformity or signs of injury. Normal range of motion.      Cervical back: Normal range of motion and neck supple. No rigidity or tenderness.      Right lower leg: No edema.      Left  lower leg: No edema.   Lymphadenopathy:      Cervical: No cervical adenopathy.   Skin:     General: Skin is warm and dry.      Coloration: Skin is not jaundiced or pale.      Findings: No bruising, erythema, lesion or rash.   Neurological:      General: No focal deficit present.      Mental Status: She is alert and oriented to person, place, and time. Mental status is at baseline.      Cranial Nerves: No cranial nerve deficit.      Sensory: No sensory deficit.      Motor: No weakness.      Coordination: Coordination normal.      Gait: Gait normal.      Deep Tendon Reflexes: Reflexes are normal and symmetric. Reflexes normal.   Psychiatric:         Mood and Affect: Mood normal.         Behavior: Behavior normal.         Thought Content: Thought content normal.         Judgment: Judgment normal.         Assessment:       1. Encounter for preventive health examination    2. Essential hypertension    3. Hypokalemia    4. Vitamin D deficiency    5. Liver cyst    6. Chronic GERD    7. DDD (degenerative disc disease), cervical    8. Cervical radiculitis    9. Edema, unspecified type    10. Mild intermittent reactive airway disease without complication    11. Nausea    12. Severe obesity (BMI 35.0-39.9) with comorbidity    13. Fatigue, unspecified type        Plan:       1. Encounter for preventive health examination (Primary)    2. Essential hypertension  Good control no medication changes needed  - metoprolol succinate (TOPROL-XL) 50 MG 24 hr tablet; Take 1 tablet (50 mg total) by mouth once daily.  Dispense: 90 tablet; Refill: 3  - Basic Metabolic Panel; Future    3. Hypokalemia  Try reducing potassiums dosage to 20 mEq a day taking a 10 twice a day  - potassium chloride SA (K-DUR,KLOR-CON M) 10 MEQ tablet; Take 1 tablet (10 mEq total) by mouth 2 (two) times daily.  Dispense: 180 tablet; Refill: 3  - Basic Metabolic Panel; Future    4. Vitamin D deficiency  Recheck vitamin-D level  - ergocalciferol (ERGOCALCIFEROL)  50,000 unit Cap; Take 1 capsule (50,000 Units total) by mouth twice a week.  Dispense: 24 capsule; Refill: 3  - Vitamin D; Future    5. Liver cyst  Followed by GI with MRI q.6 months    6. Chronic GERD  Controlled on Protonix 40 mg daily    7. DDD (degenerative disc disease), cervical  Stable    8. Cervical radiculitis  Stable    9. Edema, unspecified type  Stable and controlled on Lasix  - furosemide (LASIX) 40 MG tablet; Take 1 tablet (40 mg total) by mouth once daily.  Dispense: 90 tablet; Refill: 1    10. Mild intermittent reactive airway disease without complication  Stable refill Singulair  - montelukast (SINGULAIR) 10 mg tablet; Take 1 tablet (10 mg total) by mouth every evening.  Dispense: 90 tablet; Refill: 3    11. Nausea  She is going on to cruises in the next few months and she does not tolerate the scopolamine patches which cause her to be unsteady and stumble.  She does well with the Phenergan tablets  - promethazine (PHENERGAN) 25 MG tablet; Take 1 tablet (25 mg total) by mouth every 6 (six) hours as needed for Nausea (nausea).  Dispense: 20 tablet; Refill: 1    12. Severe obesity (BMI 35.0-39.9) with comorbidity  Much improved  - semaglutide, weight loss, (WEGOVY) 2.4 mg/0.75 mL PnIj; Inject 2.4 mg into the skin every 7 days.  Dispense: 9 mL; Refill: 3    13. Fatigue, unspecified type  Check TSH  - TSH; Future

## 2024-11-21 DIAGNOSIS — E66.01 SEVERE OBESITY (BMI 35.0-39.9) WITH COMORBIDITY: ICD-10-CM

## 2024-11-21 RX ORDER — SEMAGLUTIDE 2.4 MG/.75ML
2.4 INJECTION, SOLUTION SUBCUTANEOUS
Qty: 12 ML | Refills: 3 | OUTPATIENT
Start: 2024-11-21

## 2024-11-21 NOTE — TELEPHONE ENCOUNTER
Patient came into office saying it is cheaper for her to get a 3 month supply of the wegovy than one month at a time.  Please resend for a 3 month supply.

## 2024-11-21 NOTE — TELEPHONE ENCOUNTER
Care Due:                  Date            Visit Type   Department     Provider  --------------------------------------------------------------------------------                                EP -                              PRIMARY      [facility]  Last Visit: 11-      CARE ([facility])   PRACTICE       Remy Parson  Next Visit: None Scheduled  None         None Found                                                            Last  Test          Frequency    Reason                     Performed    Due Date  --------------------------------------------------------------------------------    HBA1C.......  6 months...  semaglutide,.............  10-   04-    Health [facility] Embedded Care Due Messages. Reference number: [id number].   11/21/2024 2:16:02 PM CST

## 2024-11-22 RX ORDER — SEMAGLUTIDE 2.4 MG/.75ML
2.4 INJECTION, SOLUTION SUBCUTANEOUS
Qty: 9 ML | Refills: 3 | Status: SHIPPED | OUTPATIENT
Start: 2024-11-22

## 2024-11-22 RX ORDER — SEMAGLUTIDE 2.4 MG/.75ML
2.4 INJECTION, SOLUTION SUBCUTANEOUS
Qty: 12 ML | Refills: 3 | OUTPATIENT
Start: 2024-11-22

## 2024-11-22 NOTE — TELEPHONE ENCOUNTER
I sent in Nine mls originally.  That is a 12 week supply.  The pharmacy will not break a package to do 13 weeks.  12 mils is 16 weeks, 112 days.

## 2024-11-25 DIAGNOSIS — E66.01 SEVERE OBESITY (BMI 35.0-39.9) WITH COMORBIDITY: ICD-10-CM

## 2024-11-25 RX ORDER — SEMAGLUTIDE 2.4 MG/.75ML
2.4 INJECTION, SOLUTION SUBCUTANEOUS
Qty: 9 ML | Refills: 0 | OUTPATIENT
Start: 2024-11-25

## 2024-11-25 NOTE — TELEPHONE ENCOUNTER
Refill Routing Note   Medication(s) are not appropriate for processing by [facility] for the following reason(s):        Required labs outdated    [facility] action(s):  Defer             Appointments  past 12m or future 3m with PCP    Date Provider   Last Visit   11/7/2024 Remy Parson MD   Next Visit   Visit date not found Remy Parson MD   ED visits in past 90 days: 0        Note composed:12:13 PM 11/25/2024

## 2024-11-25 NOTE — TELEPHONE ENCOUNTER
----- Message from Jaja sent at 11/25/2024  8:11 AM CST -----  Regarding: refill called in wrong twice  Contact: pt  Type:  RX Refill Request    Who Called: pt    Refill or New Rx:refill    RX Name and Strength:   semaglutide, weight loss, (WEGOVY) 2.4 mg/0.75 mL PnIj         How is the patient currently taking it? (ex. 1XDay):1/week    Is this a 30 day or 90 day RX:  3 month supply    Preferred Pharmacy with phone number:  [facility] [address] [place] [address] - [id number]  [address] [facility] [address]  Phone: [phone] Fax: [phone]    Local or Mail Order:local    Ordering Provider:lester    Would the patient rather a call back or a response via [facility]? Call back    Best Call Back Number:[phone]    Additional Information: pt sts that pharm told her that the rx was called in for only one month supply instead of 3 month with 3 refills.

## 2024-11-25 NOTE — TELEPHONE ENCOUNTER
No care due was identified.  Health [facility] Embedded Care Due Messages. Reference number: [id number].   11/25/2024 9:01:01 AM CST

## 2024-11-26 NOTE — TELEPHONE ENCOUNTER
This has already been filled twice on November 7 and November 22 with confirmed receipt both times.  If the pharmacy can not get it is act together then she needs to pick a new pharmacy

## 2025-02-05 ENCOUNTER — TELEPHONE (OUTPATIENT)
Dept: HEPATOLOGY | Facility: CLINIC | Age: 60
End: 2025-02-05
Payer: COMMERCIAL

## 2025-02-05 NOTE — TELEPHONE ENCOUNTER
An appointment has been scheduled on Wednesday, May 14, 2025 at 11:00AM.  Patient confirmed.  Patient was advised of new location.  A copy of appointment has been mailed out.          ----- Message from [employer] sent at 2/5/2025 10:48 AM CST -----  Regarding: Scheduling Request  Contact: Lynn Naidu  Scheduling Request           Appt Type:  EP     Date/Time Preference:any     Treating Provider:Sandip     Caller Name:Lynn Naidu      Contact Preference:[phone]     Comments/notes:Patient is calling to schedule her F/U for May. Requesting a call back

## 2025-02-25 LAB
HUMAN PAPILLOMAVIRUS (HPV): NORMAL
PAP RECOMMENDATION EXT: NORMAL
PAP SMEAR: NORMAL

## 2025-04-30 ENCOUNTER — TELEPHONE (OUTPATIENT)
Dept: HEPATOLOGY | Facility: CLINIC | Age: 60
End: 2025-04-30
Payer: COMMERCIAL

## 2025-04-30 NOTE — TELEPHONE ENCOUNTER
Returned call to pt because she wanted to know if labs were needed before the appoijntment to see Dr Donohue. I only see from her last visit on November get ultrasound and come back in 6 months. Pt requesting clarification if labs are needed or not.

## 2025-04-30 NOTE — TELEPHONE ENCOUNTER
----- Message from Nikki sent at 4/30/2025  3:01 PM CDT -----  Regarding: does PT need labs  Contact: [phone]  Please advise if PT needs labs prior to appt - US is sched 5/5Patient can be contacted @# [phone]

## 2025-05-05 ENCOUNTER — RESULTS FOLLOW-UP (OUTPATIENT)
Dept: HEPATOLOGY | Facility: CLINIC | Age: 60
End: 2025-05-05

## 2025-05-05 ENCOUNTER — HOSPITAL ENCOUNTER (OUTPATIENT)
Dept: RADIOLOGY | Facility: HOSPITAL | Age: 60
Discharge: HOME | End: 2025-05-05
Attending: INTERNAL MEDICINE
Payer: COMMERCIAL

## 2025-05-05 DIAGNOSIS — K76.89 LIVER CYST: ICD-10-CM

## 2025-05-05 DIAGNOSIS — K76.89 LIVER CYST: Primary | ICD-10-CM

## 2025-05-05 PROCEDURE — 76705 ECHO EXAM OF ABDOMEN: CPT | Mod: 26,,, | Performed by: RADIOLOGY

## 2025-05-05 PROCEDURE — 76705 ECHO EXAM OF ABDOMEN: CPT | Mod: TC,PO

## 2025-05-06 ENCOUNTER — TELEPHONE (OUTPATIENT)
Dept: HEPATOLOGY | Facility: CLINIC | Age: 60
End: 2025-05-06
Payer: COMMERCIAL

## 2025-05-06 ENCOUNTER — PATIENT MESSAGE (OUTPATIENT)
Dept: HEPATOLOGY | Facility: CLINIC | Age: 60
End: 2025-05-06
Payer: COMMERCIAL

## 2025-05-06 NOTE — TELEPHONE ENCOUNTER
Pt scheduled for 1 year f/u ultrasound on 5/5/26. A message via [facility] was sent informing pt about needing to repeat ultrasound again in 1 year.

## 2025-05-14 ENCOUNTER — OFFICE VISIT (OUTPATIENT)
Dept: HEPATOLOGY | Facility: CLINIC | Age: 60
End: 2025-05-14
Payer: COMMERCIAL

## 2025-05-14 ENCOUNTER — LAB VISIT (OUTPATIENT)
Dept: LAB | Facility: HOSPITAL | Age: 60
End: 2025-05-14
Attending: INTERNAL MEDICINE
Payer: COMMERCIAL

## 2025-05-14 ENCOUNTER — RESULTS FOLLOW-UP (OUTPATIENT)
Dept: HEPATOLOGY | Facility: CLINIC | Age: 60
End: 2025-05-14

## 2025-05-14 VITALS
HEART RATE: 74 BPM | SYSTOLIC BLOOD PRESSURE: 115 MMHG | WEIGHT: 177.94 LBS | BODY MASS INDEX: 31.53 KG/M2 | DIASTOLIC BLOOD PRESSURE: 66 MMHG | TEMPERATURE: 98 F | OXYGEN SATURATION: 99 % | HEIGHT: 63 IN | RESPIRATION RATE: 18 BRPM

## 2025-05-14 DIAGNOSIS — K76.89 LIVER CYST: ICD-10-CM

## 2025-05-14 DIAGNOSIS — K76.89 LIVER CYST: Primary | ICD-10-CM

## 2025-05-14 LAB
ABSOLUTE EOSINOPHIL (OHS): 0.1 K/UL
ABSOLUTE MONOCYTE (OHS): 0.25 K/UL (ref 0.3–1)
ABSOLUTE NEUTROPHIL COUNT (OHS): 2.25 K/UL (ref 1.8–7.7)
ALBUMIN SERPL BCP-MCNC: 3.3 G/DL (ref 3.5–5.2)
ALP SERPL-CCNC: 103 UNIT/L (ref 40–150)
ALT SERPL W/O P-5'-P-CCNC: 10 UNIT/L (ref 10–44)
ANION GAP (OHS): 11 MMOL/L (ref 8–16)
AST SERPL-CCNC: 16 UNIT/L (ref 11–45)
BASOPHILS # BLD AUTO: 0.03 K/UL
BASOPHILS NFR BLD AUTO: 0.5 %
BILIRUB DIRECT SERPL-MCNC: 0.2 MG/DL (ref 0.1–0.3)
BILIRUB SERPL-MCNC: 0.4 MG/DL (ref 0.1–1)
BUN SERPL-MCNC: 11 MG/DL (ref 8–23)
CALCIUM SERPL-MCNC: 8.7 MG/DL (ref 8.7–10.5)
CHLORIDE SERPL-SCNC: 106 MMOL/L (ref 95–110)
CO2 SERPL-SCNC: 24 MMOL/L (ref 23–29)
CREAT SERPL-MCNC: 0.9 MG/DL (ref 0.5–1.4)
ERYTHROCYTE [DISTWIDTH] IN BLOOD BY AUTOMATED COUNT: 12.7 % (ref 11.5–14.5)
GFR SERPLBLD CREATININE-BSD FMLA CKD-EPI: >60 ML/MIN/1.73/M2
GLUCOSE SERPL-MCNC: 73 MG/DL (ref 70–110)
HCT VFR BLD AUTO: 39.1 % (ref 37–48.5)
HGB BLD-MCNC: 12.7 GM/DL (ref 12–16)
IMM GRANULOCYTES # BLD AUTO: 0.01 K/UL (ref 0–0.04)
IMM GRANULOCYTES NFR BLD AUTO: 0.2 % (ref 0–0.5)
INR PPP: 1 (ref 0.8–1.2)
LYMPHOCYTES # BLD AUTO: 3.73 K/UL (ref 1–4.8)
MCH RBC QN AUTO: 30.2 PG (ref 27–31)
MCHC RBC AUTO-ENTMCNC: 32.5 G/DL (ref 32–36)
MCV RBC AUTO: 93 FL (ref 82–98)
NUCLEATED RBC (/100WBC) (OHS): 0 /100 WBC
PLATELET # BLD AUTO: 256 K/UL (ref 150–450)
PMV BLD AUTO: 10.1 FL (ref 9.2–12.9)
POTASSIUM SERPL-SCNC: 3.4 MMOL/L (ref 3.5–5.1)
PROT SERPL-MCNC: 6.7 GM/DL (ref 6–8.4)
PROTHROMBIN TIME: 10.8 SECONDS (ref 9–12.5)
RBC # BLD AUTO: 4.21 M/UL (ref 4–5.4)
RELATIVE EOSINOPHIL (OHS): 1.6 %
RELATIVE LYMPHOCYTE (OHS): 58.6 % (ref 18–48)
RELATIVE MONOCYTE (OHS): 3.9 % (ref 4–15)
RELATIVE NEUTROPHIL (OHS): 35.2 % (ref 38–73)
SODIUM SERPL-SCNC: 141 MMOL/L (ref 136–145)
WBC # BLD AUTO: 6.37 K/UL (ref 3.9–12.7)

## 2025-05-14 PROCEDURE — 82248 BILIRUBIN DIRECT: CPT

## 2025-05-14 PROCEDURE — 36415 COLL VENOUS BLD VENIPUNCTURE: CPT | Mod: PO

## 2025-05-14 PROCEDURE — 80053 COMPREHEN METABOLIC PANEL: CPT

## 2025-05-14 PROCEDURE — 1159F MED LIST DOCD IN RCRD: CPT | Mod: CPTII,S$GLB,, | Performed by: INTERNAL MEDICINE

## 2025-05-14 PROCEDURE — 85610 PROTHROMBIN TIME: CPT

## 2025-05-14 PROCEDURE — 3074F SYST BP LT 130 MM HG: CPT | Mod: CPTII,S$GLB,, | Performed by: INTERNAL MEDICINE

## 2025-05-14 PROCEDURE — 85025 COMPLETE CBC W/AUTO DIFF WBC: CPT

## 2025-05-14 PROCEDURE — 3078F DIAST BP <80 MM HG: CPT | Mod: CPTII,S$GLB,, | Performed by: INTERNAL MEDICINE

## 2025-05-14 PROCEDURE — 1160F RVW MEDS BY RX/DR IN RCRD: CPT | Mod: CPTII,S$GLB,, | Performed by: INTERNAL MEDICINE

## 2025-05-14 PROCEDURE — 99999 PR PBB SHADOW E&M-EST. PATIENT-LVL V: CPT | Mod: PBBFAC,,, | Performed by: INTERNAL MEDICINE

## 2025-05-14 PROCEDURE — 3008F BODY MASS INDEX DOCD: CPT | Mod: CPTII,S$GLB,, | Performed by: INTERNAL MEDICINE

## 2025-05-14 PROCEDURE — 99214 OFFICE O/P EST MOD 30 MIN: CPT | Mod: S$GLB,,, | Performed by: INTERNAL MEDICINE

## 2025-05-14 RX ORDER — CODEINE PHOSPHATE AND GUAIFENESIN 10; 100 MG/5ML; MG/5ML
SOLUTION ORAL
COMMUNITY
Start: 2024-12-23

## 2025-05-14 RX ORDER — FLUCONAZOLE 150 MG/1
150 TABLET ORAL ONCE
COMMUNITY
Start: 2025-03-16

## 2025-05-14 NOTE — PATIENT INSTRUCTIONS
Change abdo US 5/26 to MRI abdo  Labs now and in one year at time fo MRI  Return 1 year after MRI and labs

## 2025-05-14 NOTE — PROGRESS NOTES
HEPATOLOGY FOLLOW UP    Referring Physician:   Current Corresponding Physician:     Lynn Naidu is here for follow up of Abnormal Abdominal/Liver Imaging      HPI  Lynn Naidu is a 60 y.o. female with a hx of htn, elevated BMI and DDD who presented 10/9/23 for evaluation of incidentally found liver cysts on abdo US done to evaluate upper abdominal pain. W/u included an EGD with gastic biopsies +H pylori. Treatment of H pylori resulted in improvement of her abdo pain:     Abdo US 8/3/23: The liver is normal in size and echogenicity. There are numerous hepatic cysts. There is a: Cysts in the left lobe measuring 8.5 x 5.0 x 4.9 cm with multiple septations. There is no biliary duct dilatation. There is hepatopedal flow within the portal vein. No renal cysts     Labs 10/19/22: ALT 10, AST 12, ALKP 103, Tbil 0.4     The patient denied any symptoms of decompensated cirrhosis, including no ascites or edema, cognitive problems that would suggest hepatic encephalopathy, or GI bleeding from varices.     Interval History  Since Lynn Naidu's last few visits:  --had lap isela 11/27/23 for symptomatic cholelithiasis/choledocholithiasis:  --MRCP 11/25/23: Cholelithiasis and findings of acute cholecystitis and choledocholithiasis with pericholecystic fluid, mild biliary duct dilatation and with a filling defect in the distal common duct.   --ERCP 11/27/23: CBD stone- removed with balloon sweep  --currently no RUQ pain    Labs 1/2/24: ALT 22, aST 21, ALKP 166, Tbil 0.4  Labs 10/29/24: ALT 12, AST 15, ALKP 89, Tbil 0.5    Abdo US 4/24/24: The liver measures 11.1 cm in longitudinal dimension. The grouped cysts in the left hepatic lobe are again noted, stable or slightly smaller compared to the prior study (collective transverse diameter of 6.6 cm). Additional smaller cysts are stable. Hepatopetal flow is noted within the portal vein. Liver contour is normal.   MRI abdo w wo contrast 10/24/23: Cyst cluster versus  multilobulated cyst with thin septation occurs in left hepatic lobe. The cyst cluster measures 7.5 x 4.3 x 3.9 cm. Simple cyst posteriorly in superior left hepatic lobe measures 14 mm. Simple cyst posteriorly in lateral segment of left hepatic lobe measures 15 mm. 17 mm cyst lies in lateral segment of left hepatic lobe. Additional smaller left hepatic lobe cysts are also evident. Laterally in right hepatic lobe, 8 mm simple cyst is present. Noncontrast images show no hepatic steatosis.Impression: Multifocal hepatic cysts, including clustered cysts versus multilobulated cyst with thin septation in left hepatic lobe   MRI abdo w wo contrast 10/29/24: Multilocular nonenhancing cyst in the lateral segment of left hepatic lobe has decreased in size compared to the prior study from 7.4 cm to 5.3 cm. Additional smaller hepatic cysts are unchanged. No new mass lesions are identified. Liver contour is normal. The gallbladder is absent. The biliary tree is nondilated.   Abdo US 5/5/25: Multifocal hepatic cysts are present. 13 mm cyst lies in right hepatic lobe. 15 mm cyst near junction of right and left hepatic lobes noted along with additional 18 mm cyst. Multilocular cyst with septation in left hepatic lobe measuring approximately 3.1 x 3.7 x 7.2 cm has not significantly changed from prior exams when accounting for differences in measurement technique. Liver otherwise maintains homogeneous echogenicity without intrahepatic bile duct dilation     Outpatient Encounter Medications as of 5/14/2025   Medication Sig Dispense Refill    diazePAM (VALIUM) 10 MG Tab Take 10 mg by mouth every evening.  1    ergocalciferol (ERGOCALCIFEROL) 50,000 unit Cap Take 1 capsule (50,000 Units total) by mouth twice a week. 24 capsule 3    fluconazole (DIFLUCAN) 150 MG Tab Take 150 mg by mouth once.      fluticasone propionate (FLONASE) 50 mcg/actuation nasal spray 2 sprays (100 mcg total) by Each Nostril route once daily. 48 g 4    furosemide  (LASIX) 40 MG tablet Take 1 tablet (40 mg total) by mouth once daily. 90 tablet 1    guaiFENesin-codeine 100-10 mg/5 ml (TUSSI-ORGANIDIN NR)  mg/5 mL syrup Take by mouth.      hydrOXYzine pamoate (VISTARIL) 25 MG Cap Take 25 mg by mouth 2 (two) times daily as needed.      linaCLOtide (LINZESS) 290 mcg Cap capsule Take 1 capsule (290 mcg total) by mouth before breakfast. 90 capsule 3    metoprolol succinate (TOPROL-XL) 50 MG 24 hr tablet Take 1 tablet (50 mg total) by mouth once daily. 90 tablet 3    montelukast (SINGULAIR) 10 mg tablet Take 1 tablet (10 mg total) by mouth every evening. 90 tablet 3    pantoprazole (PROTONIX) 40 MG tablet Take 1 tablet (40 mg total) by mouth every morning. 90 tablet 3    potassium chloride SA (K-DUR,KLOR-CON M) 10 MEQ tablet Take 1 tablet (10 mEq total) by mouth 2 (two) times daily. 180 tablet 3    promethazine (PHENERGAN) 25 MG tablet Take 1 tablet (25 mg total) by mouth every 6 (six) hours as needed for Nausea (nausea). 20 tablet 1    semaglutide, weight loss, (WEGOVY) 2.4 mg/0.75 mL PnIj Inject 2.4 mg into the skin every 7 days. Dispense 3 boxes 9 mL 3    triamcinolone acetonide 0.1% (KENALOG) 0.1 % paste SMARTSIG:Sparingly TO TEETH 3 Times Daily      clarithromycin (BIAXIN) 500 MG tablet Take 500 mg by mouth 2 (two) times daily. (Patient not taking: Reported on 5/14/2025)      hydrOXYzine HCl (ATARAX) 25 MG tablet Take 1 tablet (25 mg total) by mouth 3 (three) times daily as needed. (Patient not taking: Reported on 5/14/2025) 90 tablet 1    methylPREDNISolone (MEDROL DOSEPACK) 4 mg tablet Take by mouth. (Patient not taking: Reported on 5/14/2025)       Facility-Administered Encounter Medications as of 5/14/2025   Medication Dose Route Frequency Provider Last Rate Last Admin    lactated ringers infusion   Intravenous Continuous VuThomas MD 25 mL/hr at 12/21/21 1240 New Bag at 11/27/23 0830    lactated ringers infusion   Intravenous Continuous Thomas Ivory MD          Review of patient's allergies indicates:   Allergen Reactions    Omnicef [cefdinir] Itching     Past Medical History:   Diagnosis Date    Allergy     Bronchitis 07/17/2015    Cervical disc displacement     Edema     Hypertension     Insomnia     Liver cyst     Plantar fasciitis        Review of Systems   Constitutional: Negative.    HENT: Negative.     Eyes: Negative.    Respiratory: Negative.     Cardiovascular: Negative.    Gastrointestinal: Negative.    Genitourinary: Negative.    Musculoskeletal: Negative.    Skin: Negative.    Neurological: Negative.    Psychiatric/Behavioral: Negative.       Vitals:    05/14/25 1025   BP: 115/66   Pulse: 74   Resp: 18   Temp: 98 °F (36.7 °C)         Physical Exam  Vitals reviewed.   Constitutional:       Appearance: She is well-developed.   HENT:      Head: Normocephalic and atraumatic.   Eyes:      General: No scleral icterus.     Conjunctiva/sclera: Conjunctivae normal.      Pupils: Pupils are equal, round, and reactive to light.   Neck:      Thyroid: No thyromegaly.   Cardiovascular:      Rate and Rhythm: Normal rate and regular rhythm.      Heart sounds: Normal heart sounds.   Pulmonary:      Effort: Pulmonary effort is normal.      Breath sounds: Normal breath sounds. No rales.   Abdominal:      General: Bowel sounds are normal. There is no distension.      Palpations: Abdomen is soft. There is no mass.      Tenderness: There is no abdominal tenderness.   Musculoskeletal:         General: Normal range of motion.      Cervical back: Normal range of motion and neck supple.   Skin:     General: Skin is warm and dry.      Findings: No rash.   Neurological:      Mental Status: She is alert and oriented to person, place, and time.         MELD 3.0: 7 at 10/29/2024  7:20 AM  MELD-Na: 6 at 10/29/2024  7:20 AM  Calculated from:  Serum Creatinine: 1 mg/dL at 10/29/2024  7:20 AM  Serum Sodium: 140 mmol/L (Using max of 137 mmol/L) at 10/29/2024  7:20 AM  Total Bilirubin: 0.5  mg/dL (Using min of 1 mg/dL) at 10/29/2024  7:20 AM  Serum Albumin: 3.7 g/dL (Using max of 3.5 g/dL) at 10/29/2024  7:20 AM  INR(ratio): 0.9 (Using min of 1) at 10/29/2024  7:20 AM  Age at listing (hypothetical): 59 years  Sex: Female at 10/29/2024  7:20 AM      Lab Results   Component Value Date    GLU 86 11/07/2024    BUN 11 11/07/2024    CREATININE 1.0 11/07/2024    CALCIUM 9.3 11/07/2024     11/07/2024    K 4.0 11/07/2024     11/07/2024    PROT 6.7 10/29/2024    CO2 27 11/07/2024    ANIONGAP 7 (L) 11/07/2024    WBC 4.89 10/29/2024    RBC 4.28 10/29/2024    HGB 13.1 10/29/2024    HCT 40.1 10/29/2024    MCV 94 10/29/2024    MCH 30.6 10/29/2024    MCHC 32.7 10/29/2024     Lab Results   Component Value Date    RDW 12.8 10/29/2024     10/29/2024    MPV 10.2 10/29/2024    GRAN 2.1 10/29/2024    GRAN 42.1 10/29/2024    LYMPH 2.3 10/29/2024    LYMPH 47.9 10/29/2024    MONO 0.3 10/29/2024    MONO 6.5 10/29/2024    EOSINOPHIL 2.7 10/29/2024    BASOPHIL 0.6 10/29/2024    EOS 0.1 10/29/2024    BASO 0.03 10/29/2024    BROCK Negative 05/11/2010    BNP <10 05/04/2010    CHOL 180 11/07/2023    TRIG 103 11/07/2023    HDL 37 (L) 11/07/2023    CHOLHDL 20.6 11/07/2023    TOTALCHOLEST 4.9 11/07/2023    ALBUMIN 3.7 10/29/2024    BILIDIR 0.1 10/29/2024    BILIDIR 0.1 10/29/2024    AST 15 10/29/2024    ALT 12 10/29/2024    ALKPHOS 89 10/29/2024    MG 1.9 11/28/2023    LABPROT 10.3 10/29/2024    LABPROT 10.3 10/29/2024    INR 0.9 10/29/2024    INR 0.9 10/29/2024       Assessment and Plan:  Lynn Naidu is a 60 y.o. female with Abnormal Abdominal/Liver Imaging  Cysts appear to be a large complex cyst- stable on abdo US and decreased in size on most recent MRI 10/24.. Recommend MRI in 12 months with repeat labs    Return 12 months  A total of 35 minutes was spent reviewing the patient's chart, examining the patient, reviewing labs and imaging and coordinating care with the patient's care team.

## 2025-05-15 ENCOUNTER — OFFICE VISIT (OUTPATIENT)
Dept: FAMILY MEDICINE | Facility: CLINIC | Age: 60
End: 2025-05-15
Payer: COMMERCIAL

## 2025-05-15 VITALS
HEART RATE: 81 BPM | BODY MASS INDEX: 31.29 KG/M2 | OXYGEN SATURATION: 98 % | SYSTOLIC BLOOD PRESSURE: 108 MMHG | WEIGHT: 176.56 LBS | DIASTOLIC BLOOD PRESSURE: 74 MMHG | HEIGHT: 63 IN

## 2025-05-15 DIAGNOSIS — L98.9 SKIN LESION: ICD-10-CM

## 2025-05-15 DIAGNOSIS — Z00.00 ROUTINE GENERAL MEDICAL EXAMINATION AT A HEALTH CARE FACILITY: Primary | ICD-10-CM

## 2025-05-15 DIAGNOSIS — Z13.220 ENCOUNTER FOR LIPID SCREENING FOR CARDIOVASCULAR DISEASE: ICD-10-CM

## 2025-05-15 DIAGNOSIS — E87.6 HYPOKALEMIA: ICD-10-CM

## 2025-05-15 DIAGNOSIS — M50.30 DDD (DEGENERATIVE DISC DISEASE), CERVICAL: ICD-10-CM

## 2025-05-15 DIAGNOSIS — K21.9 CHRONIC GERD: ICD-10-CM

## 2025-05-15 DIAGNOSIS — Z13.6 ENCOUNTER FOR LIPID SCREENING FOR CARDIOVASCULAR DISEASE: ICD-10-CM

## 2025-05-15 DIAGNOSIS — Z13.1 SCREENING FOR DIABETES MELLITUS (DM): ICD-10-CM

## 2025-05-15 DIAGNOSIS — K59.00 CONSTIPATION, UNSPECIFIED CONSTIPATION TYPE: ICD-10-CM

## 2025-05-15 DIAGNOSIS — E55.9 VITAMIN D DEFICIENCY: ICD-10-CM

## 2025-05-15 DIAGNOSIS — I10 ESSENTIAL HYPERTENSION: ICD-10-CM

## 2025-05-15 PROCEDURE — 3008F BODY MASS INDEX DOCD: CPT | Mod: CPTII,S$GLB,, | Performed by: STUDENT IN AN ORGANIZED HEALTH CARE EDUCATION/TRAINING PROGRAM

## 2025-05-15 PROCEDURE — G2211 COMPLEX E/M VISIT ADD ON: HCPCS | Mod: S$GLB,,, | Performed by: STUDENT IN AN ORGANIZED HEALTH CARE EDUCATION/TRAINING PROGRAM

## 2025-05-15 PROCEDURE — 3074F SYST BP LT 130 MM HG: CPT | Mod: CPTII,S$GLB,, | Performed by: STUDENT IN AN ORGANIZED HEALTH CARE EDUCATION/TRAINING PROGRAM

## 2025-05-15 PROCEDURE — 99999 PR PBB SHADOW E&M-EST. PATIENT-LVL IV: CPT | Mod: PBBFAC,,, | Performed by: STUDENT IN AN ORGANIZED HEALTH CARE EDUCATION/TRAINING PROGRAM

## 2025-05-15 PROCEDURE — 99214 OFFICE O/P EST MOD 30 MIN: CPT | Mod: S$GLB,,, | Performed by: STUDENT IN AN ORGANIZED HEALTH CARE EDUCATION/TRAINING PROGRAM

## 2025-05-15 PROCEDURE — 3078F DIAST BP <80 MM HG: CPT | Mod: CPTII,S$GLB,, | Performed by: STUDENT IN AN ORGANIZED HEALTH CARE EDUCATION/TRAINING PROGRAM

## 2025-05-15 PROCEDURE — 1159F MED LIST DOCD IN RCRD: CPT | Mod: CPTII,S$GLB,, | Performed by: STUDENT IN AN ORGANIZED HEALTH CARE EDUCATION/TRAINING PROGRAM

## 2025-05-15 PROCEDURE — 1160F RVW MEDS BY RX/DR IN RCRD: CPT | Mod: CPTII,S$GLB,, | Performed by: STUDENT IN AN ORGANIZED HEALTH CARE EDUCATION/TRAINING PROGRAM

## 2025-05-15 RX ORDER — MUPIROCIN 20 MG/G
OINTMENT TOPICAL 3 TIMES DAILY
Qty: 30 G | Refills: 0 | Status: SHIPPED | OUTPATIENT
Start: 2025-05-15

## 2025-05-15 NOTE — Clinical Note
Please obtain her pap smear result from Dr. Starr's group (done Jan 2025 by his mid level provider) thanks

## 2025-05-15 NOTE — PROGRESS NOTES
[facility]  Office Visit Note     SUBJECTIVE:   HPI: Lynn Naidu  is a 60 y.o. female here to est care    Medical conditions:  Cervical disc displacement, cervical radiculitis, tinnitus, HTN, vit D deficiency, GERD    Meds:  Vit D 02321 units weekly Hydroxyzine 25 mg BID, metoprolol succinate 50 mg daily, montelukast, pantoprazole, potassium chloride 10mEq BID, semaglutide 2.4 mg weekly    Due:  Labs  Cervical cancer screening  Vaccines     History of Present Illness    CHIEF COMPLAINT:  Patient presents today to est care     SKIN LESION:  She reports a lesion on her upper back present for 1-2 weeks. She noticed a hard spot and began picking at it, which resulted in bleeding. She denies any discharge other than blood, and denies fever or chills since manipulating the lesion.    MEDICAL HISTORY:  She has a history of weight gain with maximum weight of 210 lbs, depression, and borderline diabetes. She has liver cysts and history of gallstones with bile duct obstruction requiring stone removal and cholecystectomy. She denies history of cirrhosis.    MEDICATIONS:  She takes Vitamin D weekly, Flonase and Singulair for seasonal allergies, Hydroxyzine for itching, Linzess for chronic constipation with good response, Metoprolol succinate for blood pressure, Protonix for reflux, Potassium supplementation, and Wegovy for weight loss. She reports good tolerance of Wegovy without nausea, vomiting, or abdominal pain.    SOCIAL HISTORY:  She denies smoking, vaping, illicit drug use, and alcohol consumption.    PREVENTIVE CARE:  She completed well woman exam and Pap smear in January of this year. Laboratory tests were performed yesterday at liver doctor appointment.         Lab Visit on 05/14/2025   Component Date Value Ref Range Status    Sodium 05/14/2025 141  136 - 145 mmol/L Final    Potassium 05/14/2025 3.4 (L)  3.5 - 5.1 mmol/L Final    Chloride 05/14/2025 106  95 - 110 mmol/L Final    CO2 05/14/2025  24  23 - 29 mmol/L Final    Glucose 05/14/2025 73  70 - 110 mg/dL Final    BUN 05/14/2025 11  8 - 23 mg/dL Final    Creatinine 05/14/2025 0.9  0.5 - 1.4 mg/dL Final    Calcium 05/14/2025 8.7  8.7 - 10.5 mg/dL Final    Protein Total 05/14/2025 6.7  6.0 - 8.4 gm/dL Final    Albumin 05/14/2025 3.3 (L)  3.5 - 5.2 g/dL Final    Bilirubin Total 05/14/2025 0.4  0.1 - 1.0 mg/dL Final    ALP 05/14/2025 103  40 - 150 unit/L Final    AST 05/14/2025 16  11 - 45 unit/L Final    ALT 05/14/2025 10  10 - 44 unit/L Final    Anion Gap 05/14/2025 11  8 - 16 mmol/L Final    eGFR 05/14/2025 >60  >60 mL/min/1.73/m2 Final    PT 05/14/2025 10.8  9.0 - 12.5 seconds Final    INR 05/14/2025 1.0  0.8 - 1.2 Final    Bilirubin Direct 05/14/2025 0.2  0.1 - 0.3 mg/dL Final    WBC 05/14/2025 6.37  3.90 - 12.70 K/uL Final    RBC 05/14/2025 4.21  4.00 - 5.40 M/uL Final    HGB 05/14/2025 12.7  12.0 - 16.0 gm/dL Final    HCT 05/14/2025 39.1  37.0 - 48.5 % Final    MCV 05/14/2025 93  82 - 98 fL Final    MCH 05/14/2025 30.2  27.0 - 31.0 pg Final    MCHC 05/14/2025 32.5  32.0 - 36.0 g/dL Final    RDW 05/14/2025 12.7  11.5 - 14.5 % Final    Platelet Count 05/14/2025 256  150 - 450 K/uL Final    MPV 05/14/2025 10.1  9.2 - 12.9 fL Final    Nucleated RBC 05/14/2025 0  <=0 /100 WBC Final    Neut % 05/14/2025 35.2 (L)  38 - 73 % Final    Lymph % 05/14/2025 58.6 (H)  18 - 48 % Final    Mono % 05/14/2025 3.9 (L)  4 - 15 % Final    Eos % 05/14/2025 1.6  <=8 % Final    Basophil % 05/14/2025 0.5  <=1.9 % Final    Imm Grans % 05/14/2025 0.2  0.0 - 0.5 % Final    Neut # 05/14/2025 2.25  1.8 - 7.7 K/uL Final    Lymph # 05/14/2025 3.73  1 - 4.8 K/uL Final    Mono # 05/14/2025 0.25 (L)  0.3 - 1 K/uL Final    Eos # 05/14/2025 0.10  <=0.5 K/uL Final    Baso # 05/14/2025 0.03  <=0.2 K/uL Final    Imm Grans # 05/14/2025 0.01  0.00 - 0.04 K/uL Final         Medications Ordered Prior to Encounter[1]  Past Medical History:   Diagnosis Date    Allergy     Bronchitis 07/17/2015     Cervical disc displacement     Edema     Hypertension     Insomnia     Liver cyst     Plantar fasciitis      Past Surgical History:   Procedure Laterality Date    COLONOSCOPY  11/01/2017    Dr. Dibuono, normal, ten year recheck    COLONOSCOPY N/A 11/01/2017    Procedure: COLONOSCOPY;  Surgeon: Cameron Oviedo MD;  Location: [facility];  Service: Endoscopy;  Laterality: N/A;    EPIDURAL STEROID INJECTION INTO CERVICAL SPINE N/A 07/22/2019    Procedure: Injection-steroid-epidural-cervical;  Surgeon: Thomas Ivory MD;  Location: [facility];  Service: Pain Management;  Laterality: N/A;  C7-T1    EPIDURAL STEROID INJECTION INTO CERVICAL SPINE N/A 12/21/2021    Procedure: Injection-steroid-epidural-cervical;  Surgeon: Thomas Ivory MD;  Location: [facility];  Service: Pain Management;  Laterality: N/A;  C6-7    ERCP N/A 11/27/2023    Procedure: ERCP (ENDOSCOPIC RETROGRADE CHOLANGIOPANCREATOGRAPHY);  Surgeon: Anselmo Winter III, MD;  Location: [facility];  Service: Endoscopy;  Laterality: N/A;    ESOPHAGOGASTRODUODENOSCOPY N/A 08/31/2023    Procedure: EGD (ESOPHAGOGASTRODUODENOSCOPY);  Surgeon: Cameron Oviedo MD;  Location: [facility];  Service: Endoscopy;  Laterality: N/A;    INJECTION OF ANESTHETIC AGENT AROUND STELLATE GANGLION Left 08/30/2022    Procedure: BLOCK, GANGLION, STELLATE;  Surgeon: Thomas Ivory MD;  Location: [facility];  Service: Pain Management;  Laterality: Left;    INJECTION OF ANESTHETIC AGENT AROUND STELLATE GANGLION Left 10/21/2022    Procedure: BLOCK, GANGLION, STELLATE Left;  Surgeon: Thomas Ivory MD;  Location: [facility];  Service: Pain Management;  Laterality: Left;    INJECTION OF ANESTHETIC AGENT AROUND STELLATE GANGLION Left 12/27/2022    Procedure: BLOCK, GANGLION, STELLATE;  Surgeon: Thomas Ivory MD;  Location: [facility];  Service: Pain Management;  Laterality: Left;  left    LAPAROSCOPIC CHOLECYSTECTOMY N/A 11/27/2023    Procedure: CHOLECYSTECTOMY, LAPAROSCOPIC;  Surgeon: Al Cyr MD;   Location: [facility];  Service: General;  Laterality: N/A;    TONSILLECTOMY, ADENOIDECTOMY Bilateral 07/23/2021    Procedure: TONSILLECTOMY AND ADENOIDECTOMY;  Surgeon: Anselmo Galvan MD;  Location: [facility];  Service: ENT;  Laterality: Bilateral;    UPPER GASTROINTESTINAL ENDOSCOPY      WISDOM TOOTH EXTRACTION       Past Surgical History:   Procedure Laterality Date    COLONOSCOPY  11/01/2017    Dr. Dibuono, normal, ten year recheck    COLONOSCOPY N/A 11/01/2017    Procedure: COLONOSCOPY;  Surgeon: Cameron Oviedo MD;  Location: [facility];  Service: Endoscopy;  Laterality: N/A;    EPIDURAL STEROID INJECTION INTO CERVICAL SPINE N/A 07/22/2019    Procedure: Injection-steroid-epidural-cervical;  Surgeon: Thomas Ivory MD;  Location: [facility];  Service: Pain Management;  Laterality: N/A;  C7-T1    EPIDURAL STEROID INJECTION INTO CERVICAL SPINE N/A 12/21/2021    Procedure: Injection-steroid-epidural-cervical;  Surgeon: Thomas Ivory MD;  Location: [facility];  Service: Pain Management;  Laterality: N/A;  C6-7    ERCP N/A 11/27/2023    Procedure: ERCP (ENDOSCOPIC RETROGRADE CHOLANGIOPANCREATOGRAPHY);  Surgeon: Anselmo Winter III, MD;  Location: [facility];  Service: Endoscopy;  Laterality: N/A;    ESOPHAGOGASTRODUODENOSCOPY N/A 08/31/2023    Procedure: EGD (ESOPHAGOGASTRODUODENOSCOPY);  Surgeon: Cameron Oviedo MD;  Location: [facility];  Service: Endoscopy;  Laterality: N/A;    INJECTION OF ANESTHETIC AGENT AROUND STELLATE GANGLION Left 08/30/2022    Procedure: BLOCK, GANGLION, STELLATE;  Surgeon: Thomas Ivory MD;  Location: [facility];  Service: Pain Management;  Laterality: Left;    INJECTION OF ANESTHETIC AGENT AROUND STELLATE GANGLION Left 10/21/2022    Procedure: BLOCK, GANGLION, STELLATE Left;  Surgeon: Thomas Ivory MD;  Location: [facility];  Service: Pain Management;  Laterality: Left;    INJECTION OF ANESTHETIC AGENT AROUND STELLATE GANGLION Left 12/27/2022    Procedure: BLOCK, GANGLION, STELLATE;  Surgeon: Thomas HOPE  MD Cachorro;  Location: [facility] OR;  Service: Pain Management;  Laterality: Left;  left    LAPAROSCOPIC CHOLECYSTECTOMY N/A 11/27/2023    Procedure: CHOLECYSTECTOMY, LAPAROSCOPIC;  Surgeon: Al Cyr MD;  Location: [facility] OR;  Service: General;  Laterality: N/A;    TONSILLECTOMY, ADENOIDECTOMY Bilateral 07/23/2021    Procedure: TONSILLECTOMY AND ADENOIDECTOMY;  Surgeon: Anselmo Galvan MD;  Location: [facility] OR;  Service: ENT;  Laterality: Bilateral;    UPPER GASTROINTESTINAL ENDOSCOPY      WISDOM TOOTH EXTRACTION       Family History   Problem Relation Name Age of Onset    Heart disease Mother      Stroke Mother      Cancer Father          bone ca, prostate ca     Hypertension Sister R     Diabetes Sister R     Allergies Sister R     Asthma Sister R     Hypertension Sister S     Allergies Sister S     Asthma Sister S     Heart disease Brother      Kidney failure Brother      Angioedema Neg Hx      Eczema Neg Hx      Immunodeficiency Neg Hx      Urticaria Neg Hx      Colon cancer Neg Hx      Colon polyps Neg Hx      Esophageal cancer Neg Hx      Stomach cancer Neg Hx         Marital Status:   Alcohol History:  reports no history of alcohol use.  Tobacco History:  reports that she has never smoked. She has never used smokeless tobacco.  Drug History:  reports no history of drug use.    Health Maintenance Topics with due status: Not Due       Topic Last Completion Date    Colorectal Cancer Screening 11/01/2017    TETANUS VACCINE 11/08/2021    Lipid Panel 11/07/2023    Mammogram 09/23/2024    Influenza Vaccine Not Due     Immunization History   Administered Date(s) Administered    COVID-19, MRNA, LN-S, PF (Pfizer) (Purple Cap) 03/05/2021, 03/26/2021, 10/28/2021    Tdap 08/15/2011, 11/08/2021       Review of patient's allergies indicates:   Allergen Reactions    Omnicef [cefdinir] Itching     Current Medications[2]    Review of Systems   Constitutional:  Negative for chills and fever.   Respiratory:  Negative for  "shortness of breath.    Cardiovascular:  Negative for chest pain.   Gastrointestinal:  Positive for constipation. Negative for nausea and vomiting.       OBJECTIVE:      Vitals:    05/15/25 1309   BP: 108/74   BP Location: Right arm   Patient Position: Sitting   Pulse: 81   SpO2: 98%   Weight: 80.1 kg (176 lb 9.4 oz)   Height: 5' 3" (1.6 m)     Physical Exam  Constitutional:       General: She is not in acute distress.     Appearance: She is not ill-appearing or toxic-appearing.   HENT:      Head: Normocephalic and atraumatic.      Mouth/Throat:      Mouth: Mucous membranes are moist.      Pharynx: Uvula midline. No pharyngeal swelling.   Eyes:      Extraocular Movements: Extraocular movements intact.      Pupils: Pupils are equal, round, and reactive to light.   Cardiovascular:      Rate and Rhythm: Normal rate and regular rhythm.   Pulmonary:      Effort: Pulmonary effort is normal. No tachypnea, bradypnea, accessory muscle usage, prolonged expiration or respiratory distress.      Breath sounds: Normal breath sounds. No stridor. No wheezing, rhonchi or rales.   Abdominal:      General: Bowel sounds are normal. There is no distension.      Palpations: Abdomen is soft.      Tenderness: There is no abdominal tenderness. There is no guarding or rebound.   Neurological:      General: No focal deficit present.      Mental Status: She is alert.   Psychiatric:         Mood and Affect: Mood normal.         Behavior: Behavior normal.        Assessment:       1. Routine general medical examination at a health care facility    2. Essential hypertension    3. Hypokalemia    4. Vitamin D deficiency    5. Chronic GERD    6. DDD (degenerative disc disease), cervical    7. Mild intermittent reactive airway disease without complication    8. Constipation, unspecified constipation type    9. Encounter for lipid screening for cardiovascular disease    10. Screening for diabetes mellitus (DM)    11. Skin lesion        Plan:     "   Routine general medical examination at a health care facility  -     CBC Auto Differential; Future; Expected date: 05/15/2025  -     Comprehensive Metabolic Panel; Future; Expected date: 05/15/2025  - Considered vaccination status, particularly for shingles given age and chronic conditions like hypertension and prediabetes.  - Explained shingles vaccine recommendation for patients 50 and older.  - Described shingles virus characteristics, including: dormancy in the body and reactivation during stress, potential complications such as vision and hearing loss, and typical presentation as a painful, unilateral rash.  - Discussed shingles vaccine efficacy (97% if completed before age 69) and administration (2-dose series, 2-6 months apart).    Essential hypertension  -     CBC Auto Differential; Future; Expected date: 05/15/2025  -     Comprehensive Metabolic Panel; Future; Expected date: 05/15/2025  -     Microalbumin/Creatinine Ratio, Urine; Future; Expected date: 05/15/2025  -     TSH; Future; Expected date: 05/15/2025  - Patient is currently taking metoprolol succinate for blood pressure management.    Hypokalemia  -     Comprehensive Metabolic Panel; Future; Expected date: 05/15/2025  - Will recheck potassium levels and give supplementation if needed     Vitamin D deficiency  -     Vitamin D; Future; Expected date: 05/15/2025  -     ergocalciferol (ERGOCALCIFEROL) 50,000 unit Cap; Take 1 capsule (50,000 Units total) by mouth once a week.    Chronic GERD  - Patient takes pantoprazole for gastroesophageal reflux management.    DDD (degenerative disc disease), cervical  - Stable   - Continue to monitor  - Continue with supportive care     Constipation, unspecified constipation type  - Stable  - Continue with Linzess     Encounter for lipid screening for cardiovascular disease  -     Lipid Panel; Future; Expected date: 05/15/2025    Screening for diabetes mellitus (DM)  -     Hemoglobin A1C; Future; Expected date:  05/15/2025    Skin lesion  -     mupirocin (BACTROBAN) 2 % ointment; Apply topically 3 (three) times daily.  Dispense: 30 g; Refill: 0  - Assessed skin lesion on upper back - continue with mupirocin   - Patient reports a spot that was initially hard, which was picked at and subsequently enlarged.  - No purulent discharge was noted, only blood.  - No fever or chills reported since manipulating the lesion.    LIVER DISEASE:  - Evaluated liver health, noting previous elevated hepatic enzymes now normalized after cholecystectomy.  - Patient has hepatic cysts.  - Patient follows up with Dr. TOBAR for hepatic issues.    ALLERGIC RHINITIS:  - Patient takes fluticasone nasal spray and montelukast for allergy management.    Counseled on age and gender appropriate medical preventative services, including cancer screenings, immunizations, overall nutritional health, need for a consistent exercise regimen and an overall push towards maintaining a vigorous and active lifestyle.        Yana Watts M.D.  5/18/2025    This note was created using [employer] voice recognition software that occasionally misinterprets phrases or words            [1]   Current Outpatient Medications on File Prior to Visit   Medication Sig Dispense Refill    diazePAM (VALIUM) 10 MG Tab Take 10 mg by mouth every evening.  1    fluconazole (DIFLUCAN) 150 MG Tab Take 150 mg by mouth once.      fluticasone propionate (FLONASE) 50 mcg/actuation nasal spray 2 sprays (100 mcg total) by Each Nostril route once daily. 48 g 4    furosemide (LASIX) 40 MG tablet Take 1 tablet (40 mg total) by mouth once daily. 90 tablet 1    guaiFENesin-codeine 100-10 mg/5 ml (TUSSI-ORGANIDIN NR)  mg/5 mL syrup Take by mouth.      hydrOXYzine pamoate (VISTARIL) 25 MG Cap Take 25 mg by mouth 2 (two) times daily as needed.      linaCLOtide (LINZESS) 290 mcg Cap capsule Take 1 capsule (290 mcg total) by mouth before breakfast. 90 capsule 3    metoprolol succinate (TOPROL-XL) 50 MG 24  hr tablet Take 1 tablet (50 mg total) by mouth once daily. 90 tablet 3    montelukast (SINGULAIR) 10 mg tablet Take 1 tablet (10 mg total) by mouth every evening. 90 tablet 3    pantoprazole (PROTONIX) 40 MG tablet Take 1 tablet (40 mg total) by mouth every morning. 90 tablet 3    potassium chloride SA (K-DUR,KLOR-CON M) 10 MEQ tablet Take 1 tablet (10 mEq total) by mouth 2 (two) times daily. 180 tablet 3    promethazine (PHENERGAN) 25 MG tablet Take 1 tablet (25 mg total) by mouth every 6 (six) hours as needed for Nausea (nausea). 20 tablet 1    semaglutide, weight loss, (WEGOVY) 2.4 mg/0.75 mL PnIj Inject 2.4 mg into the skin every 7 days. Dispense 3 boxes 9 mL 3    triamcinolone acetonide 0.1% (KENALOG) 0.1 % paste SMARTSIG:Sparingly TO TEETH 3 Times Daily      [DISCONTINUED] ergocalciferol (ERGOCALCIFEROL) 50,000 unit Cap Take 1 capsule (50,000 Units total) by mouth twice a week. 24 capsule 3     Current Facility-Administered Medications on File Prior to Visit   Medication Dose Route Frequency Provider Last Rate Last Admin    lactated ringers infusion   Intravenous Continuous Thomas Ivory MD 25 mL/hr at 12/21/21 1240 New Bag at 11/27/23 0853    lactated ringers infusion   Intravenous Continuous Thomas Ivory MD       [2]   Current Outpatient Medications:     diazePAM (VALIUM) 10 MG Tab, Take 10 mg by mouth every evening., Disp: , Rfl: 1    fluconazole (DIFLUCAN) 150 MG Tab, Take 150 mg by mouth once., Disp: , Rfl:     fluticasone propionate (FLONASE) 50 mcg/actuation nasal spray, 2 sprays (100 mcg total) by Each Nostril route once daily., Disp: 48 g, Rfl: 4    furosemide (LASIX) 40 MG tablet, Take 1 tablet (40 mg total) by mouth once daily., Disp: 90 tablet, Rfl: 1    guaiFENesin-codeine 100-10 mg/5 ml (TUSSI-ORGANIDIN NR)  mg/5 mL syrup, Take by mouth., Disp: , Rfl:     hydrOXYzine pamoate (VISTARIL) 25 MG Cap, Take 25 mg by mouth 2 (two) times daily as needed., Disp: , Rfl:     linaCLOtide (LINZESS) 290  mcg Cap capsule, Take 1 capsule (290 mcg total) by mouth before breakfast., Disp: 90 capsule, Rfl: 3    metoprolol succinate (TOPROL-XL) 50 MG 24 hr tablet, Take 1 tablet (50 mg total) by mouth once daily., Disp: 90 tablet, Rfl: 3    montelukast (SINGULAIR) 10 mg tablet, Take 1 tablet (10 mg total) by mouth every evening., Disp: 90 tablet, Rfl: 3    pantoprazole (PROTONIX) 40 MG tablet, Take 1 tablet (40 mg total) by mouth every morning., Disp: 90 tablet, Rfl: 3    potassium chloride SA (K-DUR,KLOR-CON M) 10 MEQ tablet, Take 1 tablet (10 mEq total) by mouth 2 (two) times daily., Disp: 180 tablet, Rfl: 3    promethazine (PHENERGAN) 25 MG tablet, Take 1 tablet (25 mg total) by mouth every 6 (six) hours as needed for Nausea (nausea)., Disp: 20 tablet, Rfl: 1    semaglutide, weight loss, (WEGOVY) 2.4 mg/0.75 mL PnIj, Inject 2.4 mg into the skin every 7 days. Dispense 3 boxes, Disp: 9 mL, Rfl: 3    triamcinolone acetonide 0.1% (KENALOG) 0.1 % paste, SMARTSIG:Sparingly TO TEETH 3 Times Daily, Disp: , Rfl:     ergocalciferol (ERGOCALCIFEROL) 50,000 unit Cap, Take 1 capsule (50,000 Units total) by mouth once a week., Disp: , Rfl:     mupirocin (BACTROBAN) 2 % ointment, Apply topically 3 (three) times daily., Disp: 30 g, Rfl: 0  No current facility-administered medications for this visit.    Facility-Administered Medications Ordered in Other Visits:     lactated ringers infusion, , Intravenous, Continuous, Thomas Ivory MD, Last Rate: 25 mL/hr at 12/21/21 1240, New Bag at 11/27/23 0853    lactated ringers infusion, , Intravenous, Continuous, Thomas Ivory MD

## 2025-05-18 RX ORDER — ERGOCALCIFEROL 1.25 MG/1
50000 CAPSULE ORAL WEEKLY
Start: 2025-05-18

## 2025-05-23 ENCOUNTER — TELEPHONE (OUTPATIENT)
Dept: FAMILY MEDICINE | Facility: CLINIC | Age: 60
End: 2025-05-23

## 2025-05-23 NOTE — TELEPHONE ENCOUNTER
----- Message from Cassia sent at 5/23/2025  9:08 AM CDT -----  Name of Who is Calling: Lynn  What is the request in detail: Pt request call back about AVS from last visit. She also states that she was told to get the shingles shot and got a reaction and is not feeling well. Pt wants advise on that wants to know should she get the second dose or not. She states that she left a message before and no one called her back.   Can the clinic reply by [employer]:  What Number to Call Back if not in [facility]: Telephone Information:Mobile          [phone]

## 2025-05-23 NOTE — TELEPHONE ENCOUNTER
Spoke to pt who states her AVS states in the notes it states pt has a history of depression. Pt states she does not have a history of depression. Pt states she was on some meds for a little while during a time with her grand daughter running away. Pt states she is no longer on the medication. Pt states she does not want this in her chart and wishes for it to be removed.    Pt also wants to know if she has to have  the 2nd shingles shot. She states after the first injection she experienced headache, diarrhea, soreness in arms, mild fever, chills, and redness to site. Pt states she is not interested in getting the 2nd injection. Please advise

## 2025-05-26 ENCOUNTER — TELEPHONE (OUTPATIENT)
Dept: FAMILY MEDICINE | Facility: CLINIC | Age: 60
End: 2025-05-26
Payer: COMMERCIAL

## 2025-05-26 NOTE — TELEPHONE ENCOUNTER
Spoke with patient states she received shingles vaccine Tuesday 05/20/25 at [facility] States experiencing fatigue, headache, soreness in arm and redness. Advised patient to be seen at UC or ER pt declined. Scheduled to see Dr. Watts 05/27/25.

## 2025-05-26 NOTE — TELEPHONE ENCOUNTER
----- Message from Jennifer sent at 5/26/2025  4:05 PM CDT -----  Type:  Sooner Appointment Request Caller is requesting a sooner appointment.  Caller declined first available appointment listed below.  Caller will not accept being placed on the waitlist and is requesting a message be sent to doctor. Name of Caller:pt When is the first available appointment?5/27 Symptoms:headache, arm swollen and warm, fatigue Would the patient rather a call back or a response via [facility]? Call zora Best Call Back Number:[phone] Additional Information: pt received shingles vaccine last Tuesday and she is suffering with a constant headache and her arm is a little swollen and warm. She is also fatigued. We scheduled her for tomorrow at 3 but she needs to be seen asap. Also, can the office please let her know what she can take for the headache because she has been taking advil but it is not helping.     Please call Back to advise. Thanks!

## 2025-05-27 ENCOUNTER — PATIENT OUTREACH (OUTPATIENT)
Dept: ADMINISTRATIVE | Facility: HOSPITAL | Age: 60
End: 2025-05-27
Payer: COMMERCIAL

## 2025-05-27 ENCOUNTER — OFFICE VISIT (OUTPATIENT)
Dept: FAMILY MEDICINE | Facility: CLINIC | Age: 60
End: 2025-05-27
Payer: COMMERCIAL

## 2025-05-27 VITALS
OXYGEN SATURATION: 98 % | BODY MASS INDEX: 31.13 KG/M2 | DIASTOLIC BLOOD PRESSURE: 78 MMHG | HEART RATE: 78 BPM | WEIGHT: 175.69 LBS | HEIGHT: 63 IN | SYSTOLIC BLOOD PRESSURE: 102 MMHG

## 2025-05-27 DIAGNOSIS — T80.90XA INJECTION SITE REACTION, INITIAL ENCOUNTER: ICD-10-CM

## 2025-05-27 DIAGNOSIS — R51.9 ACUTE NONINTRACTABLE HEADACHE, UNSPECIFIED HEADACHE TYPE: Primary | ICD-10-CM

## 2025-05-27 PROCEDURE — 99213 OFFICE O/P EST LOW 20 MIN: CPT | Mod: 25,S$GLB,, | Performed by: STUDENT IN AN ORGANIZED HEALTH CARE EDUCATION/TRAINING PROGRAM

## 2025-05-27 PROCEDURE — 3074F SYST BP LT 130 MM HG: CPT | Mod: CPTII,S$GLB,, | Performed by: STUDENT IN AN ORGANIZED HEALTH CARE EDUCATION/TRAINING PROGRAM

## 2025-05-27 PROCEDURE — 3008F BODY MASS INDEX DOCD: CPT | Mod: CPTII,S$GLB,, | Performed by: STUDENT IN AN ORGANIZED HEALTH CARE EDUCATION/TRAINING PROGRAM

## 2025-05-27 PROCEDURE — 3078F DIAST BP <80 MM HG: CPT | Mod: CPTII,S$GLB,, | Performed by: STUDENT IN AN ORGANIZED HEALTH CARE EDUCATION/TRAINING PROGRAM

## 2025-05-27 PROCEDURE — 1159F MED LIST DOCD IN RCRD: CPT | Mod: CPTII,S$GLB,, | Performed by: STUDENT IN AN ORGANIZED HEALTH CARE EDUCATION/TRAINING PROGRAM

## 2025-05-27 PROCEDURE — 99999 PR PBB SHADOW E&M-EST. PATIENT-LVL V: CPT | Mod: PBBFAC,,, | Performed by: STUDENT IN AN ORGANIZED HEALTH CARE EDUCATION/TRAINING PROGRAM

## 2025-05-27 PROCEDURE — 1160F RVW MEDS BY RX/DR IN RCRD: CPT | Mod: CPTII,S$GLB,, | Performed by: STUDENT IN AN ORGANIZED HEALTH CARE EDUCATION/TRAINING PROGRAM

## 2025-05-27 PROCEDURE — 96372 THER/PROPH/DIAG INJ SC/IM: CPT | Mod: S$GLB,,, | Performed by: STUDENT IN AN ORGANIZED HEALTH CARE EDUCATION/TRAINING PROGRAM

## 2025-05-27 RX ORDER — BUTALBITAL, ACETAMINOPHEN AND CAFFEINE 50; 325; 40 MG/1; MG/1; MG/1
1 TABLET ORAL DAILY PRN
Qty: 10 TABLET | Refills: 0 | Status: SHIPPED | OUTPATIENT
Start: 2025-05-27 | End: 2025-05-27

## 2025-05-27 RX ORDER — KETOROLAC TROMETHAMINE 30 MG/ML
30 INJECTION, SOLUTION INTRAMUSCULAR; INTRAVENOUS
Status: COMPLETED | OUTPATIENT
Start: 2025-05-27 | End: 2025-05-27

## 2025-05-27 RX ORDER — BUTALBITAL, ACETAMINOPHEN AND CAFFEINE 50; 325; 40 MG/1; MG/1; MG/1
1 TABLET ORAL DAILY PRN
Qty: 10 TABLET | Refills: 0 | Status: SHIPPED | OUTPATIENT
Start: 2025-05-27 | End: 2025-06-26

## 2025-05-27 RX ADMIN — KETOROLAC TROMETHAMINE 30 MG: 30 INJECTION, SOLUTION INTRAMUSCULAR; INTRAVENOUS at 08:05

## 2025-05-27 NOTE — PROGRESS NOTES
Identified name and . Administered 30 mg Ketorolac, IM  Right Upper Outer Quad Gluteus . Patient tolerated well, aseptic technique maintained. Pain scale 0/10 with injection. No residual bleeding at insertion site noted.

## 2025-05-27 NOTE — PROGRESS NOTES
"[facility]   OFFICE VISIT NOTE    Patient Name: Lynn Naidu  YOB: 1965    PRESENTING HISTORY     History of Present Illness:  Ms. Lynn Naidu is a 60 y.o. female here for a follow up    Patient got the first dose of shingles vaccine last week from [facility] on 5/20.  At the injection site, she had a raised, erythematous spot that was painful.  It is coming down and fading in color but still there.  Also had diarrhea for 2 days. BM is now normal.  Has been getting headache and is not responding to ibuprofen and tylenol.  Headache is across her forehead that is about 5-6/10 on pain scale.     Review of Systems   Constitutional:  Negative for chills and fever.   Respiratory:  Negative for shortness of breath.    Cardiovascular:  Negative for chest pain.   Gastrointestinal:  Negative for nausea and vomiting.   Neurological:  Positive for headaches.          OBJECTIVE:   Vital Signs:  Vitals:    05/27/25 0804   BP: 102/78   Pulse: 78   SpO2: 98%   Weight: 79.7 kg (175 lb 11.3 oz)   Height: 5' 3" (1.6 m)           Physical Exam  Constitutional:       General: She is not in acute distress.     Appearance: She is not ill-appearing or toxic-appearing.   HENT:      Head: Normocephalic and atraumatic.      Mouth/Throat:      Mouth: Mucous membranes are moist.      Pharynx: Uvula midline. No pharyngeal swelling.   Cardiovascular:      Rate and Rhythm: Normal rate and regular rhythm.   Pulmonary:      Effort: Pulmonary effort is normal. No tachypnea, bradypnea, accessory muscle usage, prolonged expiration or respiratory distress.      Breath sounds: Normal breath sounds. No stridor. No wheezing, rhonchi or rales.   Skin:         Neurological:      General: No focal deficit present.      Mental Status: She is alert. Mental status is at baseline.   Psychiatric:         Mood and Affect: Mood normal.         Behavior: Behavior normal.         ASSESSMENT & PLAN:     Acute nonintractable " headache, unspecified headache type  -     ketorolac injection 30 mg  -     butalbital-acetaminophen-caffeine -40 mg (FIORICET, ESGIC) -40 mg per tablet; Take 1 tablet by mouth daily as needed for Pain or Headaches.  Dispense: 10 tablet; Refill: 0  Ibuprofen or Acetaminophen for headache treatment PRN   Fioricet for more severe headache -- to be take once a day PRN     Injection site reaction, initial encounter  -     ketorolac injection 30 mg  Toradol shot given   Icing to the injection site  Antihistamine (zyrtec or claritin) for pruritus   No signs of infection or anaphylaxis    Yana Watts MD  Family Medicine  [facility]     This note was created using [employer] voice recognition software that occasionally misinterprets phrases or words

## 2025-05-27 NOTE — PROGRESS NOTES
Population Health Chart Review & Patient Outreach Details    Updates Requested / Reviewed:      Updated Care Coordination Note, Care Everywhere, External Sources: [facility], and Immunizations Reconciliation Completed or Queried: [facility] Topics Overdue:      [facility] Score: 1     Hemoglobin A1c    Pneumonia Vaccine  RSV Vaccine                  Health Maintenance Topic(s) Outreach Outcomes & Actions Taken:    Cervical Cancer Screening - Outreach Outcomes & Actions Taken  : External Records Uploaded & Care Team Updated if Applicable

## 2025-07-03 DIAGNOSIS — R60.9 EDEMA, UNSPECIFIED TYPE: ICD-10-CM

## 2025-07-03 DIAGNOSIS — R51.9 ACUTE NONINTRACTABLE HEADACHE, UNSPECIFIED HEADACHE TYPE: ICD-10-CM

## 2025-07-03 RX ORDER — BUTALBITAL, ACETAMINOPHEN AND CAFFEINE 50; 325; 40 MG/1; MG/1; MG/1
1 TABLET ORAL DAILY PRN
Qty: 10 TABLET | Refills: 0 | Status: SHIPPED | OUTPATIENT
Start: 2025-07-03 | End: 2025-07-03 | Stop reason: SDUPTHER

## 2025-07-03 RX ORDER — BUTALBITAL, ACETAMINOPHEN AND CAFFEINE 50; 325; 40 MG/1; MG/1; MG/1
1 TABLET ORAL DAILY PRN
Qty: 10 TABLET | Refills: 0 | Status: SHIPPED | OUTPATIENT
Start: 2025-07-03 | End: 2025-08-02

## 2025-07-03 RX ORDER — FUROSEMIDE 40 MG/1
40 TABLET ORAL DAILY
Qty: 90 TABLET | Refills: 1 | Status: SHIPPED | OUTPATIENT
Start: 2025-07-03

## 2025-07-03 NOTE — TELEPHONE ENCOUNTER
Copied from CRM #[id number]. Topic: Medications - Medication Refill  >> Jul 3, 2025 12:37 PM Sabine wrote:  Type:  RX Refill Request    Who Called:  pt  Refill or New Rx: refill  RX Name and Strength: furosemide (LASIX) 40 MG tablet & butalbital -ace caff 50-  How is the patient currently taking it? (ex. 1XDay):   Is this a 30 day or 90 day RX:  Preferred Pharmacy with phone number:  [facility] [address] [place] [phone] [employer]  [id number] [facility] [address]  Phone: [phone] Fax: [phone]      Local or Mail Order: local  Ordering Provider: Watts  Would the patient rather a call back or a response via [facility]? call  Best Call Back Number:Telephone Information:  Mobile          [phone]      Additional Information: pt not sure of the full name of headache medication please call when scripts are sent over

## (undated) DEVICE — SYR GLASS 5CC LUER LOK

## (undated) DEVICE — NDL SPINAL NEO QNCKE 25GX2IN

## (undated) DEVICE — SYR 10CC LUER LOCK

## (undated) DEVICE — TUBING MINIBORE EXTENSION

## (undated) DEVICE — NDL SAFETY 25G X 1.5 ECLIPSE

## (undated) DEVICE — SYS LABEL CORRECT MED

## (undated) DEVICE — SYR LUER LOCK STERILE 10ML

## (undated) DEVICE — STRAP TABLE 5X72 M5173

## (undated) DEVICE — CHLORAPREP 10.5 ML APPLICATOR

## (undated) DEVICE — GLOVE SURG ULTRA TOUCH 7.5

## (undated) DEVICE — BLADE SURGICAL GLASSVAN #12

## (undated) DEVICE — SLEEVE SCD EXPRESS CALF MEDIUM

## (undated) DEVICE — POUCH RETRIEVAL 10MM APP. MED.     CD001

## (undated) DEVICE — SUT 0 36IN COATED VICRYL VI

## (undated) DEVICE — SOL 9P NACL IRR PIC IL

## (undated) DEVICE — SYR DISP LL 5CC

## (undated) DEVICE — SPONGE TONSIL MEDIUM

## (undated) DEVICE — GLOVE BIOGELPI GOLD SIZE 7.5

## (undated) DEVICE — SPONGE GAUZE 16PLY 4X4

## (undated) DEVICE — GOWN SURGICAL BASICS XL

## (undated) DEVICE — ELECTRODE OLSEN HOOK L-SHAPED 13INX330MM

## (undated) DEVICE — NDL HYPODERMIC BLUNT 18G 1.5IN

## (undated) DEVICE — NDL TUOHY EPIDURAL 20G X 3.5

## (undated) DEVICE — APPLICATOR CHLORAPREP CLR 10.5

## (undated) DEVICE — SYR B-D DISP CONTROL 10CC100/C

## (undated) DEVICE — SUTURE MONOCRYL 4-0 PS-2 27 MCP426H

## (undated) DEVICE — SUCTION COAGULATOR 12FR 6IN

## (undated) DEVICE — GLOVE PI ULTRA TOUCH G SURGEON

## (undated) DEVICE — CANISTER SUCTION 3000CC

## (undated) DEVICE — GLOVE SURGEONS ULTRA TOUCH 6.5

## (undated) DEVICE — SUTURE VICRYL #0 27 UR-6 VCP603H

## (undated) DEVICE — CATH DOVER PVC URETH PVC 8FR

## (undated) DEVICE — NEEDLE INSUFFLATION 150MM PN150

## (undated) DEVICE — SEE MEDLINE ITEM 157116

## (undated) DEVICE — WIRE SNARE CTF TONSIL 4-1/2

## (undated) DEVICE — TRAY GENERAL LAPAROSCOPY

## (undated) DEVICE — SCISSORS CURVED WITH MONOPOLAR 5DCS

## (undated) DEVICE — TROCAR ENDOPATH XCEL BLADELESS B5LT

## (undated) DEVICE — ADHESIVE TISSUE EXOFIN

## (undated) DEVICE — NDL SPINAL 25GX3.5 SPINOCAN

## (undated) DEVICE — PACK NASAL SINUS

## (undated) DEVICE — CABLE MONOPOLAR 10FT DISPOSABLE

## (undated) DEVICE — SCRUB HIBICLENS 4% CHG 4OZ

## (undated) DEVICE — SUT 2/0 30IN SILK BLK BRAI

## (undated) DEVICE — ELECTRODE REM PLYHSV RETURN 9

## (undated) DEVICE — APPLIER CLIP  5MM

## (undated) DEVICE — TROCAR OPTICAL ZTHREAD 12MMX100MM CTF73

## (undated) DEVICE — SLEEVE TROCAR ENDOPATH XCEL

## (undated) DEVICE — SOLUTION IRRI H2O BOTTLE 1000ML

## (undated) DEVICE — SOLUTION IRRI NS BOTTLE 1000ML R5200-01

## (undated) DEVICE — PAD BOVIE ADULT

## (undated) DEVICE — WARMER SCOPE SEE SHARP